# Patient Record
Sex: FEMALE | Race: WHITE | NOT HISPANIC OR LATINO | Employment: FULL TIME | URBAN - METROPOLITAN AREA
[De-identification: names, ages, dates, MRNs, and addresses within clinical notes are randomized per-mention and may not be internally consistent; named-entity substitution may affect disease eponyms.]

---

## 2017-01-16 ENCOUNTER — TRANSFERRED RECORDS (OUTPATIENT)
Dept: HEALTH INFORMATION MANAGEMENT | Facility: CLINIC | Age: 47
End: 2017-01-16

## 2017-02-08 DIAGNOSIS — I10 ESSENTIAL HYPERTENSION WITH GOAL BLOOD PRESSURE LESS THAN 140/90: Primary | ICD-10-CM

## 2017-02-08 RX ORDER — TRIAMTERENE AND HYDROCHLOROTHIAZIDE 37.5; 25 MG/1; MG/1
1 CAPSULE ORAL DAILY
Qty: 90 CAPSULE | Refills: 0 | Status: SHIPPED | OUTPATIENT
Start: 2017-02-08 | End: 2017-05-07

## 2017-02-08 NOTE — TELEPHONE ENCOUNTER
triamterene-hydrochlorothiazide (DYAZIDE) 37.5-25 MG per capsule      Last Written Prescription Date: 12/14/16  Last Fill Quantity: 90, # refills: 0  Last Office Visit with G, P or [facility] prescribing provider: 11/14/16       POTASSIUM   Date Value Ref Range Status   06/24/2015 3.7 3.4 - 5.3 mmol/L Final     CREATININE   Date Value Ref Range Status   06/24/2015 0.71 0.52 - 1.04 mg/dL Final     BP Readings from Last 3 Encounters:   11/14/16 124/86   05/20/16 118/94   12/15/15 124/72

## 2017-05-07 DIAGNOSIS — I10 ESSENTIAL HYPERTENSION WITH GOAL BLOOD PRESSURE LESS THAN 140/90: ICD-10-CM

## 2017-05-08 NOTE — TELEPHONE ENCOUNTER
TRIAMTERENE 37.5MG/ HCTZ 25MG CAPS        Last Written Prescription Date: 2/8/17  Last Fill Quantity: 90, # refills: 0  Last Office Visit with [facility], P or [facility] prescribing provider: 11/14/16       Potassium   Date Value Ref Range Status   06/24/2015 3.7 3.4 - 5.3 mmol/L Final     Creatinine   Date Value Ref Range Status   06/24/2015 0.71 0.52 - 1.04 mg/dL Final     BP Readings from Last 3 Encounters:   11/14/16 124/86   05/20/16 (!) 118/94   12/15/15 124/72

## 2017-05-09 RX ORDER — TRIAMTERENE AND HYDROCHLOROTHIAZIDE 37.5; 25 MG/1; MG/1
CAPSULE ORAL
Qty: 90 CAPSULE | Refills: 0 | Status: SHIPPED | OUTPATIENT
Start: 2017-05-09 | End: 2020-07-06

## 2017-05-09 NOTE — TELEPHONE ENCOUNTER
"Routing refill request to provider for review/approval because:  The following documentation was noted on 11/14/16 and there has been no follow up:  \"(I10) Essential hypertension with goal blood pressure less than 140/90  Comment: well controlled haven't taken triamterene-hydrochlorothiazide (DYAZIDE) for 2 days,   Plan: discontinue medication for 3 months, recheck blood pressure at that time     (E78.5) Hyperlipidemia with target LDL less than 100  Comment: Reducing well with diet and exercise, not on any medication at this time  Plan: recheck lipid panel in 3 months\"  Eron De Leon RN          "

## 2017-08-10 ENCOUNTER — TRANSFERRED RECORDS (OUTPATIENT)
Dept: HEALTH INFORMATION MANAGEMENT | Facility: CLINIC | Age: 47
End: 2017-08-10

## 2017-12-26 ENCOUNTER — TELEPHONE (OUTPATIENT)
Dept: LAB | Facility: CLINIC | Age: 47
End: 2017-12-26

## 2017-12-26 NOTE — TELEPHONE ENCOUNTER
Spoke with pt and she is unable to remain at the [facility] due to insurance.  Will cancel these labs.  Ivy TENORIO RN

## 2018-03-05 RX ORDER — FLUCONAZOLE 150 MG/1
TABLET ORAL
Qty: 1 TABLET | Refills: 0 | OUTPATIENT
Start: 2018-03-05

## 2019-05-08 ENCOUNTER — RECORDS - HEALTHEAST (OUTPATIENT)
Dept: ADMINISTRATIVE | Facility: OTHER | Age: 49
End: 2019-05-08

## 2019-06-07 ENCOUNTER — RECORDS - HEALTHEAST (OUTPATIENT)
Dept: ADMINISTRATIVE | Facility: OTHER | Age: 49
End: 2019-06-07

## 2019-09-29 ENCOUNTER — HEALTH MAINTENANCE LETTER (OUTPATIENT)
Age: 49
End: 2019-09-29

## 2020-02-06 ENCOUNTER — VIRTUAL VISIT (OUTPATIENT)
Dept: FAMILY MEDICINE | Facility: OTHER | Age: 50
End: 2020-02-06

## 2020-02-07 NOTE — PROGRESS NOTES
"Date: 2020 18:28:36  Clinician: Heena De Leon  Clinician NPI: [id number]  Patient: Jaz Archibald  Patient : 1970  Patient Address: [facility] [place] [address]  Patient Phone: [phone]  Visit Protocol: URI  Patient Summary:  Jaz is a 50 year old ( : 1970 ) female who initiated a Visit for cold, sinus infection, or influenza. When asked the question \"Please sign me up to receive news, health information and promotions from [employer].\", Jaz responded \"Yes\".    Jaz states her symptoms started today.   Her symptoms consist of a cough, myalgia, chills, and malaise. Jaz also feels feverish.   Symptom details     Cough: Jaz coughs every 5-10 minutes and her cough is more bothersome at night. Phlegm does not come into her throat when she coughs.     Temperature: Her current temperature is 100.03 degrees Fahrenheit. Jaz has had a temperature over 100 degrees Fahrenheit for 1-2 days.      Jaz denies having facial pain or pressure, headache, sore throat, ear pain, enlarged lymph nodes, nasal congestion, rhinitis, wheezing, and teeth pain. She also denies having recent facial or sinus surgery in the past 60 days and taking antibiotic medication for the symptoms. She is not experiencing dyspnea.   Precipitating events  She has recently been exposed to someone with influenza. Jaz has not been in close contact with any high risk individuals.   Jaz has not traveled internationally in the last 14 days before the start of her symptoms.   Pertinent medical history  Jaz does not get yeast infections when she takes antibiotics.   Weight: 150 lbs   Jaz does not smoke or use smokeless tobacco.   Weight: 150 lbs    MEDICATIONS: Lexapro oral, ALLERGIES: trazodone, Lexapro, hydrochlorothiazide, omeprazole  Clinician Response:  Dear Jaz,  Based on the information provided, you have influenza (the flu). The flu is a very contagious infection that " can lead to a serious illness in anyone, but some are at risk for becoming more sick than others. For this reason, it is important to know you have the flu so you can take steps to prevent spreading it to others.  Medication information  I am prescribing:     Oseltamivir (Tamiflu) 75 mg oral capsule. Take 1 capsule by mouth 2 times per day for 5 days. There are no refills with this prescription.   Self care  The following tips will keep you as comfortable as possible while you recover:     Rest    Drink plenty of water and other liquids    Take a hot shower to loosen congestion    Take a spoonful of honey to reduce your cough     If you have a fever, stay home until your temperature has returned to normal for 24 hours and you feel well enough for daily activities. And of course, wash your hands often to prevent spreading the flu and other illnesses. However, the best way to prevent the flu is to get a flu shot before each flu season.  When to seek care  Please be seen in a clinic or urgent care if new symptoms develop, or symptoms become worse.   Diagnosis: Influenza  Diagnosis ICD: J11.1  Prescription: oseltamivir (Tamiflu) 75 mg oral capsule 10 capsule, 5 days supply. Take 1 capsule by mouth 2 times per day for 5 days. Refills: 0, Refill as needed: no, Allow substitutions: yes  Pharmacy: [facility] #[id number] - [phone] - [address] [place] [address]

## 2020-03-08 ENCOUNTER — VIRTUAL VISIT (OUTPATIENT)
Dept: FAMILY MEDICINE | Facility: OTHER | Age: 50
End: 2020-03-08

## 2020-03-08 NOTE — PROGRESS NOTES
"Date: 2020 12:10:19  Clinician: Girish Mock  Clinician NPI: [id number]  Patient: Jaz Archibald  Patient : 1970  Patient Address: [address] [place] [address] [place] [id number]  Patient Phone: [phone]  Visit Protocol: UTI  Patient Summary:  Jaz is a 50 year old ( : 1970 ) female who initiated a Visit for a presumed bladder infection. When asked the question \"Please sign me up to receive news, health information and promotions from [employer].\", Jaz responded \"Yes\".   Her symptoms started 1-3 days ago and consist of dysuria, feeling as if the bladder is never empty, abdominal pain, urinary frequency, urgency, and urinary incontinence.   Symptom details     Urine color: The color of her urine is yellow.     Abdominal pain: The pain is mild (1-3 on a 10 point pain scale).      Denied symptoms include foul-smelling urine, nausea, flank pain, chills, vaginal discharge, vomiting, and vaginal itching. She does not feel feverish.   Jaz has not used any over-the-counter medications or home remedies to relieve her current symptoms.  Precipitating events  Jaz denies having a sexually transmitted disease.  Pertinent medical history  Jaz has had a bladder infection before and has had 1 in the past 12 months. Her most recent bladder infection was not within the last 4 weeks. Her current symptoms are similar to her previous bladder infection symptoms.   She is not sure what antibiotics have been effective in treating her past bladder infections.   Jaz typically gets a yeast infection when she takes antibiotics. She has used fluconazole (Diflucan) to treat previous yeast infections. 2 doses of fluconazole (Diflucan) has typically been needed for symptoms to resolve in the past.  Jaz has not been prescribed antibiotics to prevent frequent or repeated bladder infections in the past. She has not experienced problems or side effects with any of the common antibiotics used to treat " bladder infections.   Jaz does not have a history of kidney stones. She has not used a catheter or been a patient in a hospital or nursing home in the past 2 weeks.   Jaz does not smoke or use smokeless tobacco.   Additional information as reported by the patient (free text): There is one antibiotic that makes me nauseated, but, unfortunately I do not remember the name. I also have q t prolongation (one test) and a DrLaura told me that it could change prescription antibiotics. Thanks!     MEDICATIONS: triamterene-hydrochlorothiazide oral, omeprazole oral, Lexapro oral, ALLERGIES: NKDA  Clinician Response:  Dear Jaz,  Based on the information you have provided, you likely have an acute urinary tract infection, also called a bladder infection. Bladder infections occur when bacteria from the outside of the body enters the urinary tract. Any part of the urinary system can be infected, but the bladder is the most common.  Medication information  I am prescribing:     Nitrofurantoin monohyd/m-cryst (Macrobid) 100 mg oral capsule. Take 1 capsule by mouth every 12 hours for 5 days. Take this medication with food. There are no refills with this prescription.   The medication I prescribed for your bladder infection is an antibiotic. Continue taking the medication until it is gone even if you feel better.   Yeast infections can be a common side effect of antibiotics. The most common symptom of a yeast infection is itchiness in and around the vagina. Other signs and symptoms include burning, redness, or a thick, white vaginal discharge that looks like cottage cheese and does not have a bad smell.  Self care  Urination helps to flush bacteria from the urinary tract. For this reason, drinking water and urinating often helps relieve some urinary symptoms and can decrease your risk of getting bladder infections in the future.  Other steps you can take to prevent future bladder infections include:     Wipe front to back  after using the bathroom    Urinate after sexual intercourse    Avoid using deodorant sprays, douches, or powders in the vaginal area     When to seek care  Please make an appointment to be seen in a clinic or urgent care if any of the following occur:     You develop new symptoms or your symptoms become worse    You have medication side effects that make it difficult to take them as prescribed    Your symptoms do not improve within 1-2 days of starting treatment    You have symptoms of a bladder infection that return shortly after completing treatment     It is possible to have an allergic reaction to an antibiotic even if you have not had one in the past. If you notice a new rash, significant swelling, or difficulty breathing, stop taking this medication immediately and go to a clinic or urgent care.   Diagnosis: Acute uncomplicated bladder infection  Diagnosis ICD: N39.0  Prescription: nitrofurantoin monohyd/m-cryst (Macrobid) 100 mg oral capsule 10 capsule, 5 days supply. Take 1 capsule by mouth every 12 hours for 5 days. Refills: 0, Refill as needed: no, Allow substitutions: yes  Pharmacy: [facility] #[id number] - [phone] - [address] [place] [address]  Addendum created: March 08 12:42:47, 2020 created by: Girish smith: 150 Mg tablet PO once  Addendum created: March 08 12:42:19, 2020 created by: Girish smith: Pyridium is available OTC.  Addendum created: March 08 12:14:18, 2020 created by: Girish smith: sorry, I forgot to add Diflucan.  If patient requests this please phone in for her with 1 refill.

## 2020-03-12 ENCOUNTER — OFFICE VISIT - HEALTHEAST (OUTPATIENT)
Dept: INTERNAL MEDICINE | Facility: CLINIC | Age: 50
End: 2020-03-12

## 2020-03-12 DIAGNOSIS — F41.1 ANXIETY STATE: ICD-10-CM

## 2020-03-12 DIAGNOSIS — Z00.00 ENCOUNTER FOR PREVENTIVE HEALTH EXAMINATION: ICD-10-CM

## 2020-03-12 DIAGNOSIS — Z85.828 HISTORY OF BASAL CELL CARCINOMA: ICD-10-CM

## 2020-03-12 DIAGNOSIS — F90.0 ATTENTION DEFICIT HYPERACTIVITY DISORDER (ADHD), PREDOMINANTLY INATTENTIVE TYPE: ICD-10-CM

## 2020-03-12 DIAGNOSIS — E78.00 ELEVATED LDL CHOLESTEROL LEVEL: ICD-10-CM

## 2020-03-12 DIAGNOSIS — Z12.31 VISIT FOR SCREENING MAMMOGRAM: ICD-10-CM

## 2020-03-12 DIAGNOSIS — E55.9 VITAMIN D DEFICIENCY: ICD-10-CM

## 2020-03-12 DIAGNOSIS — Z82.62 FAMILY HISTORY OF OSTEOPOROSIS: ICD-10-CM

## 2020-03-12 ASSESSMENT — MIFFLIN-ST. JEOR: SCORE: 1329.26

## 2020-03-18 ENCOUNTER — COMMUNICATION - HEALTHEAST (OUTPATIENT)
Dept: INTERNAL MEDICINE | Facility: CLINIC | Age: 50
End: 2020-03-18

## 2020-03-19 ENCOUNTER — COMMUNICATION - HEALTHEAST (OUTPATIENT)
Dept: INTERNAL MEDICINE | Facility: CLINIC | Age: 50
End: 2020-03-19

## 2020-05-14 ENCOUNTER — RECORDS - HEALTHEAST (OUTPATIENT)
Dept: ADMINISTRATIVE | Facility: OTHER | Age: 50
End: 2020-05-14

## 2020-05-25 ENCOUNTER — COMMUNICATION - HEALTHEAST (OUTPATIENT)
Dept: INTERNAL MEDICINE | Facility: CLINIC | Age: 50
End: 2020-05-25

## 2020-05-25 ENCOUNTER — COMMUNICATION - HEALTHEAST (OUTPATIENT)
Dept: SCHEDULING | Facility: CLINIC | Age: 50
End: 2020-05-25

## 2020-05-28 ENCOUNTER — OFFICE VISIT - HEALTHEAST (OUTPATIENT)
Dept: INTERNAL MEDICINE | Facility: CLINIC | Age: 50
End: 2020-05-28

## 2020-05-28 DIAGNOSIS — I10 BENIGN ESSENTIAL HYPERTENSION: ICD-10-CM

## 2020-05-28 DIAGNOSIS — K44.9 HIATAL HERNIA: ICD-10-CM

## 2020-05-28 DIAGNOSIS — K21.00 GASTROESOPHAGEAL REFLUX DISEASE WITH ESOPHAGITIS: ICD-10-CM

## 2020-05-28 ASSESSMENT — MIFFLIN-ST. JEOR: SCORE: 1333.8

## 2020-06-05 ENCOUNTER — RECORDS - HEALTHEAST (OUTPATIENT)
Dept: ADMINISTRATIVE | Facility: OTHER | Age: 50
End: 2020-06-05

## 2020-06-10 ENCOUNTER — COMMUNICATION - HEALTHEAST (OUTPATIENT)
Dept: INTERNAL MEDICINE | Facility: CLINIC | Age: 50
End: 2020-06-10

## 2020-06-11 ENCOUNTER — RECORDS - HEALTHEAST (OUTPATIENT)
Dept: ADMINISTRATIVE | Facility: OTHER | Age: 50
End: 2020-06-11

## 2020-06-16 ENCOUNTER — HOSPITAL ENCOUNTER (OUTPATIENT)
Dept: NUCLEAR MEDICINE | Facility: CLINIC | Age: 50
Setting detail: NUCLEAR MEDICINE
Discharge: HOME | End: 2020-06-16
Attending: INTERNAL MEDICINE | Admitting: INTERNAL MEDICINE
Payer: COMMERCIAL

## 2020-06-16 ENCOUNTER — RECORDS - HEALTHEAST (OUTPATIENT)
Dept: ADMINISTRATIVE | Facility: OTHER | Age: 50
End: 2020-06-16

## 2020-06-16 DIAGNOSIS — K21.9 CHRONIC GERD: ICD-10-CM

## 2020-06-16 DIAGNOSIS — K20.90 ESOPHAGITIS: ICD-10-CM

## 2020-06-16 PROCEDURE — A9541 TC99M SULFUR COLLOID: HCPCS | Performed by: RADIOLOGY

## 2020-06-16 PROCEDURE — 34300033 ZZH RX 343: Performed by: RADIOLOGY

## 2020-06-16 PROCEDURE — 78264 GASTRIC EMPTYING IMG STUDY: CPT

## 2020-06-16 RX ADMIN — Medication 1.1 MILLICURIE: at 08:05

## 2020-07-01 ENCOUNTER — COMMUNICATION - HEALTHEAST (OUTPATIENT)
Dept: INTERNAL MEDICINE | Facility: CLINIC | Age: 50
End: 2020-07-01

## 2020-07-01 DIAGNOSIS — Z00.00 ROUTINE GENERAL MEDICAL EXAMINATION AT A HEALTH CARE FACILITY: ICD-10-CM

## 2020-07-01 DIAGNOSIS — E55.9 VITAMIN D DEFICIENCY: Primary | ICD-10-CM

## 2020-07-03 DIAGNOSIS — Z00.00 ROUTINE GENERAL MEDICAL EXAMINATION AT A HEALTH CARE FACILITY: ICD-10-CM

## 2020-07-03 DIAGNOSIS — E55.9 VITAMIN D DEFICIENCY: ICD-10-CM

## 2020-07-03 LAB
ALBUMIN UR-MCNC: NEGATIVE MG/DL
APPEARANCE UR: CLEAR
BASOPHILS # BLD AUTO: 0.1 10E9/L (ref 0–0.2)
BASOPHILS NFR BLD AUTO: 0.8 %
BILIRUB UR QL STRIP: NEGATIVE
COLOR UR AUTO: YELLOW
DEPRECATED CALCIDIOL+CALCIFEROL SERPL-MC: 37 UG/L (ref 20–75)
DIFFERENTIAL METHOD BLD: NORMAL
EOSINOPHIL # BLD AUTO: 0.1 10E9/L (ref 0–0.7)
EOSINOPHIL NFR BLD AUTO: 1.5 %
ERYTHROCYTE [DISTWIDTH] IN BLOOD BY AUTOMATED COUNT: 13.6 % (ref 10–15)
FSH SERPL-ACNC: 1.5 IU/L
GLUCOSE UR STRIP-MCNC: NEGATIVE MG/DL
HCT VFR BLD AUTO: 44.3 % (ref 35–47)
HGB BLD-MCNC: 15.1 G/DL (ref 11.7–15.7)
HGB UR QL STRIP: NEGATIVE
KETONES UR STRIP-MCNC: NEGATIVE MG/DL
LEUKOCYTE ESTERASE UR QL STRIP: NEGATIVE
LH SERPL-ACNC: 3.7 IU/L
LYMPHOCYTES # BLD AUTO: 2.9 10E9/L (ref 0.8–5.3)
LYMPHOCYTES NFR BLD AUTO: 38.6 %
MCH RBC QN AUTO: 29.7 PG (ref 26.5–33)
MCHC RBC AUTO-ENTMCNC: 34.1 G/DL (ref 31.5–36.5)
MCV RBC AUTO: 87 FL (ref 78–100)
MONOCYTES # BLD AUTO: 0.6 10E9/L (ref 0–1.3)
MONOCYTES NFR BLD AUTO: 8.4 %
NEUTROPHILS # BLD AUTO: 3.8 10E9/L (ref 1.6–8.3)
NEUTROPHILS NFR BLD AUTO: 50.7 %
NITRATE UR QL: NEGATIVE
PH UR STRIP: 5.5 PH (ref 5–7)
PLATELET # BLD AUTO: 367 10E9/L (ref 150–450)
RBC # BLD AUTO: 5.09 10E12/L (ref 3.8–5.2)
SOURCE: NORMAL
SP GR UR STRIP: 1.02 (ref 1–1.03)
UROBILINOGEN UR STRIP-ACNC: 0.2 EU/DL (ref 0.2–1)
WBC # BLD AUTO: 7.5 10E9/L (ref 4–11)

## 2020-07-03 PROCEDURE — 82306 VITAMIN D 25 HYDROXY: CPT | Performed by: NURSE PRACTITIONER

## 2020-07-03 PROCEDURE — 83001 ASSAY OF GONADOTROPIN (FSH): CPT | Performed by: NURSE PRACTITIONER

## 2020-07-03 PROCEDURE — 36415 COLL VENOUS BLD VENIPUNCTURE: CPT | Performed by: NURSE PRACTITIONER

## 2020-07-03 PROCEDURE — 80050 GENERAL HEALTH PANEL: CPT | Performed by: NURSE PRACTITIONER

## 2020-07-03 PROCEDURE — 80061 LIPID PANEL: CPT | Performed by: NURSE PRACTITIONER

## 2020-07-03 PROCEDURE — 83002 ASSAY OF GONADOTROPIN (LH): CPT | Performed by: NURSE PRACTITIONER

## 2020-07-03 PROCEDURE — 81003 URINALYSIS AUTO W/O SCOPE: CPT | Performed by: NURSE PRACTITIONER

## 2020-07-04 LAB
ALBUMIN SERPL-MCNC: 3.9 G/DL (ref 3.4–5)
ALP SERPL-CCNC: 57 U/L (ref 40–150)
ALT SERPL W P-5'-P-CCNC: 21 U/L (ref 0–50)
ANION GAP SERPL CALCULATED.3IONS-SCNC: 10 MMOL/L (ref 3–14)
AST SERPL W P-5'-P-CCNC: 9 U/L (ref 0–45)
BILIRUB SERPL-MCNC: 0.4 MG/DL (ref 0.2–1.3)
BUN SERPL-MCNC: 16 MG/DL (ref 7–30)
CALCIUM SERPL-MCNC: 9 MG/DL (ref 8.5–10.1)
CHLORIDE SERPL-SCNC: 105 MMOL/L (ref 94–109)
CHOLEST SERPL-MCNC: 264 MG/DL
CO2 SERPL-SCNC: 20 MMOL/L (ref 20–32)
CREAT SERPL-MCNC: 0.76 MG/DL (ref 0.52–1.04)
GFR SERPL CREATININE-BSD FRML MDRD: >90 ML/MIN/{1.73_M2}
GLUCOSE SERPL-MCNC: 78 MG/DL (ref 70–99)
HDLC SERPL-MCNC: 57 MG/DL
LDLC SERPL CALC-MCNC: 160 MG/DL
NONHDLC SERPL-MCNC: 207 MG/DL
POTASSIUM SERPL-SCNC: 3.9 MMOL/L (ref 3.4–5.3)
PROT SERPL-MCNC: 7.9 G/DL (ref 6.8–8.8)
SODIUM SERPL-SCNC: 135 MMOL/L (ref 133–144)
TRIGL SERPL-MCNC: 235 MG/DL
TSH SERPL DL<=0.005 MIU/L-ACNC: 2.22 MU/L (ref 0.4–4)

## 2020-07-06 ENCOUNTER — OFFICE VISIT (OUTPATIENT)
Dept: FAMILY MEDICINE | Facility: CLINIC | Age: 50
End: 2020-07-06
Payer: COMMERCIAL

## 2020-07-06 ENCOUNTER — RECORDS - HEALTHEAST (OUTPATIENT)
Dept: ADMINISTRATIVE | Facility: OTHER | Age: 50
End: 2020-07-06

## 2020-07-06 VITALS
OXYGEN SATURATION: 95 % | SYSTOLIC BLOOD PRESSURE: 136 MMHG | TEMPERATURE: 98 F | BODY MASS INDEX: 25.28 KG/M2 | WEIGHT: 157.31 LBS | HEART RATE: 84 BPM | HEIGHT: 66 IN | DIASTOLIC BLOOD PRESSURE: 92 MMHG

## 2020-07-06 DIAGNOSIS — I10 ESSENTIAL HYPERTENSION WITH GOAL BLOOD PRESSURE LESS THAN 140/90: ICD-10-CM

## 2020-07-06 DIAGNOSIS — E78.00 ELEVATED LDL CHOLESTEROL LEVEL: ICD-10-CM

## 2020-07-06 PROBLEM — Z00.6 ENCOUNTER FOR EXAMINATION FOR NORMAL COMPARISON AND CONTROL IN CLINICAL RESEARCH PROGRAM: Status: ACTIVE | Noted: 2019-08-19

## 2020-07-06 PROBLEM — Z85.828 HISTORY OF BASAL CELL CARCINOMA: Status: ACTIVE | Noted: 2018-05-09

## 2020-07-06 RX ORDER — PANTOPRAZOLE SODIUM 40 MG/1
40 TABLET, DELAYED RELEASE ORAL 2 TIMES DAILY
Status: ON HOLD | COMMUNITY
Start: 2020-06-11 | End: 2021-07-29

## 2020-07-06 RX ORDER — ESCITALOPRAM OXALATE 20 MG/1
20 TABLET ORAL AT BEDTIME
COMMUNITY
Start: 2019-12-15 | End: 2021-07-30

## 2020-07-06 RX ORDER — AMLODIPINE BESYLATE 2.5 MG/1
2.5 TABLET ORAL
COMMUNITY
Start: 2019-09-10 | End: 2021-05-20

## 2020-07-06 RX ORDER — CLONAZEPAM 1 MG/1
TABLET ORAL
COMMUNITY
Start: 2020-06-30 | End: 2021-05-20

## 2020-07-06 RX ORDER — VALACYCLOVIR HYDROCHLORIDE 1 G/1
1000 TABLET, FILM COATED ORAL PRN
COMMUNITY
Start: 2018-12-17 | End: 2021-08-06

## 2020-07-06 RX ORDER — IMIQUIMOD 12.5 MG/.25G
CREAM TOPICAL
COMMUNITY
Start: 2019-03-29 | End: 2021-05-21

## 2020-07-06 RX ORDER — TRIAMTERENE AND HYDROCHLOROTHIAZIDE 37.5; 25 MG/1; MG/1
1 CAPSULE ORAL EVERY MORNING
Qty: 90 CAPSULE | Refills: 0 | COMMUNITY
Start: 2020-07-06 | End: 2021-07-30

## 2020-07-06 RX ORDER — SUCRALFATE 1 G/1
1 TABLET ORAL 4 TIMES DAILY PRN
COMMUNITY
Start: 2020-06-11 | End: 2021-12-09

## 2020-07-06 ASSESSMENT — MIFFLIN-ST. JEOR: SCORE: 1345.31

## 2020-07-06 NOTE — PATIENT INSTRUCTIONS
Initial goal is to achieve a small amount of weight loss to help BP and cholesterol. A 5% weight loss would about 8 lbs.     We hope that Jaz Archibald can do this through sustainable lifestyle changes.    DIET ASSESSMENT/PLAN:    Her diet does not seem high in calories, but not formally assessed.     Has protein in the morning.     Does not eat before bed.     Minimal snacking    Referred to Nutrition counseling.     PHYSICAL ACTIVITY ASSESSMENT/PLAN:    Continue on twice weekly strength training.    Add activities on other days, including brisk walking, consider Nordic Walking poles. Technique taught. Increases energy expenditure.     Seek a minimum of about 8-10K steps per day    Also, use standing desk with proper technique. Technique taught      Follow-up:    With Nutrition - Set up visit    With Dr. Atkinson in 6 weeks.

## 2020-07-06 NOTE — NURSING NOTE
"50 year old  Chief Complaint   Patient presents with     Weight Problem     new lifestyle weight management        Blood pressure (!) 136/92, pulse 84, temperature 98  F (36.7  C), temperature source Temporal, height 1.676 m (5' 6\"), weight 71.4 kg (157 lb 5 oz), last menstrual period 07/26/2013, SpO2 95 %, not currently breastfeeding. Body mass index is 25.39 kg/m .  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, insomnia type     Hypothyroidism     Health Care Home     Dyspareunia     Hyperlipidemia LDL goal <130     Essential hypertension     QT prolongation     Persons encountering health services in other specified circumstances     History of basal cell carcinoma     Environmental allergies     Encounter for examination for normal comparison and control in clinical research program     Elevated LDL cholesterol level     Vitamin D deficiency       Wt Readings from Last 2 Encounters:   07/06/20 71.4 kg (157 lb 5 oz)   11/14/16 65.6 kg (144 lb 11.2 oz)     BP Readings from Last 3 Encounters:   07/06/20 (!) 136/92   11/14/16 124/86   05/20/16 (!) 118/94         Current Outpatient Medications   Medication     clonazePAM (KLONOPIN) 1 MG tablet     EPINEPHrine (EPIPEN) 0.3 MG/0.3ML injection     escitalopram (LEXAPRO) 20 MG tablet     hyoscyamine (LEVSIN/SL) 0.125 MG sublingual tablet     imiquimod (ALDARA) 5 % external cream     olopatadine (PATANOL) 0.1 % ophthalmic solution     pantoprazole (PROTONIX) 40 MG EC tablet     sucralfate (CARAFATE) 1 GM tablet     traZODone (DESYREL) 50 MG tablet     triamterene-hydrochlorothiazide (DYAZIDE) 37.5-25 MG per capsule     valACYclovir (VALTREX) 1000 mg tablet     amLODIPine (NORVASC) 2.5 MG tablet     No current facility-administered medications for this visit.        Social History     Tobacco Use     Smoking status: Never Smoker     Smokeless tobacco: Never Used   Substance " Use Topics     Alcohol use: Yes     Alcohol/week: 0.0 standard drinks     Comment: 2-3 drinks per week     Drug use: No       Health Maintenance Due   Topic Date Due     COLORECTAL CANCER SCREENING  02/01/1979     HIV SCREENING  02/01/1984     MAMMO SCREENING  05/28/2017     PREVENTIVE CARE VISIT  11/14/2017     ZOSTER IMMUNIZATION (1 of 2) 02/01/2019     PHQ-2  01/01/2020       Lab Results   Component Value Date    PAP NIL 06/26/2013 July 6, 2020 10:17 AM

## 2020-07-06 NOTE — PROGRESS NOTES
"INTRODUCTION: Ms. Jaz Archibald is a 50 year old y.o. female who has hypertension, GERD and hypercholestemia who presents for her initial visit to the to discuss strategies for healthy weight loss.       Had a hysterectomy. Doesn't think that she's post-menopausal. Feels that her weight is slowly increasing. A brief review of her chart shows fluctuations from the mid-130s up to the upper 150's.   Has hypertension, high cholesterol and GERD. Has been vomiting at night. Also, coughing at night.   Seeing GI specialist in next few days.     Her goal is \"to lose 25-30 lbs.\" States that everyone at her job gains weight. Wants to weigh about 135lbs. She determined this through the BMI charts.   Feeling on the edge \"both physically and mentally.\" There's lots of obesity in her family and she's scared that she  Is headed that way.   Her  is losing weight through medication prescribed by the [facility] [place] Med management program.   He is supportive.     Wants to control her BP, chol and reflux through lifestyle change    Patient Supplied Answers To [facility] Lifestyle Weight Management Intake Questionnaire:     Sports Medicine Lifestyle Management Questionnaire Responses  Reasons for coming:  Lifestyle Mgmt Reason for Coming 7/5/2020   Were you referred here? No   If you were referred here who referred you? DANILO Lawson in [facility]   Please describe your reasons for coming to this weight management program: Health problems related to weight increase       History of obesity and weight loss attempts:  Lifestyle Mgmt Obesity History and Wgt Loss Attempts 7/5/2020   Have you tried other weight loss programs?  Please check all that apply: Weight Watchers   Please list any other weight loss programs that you have tried: Gym membership programs   Have you done any of the following things in order to lose weight or keep from gaining weight? Fasted/skipped meals, Used food substitutes (powder, special " drink)   Has tried 'intermittent fasting', only eating between 10-6pm.       Dietary history:  Lifestyle Mgmt Dietary History 7/5/2020   Stress makes me eat Mostly true   Feeling sad makes me eat Mostly false   Feeling lonely makes me eat Mostly false   Do you go on eating binges even though you are not hungry? No     Alcohol intake = 1-2 nights/week. About 1-2 drinks/night.   Occasional diet soda.   No juices as hurts her GERD.   Minimal snacks. Generally apples or bananas.   Has protein shake in the morning.     Sleep Habits:  Lifestyle Mgmt Sleep Habits 7/5/2020   Do you have a scale at home? Yes   Have you ever been told you have sleep apnea? No   Do you generally get up in the morning feeling rested and ready for the day? Yes   Do you snore at night? Yes   Does sleepiness during the day affect your ability to function at work? No   Have you ever fallen asleep while driving? No   Have you ever fallen asleep during work? No   I find it difficult to resist eating tasty food even if I am not hungry 4 = Moderately agree   If I want something, I may buy it even if it is beyond my financial budget 5 = Strongly agree   I go to bed early so that I can feel better the next day 5 = Strongly agree   I try to find time for exercises that make me feel good 4 = Moderately agree   I would rather have $10.00 today than $15.00 in a week  1 = Strongly disagree       Physical Activity History:  Lifestyle Mgmt Physical Activity History 7/5/2020   STRENUOUS EXERCISE (HEART BEATS RAPIDLY) Number of times per week 2   MILD EXERCISE (MINIMAL EFFECT) Number of times per week 1   Do you have access to a gymnasium? Yes     Does high intensity weight lifting. This has had a large effect on cholesterol. Yet, not helping her lose weight.     Has a standing desk at office. Has low back problems with low back arthritis.   Has watch step counter. On focused days, gets 10K. On other days, around 5K       Review of systems: Otherwise in her usual  "state of health.      Past Medical History:  has a past medical history of Hypertension and Urethral hypermobility (6/26/2013).    Speciality comments:  June 5, 2013, 24 hour [facility] contact form given to patient today.     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   Lives in [facility].     . Two children, 24 and 20 yo    Lab Results  TSH   Date Value Ref Range Status   07/03/2020 2.22 0.40 - 4.00 mU/L Final     Recent Labs   Lab Test 07/03/20  0859 05/20/16  0754 05/28/15  0840 06/05/13  1024   CHOL 264* 248* 312* 264*   HDL 57 60 76 68   * 148* 202* 165*   TRIG 235* 199* 171* 153*   CHOLHDLRATIO  --   --  4.1 4.0         PHYSICAL EXAM:  Blood pressure (!) 136/92, pulse 84, temperature 98  F (36.7  C), temperature source Temporal, height 1.676 m (5' 6\"), weight 71.4 kg (157 lb 5 oz), last menstrual period 07/26/2013, SpO2 95 %, not currently breastfeeding.    No flowsheet data found.    Ms. Archibald is well and in no apparent distress. Able to rise from a seated position without difficulty and ambulate with a normal gait.     HEENT: Normal  CV: Regular rate and rhythm without murmer  Lungs: Clear to Ausculation  Musculoskeletal: No limitations noted in hips, knees or lower legs and feet.   No edema    IMPRESSION: 50 year old y.o. with obesity who is motivated to lose weight in order to improve hypertension, hypercholesterolemia, GERD and general health.      Discussed controversies re: ideal body weight with aging.   Also, discussed the lack of clear standards regarding body fat percentages.     Suggested initial goal is to achieve a small amount of weight loss to help BP and cholesterol. A 5% weight loss would about 8 lbs.     We hope that Jaz Archibald can do this through sustainable lifestyle changes.    DIET ASSESSMENT/PLAN:    Her diet does not seem high in calories, but not formally assessed.     Has protein in the " morning.     Does not eat before bed.     Minimal snacking    Referred to Nutrition counseling.     PHYSICAL ACTIVITY ASSESSMENT/PLAN:    Continue on twice weekly strength training.    Add activities on other days, including brisk walking, consider Nordic Walking poles. Technique taught. Increases energy expenditure.     Seek a minimum of about 8-10K steps per day    Also, use standing desk with proper technique. Technique taught      Follow-up:    With Nutrition - Set up visit    With Dr. Atkinson in 6 weeks.       Over 50% of the 45 minute face-to-face clinic visit time was spent in counseling regarding strategies to achieve lifestyle changes in diet and physical activity as described above.   --Montrell Atkinson MD

## 2020-07-15 ENCOUNTER — TELEPHONE (OUTPATIENT)
Dept: FAMILY MEDICINE | Facility: CLINIC | Age: 50
End: 2020-07-15

## 2020-07-15 NOTE — TELEPHONE ENCOUNTER
Health Call Center    Phone Message    May a detailed message be left on voicemail: yes     Reason for Call: Other: Hank calling to let Claire know that she is not available until after 2:00 p.m. today (7/15/20) due to other appointments she has scheduled. Hank would like to hear back from Claire arnold if possible. Please give Hank a call back at your earliest convenience to discuss.     Action Taken: Message routed to:  [facility]: Primary Care    Travel Screening: Not Applicable

## 2020-07-30 ENCOUNTER — TRANSFERRED RECORDS (OUTPATIENT)
Dept: HEALTH INFORMATION MANAGEMENT | Facility: CLINIC | Age: 50
End: 2020-07-30

## 2020-07-30 ENCOUNTER — RECORDS - HEALTHEAST (OUTPATIENT)
Dept: ADMINISTRATIVE | Facility: OTHER | Age: 50
End: 2020-07-30

## 2020-08-06 ENCOUNTER — RECORDS - HEALTHEAST (OUTPATIENT)
Dept: ADMINISTRATIVE | Facility: OTHER | Age: 50
End: 2020-08-06

## 2020-08-06 ENCOUNTER — TRANSFERRED RECORDS (OUTPATIENT)
Dept: HEALTH INFORMATION MANAGEMENT | Facility: CLINIC | Age: 50
End: 2020-08-06

## 2020-08-10 ENCOUNTER — OFFICE VISIT (OUTPATIENT)
Dept: SURGERY | Facility: CLINIC | Age: 50
End: 2020-08-10
Payer: COMMERCIAL

## 2020-08-10 VITALS
WEIGHT: 157 LBS | SYSTOLIC BLOOD PRESSURE: 116 MMHG | BODY MASS INDEX: 25.23 KG/M2 | DIASTOLIC BLOOD PRESSURE: 80 MMHG | HEART RATE: 87 BPM | HEIGHT: 66 IN

## 2020-08-10 DIAGNOSIS — K44.9 HIATAL HERNIA: Primary | ICD-10-CM

## 2020-08-10 DIAGNOSIS — K21.9 GASTROESOPHAGEAL REFLUX DISEASE WITHOUT ESOPHAGITIS: ICD-10-CM

## 2020-08-10 PROCEDURE — 99215 OFFICE O/P EST HI 40 MIN: CPT | Performed by: SURGERY

## 2020-08-10 ASSESSMENT — MIFFLIN-ST. JEOR: SCORE: 1343.9

## 2020-08-14 ENCOUNTER — TRANSFERRED RECORDS (OUTPATIENT)
Dept: HEALTH INFORMATION MANAGEMENT | Facility: CLINIC | Age: 50
End: 2020-08-14
Payer: COMMERCIAL

## 2020-08-14 NOTE — PROGRESS NOTES
[facility] Consultation    CHIEF COMPLAINT:  Chief Complaint   Patient presents with     Consult     Hiatal hernia        HISTORY OF PRESENT ILLNESS:  Jaz Archibald is a 50 year old female who is seen in consultation at the request of Dr. Choi for evaluation of recalcitrant gastroesophageal reflux disease and known hiatal hernia.  She has been on multiple medications for years with only partial results at best, now the symptoms have progress to level not compatible with normal daily activities, she has to at times step out of meetings/work due to reflux driven vomiting.  She has been altering her oral intake to try and minimize the food related events but has been unsuccessful.  The reflux is driving ear, nasal and throat constant irritation.  She is here to learn about surgical options.    REVIEW OF SYSTEMS:  Constitutional:  Negative for chills, fatigue, fever and some weight up since the pandemic closings but working of weight control/loss.  Neuro: No extremity, nor facial weakness  Psych:  No unexpected changes in mood  Eyes:  Negative for new vision problems.  ENT:  See HPI.  Cardiovascular:  Negative for chest pain, palpitations.  Respiratory:  Negative for dyspnea but significant cough related to reflux  Gastrointestinal:  Negative for abdominal pain.     Musculoskeletal:  Negative for new arthralgias or myalgias.  Integumentary:  No new lesions nor rashes    Past Medical History:   Diagnosis Date     Hypertension      Urethral hypermobility 6/26/2013       Past Surgical History:   Procedure Laterality Date     GYN SURGERY  7/12/12    Novasure     HYSTERECTOMY, PAP NO LONGER INDICATED       LAPAROSCOPIC HYSTERECTOMY TOTAL  8/12/2013    Procedure: LAPAROSCOPIC HYSTERECTOMY TOTAL;  Laparoscopic Total Hysterectomy,Bilateral Salpingectomy with Sparing of the Ovaries,Uterosacral Suspension,Transvaginal Taping,Perineoplasty;  Surgeon: Sy Castro MD;  Location: [facility]     SLING  TRANSVAGINAL  8/12/2013    Procedure: SLING TRANSVAGINAL;;  Surgeon: Sy Castro MD;  Location: [facility] OR       Family History has been reviewed.    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, insomnia type     Hypothyroidism     Health Care Home     Dyspareunia     Hyperlipidemia LDL goal <130     Essential hypertension     QT prolongation     Persons encountering health services in other specified circumstances     History of basal cell carcinoma     Environmental allergies     Encounter for examination for normal comparison and control in clinical research program     Elevated LDL cholesterol level     Vitamin D deficiency       Allergies   Allergen Reactions     Bees Swelling     Disfiguring      Suture Swelling     local swelling and sutures didn't dissolve vyrcil   Suture        Current Outpatient Medications   Medication Sig Dispense Refill     amLODIPine (NORVASC) 2.5 MG tablet Take 2.5 mg by mouth       clonazePAM (KLONOPIN) 1 MG tablet        EPINEPHrine (EPIPEN) 0.3 MG/0.3ML injection Inject 0.3 mLs into the muscle once as needed for anaphylaxis. 2 each 3     escitalopram (LEXAPRO) 20 MG tablet        hyoscyamine (LEVSIN/SL) 0.125 MG sublingual tablet Take 0.125 mg by mouth       imiquimod (ALDARA) 5 % external cream        olopatadine (PATANOL) 0.1 % ophthalmic solution INT 1 OR 2 GTS AEY BID  6     pantoprazole (PROTONIX) 40 MG EC tablet        sucralfate (CARAFATE) 1 GM tablet        traZODone (DESYREL) 50 MG tablet TK SS TO ONE T PO QHS PRF INSOMNIA  2     triamterene-HCTZ (DYAZIDE) 37.5-25 MG capsule Take 1 capsule by mouth daily 90 capsule 0     valACYclovir (VALTREX) 1000 mg  "tablet Take 1,000 mg by mouth         Vitals: /80   Pulse 87   Ht 1.676 m (5' 6\")   Wt 71.2 kg (157 lb)   LMP 07/26/2013   BMI 25.34 kg/m    BMI= Body mass index is 25.34 kg/m .    EXAM:  GENERAL: healthy, alert and no distress     HEENT: moist mucus membranes, no scleral icterus,   CARDIOVASCULAR:  RRR, No JVD  NEURO:  Alert;  well oriented to time, place and person.  RESPIRATORY: non labored breathing  NECK: Neck supple. No noticeable masses.  No lymphadenopathy noted.  ABDOMEN/GI: benign  EXTREMITIES: warm and well perfused, no edema  SKIN: No suspicious lesions or rashes      LABS/Imaging: reviewed endoscopy and path, gastric emptying results, also the pH study results    ASSESSMENT:  Jaz Archibald suffers from 1. Hiatal hernia  - XR Esophagram w Upper GI    2. Gastroesophageal reflux disease without esophagitis    - objectively proven to have significant reflux to explain her symptoms.      PLAN:  Laparoscopic hiatal hernia repair with fundoplication      Jaz Archibald understands the risk, benefits, hopeful outcomes, and possible complications, both in the short and in the long term.  All her questions answered, she will like to proceed with the propose procedure in the near future.    It is my pleasure to participate in the care of Jaz Archibald. Thank you for this consultation.     If you have any questions please give me a call.    Best regards,  David Mar MD    Please route or send letter to:  Primary Care Provider (PCP), Referring Provider and Include Progress Note    Total time with patient visit: 60 minutes more than half spent in counseling, explanation of procedures and coordination of care.    "

## 2020-08-18 ENCOUNTER — TELEPHONE (OUTPATIENT)
Dept: SURGERY | Facility: CLINIC | Age: 50
End: 2020-08-18

## 2020-08-18 NOTE — TELEPHONE ENCOUNTER
Name of caller: Patient    Reason for Call:  Order, patient needs an order for the weight loss clinic-program with medical provider.    Patient met with Dr. Mar last week for a  Hernia, and they discussed the WL program, she thought he was going to put in an order/referral for the WL program, but they do not have one for her.    Surgeon:  Dr. Mar    Recent Surgery:  No    If yes, when & what type:  N/A      Best phone number to reach pt at is: [phone]    Ok to leave a message with medical info? Yes

## 2020-08-19 NOTE — TELEPHONE ENCOUNTER
Attempted to call patient to provider her with scheduling number for weight loss.  No answer and voicemail box full.  Unable to leave message.    Will try again tomorrow.    Natalie Roque RN-BSN

## 2020-08-24 NOTE — TELEPHONE ENCOUNTER
Patient called back, she is not able to schedule with the WL clinic until there is a referral placed.    Please call : [phone]

## 2020-08-28 DIAGNOSIS — E66.9 OBESITY: Primary | ICD-10-CM

## 2020-08-28 NOTE — TELEPHONE ENCOUNTER
Patient calling back regarding referral for Weight Loss.    Please call when referral is placed so she can schedule.    [phone]  OK to leave VM

## 2020-09-01 ENCOUNTER — HOSPITAL ENCOUNTER (OUTPATIENT)
Dept: GENERAL RADIOLOGY | Facility: CLINIC | Age: 50
Discharge: HOME | End: 2020-09-01
Attending: SURGERY | Admitting: SURGERY
Payer: COMMERCIAL

## 2020-09-01 DIAGNOSIS — K44.9 HIATAL HERNIA: ICD-10-CM

## 2020-09-01 PROCEDURE — 74240 X-RAY XM UPR GI TRC 1CNTRST: CPT

## 2020-09-01 PROCEDURE — 25500045 ZZH RX 255: Performed by: SURGERY

## 2020-09-01 RX ADMIN — ANTACID/ANTIFLATULENT 4 G: 380; 550; 10; 10 GRANULE, EFFERVESCENT ORAL at 15:04

## 2020-09-02 ENCOUNTER — TELEPHONE (OUTPATIENT)
Dept: SURGERY | Facility: CLINIC | Age: 50
End: 2020-09-02

## 2020-09-02 NOTE — TELEPHONE ENCOUNTER
vm box was full and I could not leave a message    Called pt to let her know she needs to sign into her mychart prior to tomorrow visit to fill out hte required new pt questionnaire. If the questionnaire is not completed, we will need to reschedule her.    If shehas any questions my number is [phone]    Misty Mortensen MA

## 2020-09-03 ENCOUNTER — VIRTUAL VISIT (OUTPATIENT)
Dept: SURGERY | Facility: CLINIC | Age: 50
End: 2020-09-03
Payer: COMMERCIAL

## 2020-09-03 VITALS
SYSTOLIC BLOOD PRESSURE: 136 MMHG | WEIGHT: 160 LBS | BODY MASS INDEX: 25.71 KG/M2 | DIASTOLIC BLOOD PRESSURE: 86 MMHG | HEIGHT: 66 IN

## 2020-09-03 DIAGNOSIS — I10 ESSENTIAL HYPERTENSION: ICD-10-CM

## 2020-09-03 DIAGNOSIS — E66.3 OVERWEIGHT WITH BODY MASS INDEX (BMI) OF 25 TO 25.9 IN ADULT: Primary | ICD-10-CM

## 2020-09-03 DIAGNOSIS — E78.5 HYPERLIPIDEMIA LDL GOAL <130: ICD-10-CM

## 2020-09-03 DIAGNOSIS — K21.9 GASTROESOPHAGEAL REFLUX DISEASE, ESOPHAGITIS PRESENCE NOT SPECIFIED: ICD-10-CM

## 2020-09-03 PROCEDURE — 99214 OFFICE O/P EST MOD 30 MIN: CPT | Mod: 95 | Performed by: PHYSICIAN ASSISTANT

## 2020-09-03 ASSESSMENT — MIFFLIN-ST. JEOR: SCORE: 1357.51

## 2020-09-03 NOTE — PROGRESS NOTES
"Jaz Archibald is a 50 year old female who is being evaluated via a billable video visit.          The patient has been notified of following:     \"This video visit will be conducted via a call between you and your physician/provider. We have found that certain health care needs can be provided without the need for an in-person physical exam.  This service lets us provide the care you need with a video conversation.  If a prescription is necessary we can send it directly to your pharmacy.  If lab work is needed we can place an order for that and you can then stop by our lab to have the test done at a later time.    Video visits are billed at different rates depending on your insurance coverage.  Please reach out to your insurance provider with any questions.    If during the course of the call the physician/provider feels a video visit is not appropriate, you will not be charged for this service.\"    Patient has given verbal consent for Video visit? Yes  How would you like to obtain your AVS? MyChart  If you are dropped from the video visit, the video invite should be resent to: Text to cell phone: [phone]  Will anyone else be joining your video visit? No        Video-Visit Details    Type of service:  Video Visit    Video Start Time:  2:12  Video End Time:  2:34    Originating Location (pt. Location): Home    Distant Location (provider location):  [place] [facility]     Platform used for Video Visit: Vivian Ferrara [facility] Medical Weight Management Consult      PATIENT:  Jaz Archibald  MRN:         [id number]  :         1970  FLASH:         9/3/2020        Dear Maria D Holden DO,    I had the pleasure of seeing your patient virtually, Jaz Archibald. Full intake/assessment was done to determine barriers to weight loss success and develop a treatment plan. Jaz Archibald is a 50 year old female interested in treatment of medical problems associated with excess weight. She has " "a height of 5' 6\"[pt reported[, a weight of 160 lbs 0 oz, and the calculated Body mass index is 25.82 kg/m .        ASSESSMENT/PLAN:  Overweight BMI 25    She has a hiatal hernia that needs repair along with hypertension and hyperlipidemia.  Her surgeon Dr Mar referred her to the program as weight loss might help with her hiatal hernia symptoms.  Unfortunately her BMI is below the threshold.  Discussed the components of the 24 week plan and how the health coaching, dietitian visits, and meal replacement products can all be done outside of the program.  Patient would like to do these.      Sonia MARI will contact patient today and discuss the meal replacements and health coaching.  A referral will be put in today for a dietitian.          She has the following co-morbidities:       9/3/2020   I have the following health issues associated with obesity: High Blood Pressure, High Cholesterol, GERD (Reflux), Stress Incontinence, Osteoarthritis (joint disease)   I have the following symptoms associated with obesity: Knee Pain, Depression, Lower Extremity Swelling, Back Pain, Fatigue, Hip Pain       Patient Goals 9/3/2020   I am interested in having a healthier weight to diminish current health problems: Yes   I am interested in having a healthier weight in order to prevent future health problems: Yes   I am interested in having a healthier weight in order to have a future surgery: Yes   If yes, please indicate which surgery? hiatal hernia repair for severe reflux as per Dr. Mar       Referring Provider 9/3/2020   Please name the provider who referred you to Medical Weight Management.  If you do not know, please answer: \"I Don't Know\". David Mar       Weight History 9/3/2020   How concerned are you about your weight? Somewhat Concerned   Would you describe your weight gain as gradual? Yes   I became overweight: As an Adult   The following factors have contributed to my weight gain:  Eating Wrong Types of Food, Genetic " (Runs in the Family), Stress   I have tried the following methods to lose weight: Watching Portions or Calories, Exercise, Weight Watchers, Atkins-type Diet (Low Carb/High Protein), Meal Replacements, Fasting   My lowest weight since age 17 was: 120   My highest weight since age 17 was: 163   The most weight I have ever lost was: (lbs) 10   I have the following family history of obesity/being overweight:  My father is overweight, Many of my relatives are overweight   Has anyone in your family had weight loss surgery? No   How has your weight changed over the last year?  Gained   How many pounds? 10       Diet Recall Review with Patient 9/3/2020   Do you typically eat breakfast? No   If you do eat breakfast, what types of food do you eat? bagel, cream cheese, protein shake, anything that is available   Do you typically eat lunch? Yes   If you do eat lunch, what types of food do you typically eat?  large salad, soup, chicken, sometimes fast food   Do you typically eat supper? Yes   If you do eat supper, what types of food do you typically eat? chicken, steak, salad, greens, cheesy potatoes,   Do you typically eat snacks? Yes   If you do snack, what types of food do you typically eat? anything that is available from an apple to m&m's   Do you like vegetables?  Yes   Do you drink water? Yes   How many glasses of juice do you drink in a typical day? 0   How many of glasses of milk do you drink in a typical day? 1   If you do drink milk, what type? 2%   How many 8oz glasses of sugar containing drinks such as Fer-Aid/sweet tea do you drink in a day? 0   How many cans/bottles of sugar pop/soda/tea/sports drinks do you drink in a day? 1   How many cans/bottles of diet pop/soda/tea or sports drink do you drink in a day? 1   How often do you have a drink of alcohol? 2-3 TImes a Week   If you do drink, how many drinks might you have in a day? 1 or 2       Eating Habits 9/3/2020   Generally, my meals include foods like these:  bread, pasta, rice, potatoes, corn, crackers, sweet dessert, pop, or juice. Less Than Weekly   Generally, my meals include foods like these: fried meats, brats, burgers, french fries, pizza, cheese, chips, or ice cream. Less Than Weekly   Eat fast food (like McDonalds, [employer], Taco Bell). Less Than Weekly   Eat at a buffet or sit-down restaurant. Once a Week   Eat most of my meals in front of the TV or computer. Almost Everyday   Often skip meals, eat at random times, have no regular eating times. Everyday   Rarely sit down for a meal but snack or graze throughout.  Everyday   Eat extra snacks between meals. A Few Times a Week   Eat most of my food at the end of the day. A Few Times a Week   Eat in the middle of the night or wake up at night to eat. Less Than Weekly   Eat extra snacks to prevent or correct low blood sugar. Less Than Weekly   Eat to prevent acid reflux or stomach pain. Everyday   Worry about not having enough food to eat. Never   Have you been to the food shelf at least a few times this year? No   I eat when I am depressed. Once a Week   I eat when I am stressed. Once a Week   I eat when I am bored. Once a Week   I eat when I am anxious. A Few Times a Week   I eat when I am happy or as a reward. Once a Week   I feel hungry all the time even if I just have eaten. A Few Times a Week   Feeling full is important to me. Never   I finish all the food on my plate even if I am already full. Never   I can't resist eating delicious food or walk past the good food/smell. Once a Week   I eat/snack without noticing that I am eating. Less Than Weekly   I eat when I am preparing the meal. Almost Everyday   I eat more than usual when I see others eating. A Few Times a Week   I have trouble not eating sweets, ice cream, cookies, or chips if they are around the house. Once a Week   I think about food all day. Once a Week   What foods, if any, do you crave? Cheese   Please list any other foods you crave? cheese,  crackers, olives, potatoe chips, creamy soups, avocados, salads, occasionally chocolate and mexican coca-cola       Amount of Food 9/3/2020   I make myself vomit what I have eaten or use laxatives to get rid of food. Never   I eat a large amount of food, like a loaf of bread, a box of cookies, a pint/quart of ice cream, all at once. Never   I eat a large amount of food even when I am not hungry. Monthly   I eat rapidly. Never   I eat alone because I feel embarrassed and do not want others to see how much I have eaten. Never   I eat until I am uncomfortably full. Monthly   I feel bad, disgusted, or guilty after I overeat. Monthly   I make myself vomit what I have eaten or use laxatives to get rid of food. Never       Activity/Exercise History 9/3/2020   How much of a typical 12 hour day do you spend sitting? Most of the Day   How much of a typical 12 hour day do you spend lying down? Less Than Half the Day   How much of a typical day do you spend walking/standing? Less Than Half the Day   How many hours (not including work) do you spend on the TV/Video Games/Computer/Tablet/Phone? 6 Hours or More   How many times a week are you active for the purpose of exercise? 2-3 Times a Week   What keeps you from being more active? Too tired   How many total minutes do you spend doing some activity for the purpose of exercising when you exercise? 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 100%   How many hours do you spend commuting to work daily?  1   What is your marital status? /In a Relationship   If in a relationship, is your significant other overweight? Yes   Do you have children? Yes   If you have children, are they  overweight? Yes   Who do you live with?     Are they supportive of your health goals? Yes   Who does the food shopping?         Mental Health History Reviewed With Patient 9/3/2020   Have you ever been physically or sexually abused? No   If yes, do you feel that the abuse is affecting your weight? N/A   If yes, would you like to talk to a counselor about the abuse? N/A   How often in the past 2 weeks have you felt little interest or pleasure in doing things? For Several Days   Over the past 2 weeks how often have you felt down, depressed, or hopeless? For Several Days       Sleep History Reviewed With Patient 9/3/2020   How many hours do you sleep at night? 7   Do you think that you snore loudly or has anybody ever heard you snore loudly (louder than talking or so loud it can be heard behind a shut door)? No   Has anyone seen or heard you stop breathing during your sleep? No   Do you often feel tired, fatigued, or sleepy during the day? Yes   Do you have a TV/Computer in your bedroom? Yes       MEDICATIONS:   Current Outpatient Medications   Medication Sig Dispense Refill     amLODIPine (NORVASC) 2.5 MG tablet Take 2.5 mg by mouth       clonazePAM (KLONOPIN) 1 MG tablet        EPINEPHrine (EPIPEN) 0.3 MG/0.3ML injection Inject 0.3 mLs into the muscle once as needed for anaphylaxis. 2 each 3     escitalopram (LEXAPRO) 20 MG tablet        hyoscyamine (LEVSIN/SL) 0.125 MG sublingual tablet Take 0.125 mg by mouth       imiquimod (ALDARA) 5 % external cream        olopatadine (PATANOL) 0.1 % ophthalmic solution INT 1 OR 2 GTS AEY BID  6     pantoprazole (PROTONIX) 40 MG EC tablet        sucralfate (CARAFATE) 1 GM tablet        traZODone (DESYREL) 50 MG tablet TK SS TO ONE T PO QHS PRF INSOMNIA  2     triamterene-HCTZ (DYAZIDE) 37.5-25 MG capsule Take 1 capsule by mouth daily 90 capsule 0     valACYclovir (VALTREX) 1000 mg tablet Take 1,000 mg by mouth         ALLERGIES:   Allergies   Allergen Reactions      "Bees Swelling     Disfiguring      Suture Swelling     local swelling and sutures didn't dissolve vyrcil   Suture        PHYSICAL EXAM:  /86   Ht 5' 6\" (1.676 m)   Wt 160 lb (72.6 kg)   LMP 07/26/2013   BMI 25.82 kg/m    General: NAD  Pulmonary: No labored breathing    FOLLOW-UP:   Prn        Sincerely,    Gardenia Diego PA-C      "

## 2020-09-03 NOTE — PATIENT INSTRUCTIONS

## 2020-09-04 NOTE — PROGRESS NOTES
"Jaz Archibald is a 50 year old female who is being evaluated via a billable video visit.      The patient has been notified of following:     \"This video visit will be conducted via a call between you and your physician/provider. We have found that certain health care needs can be provided without the need for an in-person physical exam.  This service lets us provide the care you need with a video conversation.  If a prescription is necessary we can send it directly to your pharmacy.  If lab work is needed we can place an order for that and you can then stop by our lab to have the test done at a later time.    Video visits are billed at different rates depending on your insurance coverage.  Please reach out to your insurance provider with any questions.    If during the course of the call the physician/provider feels a video visit is not appropriate, you will not be charged for this service.\"    Patient has given verbal consent for Video visit? Yes  How would you like to obtain your AVS? MyChart    Will anyone else be joining your video visit? No        Video-Visit Details    Type of service:  Video Visit    Video Start Time: 9:02  Video End Time: 9:37    Originating Location (pt. Location): Home    Distant Location (provider location):  [place] [facility]     Platform used for Video Visit: Vivian Ruiz RD, LD      MEDICAL WEIGHT LOSS INITIAL EVALUATION  DIAGNOSIS:  Overweight    NUTRITION HISTORY: per visit with PA-C    Diet Recall Review with Patient 9/3/2020   Do you typically eat breakfast? No   If you do eat breakfast, what types of food do you eat? bagel, cream cheese, protein shake, anything that is available   Do you typically eat lunch? Yes   If you do eat lunch, what types of food do you typically eat?  large salad, soup, chicken, sometimes fast food-take out   Do you typically eat supper? Yes   If you do eat supper, what types of food do you typically eat? chicken, " "steak, salad, greens, cheesy potatoes,   Do you typically eat snacks? Yes   If you do snack, what types of food do you typically eat? anything that is available from an apple to m&m's-1X per day       Beverage choices: water, occasional Mexican Coke, infused water, occasional protein drink,ETOH-1 glass of wine 2X per week  Dining out: take out at lunch (Naf Naf grill)  Binge eating: denies  Emotional eating: yes-stress,bad day/reward  Night time eating: very rare 1X every 3 months-fruit or chips  Exercise: strength training (HIIT)  1-2X per week  Other: Primary reason for wanting to lose weight is to help with hiatal hernia and possibly delay surgery. She indicated that she carries most of her weight in her abdomen, UBW most of her adult life wast 130 lb. Patient and her spouse share the cooking at home, but he does most of the cooking. She is not sure if she is post-menopausal (hx. of hysterectomy). She struggles with meal planning. Works in [employer] and struggles with social eating at work.Works 8 days in a row and starts at 4:00 a.m. some days. Has to eat meals very slow due to reflux and occasional vomiting.    Medication for weight loss:  none    ANTHROPOMETRICS:  Height: 66\"  Weight: 160 lb (pt reported)  BMI:  25.82 kg/m2  NUTRITION DIAGNOSIS:   Overweight related to excess energy intake as evidence by BMI of  25.82  NUTRITION INTERVENTIONS  Nutrition Prescription:  Recommend modified energy- nutrient intake  Implementation:  Nutrition Education (Content):    Discussed portion sizes    Determined alternative to emotional/reward eating    Reviewed the importance of not skipping meals and regular exercise    Provided   Tips for Weight Loss and Weight Management\"    Nutrition Education (Application):     Patient to practice goals as stated below    Patient verbalizes understanding of diet by wanting to focus on preplanning meals    Anticipate fair-good compliance    Goals:  Eat breakfast " daily or have a protein drink (< 250 calories, 15-30 g. protein, < 10 g. total fat, < 10g. sugar per serving) within 1 hour of waking  Preplan and prepare 3-4 meals for the week on days off from work  Practice alternatives to emotional/ reward eating (knitting, etc.)  Add cardio. workouts 3X per week for 30 minutes    FOLLOW UP AND MONITORING:    Other  - follow up in 8 weeks     Harpal Ruiz RD, MARLA  [facility], [place]  [phone]

## 2020-09-09 ENCOUNTER — VIRTUAL VISIT (OUTPATIENT)
Dept: SURGERY | Facility: CLINIC | Age: 50
End: 2020-09-09
Payer: COMMERCIAL

## 2020-09-09 ENCOUNTER — COMMUNICATION - HEALTHEAST (OUTPATIENT)
Dept: INTERNAL MEDICINE | Facility: CLINIC | Age: 50
End: 2020-09-09

## 2020-09-09 VITALS — WEIGHT: 160 LBS | BODY MASS INDEX: 25.82 KG/M2

## 2020-09-09 DIAGNOSIS — F41.1 ANXIETY STATE: ICD-10-CM

## 2020-09-09 DIAGNOSIS — I10 BENIGN ESSENTIAL HYPERTENSION: ICD-10-CM

## 2020-09-09 DIAGNOSIS — K21.00 GASTROESOPHAGEAL REFLUX DISEASE WITH ESOPHAGITIS: ICD-10-CM

## 2020-09-09 DIAGNOSIS — K44.9 HIATAL HERNIA: ICD-10-CM

## 2020-09-09 PROCEDURE — 97802 MEDICAL NUTRITION INDIV IN: CPT | Mod: GT | Performed by: DIETITIAN, REGISTERED

## 2020-09-21 ENCOUNTER — TELEPHONE (OUTPATIENT)
Dept: SURGERY | Facility: CLINIC | Age: 50
End: 2020-09-21

## 2020-09-21 NOTE — TELEPHONE ENCOUNTER
Pt Missed Scheduled HC Visit.    Left vm for pt regarding missed Health  visit today. Provided pt reschedule info and to expect a MyChart. See MyChart for addt'l info provided to pt. -GN         They found a dermoid on my right ovary. "They found a dermoid on my left ovary."

## 2020-09-29 ENCOUNTER — COMMUNICATION - HEALTHEAST (OUTPATIENT)
Dept: INTERNAL MEDICINE | Facility: CLINIC | Age: 50
End: 2020-09-29

## 2020-09-29 ENCOUNTER — OFFICE VISIT - HEALTHEAST (OUTPATIENT)
Dept: INTERNAL MEDICINE | Facility: CLINIC | Age: 50
End: 2020-09-29

## 2020-09-29 DIAGNOSIS — Z85.828 HISTORY OF BASAL CELL CARCINOMA: ICD-10-CM

## 2020-09-29 DIAGNOSIS — K21.00 GASTROESOPHAGEAL REFLUX DISEASE WITH ESOPHAGITIS WITHOUT HEMORRHAGE: ICD-10-CM

## 2020-09-29 DIAGNOSIS — I10 BENIGN ESSENTIAL HYPERTENSION: ICD-10-CM

## 2020-09-29 DIAGNOSIS — R20.0 NUMBNESS AND TINGLING: ICD-10-CM

## 2020-09-29 DIAGNOSIS — K44.9 HIATAL HERNIA: ICD-10-CM

## 2020-09-29 DIAGNOSIS — R20.2 NUMBNESS AND TINGLING: ICD-10-CM

## 2020-09-29 DIAGNOSIS — F41.1 ANXIETY STATE: ICD-10-CM

## 2020-09-29 DIAGNOSIS — R63.5 WEIGHT GAIN: ICD-10-CM

## 2020-10-01 ENCOUNTER — COMMUNICATION - HEALTHEAST (OUTPATIENT)
Dept: PHYSICAL MEDICINE AND REHAB | Facility: CLINIC | Age: 50
End: 2020-10-01

## 2020-10-22 ENCOUNTER — HOSPITAL ENCOUNTER (OUTPATIENT)
Dept: PHYSICAL MEDICINE AND REHAB | Facility: CLINIC | Age: 50
Discharge: HOME | End: 2020-10-22
Attending: PHYSICAL MEDICINE & REHABILITATION

## 2020-10-22 DIAGNOSIS — R20.0 NUMBNESS AND TINGLING: ICD-10-CM

## 2020-10-22 DIAGNOSIS — R20.2 NUMBNESS AND TINGLING: ICD-10-CM

## 2020-11-18 ENCOUNTER — HOSPITAL ENCOUNTER (OUTPATIENT)
Dept: PHYSICAL MEDICINE AND REHAB | Facility: CLINIC | Age: 50
Discharge: HOME | End: 2020-11-18
Attending: PHYSICAL MEDICINE & REHABILITATION

## 2020-11-18 DIAGNOSIS — R20.2 NUMBNESS AND TINGLING: ICD-10-CM

## 2020-11-18 DIAGNOSIS — R20.0 NUMBNESS AND TINGLING: ICD-10-CM

## 2020-11-19 ENCOUNTER — AMBULATORY - HEALTHEAST (OUTPATIENT)
Dept: INTERNAL MEDICINE | Facility: CLINIC | Age: 50
End: 2020-11-19

## 2020-11-19 DIAGNOSIS — R20.0 NUMBNESS AND TINGLING: ICD-10-CM

## 2020-11-19 DIAGNOSIS — R20.2 NUMBNESS AND TINGLING: ICD-10-CM

## 2021-01-05 ENCOUNTER — TRANSCRIBE ORDERS (OUTPATIENT)
Dept: OTHER | Age: 51
End: 2021-01-05

## 2021-01-05 DIAGNOSIS — K21.9 GERD (GASTROESOPHAGEAL REFLUX DISEASE): Primary | ICD-10-CM

## 2021-01-05 DIAGNOSIS — K44.9 HIATAL HERNIA: ICD-10-CM

## 2021-01-08 ENCOUNTER — PRE VISIT (OUTPATIENT)
Dept: SURGERY | Facility: CLINIC | Age: 51
End: 2021-01-08

## 2021-01-08 NOTE — TELEPHONE ENCOUNTER
ONCOLOGY INTAKE: Records Information      APPT INFORMATION:  Referring provider:  Morgan  Referring provider s clinic:  [facility]  Reason for visit/diagnosis:  hiatal hernia, linx procerdure  Has patient been notified of appointment date and time?: yes    RECORDS INFORMATION:  Were the records received with the referral (via [employer])? no    Has patient been seen for any external appt for this diagnosis? no    If yes, where? n/a    Has patient had any imaging or procedures outside of [facility] for this condition? no      If Yes, where? n/a    ADDITIONAL INFORMATION:  none

## 2021-01-14 ENCOUNTER — HEALTH MAINTENANCE LETTER (OUTPATIENT)
Age: 51
End: 2021-01-14

## 2021-01-19 NOTE — PROGRESS NOTES
Hank is a 50 year old who is being evaluated via a billable video visit.      How would you like to obtain your AVS? MyChart  If the video visit is dropped, the invitation should be resent by: Send to text: [phone]  Will anyone else be joining your video visit? No       I have reviewed and updated the patient's allergies and medication list.    Concerns: none  Refills: none     Kathy Alatorre CMA  \      Despite several attempts I was unsuccessful at connecting with the patient. We will reschedule her visit.   Mady Archer MD

## 2021-01-20 ENCOUNTER — VIRTUAL VISIT (OUTPATIENT)
Dept: SURGERY | Facility: CLINIC | Age: 51
End: 2021-01-20
Attending: THORACIC SURGERY (CARDIOTHORACIC VASCULAR SURGERY)
Payer: COMMERCIAL

## 2021-01-20 DIAGNOSIS — K21.9 GERD WITHOUT ESOPHAGITIS: Primary | ICD-10-CM

## 2021-01-20 PROCEDURE — 99207 PR NO BILLABLE SERVICE THIS VISIT: CPT | Mod: GT | Performed by: THORACIC SURGERY (CARDIOTHORACIC VASCULAR SURGERY)

## 2021-01-20 PROCEDURE — 999N001193 HC VIDEO/TELEPHONE VISIT; NO CHARGE

## 2021-01-20 NOTE — LETTER
1/20/2021         RE: Jaz Archibald  [address]  [facility] [address]        Dear Colleague,    Thank you for referring your patient, Jaz Archibald, to the [facility]. Please see a copy of my visit note below.        Again, thank you for allowing me to participate in the care of your patient.        Sincerely,        Morris Boland MD

## 2021-01-21 ENCOUNTER — TELEPHONE (OUTPATIENT)
Dept: SURGERY | Facility: CLINIC | Age: 51
End: 2021-01-21

## 2021-01-21 NOTE — TELEPHONE ENCOUNTER
"      ----- Message from Mady Caceres MD sent at 1/20/2021  9:06 PM CST -----  Regarding: RE: Patient called  Wagner Fowler,     I couldn't connect with this patient today, I tried calling her twice after she was \"kicked out of virtual waiting room\", but she didn't answer and her voice mail was full.     Can we call her, apologize and reschedule?     Thank you!     Mady     ----- Message -----  From: Maria D Escalona RN  Sent: 1/20/2021   5:01 PM CST  To: Mady Caceres MD, #  Subject: Patient called                                   Patient called to see why she was kicked out of video visit and hadn't heard anything. I explained to her the fun that happens if you have been in the \"waiting room\" for 30 minutes but didn't see that there were multiple attempts to call her. Can someone touch base with her on getting rescheduled?    Thanks-    Luzmaria        "

## 2021-02-09 ENCOUNTER — COMMUNICATION - HEALTHEAST (OUTPATIENT)
Dept: INTERNAL MEDICINE | Facility: CLINIC | Age: 51
End: 2021-02-09

## 2021-02-09 DIAGNOSIS — F41.1 ANXIETY STATE: ICD-10-CM

## 2021-02-10 ENCOUNTER — RECORDS - HEALTHEAST (OUTPATIENT)
Dept: ADMINISTRATIVE | Facility: OTHER | Age: 51
End: 2021-02-10

## 2021-02-10 ENCOUNTER — VIRTUAL VISIT (OUTPATIENT)
Dept: SURGERY | Facility: CLINIC | Age: 51
End: 2021-02-10
Attending: THORACIC SURGERY (CARDIOTHORACIC VASCULAR SURGERY)
Payer: COMMERCIAL

## 2021-02-10 DIAGNOSIS — K21.9 GERD WITHOUT ESOPHAGITIS: Primary | ICD-10-CM

## 2021-02-10 PROCEDURE — 999N001193 HC VIDEO/TELEPHONE VISIT; NO CHARGE

## 2021-02-10 PROCEDURE — 99203 OFFICE O/P NEW LOW 30 MIN: CPT | Mod: 95 | Performed by: THORACIC SURGERY (CARDIOTHORACIC VASCULAR SURGERY)

## 2021-02-10 NOTE — PROGRESS NOTES
"Hank is a 51 year old who is being evaluated via a billable video visit.      How would you like to obtain your AVS? Mail a copy  If the video visit is dropped, the invitation should be resent by: Send to e-mail at: [email or web address]  Will anyone else be joining your video visit? No  {If patient encounters technical issues they should call [phone] :[id number]}    Vitals - Patient Reported  Weight (Patient Reported): 68 kg (150 lb)  Height (Patient Reported): 167.6 cm (5' 6\")  BMI (Based on Pt Reported Ht/Wt): 24.21  Pain Score: Moderate Pain (4)  Pain Loc: Abdomen    Dionna Mina RMA    Video Start Time: 4:30  Video-Visit Details    Type of service:  Video Visit    Video End Time: 4:59    Originating Location (pt. Location): Home    Distant Location (provider location):  [facility]     Platform used for Video Visit: [facility]     THORACIC SURGERY - NEW PATIENT OFFICE VISIT      Dear Dr. Mar,    I saw Dragan in consultation for the evaluation and treatment of GERD and hiatal hernia.     HPI  Ms. Jaz Archibald is a 51 year old woman who presented with longstanding history of heartburn and regurgitation that occasionally leads to vomiting. No dysphagia.                                     Previsit Tests   Esophagram 9/1/20: Small hiatal hernia.   EGD 7/30/20: Hiatal hernia.   Ambulatory pH study 7/30/20: DeMeester 43-50.    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, insomnia type     Hypothyroidism     Health Care Home     Dyspareunia     Hyperlipidemia LDL goal <130     Essential hypertension     QT prolongation     Persons encountering health services in other specified circumstances     History of basal cell carcinoma     Environmental allergies     Encounter for examination for normal comparison and control in clinical research program     Elevated LDL cholesterol level     Vitamin D deficiency         PSH    Past Surgical " History:   Procedure Laterality Date     GYN SURGERY  7/12/12    Novasure     HYSTERECTOMY, PAP NO LONGER INDICATED       LAPAROSCOPIC HYSTERECTOMY TOTAL  8/12/2013    Procedure: LAPAROSCOPIC HYSTERECTOMY TOTAL;  Laparoscopic Total Hysterectomy,Bilateral Salpingectomy with Sparing of the Ovaries,Uterosacral Suspension,Transvaginal Taping,Perineoplasty;  Surgeon: Sy Castro MD;  Location: [facility]     SLING TRANSVAGINAL  8/12/2013    Procedure: SLING TRANSVAGINAL;;  Surgeon: Sy Castro MD;  Location: [facility]        ETOH Occasional  TOB None     Physical examination  BMI 25  Deferred    From a personal perspective, she is at home with her family. She works at [employer], she is a scheduling supervisor.     IMPRESSION No diagnosis found.   51 year old year-old female with ***    PLAN  I spent a total of *** minutes with Jaz Archibald. I reviewed the plan as follows:  Will think about it and let us know.   If LINX then she needs a manometry.   Then RTC and H&P by PCP.     All questions were answered and Jaz Archibald and present family were in agreement with the plan.  I appreciate the opportunity to participate in the care of your patient and will keep you updated.  Sincerely,    Morris Boland MD     {[facility] 2021 Documentation (Optional):084960}  {2021 E&M time (Optional):655382}  {Provider  Link to [facility] Help Grid :810441}

## 2021-02-10 NOTE — LETTER
"    2/10/2021         RE: Jaz Archibald  [address]  [facility] [address]        Dear Colleague,    Thank you for referring your patient, Jaz Archibald, to the [facility]. Please see a copy of my visit note below.    Hank is a 51 year old who is being evaluated via a billable video visit.      How would you like to obtain your AVS? Mail a copy  If the video visit is dropped, the invitation should be resent by: Send to e-mail at: [email or web address]  Will anyone else be joining your video visit? No      Vitals - Patient Reported  Weight (Patient Reported): 68 kg (150 lb)  Height (Patient Reported): 167.6 cm (5' 6\")  BMI (Based on Pt Reported Ht/Wt): 24.21  Pain Score: Moderate Pain (4)  Pain Loc: Abdomen    Dionna Mina MCCLAINA    Video Start Time: 4:30  Video-Visit Details    Type of service:  Video Visit    Video End Time: 4:59    Originating Location (pt. Location): Home    Distant Location (provider location):  [facility]     Platform used for Video Visit: [employer]     THORACIC SURGERY - NEW PATIENT OFFICE VISIT      Dear Dr. Mar,    I saw Dragan in consultation for the evaluation and treatment of GERD and hiatal hernia.     HPI  Ms. Jaz Archibald is a 51 year old woman who presented with longstanding history of heartburn and regurgitation that occasionally leads to vomiting. No dysphagia. She comes to clinic for a second opinion about surgery.                                    Previsit Tests   Esophagram 9/1/20: Small hiatal hernia.       EGD 7/30/20: Hiatal hernia.   Ambulatory pH study 7/30/20: DeMeester 43-50.    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, insomnia type     Hypothyroidism     Health Care Home     Dyspareunia     Hyperlipidemia LDL goal <130     Essential hypertension     QT prolongation     Persons encountering health services in other specified circumstances     " History of basal cell carcinoma     Environmental allergies     Encounter for examination for normal comparison and control in clinical research program     Elevated LDL cholesterol level     Vitamin D deficiency       PSH    Past Surgical History:   Procedure Laterality Date     GYN SURGERY  7/12/12    Novasure     HYSTERECTOMY, PAP NO LONGER INDICATED       LAPAROSCOPIC HYSTERECTOMY TOTAL  8/12/2013    Procedure: LAPAROSCOPIC HYSTERECTOMY TOTAL;  Laparoscopic Total Hysterectomy,Bilateral Salpingectomy with Sparing of the Ovaries,Uterosacral Suspension,Transvaginal Taping,Perineoplasty;  Surgeon: Sy Castro MD;  Location: [facility]     SLING TRANSVAGINAL  8/12/2013    Procedure: SLING TRANSVAGINAL;;  Surgeon: Sy Castro MD;  Location: [facility]        ETOH Occasional  TOB None     Physical examination  BMI 25  Deferred    From a personal perspective, she is at home with her family. She works at [employer], she is a scheduling supervisor.     IMPRESSION    51 year old female patient with GERD and hiatal hernia.  I had an extensive discussion with the patient regarding the rationale for surgery, the alternatives risks and benefits of the procedure. We talked about the pros and cons of fundoplication vs LINX placement. I explained the expected hospital stay, postoperative course, recovery time, diet and activity restrictions.       PLAN  I spent a total of 30 minutes with Jaz Archibald. I reviewed the plan as follows:  Hank will think about what option is best for her and will contact our office.     Necessary tests and appointments: PAC.  If she wants to proceed with LINX, we will need a HRM. H&P by PCP.     All questions were answered and Jaz Archibald and present family were in agreement with the plan.  I appreciate the opportunity to participate in the care of your patient and will keep you updated.  Sincerely,  Mady Archer MD

## 2021-02-11 NOTE — PROGRESS NOTES
"Hank is a 51 year old who is being evaluated via a billable video visit.      How would you like to obtain your AVS? Mail a copy  If the video visit is dropped, the invitation should be resent by: Send to e-mail at: [email or web address]  Will anyone else be joining your video visit? No      Vitals - Patient Reported  Weight (Patient Reported): 68 kg (150 lb)  Height (Patient Reported): 167.6 cm (5' 6\")  BMI (Based on Pt Reported Ht/Wt): 24.21  Pain Score: Moderate Pain (4)  Pain Loc: Abdomen    Dionna Mina RMA    Video Start Time: 4:30  Video-Visit Details    Type of service:  Video Visit    Video End Time: 4:59    Originating Location (pt. Location): Home    Distant Location (provider location):  [facility]     Platform used for Video Visit: [facility]     THORACIC SURGERY - NEW PATIENT OFFICE VISIT      Dear Dr. Mar,    I saw Dragan in consultation for the evaluation and treatment of GERD and hiatal hernia.     HPI  Ms. Jaz Archibald is a 51 year old woman who presented with longstanding history of heartburn and regurgitation that occasionally leads to vomiting. No dysphagia. She comes to clinic for a second opinion about surgery.                                    Previsit Tests   Esophagram 9/1/20: Small hiatal hernia.       EGD 7/30/20: Hiatal hernia.   Ambulatory pH study 7/30/20: DeMeester 43-50.    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, insomnia type     Hypothyroidism     Health Care Home     Dyspareunia     Hyperlipidemia LDL goal <130     Essential hypertension     QT prolongation     Persons encountering health services in other specified circumstances     History of basal cell carcinoma     Environmental allergies     Encounter for examination for normal comparison and control in clinical research program     Elevated LDL cholesterol level     Vitamin D deficiency       PSH    Past Surgical History:   Procedure " Laterality Date     GYN SURGERY  7/12/12    Novasure     HYSTERECTOMY, PAP NO LONGER INDICATED       LAPAROSCOPIC HYSTERECTOMY TOTAL  8/12/2013    Procedure: LAPAROSCOPIC HYSTERECTOMY TOTAL;  Laparoscopic Total Hysterectomy,Bilateral Salpingectomy with Sparing of the Ovaries,Uterosacral Suspension,Transvaginal Taping,Perineoplasty;  Surgeon: Sy Castro MD;  Location: [facility]     SLING TRANSVAGINAL  8/12/2013    Procedure: SLING TRANSVAGINAL;;  Surgeon: Sy Castro MD;  Location: [facility]        ETOH Occasional  TOB None     Physical examination  BMI 25  Deferred    From a personal perspective, she is at home with her family. She works at [employer], she is a scheduling supervisor.     IMPRESSION    51 year old female patient with GERD and hiatal hernia.  I had an extensive discussion with the patient regarding the rationale for surgery, the alternatives risks and benefits of the procedure. We talked about the pros and cons of fundoplication vs LINX placement. I explained the expected hospital stay, postoperative course, recovery time, diet and activity restrictions.       PLAN  I spent a total of 30 minutes with Jaz Archibald. I reviewed the plan as follows:  Hank will think about what option is best for her and will contact our office.     Necessary tests and appointments: PAC.  If she wants to proceed with LINX, we will need a HRM. H&P by PCP.     All questions were answered and Jaz Archibald and present family were in agreement with the plan.  I appreciate the opportunity to participate in the care of your patient and will keep you updated.  Sincerely,  Mady Archer MD

## 2021-02-12 ENCOUNTER — COMMUNICATION - HEALTHEAST (OUTPATIENT)
Dept: ADMINISTRATIVE | Facility: CLINIC | Age: 51
End: 2021-02-12

## 2021-02-12 DIAGNOSIS — F41.1 ANXIETY STATE: ICD-10-CM

## 2021-03-14 ENCOUNTER — HEALTH MAINTENANCE LETTER (OUTPATIENT)
Age: 51
End: 2021-03-14

## 2021-04-08 ENCOUNTER — AMBULATORY - HEALTHEAST (OUTPATIENT)
Dept: INTERNAL MEDICINE | Facility: CLINIC | Age: 51
End: 2021-04-08

## 2021-04-08 DIAGNOSIS — I10 BENIGN ESSENTIAL HYPERTENSION: ICD-10-CM

## 2021-04-18 ENCOUNTER — E-VISIT (OUTPATIENT)
Dept: URGENT CARE | Facility: URGENT CARE | Age: 51
End: 2021-04-18
Payer: COMMERCIAL

## 2021-04-18 DIAGNOSIS — B37.9 ANTIBIOTIC-INDUCED YEAST INFECTION: Primary | ICD-10-CM

## 2021-04-18 DIAGNOSIS — T36.95XA ANTIBIOTIC-INDUCED YEAST INFECTION: Primary | ICD-10-CM

## 2021-04-18 DIAGNOSIS — N39.0 ACUTE UTI (URINARY TRACT INFECTION): ICD-10-CM

## 2021-04-18 PROCEDURE — 99421 OL DIG E/M SVC 5-10 MIN: CPT | Performed by: PHYSICIAN ASSISTANT

## 2021-04-18 RX ORDER — PHENAZOPYRIDINE HYDROCHLORIDE 100 MG/1
100 TABLET, FILM COATED ORAL 3 TIMES DAILY PRN
Qty: 6 TABLET | Refills: 0 | Status: SHIPPED | OUTPATIENT
Start: 2021-04-18 | End: 2021-05-20

## 2021-04-18 RX ORDER — FLUCONAZOLE 150 MG/1
TABLET ORAL
Qty: 2 TABLET | Refills: 0 | Status: SHIPPED | OUTPATIENT
Start: 2021-04-18 | End: 2021-05-20

## 2021-04-18 RX ORDER — SULFAMETHOXAZOLE/TRIMETHOPRIM 800-160 MG
1 TABLET ORAL 2 TIMES DAILY
Qty: 6 TABLET | Refills: 0 | Status: SHIPPED | OUTPATIENT
Start: 2021-04-18 | End: 2021-04-21

## 2021-04-18 NOTE — PATIENT INSTRUCTIONS
Dear Jaz Archibald    After reviewing your responses, I've been able to diagnose you with a urinary tract infection, which is a common infection of the bladder with bacteria.  This is not a sexually transmitted infection, though urinating immediately after intercourse can help prevent infections.  Drinking lots of fluids is also helpful to clear your current infection and prevent the next one.      I have sent a prescription for antibiotics to your pharmacy to treat this infection.    It is important that you take all of your prescribed medication even if your symptoms are improving after a few doses.  Taking all of your medicine helps prevent the symptoms from returning.     If your symptoms worsen, you develop pain in your back or stomach, develop fevers, or are not improving in 5 days, please contact your primary care provider for an appointment or visit any of our convenient Walk-in or Urgent Care Centers to be seen, which can be found on our website here.    Thanks again for choosing us as your health care partner,    Mar Smith PA-C

## 2021-04-30 ENCOUNTER — PREP FOR PROCEDURE (OUTPATIENT)
Dept: SURGERY | Facility: CLINIC | Age: 51
End: 2021-04-30

## 2021-04-30 ENCOUNTER — TELEPHONE (OUTPATIENT)
Dept: SURGERY | Facility: CLINIC | Age: 51
End: 2021-04-30

## 2021-04-30 DIAGNOSIS — K44.9 HIATAL HERNIA: ICD-10-CM

## 2021-04-30 DIAGNOSIS — K21.9 GASTROESOPHAGEAL REFLUX DISEASE WITHOUT ESOPHAGITIS: Primary | ICD-10-CM

## 2021-04-30 RX ORDER — CEFAZOLIN SODIUM 2 G/50ML
2 SOLUTION INTRAVENOUS SEE ADMIN INSTRUCTIONS
Status: CANCELLED | OUTPATIENT
Start: 2021-04-30

## 2021-04-30 RX ORDER — CEFAZOLIN SODIUM 2 G/50ML
2 SOLUTION INTRAVENOUS
Status: CANCELLED | OUTPATIENT
Start: 2021-04-30

## 2021-05-03 NOTE — TELEPHONE ENCOUNTER
FUTURE VISIT INFORMATION      SURGERY INFORMATION:    Date: TBD- Dr. Boland    Consult: virtual visit 2/10/21    RECORDS REQUESTED FROM:       Primary Care Provider: Jacinda Banks NP- [facility]    Pertinent Medical History: hypertension    Most recent EKG+ Tracin/1/15    Most recent ECHO: 6/15/15    Most recent Coronary Angiogram: 6/15/15

## 2021-05-19 ENCOUNTER — OFFICE VISIT (OUTPATIENT)
Dept: SURGERY | Facility: CLINIC | Age: 51
End: 2021-05-19
Attending: THORACIC SURGERY (CARDIOTHORACIC VASCULAR SURGERY)
Payer: COMMERCIAL

## 2021-05-19 VITALS
SYSTOLIC BLOOD PRESSURE: 128 MMHG | BODY MASS INDEX: 24.91 KG/M2 | WEIGHT: 155 LBS | TEMPERATURE: 98 F | HEART RATE: 65 BPM | DIASTOLIC BLOOD PRESSURE: 94 MMHG | HEIGHT: 66 IN | OXYGEN SATURATION: 98 % | RESPIRATION RATE: 16 BRPM

## 2021-05-19 DIAGNOSIS — K44.9 HIATAL HERNIA: Primary | ICD-10-CM

## 2021-05-19 DIAGNOSIS — K21.9 GASTROESOPHAGEAL REFLUX DISEASE WITHOUT ESOPHAGITIS: ICD-10-CM

## 2021-05-19 PROCEDURE — G0463 HOSPITAL OUTPT CLINIC VISIT: HCPCS

## 2021-05-19 PROCEDURE — 99214 OFFICE O/P EST MOD 30 MIN: CPT | Performed by: THORACIC SURGERY (CARDIOTHORACIC VASCULAR SURGERY)

## 2021-05-19 RX ORDER — DEXTROAMPHETAMINE SACCHARATE, AMPHETAMINE ASPARTATE, DEXTROAMPHETAMINE SULFATE AND AMPHETAMINE SULFATE 5; 5; 5; 5 MG/1; MG/1; MG/1; MG/1
20 TABLET ORAL EVERY MORNING
COMMUNITY
Start: 2021-04-01 | End: 2021-08-06

## 2021-05-19 ASSESSMENT — MIFFLIN-ST. JEOR: SCORE: 1329.58

## 2021-05-19 ASSESSMENT — PAIN SCALES - GENERAL: PAINLEVEL: NO PAIN (0)

## 2021-05-19 NOTE — LETTER
5/19/2021         RE: Jaz Archibald  [address]  [facility] [address]        Dear Colleague,    Thank you for referring your patient, Jaz Archibald, to the [facility]. Please see a copy of my visit note below.    THORACIC SURGERY FOLLOW UP VISIT    I saw Ms. Jaz Archibald in follow-up today. The clinical summary follows:     DIAGNOSIS   GERD and hiatal hernia.     INTERVAL STUDIES  None.    Previsit Tests   Esophagram 9/1/20: Small hiatal hernia.       EGD 7/30/20: Hiatal hernia.   Ambulatory pH study 7/30/20: DeMeester 43-50.    Past Medical History:   Diagnosis Date     ADD (attention deficit disorder)      Anxiety      Gastroesophageal reflux disease without esophagitis      Hypertension      Urethral hypermobility 06/26/2013     Past Surgical History:   Procedure Laterality Date     EGD      7/2020     GYN SURGERY  07/12/2012    Novasure     HYSTERECTOMY, PAP NO LONGER INDICATED       LAPAROSCOPIC HYSTERECTOMY TOTAL  08/12/2013    Procedure: LAPAROSCOPIC HYSTERECTOMY TOTAL;  Laparoscopic Total Hysterectomy,Bilateral Salpingectomy with Sparing of the Ovaries,Uterosacral Suspension,Transvaginal Taping,Perineoplasty;  Surgeon: Sy Castro MD;  Location: [facility]     SLING TRANSVAGINAL  08/12/2013    Procedure: SLING TRANSVAGINAL;;  Surgeon: Sy Castro MD;  Location: [facility]        Allergies   Allergen Reactions     Bees Swelling     Disfiguring      Suture Swelling     local swelling and sutures didn't dissolve vyrcil   Suture      Current Outpatient Medications   Medication     amphetamine-dextroamphetamine (ADDERALL) 20 MG tablet     EPINEPHrine (EPIPEN) 0.3 MG/0.3ML injection     escitalopram (LEXAPRO) 20 MG tablet     hyoscyamine (LEVSIN/SL) 0.125 MG sublingual tablet     olopatadine (PATANOL) 0.1 % ophthalmic solution     pantoprazole (PROTONIX) 40 MG EC tablet     sucralfate (CARAFATE) 1 GM tablet     traZODone (DESYREL) 50 MG tablet      "triamterene-HCTZ (DYAZIDE) 37.5-25 MG capsule     valACYclovir (VALTREX) 1000 mg tablet     amphetamine-dextroamphetamine (ADDERALL) 10 MG tablet     lactobacillus rhamnosus, GG, (CULTURELL) capsule     traZODone (DESYREL) 100 MG tablet     Vitamin D3 (CHOLECALCIFEROL) 25 mcg (1000 units) tablet     No current facility-administered medications for this visit.        ETOH Occasional   TOB None   BMI 25    Exam  BP (!) 128/94 (BP Location: Right arm, Patient Position: Chair, Cuff Size: Adult Regular)   Pulse 65   Temp 98  F (36.7  C)   Resp 16   Ht 1.676 m (5' 5.98\")   Wt 70.3 kg (155 lb)   LMP 07/26/2013   SpO2 98%   BMI 25.03 kg/m    Alert and oriented  Bilateral breath sounds.   RRR  Abd soft benign ND NT. Small scars from lap hysterectomy.     HUSAM Mckinney comes to clinic after a long hiatus. I saw her las year however, she put off surgery due to Covid and other pressing circumstances. She continues to be symptomatic, mainly heartburn and regurgitation but no dysphagia.    From a personal perspective, she comes to clinic by herself.     IMPRESSION   51 year old female patient with GERD and hiatal hernia.  I had an extensive discussion with the patient regarding the rationale for surgery, the alternatives risks and benefits of the procedure. I explained the expected hospital stay, postoperative course, recovery time, diet and activity restrictions. Informed consent was obtained and they agreed to proceed.    PLAN  I spent 30 min on the date of the encounter in chart review, patient visit, review of tests, documentation and/or discussion with other providers about the issues documented above. I reviewed the plan as follows:  Procedure planned: Laparoscopic hiatal hernia repair and Nissen fundoplication.   Necessary Tests & Appointments: PAC. Virtual visit within 30 days of surgery (she wants to have surgery in July).   Anticoagulation Plan: Enoxaparin postop.   All questions were answered and the patient " and present family were in agreement with the plan.  I appreciate the opportunity to participate in the care of your patient and will keep you updated.  Sincerely,    Mady Archer MD        Again, thank you for allowing me to participate in the care of your patient.        Sincerely,        Morris Boland MD

## 2021-05-19 NOTE — NURSING NOTE
"Oncology Rooming Note    May 19, 2021 3:29 PM   Jaz Archibald is a 51 year old female who presents for:    Chief Complaint   Patient presents with     Oncology Clinic Visit     [facility] RETURN - HIATAL HERNIA     Initial Vitals: BP (!) 128/94 (BP Location: Right arm, Patient Position: Chair, Cuff Size: Adult Regular)   Pulse 65   Temp 98  F (36.7  C)   Resp 16   Ht 1.676 m (5' 5.98\")   Wt 70.3 kg (155 lb)   LMP 07/26/2013   SpO2 98%   BMI 25.03 kg/m   Estimated body mass index is 25.03 kg/m  as calculated from the following:    Height as of this encounter: 1.676 m (5' 5.98\").    Weight as of this encounter: 70.3 kg (155 lb). Body surface area is 1.81 meters squared.  No Pain (0) Comment: Data Unavailable   Patient's last menstrual period was 07/26/2013.  Allergies reviewed: Yes  Medications reviewed: Yes    Medications: Medication refills not needed today.  Pharmacy name entered into [employer]:    [id number] - [place], [place] - [address] [place] [address] [id number]    Clinical concerns: No new concerns. Krystian was notified.      Job Bunn LPN            "

## 2021-05-20 ENCOUNTER — RECORDS - HEALTHEAST (OUTPATIENT)
Dept: ADMINISTRATIVE | Facility: OTHER | Age: 51
End: 2021-05-20

## 2021-05-20 ENCOUNTER — PRE VISIT (OUTPATIENT)
Dept: SURGERY | Facility: CLINIC | Age: 51
End: 2021-05-20

## 2021-05-20 ENCOUNTER — ANESTHESIA EVENT (OUTPATIENT)
Dept: SURGERY | Facility: CLINIC | Age: 51
End: 2021-05-20

## 2021-05-20 ASSESSMENT — LIFESTYLE VARIABLES: TOBACCO_USE: 0

## 2021-05-20 NOTE — ANESTHESIA PREPROCEDURE EVALUATION
Anesthesia Pre-Procedure Evaluation    Patient: Jaz Archibald   MRN: [id number] : 1970        Preoperative Diagnosis: * No surgery found *   Procedure :      Past Medical History:   Diagnosis Date     ADD (attention deficit disorder)      Anxiety      Gastroesophageal reflux disease without esophagitis      Hypertension      Urethral hypermobility 2013      Past Surgical History:   Procedure Laterality Date     EGD      2020     GYN SURGERY  2012    Novasure     HYSTERECTOMY, PAP NO LONGER INDICATED       LAPAROSCOPIC HYSTERECTOMY TOTAL  2013    Procedure: LAPAROSCOPIC HYSTERECTOMY TOTAL;  Laparoscopic Total Hysterectomy,Bilateral Salpingectomy with Sparing of the Ovaries,Uterosacral Suspension,Transvaginal Taping,Perineoplasty;  Surgeon: Sy Castro MD;  Location: [facility]     SLING TRANSVAGINAL  2013    Procedure: SLING TRANSVAGINAL;;  Surgeon: Sy Castro MD;  Location: [facility]      Allergies   Allergen Reactions     Bees Swelling     Disfiguring      Suture Swelling     local swelling and sutures didn't dissolve vyrcil   Suture       Social History     Tobacco Use     Smoking status: Never Smoker     Smokeless tobacco: Never Used   Substance Use Topics     Alcohol use: Yes     Alcohol/week: 0.0 standard drinks     Comment: 2-3 drinks per week      Wt Readings from Last 1 Encounters:   21 70.3 kg (155 lb)        Anesthesia Evaluation   Pt has had prior anesthetic. Type: General and MAC.    No history of anesthetic complications       ROS/MED HX  ENT/Pulmonary:     (+) ZHANG risk factors, snores loudly, hypertension,  (-) tobacco use   Neurologic: Comment: ADD - neg neurologic ROS     Cardiovascular:     (+) hypertension-range: BP range 130s/80-90s/ ----Previous cardiac testing   Echo: Date:  Results:    Stress Test: Date: Results:    ECG Reviewed: Date:  Results:  SR, SARA, right atrial abnormality, poss pulmonary disease    EKG   Normal sinus  rhythm, possible LAE, ST and T wave abnormality, Prolonged QTc 492 ms  Cath: Date: Results:   (-) CAD and taking anticoagulants/antiplatelets   METS/Exercise Tolerance: >4 METS    Hematologic:  - neg hematologic  ROS     Musculoskeletal:  - neg musculoskeletal ROS     GI/Hepatic:     (+) GERD, Symptomatic,     Renal/Genitourinary:  - neg Renal ROS     Endo:     (+) thyroid problem,  Thyroid disease - Other,     Psychiatric/Substance Use:     (+) psychiatric history anxiety     Infectious Disease:  - neg infectious disease ROS     Malignancy:   (+) Malignancy, History of Skin.Skin CA Remission status post Surgery.        Other:  - neg other ROS          Physical Exam    Airway   unable to assess          Respiratory Devices and Support         Dental  no notable dental history         Cardiovascular    unable to assess         Pulmonary    Unable to assess           Other findings: Virtual visit    OUTSIDE LABS:  CBC:   Lab Results   Component Value Date    WBC 7.5 07/03/2020    WBC 7.5 08/15/2013    HGB 15.1 07/03/2020    HGB 11.8 08/15/2013    HCT 44.3 07/03/2020    HCT 34.5 (L) 08/15/2013     07/03/2020     08/15/2013     BMP:   Lab Results   Component Value Date     07/03/2020     06/24/2015    POTASSIUM 3.9 07/03/2020    POTASSIUM 3.7 06/24/2015    CHLORIDE 105 07/03/2020    CHLORIDE 104 06/24/2015    CO2 20 07/03/2020    CO2 28 06/24/2015    BUN 16 07/03/2020    BUN 14 06/24/2015    CR 0.76 07/03/2020    CR 0.71 06/24/2015    GLC 78 07/03/2020    GLC 98 06/24/2015     COAGS: No results found for: PTT, INR, FIBR  POC:   Lab Results   Component Value Date     (H) 08/14/2013    HCG Negative 08/12/2013    HCGS Negative 04/11/2006     HEPATIC:   Lab Results   Component Value Date    ALBUMIN 3.9 07/03/2020    PROTTOTAL 7.9 07/03/2020    ALT 21 07/03/2020    AST 9 07/03/2020    ALKPHOS 57 07/03/2020    BILITOTAL 0.4 07/03/2020     OTHER:   Lab Results   Component Value Date    JUANY 9.0  07/03/2020    TSH 2.22 07/03/2020    T4 1.03 06/24/2015    CRP 6.0 04/21/2014    SED 7 04/21/2014             PAC Discussion and Assessment    ASA Classification: 3  Case is suitable for: [place]  Anesthetic techniques and relevant risks discussed: GA  Invasive monitoring and risk discussed: No    Possibility and Risk of blood transfusion discussed: Yes            PAC Resident/NP Anesthesia Assessment: Jaz Archibald (Lee) is a 51 year old female scheduled to undergo laparoscopic Nissen fundoplication with Dr. Krystian Caceres on date TBD. She has the following specific operative considerations:   - RCRI : 0.9% risk of major adverse cardiac event.   - VTE risk: 0.26%  - ZHANG # of risks 3/8 = Intermediate risk  - Risk of PONV score = 3.  If > 2, anti-emetic intervention recommended. If 3 or > anti emetic intervention recommended with two or more meds    --Longstanding GERD, refractory to medical management. Above procedure being planned. Will take Protonix on DOS.  --No history of problems with anesthesia.  --HYPERTENSION. Will hold Dyazide on DOS. Cardiac work up in 2015 for PVCs neg. CTA showed no evidence of coronary calcification. Echocardiogram with EF 55% without regional wall motion abnormality. Denies cardiac symptoms. Good activity tolerance.   --Nonsmoker. Some coughing attributed to GERD. Denies shortness of breath. Intermediate risk for ZHANG.  --Anxiety. Lexapro at HS.   --ADD Adderall in am but will hold on DOS.   --Denies history of blood transfusion.       Patient is optimized and is acceptable candidate for the proposed procedure.  No further diagnostic evaluation is needed.     Reviewed and Signed by PAC Mid-Level Provider/Resident  Mid-Level Provider/Resident: JAMIE Heredia, CNS  Date: 5/20/21  Time: 5:30pm                               JAMIE Pink CNS

## 2021-05-21 ENCOUNTER — PREP FOR PROCEDURE (OUTPATIENT)
Dept: SURGERY | Facility: CLINIC | Age: 51
End: 2021-05-21

## 2021-05-21 DIAGNOSIS — K21.9 GERD WITHOUT ESOPHAGITIS: Primary | ICD-10-CM

## 2021-05-21 RX ORDER — CEFAZOLIN SODIUM 2 G/50ML
2 SOLUTION INTRAVENOUS
Status: CANCELLED | OUTPATIENT
Start: 2021-05-21

## 2021-05-21 RX ORDER — CEFAZOLIN SODIUM 2 G/50ML
2 SOLUTION INTRAVENOUS SEE ADMIN INSTRUCTIONS
Status: CANCELLED | OUTPATIENT
Start: 2021-05-21

## 2021-05-21 RX ORDER — ACETAMINOPHEN 325 MG/1
975 TABLET ORAL ONCE
Status: CANCELLED | OUTPATIENT
Start: 2021-05-21 | End: 2021-05-21

## 2021-05-22 NOTE — PROGRESS NOTES
THORACIC SURGERY FOLLOW UP VISIT    I saw Ms. Jaz Archibald in follow-up today. The clinical summary follows:     DIAGNOSIS   GERD and hiatal hernia.     INTERVAL STUDIES  None.    Previsit Tests   Esophagram 9/1/20: Small hiatal hernia.       EGD 7/30/20: Hiatal hernia.   Ambulatory pH study 7/30/20: [place] 43-50.    Past Medical History:   Diagnosis Date     ADD (attention deficit disorder)      Anxiety      Gastroesophageal reflux disease without esophagitis      Hypertension      Urethral hypermobility 06/26/2013     Past Surgical History:   Procedure Laterality Date     EGD      7/2020     GYN SURGERY  07/12/2012    Novasure     HYSTERECTOMY, PAP NO LONGER INDICATED       LAPAROSCOPIC HYSTERECTOMY TOTAL  08/12/2013    Procedure: LAPAROSCOPIC HYSTERECTOMY TOTAL;  Laparoscopic Total Hysterectomy,Bilateral Salpingectomy with Sparing of the Ovaries,Uterosacral Suspension,Transvaginal Taping,Perineoplasty;  Surgeon: Sy Castro MD;  Location: [facility]     SLING TRANSVAGINAL  08/12/2013    Procedure: SLING TRANSVAGINAL;;  Surgeon: Sy Castro MD;  Location: [facility]        Allergies   Allergen Reactions     Bees Swelling     Disfiguring      Suture Swelling     local swelling and sutures didn't dissolve vyrcil   Suture      Current Outpatient Medications   Medication     amphetamine-dextroamphetamine (ADDERALL) 20 MG tablet     EPINEPHrine (EPIPEN) 0.3 MG/0.3ML injection     escitalopram (LEXAPRO) 20 MG tablet     hyoscyamine (LEVSIN/SL) 0.125 MG sublingual tablet     olopatadine (PATANOL) 0.1 % ophthalmic solution     pantoprazole (PROTONIX) 40 MG EC tablet     sucralfate (CARAFATE) 1 GM tablet     traZODone (DESYREL) 50 MG tablet     triamterene-HCTZ (DYAZIDE) 37.5-25 MG capsule     valACYclovir (VALTREX) 1000 mg tablet     amphetamine-dextroamphetamine (ADDERALL) 10 MG tablet     lactobacillus rhamnosus, GG, (CULTURELL) capsule     traZODone (DESYREL) 100 MG tablet     Vitamin D3  "(CHOLECALCIFEROL) 25 mcg (1000 units) tablet     No current facility-administered medications for this visit.        ETOH Occasional   TOB None   BMI 25    Exam  BP (!) 128/94 (BP Location: Right arm, Patient Position: Chair, Cuff Size: Adult Regular)   Pulse 65   Temp 98  F (36.7  C)   Resp 16   Ht 1.676 m (5' 5.98\")   Wt 70.3 kg (155 lb)   LMP 07/26/2013   SpO2 98%   BMI 25.03 kg/m    Alert and oriented  Bilateral breath sounds.   RRR  Abd soft benign ND NT. Small scars from lap hysterectomy.     HUSAM Mckinney comes to clinic after a long hiatus. I saw her las year however, she put off surgery due to Covid and other pressing circumstances. She continues to be symptomatic, mainly heartburn and regurgitation but no dysphagia.    From a personal perspective, she comes to clinic by herself.     IMPRESSION   51 year old female patient with GERD and hiatal hernia.  I had an extensive discussion with the patient regarding the rationale for surgery, the alternatives risks and benefits of the procedure. I explained the expected hospital stay, postoperative course, recovery time, diet and activity restrictions. Informed consent was obtained and they agreed to proceed.    PLAN  I spent 30 min on the date of the encounter in chart review, patient visit, review of tests, documentation and/or discussion with other providers about the issues documented above. I reviewed the plan as follows:  Procedure planned: Laparoscopic hiatal hernia repair and Nissen fundoplication.   Necessary Tests & Appointments: PAC. Virtual visit within 30 days of surgery (she wants to have surgery in July).   Anticoagulation Plan: Enoxaparin postop.   All questions were answered and the patient and present family were in agreement with the plan.  I appreciate the opportunity to participate in the care of your patient and will keep you updated.  Sincerely,    Mady Archer MD    "

## 2021-05-28 PROBLEM — K21.9 GERD WITHOUT ESOPHAGITIS: Status: ACTIVE | Noted: 2021-05-28

## 2021-06-01 ENCOUNTER — RECORDS - HEALTHEAST (OUTPATIENT)
Dept: ADMINISTRATIVE | Facility: CLINIC | Age: 51
End: 2021-06-01

## 2021-06-01 NOTE — TELEPHONE ENCOUNTER
FUTURE VISIT INFORMATION      SURGERY INFORMATION:    Date: 21    Location: [facility] OR    Surgeon:  Mady Potts MD    Anesthesia Type:  General    Procedure: HERNIORRHAPHY, HIATAL, LAPAROSCOPIC FUNDOPLICATION, NISSEN, LAPAROSCOPIC    Consult: virtual visit 2/10/21     RECORDS REQUESTED FROM:         Primary Care Provider: Jacinda Banks NP- [facility]     Pertinent Medical History: hypertension     Most recent EKG+ Tracin/1/15     Most recent ECHO: 6/15/15     Most recent Coronary Angiogram: 6/15/15

## 2021-06-02 ENCOUNTER — TELEPHONE (OUTPATIENT)
Dept: SURGERY | Facility: CLINIC | Age: 51
End: 2021-06-02

## 2021-06-02 NOTE — TELEPHONE ENCOUNTER
Spoke with patient to schedule procedure with Dr. Mady Caceres   Procedure was scheduled on 07/26 at [facility] OR  Patient will have H&P with PAC 07/21    Patient is aware a COVID-19 test is needed before their procedure. The test should be with-in 4 days of their procedure.   Test Details: Date 7/23 Location  [place]    Patient is aware a / is needed day of surgery.   Surgery letter was sent via [employer], patient has my direct contact information for any further questions.

## 2021-06-04 VITALS
HEART RATE: 91 BPM | SYSTOLIC BLOOD PRESSURE: 132 MMHG | BODY MASS INDEX: 24.75 KG/M2 | HEIGHT: 66 IN | WEIGHT: 154 LBS | OXYGEN SATURATION: 95 % | RESPIRATION RATE: 20 BRPM | DIASTOLIC BLOOD PRESSURE: 88 MMHG | TEMPERATURE: 98.2 F

## 2021-06-04 VITALS
SYSTOLIC BLOOD PRESSURE: 124 MMHG | BODY MASS INDEX: 24.91 KG/M2 | OXYGEN SATURATION: 98 % | RESPIRATION RATE: 22 BRPM | HEART RATE: 87 BPM | DIASTOLIC BLOOD PRESSURE: 80 MMHG | HEIGHT: 66 IN | TEMPERATURE: 98.2 F | WEIGHT: 155 LBS

## 2021-06-06 NOTE — PROGRESS NOTES
Assessment/Plan:     1. Encounter for preventive health examination  - Lipid [place] FASTING; Future  - Comprehensive Metabolic Panel; Future  - HM1(CBC and Differential); Future  - Thyroid Stimulating Hormone (TSH); Future  - Urinalysis-UC if Indicated; Future  - Follicle Stimulating Hormone (FSH); Future  - Luteinizing Hormone (LH); Future    2. Attention deficit hyperactivity disorder (ADHD), predominantly inattentive type  Remains stable recommend no changes at this time    3. Vitamin D deficiency  - Vitamin D, Total (25-Hydroxy); Future    4. Elevated LDL cholesterol level  We will obtain a lipid panel    5. History of basal cell carcinoma  - Ambulatory referral to Dermatology    6. Anxiety state  Continue Lexapro 20 mg daily    7. Visit for screening mammogram  - Mammo Screening Bilateral; Future    8. Family history of osteoporosis  - DXA Bone Density Scan; Future  - DXA Bone Density Scan    9. BMI 24.0-24.9, adult  - Ambulatory referral to Nutrition Services      Subjective:     Jaz Archibald is a 50 y.o. female who presents for an annual exam. Hypertension, history of ADD, history of actinic keratosis, GERD, hot flashes post hysterectomy.    Patient is new to me.  She is concerned about the swelling in her ankles secondary to the Norvasc.    Patient has a history of GERD she reports continued epigastric discomfort she is taking ibuprofen on a regular basis still continues on omeprazole 40 mg daily continued symptoms without blood in the stool or black tarry stools.    She has a history of basal cell carcinoma she is needing a referral for ongoing monitoring through dermatology she is requesting this today.    Patient reports difficulty with weight loss she reports that she is exercising on a regular basis she is interested in being seen by the dietitian.    She reports she is utilizing the ibuprofen due to low back discomfort she is doing stretches utilizes her foam roller.    Patient is status post  hysterectomy she reports concern for osteopenia and osteoporosis with family history of bone loss.    Patient has a history of anxiety continues on Lexapro 20 mg and feels this medication is working well for her.  She does report having a low sex drive is not clear as to if this is related to medication side effect or hormonal changes.  We will obtain an FSH and LH and discussed with patient possible estrogen.      The patient reports that there is not domestic violence in her life.     Healthy Habits:   Regular Exercise: Yes  Sunscreen Use: Yes  Healthy Diet: Yes  Dental Visits Regularly: Yes  Sexually active: Yes  Mammogram:   Colonoscopy: Yes  Prevention of Osteoporosis: Yes  Last Dexa: Yes    Immunization History   Administered Date(s) Administered     Influenza,seasonal quad, PF, =/> 6months 01/24/2011, 10/18/2012, 01/13/2014, 01/08/2016, 10/01/2016, 11/08/2017, 02/13/2019, 10/01/2019     Influenza,seasonal, Inj IIV3 12/01/1998, 11/08/1999, 01/24/2011, 10/18/2012, 01/13/2014, 10/01/2016     Pneumo Polysac 23-V 11/14/2016     Td, adult adsorbed, PF 05/23/2005     Tdap 06/05/2013     ZOSTER, RECOMBINANT, IM 02/13/2019     Immunization status: up to date and documented.    Gynecologic History  No LMP recorded.  Contraception: status post hysterectomy  Last Pap: 2013. Results were: normal HPV testing:  History of HPV and will require Pap off the cuff  Last mammogram: 2017. Results were: normal    OB History   No obstetric history on file.       Current Outpatient Medications   Medication Sig Dispense Refill     escitalopram oxalate (LEXAPRO) 20 MG tablet        hyoscyamine (LEVSIN/SL) 0.125 mg SL tablet Take 0.125 mg by mouth every 4 (four) hours as needed for cramping.       nitrofurantoin, macrocrystal-monohydrate, (MACROBID) 100 MG capsule TK 1 C PO Q 12 H FOR 5 DAYS       omeprazole (PRILOSEC) 40 MG capsule TAKE 1 CAPSULE BY MOUTH EVERY DAY       traZODone (DESYREL) 50 MG tablet Take 50 mg by mouth.        triamterene-hydrochlorothiazide (DYAZIDE) 37.5-25 mg per capsule Take 1 capsule by mouth.       No current facility-administered medications for this visit.      No past medical history on file.  No past surgical history on file.  Beesix; Venom-honey bee; and Other allergy (see comments)  No family history on file.  Social History     Socioeconomic History     Marital status:      Spouse name: Not on file     Number of children: Not on file     Years of education: Not on file     Highest education level: Not on file   Occupational History     Not on file   Social Needs     Financial resource strain: Not on file     Food insecurity     Worry: Not on file     Inability: Not on file     Transportation needs     Medical: Not on file     Non-medical: Not on file   Tobacco Use     Smoking status: Never Smoker     Smokeless tobacco: Never Used   Substance and Sexual Activity     Alcohol use: Yes     Drug use: Never     Sexual activity: Yes     Partners: Male     Birth control/protection: None   Lifestyle     Physical activity     Days per week: Not on file     Minutes per session: Not on file     Stress: Not on file   Relationships     Social connections     Talks on phone: Not on file     Gets together: Not on file     Attends Zoroastrianism service: Not on file     Active member of club or organization: Not on file     Attends meetings of clubs or organizations: Not on file     Relationship status: Not on file     Intimate partner violence     Fear of current or ex partner: Not on file     Emotionally abused: Not on file     Physically abused: Not on file     Forced sexual activity: Not on file   Other Topics Concern     Not on file   Social History Narrative    Remains physically active.  Works for [employer].     Social History     Social History Narrative    Remains physically active.  Works for [employer].       Review of Systems  General:  Negative except as noted above  Eyes: Negative except as  "noted above  Ears/Nose/Throat: Negative except as noted above  Cardiovascular: Negative except as noted above  Respiratory:  Negative except as noted above  Gastrointestinal:  Negative except as noted above  Musculoskeletal:  Negative except as noted above  Skin: Negative except as noted above  Neurologic: Negative except as noted above  Psychiatric: Negative except as noted above  Endocrine: Negative except as noted above  Heme/Lymphatic: Negative except as noted above   Allergic/Immunologic: Negative except as noted above      Objective:      Vitals:    03/12/20 0923   BP: 132/88   Pulse: 91   Resp: 20   Temp: 98.2  F (36.8  C)   SpO2: 95%   Weight: 154 lb (69.9 kg)   Height: 5' 6.25\" (1.683 m)     Wt Readings from Last 3 Encounters:   03/12/20 154 lb (69.9 kg)     Body mass index is 24.67 kg/m . (>25?)    Physical Exam:  General Appearance: Alert, cooperative, no distress.  Head: Normocephalic, without obvious abnormality, atraumatic  Eyes: PERRL, conjunctiva/corneas clear, EOM's intact  Ears: Normal TM's and external ear canals, both ears  Nose: Nares normal, septum midline,mucosa normal, no drainage  Throat: Lips, mucosa, and tongue normal  Neck: Supple, symmetrical, trachea midline, no adenopathy;  thyroid: not enlarged, symmetric, no tenderness/mass/nodules  Back: Symmetric, no curvature, ROM normal, no CVA tenderness  Lungs: Clear to auscultation bilaterally, respirations unlabored  Heart: Regular rate and rhythm, S1 and S2 normal, no murmur, rub, or gallop  Abdomen: Soft, non-tender, bowel sounds active all four quadrants,  no masses, no organomegaly  Extremities: Extremities normal, atraumatic, no cyanosis or edema  Skin: Skin color, texture, turgor normal, no rashes or lesions  Lymph nodes: Cervical, supraclavicular nodes normal  Neurologic: Normal  Psych: Normal affect.  Does not appear anxious or depressed.       "

## 2021-06-07 NOTE — TELEPHONE ENCOUNTER
Question following Office Visit  When did you see your provider: 3/12/20  What is your question: Patient stated she wanted to know if Jacinda Banks CNP can be her primary provider. Please let the patient know and update her chart to reflect this.  Okay to leave a detailed message: Yes

## 2021-06-08 NOTE — TELEPHONE ENCOUNTER
Type of referral:  Gastroenterology   What provider or facility are you being referred to?  None   Do you have an appointment scheduled?  No  What is your question?  Patient states her symptoms are getting worst she is not working tomorrow she would like to scheduled appointment .  Okay to leave a detailed message: No

## 2021-06-08 NOTE — PROGRESS NOTES
Internal Medicine Office Visit  [facility]  Patient Name: Jaz Archibald  Patient Age: 50 y.o.  YOB: 1970  MRN: [id number]    Date of Visit: 2020  Reason for Office Visit:   Chief Complaint   Patient presents with     Follow-up     Discuss abd issues. Endoscopy a year ago. Having burning in the abd, cramping, vomiting, and the reflux is causing issues with throat.            Assessment / Plan / Medical Decision Makin. Gastroesophageal reflux disease with esophagitis  2. Hiatal hernia  - omeprazole (PRILOSEC) 20 MG capsule; Take 1 capsule (20 mg total) by mouth 2 (two) times a day before meals.  Dispense: 60 capsule; Refill: 2  - Ambulatory referral to Gastroenterology    3. Benign essential hypertension  - triamterene-hydrochlorothiazide (DYAZIDE) 37.5-25 mg per capsule; Take 1 capsule by mouth daily.  Dispense: 90 capsule; Refill: 0      Orders Placed This Encounter   Procedures     Ambulatory referral to Gastroenterology   Followup: Return in about 3 months (around 2020). earlier if needed.    Health Maintenance Review  Health Maintenance   Topic Date Due     ADVANCE CARE PLANNING  1987     PAP SMEAR  2016     ZOSTER VACCINES (2 of 2) 04/10/2019     PREVENTIVE CARE VISIT  2021     MAMMOGRAM  2021     TD 18+ HE  2023     LIPID  2024     COLORECTAL CANCER SCREENING  2029     HIV SCREENING  Completed     INFLUENZA VACCINE RULE BASED  Completed     TDAP ADULT ONE TIME DOSE  Completed         I have changed Jaz Archibald's triamterene-hydrochlorothiazide. I am also having her start on omeprazole. Additionally, I am having her maintain her nitrofurantoin (macrocrystal-monohydrate), omeprazole, escitalopram oxalate, traZODone, and hyoscyamine.      HPI:  Jaz Archibald is a 50 y.o. year old who presents to the office today with chronic conditions including Hypertension, history of ADD, history of actinic  keratosis, GERD, hot flashes post hysterectomy, history of swelling of ankles secondary to Norvasc, history of basal cell carcinoma, reported difficulty with weight loss, low back pain, anxiety, elevated LDL, vitamin D deficiency.    Patient was last seen in March 2020 at which time she is advised to continue on her Lexapro 20 mg daily, obtain a bone density scan, referral to nutritional services, and completion of lab work.    Patient underwent endoscopy June 2019 which indicated esophagitis with no bleeding was found in the lower third of the esophagus, small hiatal hernia was present, no signs of Pandey's with recommendation to continue on Prilosec 20 mg daily indefinitely and if her symptoms were to persist to increase to 20 mg twice daily.      She reports sore throat with epigastric burning and some regurgitation.  She is reducing her consumption of food given the burning in the epigastric region.  She continues on Prilosec daily as prescribed.    Review of Systems- pertinent positive in bold:  Constitutional: Fever, chills, night sweats, fainting, weight change, fatigue, dizziness, sleeping difficulties, loud snoring/pauses in breathing  Eyes: change in vision, blurred or double vision, redness/eye pain  Ears, nose, mouth, throat: change in hearing, ear pain, hoarseness, difficulty swallowing, sores in the mouth or throat  Respiratory: shortness of breath, cough, bloody sputum, wheezing  Cardiovascular: chest pain, palpitations   Gastrointestinal: abdominal pain, heartburn/indigestion, nausea/vomiting, change in appetite, change in bowel habits, constipation or diarrhea, rectal bleeding/dark stools, difficulty swallowing  Urinary: painful urination, frequent urination, urinary urgency/incontinence, blood in urine/dark urine, nocturia (new or increasing), muscle cramps, swelling of hands, feet, ankles, leg pain/redness  Skin: change in moles/freckles, rash, nodules  Hematologic/lymphatic: swollen lymph  "glands, abnormal bruising/bleeding  Endocrine: excessive thirst/urination, cold or heat intolerance  Neurologic/emotional: worrisome memory change, numbness/tingling, anxiety, mood swings      Current Scheduled Meds:  Outpatient Encounter Medications as of 5/28/2020   Medication Sig Dispense Refill     escitalopram oxalate (LEXAPRO) 20 MG tablet        hyoscyamine (LEVSIN/SL) 0.125 mg SL tablet Take 0.125 mg by mouth every 4 (four) hours as needed for cramping.       omeprazole (PRILOSEC) 40 MG capsule TAKE 1 CAPSULE BY MOUTH EVERY DAY       traZODone (DESYREL) 50 MG tablet Take 50 mg by mouth.       triamterene-hydrochlorothiazide (DYAZIDE) 37.5-25 mg per capsule Take 1 capsule by mouth daily. 90 capsule 0     [DISCONTINUED] triamterene-hydrochlorothiazide (DYAZIDE) 37.5-25 mg per capsule Take 1 capsule by mouth.       nitrofurantoin, macrocrystal-monohydrate, (MACROBID) 100 MG capsule TK 1 C PO Q 12 H FOR 5 DAYS       omeprazole (PRILOSEC) 20 MG capsule Take 1 capsule (20 mg total) by mouth 2 (two) times a day before meals. 60 capsule 2     No facility-administered encounter medications on file as of 5/28/2020.      No past medical history on file.  No past surgical history on file.  Social History     Tobacco Use     Smoking status: Never Smoker     Smokeless tobacco: Never Used   Substance Use Topics     Alcohol use: Yes     Drug use: Never     No family history on file.  Social History     Social History Narrative    Remains physically active.  Works for [employer].       Objective / Physical Examination:  Vitals:    05/28/20 1026   BP: 124/80   Pulse: 87   Resp: 22   Temp: 98.2  F (36.8  C)   SpO2: 98%   Weight: 155 lb (70.3 kg)   Height: 5' 6.25\" (1.683 m)     Wt Readings from Last 3 Encounters:   05/28/20 155 lb (70.3 kg)   03/12/20 154 lb (69.9 kg)     Body mass index is 24.83 kg/m .     General Appearance: Alert and oriented, cooperative, affect appropriate, speech clear, in no apparent distress, " patient is wearing a mask  Head: Normocephalic, atraumatic  Eyes:   Conjunctivae clear and sclerae non-icteric  Lungs: Clear to auscultation bilaterally. No adventitious lung sounds noted. Normal inspiratory and expiratory effort  Cardiovascular: Regular rate, normal S1, S2. No murmurs, rubs, or gallops  Abdomen: Bowel sounds active all four quadrants. Soft, epigastric tenderness upon palpation.  No hepatomegaly or splenomegaly.   Integumentary: Warm and dry.   Neuro: Alert and oriented, follows commands appropriately.         Jacinda Banks, CNP

## 2021-06-08 NOTE — TELEPHONE ENCOUNTER
I called and left a VM for the patient to call back and schedule a in person visit with Jacinda Banks this week.

## 2021-06-08 NOTE — TELEPHONE ENCOUNTER
New Appointment Needed  What is the reason for the visit:  Discuss endoscopy results - per pt the clinic where she got them done should have sent results over to the clinic and is also wondering whether or not Jacinda has received them.     Provider Preference: Jacinda Banks only    How soon do you need to be seen?: Hoping for this week as the next available virtual visit with PCP is in June     Waitlist offered?: No    Okay to leave a detailed message:  Yes

## 2021-06-09 NOTE — TELEPHONE ENCOUNTER
Who is calling:  The patient   Reason for Call:  The patient states she scheduled a lab appointment at the [facility] at 9AM on 7/3/20 and would like the future lab orders from 3/12/20 faxed to them at  # [phone].  Date of last appointment with primary care:   Okay to leave a detailed message: Yes  The patient states to send her a My chart message when the labs have been faxed.

## 2021-06-10 ENCOUNTER — COMMUNICATION - HEALTHEAST (OUTPATIENT)
Dept: INTERNAL MEDICINE | Facility: CLINIC | Age: 51
End: 2021-06-10

## 2021-06-10 DIAGNOSIS — Z11.59 ENCOUNTER FOR SCREENING FOR OTHER VIRAL DISEASES: ICD-10-CM

## 2021-06-10 DIAGNOSIS — F41.1 ANXIETY STATE: ICD-10-CM

## 2021-06-11 NOTE — PROGRESS NOTES
"Jaz Archibald is a 50 y.o. female who is being evaluated via a billable video visit.      The patient has been notified of following:     \"This video visit will be conducted via a call between you and your physician/provider. We have found that certain health care needs can be provided without the need for an in-person physical exam.  This service lets us provide the care you need with a video conversation.  If a prescription is necessary we can send it directly to your pharmacy.  If lab work is needed we can place an order for that and you can then stop by our lab to have the test done at a later time.    Video visits are billed at different rates depending on your insurance coverage. Please reach out to your insurance provider with any questions.    If during the course of the call the physician/provider feels a video visit is not appropriate, you will not be charged for this service.\"    Patient has given verbal consent to a Video visit? Yes  How would you like to obtain your AVS? AVS Preference: MyChart.  If dropped by the video visit, the video invitation should be sent to:   Will anyone else be joining your video visit? No        Video Start Time: 2:20pm    Additional provider notes:     Internal Medicine Office Visit  [facility]  Patient Name: Jaz Archiabld  Patient Age: 50 y.o.  YOB: 1970  MRN: [id number]    Date of Visit: 2020  Reason for Office Visit:   Chief Complaint   Patient presents with     Follow-up     [facility] said that she may need surgery. Has been getting treatment for skin cancers. Discuss blood work. Talk about thyroid.            Assessment / Plan / Medical Decision Makin. Numbness and tingling  - EMG- Both Arms; Future  - EMG- Both Legs; Future    2. Anxiety state  - escitalopram oxalate (LEXAPRO) 20 MG tablet; Take 1 tablet (20 mg total) by mouth daily.  Dispense: 30 tablet; Refill: 5  - traZODone (DESYREL) 50 MG tablet; Take 1 " tablet (50 mg total) by mouth at bedtime.  Dispense: 30 tablet; Refill: 5    3. Benign essential hypertension  - triamterene-hydrochlorothiazide (DYAZIDE) 37.5-25 mg per capsule; Take 1 capsule by mouth daily.  Dispense: 30 capsule; Refill: 5    4. Gastroesophageal reflux disease with esophagitis without hemorrhage  5. Hiatal hernia  Recommend continue on current medications, schedule follow-up with specialist to discuss surgical options and determine a plan of care    6. History of basal cell carcinoma  Recommend keeping follow-up appointment with dermatology as scheduled    7. Weight gain  Recommend increasing physical activity to 30 minutes most days of the week outside of regular schedule in addition to utilizing an mando such as my fitness pal as an awareness opportunity as to caloric intake    Orders Placed This Encounter   Procedures     EMG- Both Arms     EMG- Both Legs   Followup: Return in about 6 months (around 3/29/2021). earlier if needed.    Health Maintenance Review  Health Maintenance   Topic Date Due     ADVANCE CARE PLANNING  02/01/1987     PAP SMEAR  06/26/2016     Pneumococcal Vaccine: Pediatrics (0 to 5 Years) and At-Risk Patients (6 to 64 Years) (2 of 3 - PCV13) 11/14/2017     ZOSTER VACCINES (2 of 2) 04/10/2019     INFLUENZA VACCINE RULE BASED (1) 08/01/2020     PREVENTIVE CARE VISIT  03/12/2021     MAMMOGRAM  06/05/2021     TD 18+ HE  06/05/2023     LIPID  02/13/2024     COLORECTAL CANCER SCREENING  05/08/2029     HIV SCREENING  Completed     TDAP ADULT ONE TIME DOSE  Completed     HEPATITIS B VACCINES  Aged Out         I have discontinued Jaz Archibald's nitrofurantoin (macrocrystal-monohydrate), omeprazole, and omeprazole. I am also having her maintain her hyoscyamine, pantoprazole, escitalopram oxalate, triamterene-hydrochlorothiazide, and traZODone.      HPI:  Jaz Archibald is a 50 y.o. year old who presents to the office today with chronic conditions including     Recommendation by GI  to complete an brii or link procedure.     She is concerns about 15 pound weight gain over the last year.    She reports some neuropathy in her hands and in her legs sometimes at bedtime. She is working at a desk    She is doing skin cancer treatments and is following up with dermatology.       Review of Systems- pertinent positive in bold:  Constitutional: Fever, chills, night sweats, fainting, weight change, fatigue, dizziness, sleeping difficulties, loud snoring/pauses in breathing  Eyes: change in vision, blurred or double vision, redness/eye pain  Ears, nose, mouth, throat: change in hearing, ear pain, hoarseness, difficulty swallowing, sores in the mouth or throat  Respiratory: shortness of breath, cough, bloody sputum, wheezing  Cardiovascular: chest pain, palpitations   Gastrointestinal: abdominal pain, heartburn/indigestion, nausea/vomiting, change in appetite, change in bowel habits, constipation or diarrhea, rectal bleeding/dark stools, difficulty swallowing  Urinary: painful urination, frequent urination, urinary urgency/incontinence, blood in urine/dark urine, nocturia (new or increasing), muscle cramps, swelling of hands, feet, ankles, leg pain/redness  Skin: change in moles/freckles, rash, nodules  Hematologic/lymphatic: swollen lymph glands, abnormal bruising/bleeding  Endocrine: excessive thirst/urination, cold or heat intolerance  Neurologic/emotional: worrisome memory change, numbness/tingling, anxiety, mood swings      Current Scheduled Meds:  Outpatient Encounter Medications as of 9/29/2020   Medication Sig Dispense Refill     escitalopram oxalate (LEXAPRO) 20 MG tablet Take 1 tablet (20 mg total) by mouth daily. 30 tablet 5     hyoscyamine (LEVSIN/SL) 0.125 mg SL tablet Take 0.125 mg by mouth every 4 (four) hours as needed for cramping.       pantoprazole (PROTONIX) 40 MG tablet        traZODone (DESYREL) 50 MG tablet Take 1 tablet (50 mg total) by mouth at bedtime. 30 tablet 5      triamterene-hydrochlorothiazide (DYAZIDE) 37.5-25 mg per capsule Take 1 capsule by mouth daily. 30 capsule 5     [DISCONTINUED] escitalopram oxalate (LEXAPRO) 20 MG tablet Take 1 tablet (20 mg total) by mouth daily. 30 tablet 0     [DISCONTINUED] nitrofurantoin, macrocrystal-monohydrate, (MACROBID) 100 MG capsule TK 1 C PO Q 12 H FOR 5 DAYS       [DISCONTINUED] traZODone (DESYREL) 50 MG tablet Take 1 tablet (50 mg total) by mouth at bedtime. 30 tablet 0     [DISCONTINUED] triamterene-hydrochlorothiazide (DYAZIDE) 37.5-25 mg per capsule Take 1 capsule by mouth daily. 30 capsule 0     [DISCONTINUED] omeprazole (PRILOSEC) 20 MG capsule Take 1 capsule (20 mg total) by mouth 2 (two) times a day before meals. 60 capsule 0     [DISCONTINUED] omeprazole (PRILOSEC) 40 MG capsule TAKE 1 CAPSULE BY MOUTH EVERY DAY       No facility-administered encounter medications on file as of 9/29/2020.      No past medical history on file.  No past surgical history on file.  Social History     Tobacco Use     Smoking status: Never Smoker     Smokeless tobacco: Never Used   Substance Use Topics     Alcohol use: Yes     Drug use: Never     No family history on file.  Social History     Social History Narrative    Remains physically active.  Works for [employer].       Objective / Physical Examination:  There were no vitals filed for this visit.  Wt Readings from Last 3 Encounters:   05/28/20 155 lb (70.3 kg)   03/12/20 154 lb (69.9 kg)     There is no height or weight on file to calculate BMI.     GENERAL: Healthy, alert and no distress  EYES: Eyes grossly normal to inspection. No discharge or erythema, or obvious scleral/conjunctival abnormalities.  RESP: No audible wheeze, cough, or visible cyanosis.  No visible retractions or increased work of breathing.    NEURO: Cranial nerves grossly intact. Mentation and speech appropriate for age.  PSYCH: Mentation appears normal, affect normal/bright, judgement and insight intact, normal  speech and appearance well-groomed          Video-Visit Details    Type of service:  Video Visit    Video End Time (time video stopped): 2:42 PM  Originating Location (pt. Location): Home    Distant Location (provider location):  [facility]     Platform used for Video Visit: Vivian Banks, CNP

## 2021-06-12 NOTE — PROGRESS NOTES
"Patient presents at the request of Jacinda Banks CNP for an upper extremity EMG.  She has neck pain with right greater than left arm numbness and tingling throughout the forearms with a shocklike sensation.  She also has right wrist pain and first CMC joint pain some numbness in digits one of the right hand.  Also has low back pain with left leg \"sciatica \".  On exam, normal sensation of the upper extremities with normal reflexes and strength.    EMG/NCS  results: Please see scanned full report    Comment NCS: Normal study  1.  Normal nerve conduction studies bilateral upper extremities.    Comment EMG: Normal study  1.  Normal needle EMG bilateral upper extremities.    Interpretation: Normal study    1. There is no electrodiagnostic evidence of cervical radiculopathy, brachial plexopathy, or focal neuropathy in the bilateral upper extremities.  Specifically, there is no electrodiagnostic evidence of median neuropathy at the wrist in the bilateral upper extremities.    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  May benefit from hand therapy.  She will return for EMG of the left lower extremity to evaluate for polyneuropathy and  lumbar radiculopathy.    The testing was completed in its entirety by the physician.       It was our pleasure caring for your patient today, if there any questions or concerns please do not hesitate to contact us.    "

## 2021-06-12 NOTE — PATIENT INSTRUCTIONS - HE
Thank you for choosing the [facility] for your EMG testing.    The ordering provider will receive your final EMG results within the next few days.  Please follow up with your provider for the results and further treatment recommendations.

## 2021-06-13 NOTE — PROGRESS NOTES
Patient presents at the request of Jacinda Banks CNP for bilateral lower extremity EMG.  She has a 7 to 8-year history of left-sided low back pain with left posterior lower extremity pain to the calf.  She also has bilateral foot numbness and tingling over the toes and sometimes radiating up to the ankles.  On exam she has no muscle atrophy of the lower extremities, normal strength, and has an absent left Achilles reflex normal patellar reflexes.    EMG/NCS  results: Please see scanned full report.    Comment NCS: Abnormal study  1.  Prolonged left tibial H reflex latency with low amplitude compared to right.  2.  Normal bilateral sural SNAPs and peroneal and tibial CMAP's.    Comment EMG: Normal study  1.  Normal needle EMG bilateral lower extremities.    Interpretation: Abnormal study: There is electrodiagnostic evidence of:     1.  Prolonged latency and very low amplitude left tibial H reflex compared to right.  This is nonspecific and nondiagnostic.  Under the correct clinical conditions, this can be observed in an age-indeterminate left S1 radiculopathy.     2.  There is no electrodiagnostic evidence of lumbosacral radiculopathy in the right lower extremity or lumbosacral plexopathy, focal neuropathy, or peripheral polyneuropathy in the bilateral lower extremities.    Note: Patient will follow up with her primary care provider for further recommendations.  She does have some supplement such as B12 and vitamin D that she is going to try.    The testing was completed in its entirety by the physician.      It was our pleasure caring for your patient today, if there any questions or concerns please do not hesitate to contact us.

## 2021-06-15 NOTE — TELEPHONE ENCOUNTER
Refill Approved    Rx renewed per Medication Renewal Policy. Medication was last renewed on 09/29/2020.  Last office visit was 09/29/2020 with PCP.    Heena Aguayo, [facility] Triage/Med Refill 2/9/2021     Requested Prescriptions   Pending Prescriptions Disp Refills     escitalopram oxalate (LEXAPRO) 20 MG tablet 30 tablet 1     Sig: Take 1 tablet (20 mg total) by mouth daily.       SSRI Refill Protocol  Passed - 2/9/2021  1:39 PM        Passed - PCP or prescribing provider visit in last year     Last office visit with prescriber/PCP: 5/28/2020 Jacinda Banks CNP OR same dept: 5/28/2020 Jacinda Banks CNP OR same specialty: 5/28/2020 Jacinda Banks CNP  Last physical: 3/12/2020 Last MTM visit: Visit date not found   Next visit within 3 mo: Visit date not found  Next physical within 3 mo: Visit date not found  Prescriber OR PCP: Jacinda Banks CNP  Last diagnosis associated with med order: 1. Anxiety state  - escitalopram oxalate (LEXAPRO) 20 MG tablet; Take 1 tablet (20 mg total) by mouth daily.  Dispense: 30 tablet; Refill: 1    If protocol passes may refill for 12 months if within 3 months of last provider visit (or a total of 15 months).

## 2021-06-15 NOTE — TELEPHONE ENCOUNTER
[facility]     RX Request has been sent 3 times to new fax number  - no response rcvd for    escitalopram oxalate     Fax number is [phone] or submit electronically.    If any questions please call [facility] at.

## 2021-06-25 NOTE — TELEPHONE ENCOUNTER
escitalopram oxalate (LEXAPRO) 20 MG tablet 30 tablet 0 6/10/2021  No   Sig - Route: Take 1 tablet (20 mg total) by mouth daily. - Oral   Sent to pharmacy as: escitalopram 20 mg tablet (LEXAPRO)   E-Prescribing Status: Receipt confirmed by pharmacy (6/10/2021 11:32 AM CDT)     Sent to wrong pharmacy. Teed up with correct one

## 2021-06-25 NOTE — TELEPHONE ENCOUNTER
Last seen 9/29/21  No pending follow up   [employer] message sent to schedule     Pended 30 day supply of Escitalopram 20MG

## 2021-06-25 NOTE — TELEPHONE ENCOUNTER
[facility] calling to report that the below Rx was supposed to be sent to them, not [facility].  Please correct and resubmit.    Any questions, please call [phone].

## 2021-07-01 ENCOUNTER — COMMUNICATION - HEALTHEAST (OUTPATIENT)
Dept: INTERNAL MEDICINE | Facility: CLINIC | Age: 51
End: 2021-07-01

## 2021-07-01 DIAGNOSIS — I10 BENIGN ESSENTIAL HYPERTENSION: ICD-10-CM

## 2021-07-04 NOTE — TELEPHONE ENCOUNTER
Telephone Encounter by Aurea Drake RN at 7/1/2021 10:38 AM     Author: Aurea Drake RN Service: -- Author Type: Registered Nurse    Filed: 7/1/2021 10:38 AM Encounter Date: 7/1/2021 Status: Signed    : Aurea Drake RN (Registered Nurse)       RN cannot approve Refill Request    RN can NOT refill this medication PCP messaged that patient is overdue for Labs. Last office visit: 5/28/2020 Jacinda Banks CNP Last Physical: 3/12/2020 Last MTM visit: Visit date not found Last visit same specialty: 5/28/2020 Jacinda Banks CNP.  Next visit within 3 mo: Visit date not found  Next physical within 3 mo: Visit date not found      [facility] Triage/Med Refill 7/1/2021    Requested Prescriptions   Pending Prescriptions Disp Refills   ? triamterene-hydrochlorothiazide (DYAZIDE) 37.5-25 mg per capsule 60 capsule 0     Sig: Take 1 capsule by mouth daily. Patient due for in clinic appt for future refills       Diuretics/Combination Diuretics Refill Protocol  Failed - 7/1/2021  9:58 AM        Failed - Serum Potassium in past 12 months      No results found for: LN-POTASSIUM          Failed - Serum Sodium in past 12 months      No results found for: LN-SODIUM          Failed - Blood pressure on file in past 12 months     BP Readings from Last 1 Encounters:   05/28/20 124/80             Failed - Serum Creatinine in past 12 months      No results found for: CREATININE          Passed - Visit with PCP or prescribing provider visit in past 12 months     Last office visit with prescriber/PCP: 5/28/2020 Jacinda Banks CNP OR same dept: Visit date not found OR same specialty: 5/28/2020 Jacinda Banks CNP  Last physical: 3/12/2020 Last MTM visit: Visit date not found   Next visit within 3 mo: Visit date not found  Next physical within 3 mo: Visit date not found  Prescriber OR PCP: Jacinda Banks CNP  Last diagnosis associated with med  order: 1. Benign essential hypertension  - triamterene-hydrochlorothiazide (DYAZIDE) 37.5-25 mg per capsule; Take 1 capsule by mouth daily. Patient due for in clinic appt for future refills  Dispense: 60 capsule; Refill: 0    If protocol passes may refill for 12 months if within 3 months of last provider visit (or a total of 15 months).

## 2021-07-04 NOTE — ADDENDUM NOTE
Addendum Note by Kristel Quiles LPN at 6/11/2021 10:58 AM     Author: Kristel Quiles LPN Service: -- Author Type: Licensed Nurse    Filed: 6/11/2021 10:58 AM Encounter Date: 6/10/2021 Status: Signed    : Kristel Quiles LPN (Licensed Nurse)    Addended by: KRISTEL QUILES on: 6/11/2021 10:58 AM        Modules accepted: Orders

## 2021-07-07 ENCOUNTER — COMMUNICATION - HEALTHEAST (OUTPATIENT)
Dept: FAMILY MEDICINE | Facility: CLINIC | Age: 51
End: 2021-07-07

## 2021-07-07 DIAGNOSIS — I10 BENIGN ESSENTIAL HYPERTENSION: ICD-10-CM

## 2021-07-07 NOTE — TELEPHONE ENCOUNTER
Telephone Encounter by Aurea Drake RN at 7/7/2021  8:29 AM     Author: Aurea Drake RN Service: -- Author Type: Registered Nurse    Filed: 7/7/2021  8:29 AM Encounter Date: 7/7/2021 Status: Signed    : Aurea Drake RN (Registered Nurse)       RN cannot approve Refill Request    RN can NOT refill this medication PCP messaged that patient is overdue for Labs. Last office visit: 5/28/2020 Jacinda Banks CNP Last Physical: 3/12/2020 Last MTM visit: Visit date not found Last visit same specialty: Visit date not found.  Next visit within 3 mo: Visit date not found  Next physical within 3 mo: Visit date not found      [facility] Connection Triage/Med Refill 7/7/2021    Requested Prescriptions   Pending Prescriptions Disp Refills   ? triamterene-hydrochlorothiazide (DYAZIDE) 37.5-25 mg per capsule 60 capsule 0     Sig: Take 1 capsule by mouth daily. Patient due for in clinic appt for future refills       Diuretics/Combination Diuretics Refill Protocol  Failed - 7/7/2021  7:44 AM        Failed - Serum Potassium in past 12 months      No results found for: LN-POTASSIUM          Failed - Serum Sodium in past 12 months      No results found for: LN-SODIUM          Failed - Blood pressure on file in past 12 months     BP Readings from Last 1 Encounters:   05/28/20 124/80             Failed - Serum Creatinine in past 12 months      No results found for: CREATININE          Passed - Visit with PCP or prescribing provider visit in past 12 months     Last office visit with prescriber/PCP: 5/28/2020 Jacinda Banks CNP OR same dept: Visit date not found OR same specialty: Visit date not found  Last physical: 3/12/2020 Last MTM visit: Visit date not found   Next visit within 3 mo: Visit date not found  Next physical within 3 mo: Visit date not found  Prescriber OR PCP: Jacinda Banks CNP  Last diagnosis associated with med order: 1. Benign essential  hypertension  - triamterene-hydrochlorothiazide (DYAZIDE) 37.5-25 mg per capsule; Take 1 capsule by mouth daily. Patient due for in clinic appt for future refills  Dispense: 60 capsule; Refill: 0    If protocol passes may refill for 12 months if within 3 months of last provider visit (or a total of 15 months).

## 2021-07-07 NOTE — TELEPHONE ENCOUNTER
Telephone Encounter by Rolanda Mir CMA at 7/7/2021  7:43 AM     Author: Rolanda Mir CMA Service: -- Author Type: Certified Medical Assistant    Filed: 7/7/2021  7:44 AM Encounter Date: 7/7/2021 Status: Signed    : Rolanda Mir CMA (Certified Medical Assistant)       Patient would like RX sent to this pharmacy for triamterene-hydrochlorothiazide instead of [facility]

## 2021-07-12 ENCOUNTER — PRE VISIT (OUTPATIENT)
Dept: SURGERY | Facility: CLINIC | Age: 51
End: 2021-07-12

## 2021-07-20 ENCOUNTER — APPOINTMENT (OUTPATIENT)
Dept: URGENT CARE | Facility: CLINIC | Age: 51
End: 2021-07-20
Payer: COMMERCIAL

## 2021-07-21 ENCOUNTER — OFFICE VISIT (OUTPATIENT)
Dept: SURGERY | Facility: CLINIC | Age: 51
End: 2021-07-21
Attending: THORACIC SURGERY (CARDIOTHORACIC VASCULAR SURGERY)
Payer: COMMERCIAL

## 2021-07-21 ENCOUNTER — ANESTHESIA EVENT (OUTPATIENT)
Dept: SURGERY | Facility: CLINIC | Age: 51
DRG: 328 | End: 2021-07-21
Payer: COMMERCIAL

## 2021-07-21 ENCOUNTER — OFFICE VISIT (OUTPATIENT)
Dept: SURGERY | Facility: CLINIC | Age: 51
End: 2021-07-21
Payer: COMMERCIAL

## 2021-07-21 ENCOUNTER — LAB (OUTPATIENT)
Dept: LAB | Facility: CLINIC | Age: 51
End: 2021-07-21
Payer: COMMERCIAL

## 2021-07-21 VITALS
HEART RATE: 101 BPM | BODY MASS INDEX: 24.96 KG/M2 | OXYGEN SATURATION: 97 % | TEMPERATURE: 98.1 F | HEIGHT: 67 IN | RESPIRATION RATE: 12 BRPM | DIASTOLIC BLOOD PRESSURE: 89 MMHG | WEIGHT: 159 LBS | SYSTOLIC BLOOD PRESSURE: 117 MMHG

## 2021-07-21 VITALS
SYSTOLIC BLOOD PRESSURE: 117 MMHG | OXYGEN SATURATION: 97 % | DIASTOLIC BLOOD PRESSURE: 89 MMHG | TEMPERATURE: 98.1 F | HEART RATE: 101 BPM | RESPIRATION RATE: 12 BRPM | WEIGHT: 158.95 LBS | BODY MASS INDEX: 25.27 KG/M2

## 2021-07-21 DIAGNOSIS — I10 ESSENTIAL HYPERTENSION: ICD-10-CM

## 2021-07-21 DIAGNOSIS — K21.9 GERD WITHOUT ESOPHAGITIS: Primary | ICD-10-CM

## 2021-07-21 DIAGNOSIS — Z01.818 PREOP EXAMINATION: ICD-10-CM

## 2021-07-21 DIAGNOSIS — K44.9 HIATAL HERNIA: Primary | ICD-10-CM

## 2021-07-21 DIAGNOSIS — K44.9 HIATAL HERNIA: ICD-10-CM

## 2021-07-21 LAB
ABO/RH(D): NORMAL
ANION GAP SERPL CALCULATED.3IONS-SCNC: 7 MMOL/L (ref 3–14)
ANTIBODY SCREEN: NEGATIVE
BUN SERPL-MCNC: 12 MG/DL (ref 7–30)
CALCIUM SERPL-MCNC: 9.1 MG/DL (ref 8.5–10.1)
CHLORIDE BLD-SCNC: 100 MMOL/L (ref 94–109)
CO2 SERPL-SCNC: 29 MMOL/L (ref 20–32)
CREAT SERPL-MCNC: 0.79 MG/DL (ref 0.52–1.04)
ERYTHROCYTE [DISTWIDTH] IN BLOOD BY AUTOMATED COUNT: 12.9 % (ref 10–15)
GFR SERPL CREATININE-BSD FRML MDRD: 86 ML/MIN/1.73M2
GLUCOSE BLD-MCNC: 92 MG/DL (ref 70–99)
HCT VFR BLD AUTO: 44.9 % (ref 35–47)
HGB BLD-MCNC: 14.9 G/DL (ref 11.7–15.7)
MCH RBC QN AUTO: 29 PG (ref 26.5–33)
MCHC RBC AUTO-ENTMCNC: 33.2 G/DL (ref 31.5–36.5)
MCV RBC AUTO: 88 FL (ref 78–100)
PLATELET # BLD AUTO: 388 10E3/UL (ref 150–450)
POTASSIUM BLD-SCNC: 3.6 MMOL/L (ref 3.4–5.3)
RBC # BLD AUTO: 5.13 10E6/UL (ref 3.8–5.2)
SODIUM SERPL-SCNC: 136 MMOL/L (ref 133–144)
SPECIMEN EXPIRATION DATE: NORMAL
WBC # BLD AUTO: 10.6 10E3/UL (ref 4–11)

## 2021-07-21 PROCEDURE — 86850 RBC ANTIBODY SCREEN: CPT | Mod: 90 | Performed by: PATHOLOGY

## 2021-07-21 PROCEDURE — 85027 COMPLETE CBC AUTOMATED: CPT | Performed by: PATHOLOGY

## 2021-07-21 PROCEDURE — 86900 BLOOD TYPING SEROLOGIC ABO: CPT | Mod: 90 | Performed by: PATHOLOGY

## 2021-07-21 PROCEDURE — 36415 COLL VENOUS BLD VENIPUNCTURE: CPT | Performed by: PATHOLOGY

## 2021-07-21 PROCEDURE — 99215 OFFICE O/P EST HI 40 MIN: CPT | Performed by: CLINICAL NURSE SPECIALIST

## 2021-07-21 PROCEDURE — 99215 OFFICE O/P EST HI 40 MIN: CPT | Performed by: THORACIC SURGERY (CARDIOTHORACIC VASCULAR SURGERY)

## 2021-07-21 PROCEDURE — G0463 HOSPITAL OUTPT CLINIC VISIT: HCPCS

## 2021-07-21 PROCEDURE — 86901 BLOOD TYPING SEROLOGIC RH(D): CPT | Mod: 90 | Performed by: PATHOLOGY

## 2021-07-21 PROCEDURE — 80048 BASIC METABOLIC PNL TOTAL CA: CPT | Performed by: PATHOLOGY

## 2021-07-21 RX ORDER — OMEGA-3 FATTY ACIDS/FISH OIL 300-1000MG
200 CAPSULE ORAL EVERY 4 HOURS PRN
Status: ON HOLD | COMMUNITY
End: 2021-07-29

## 2021-07-21 ASSESSMENT — LIFESTYLE VARIABLES: TOBACCO_USE: 0

## 2021-07-21 ASSESSMENT — PAIN SCALES - GENERAL
PAINLEVEL: MODERATE PAIN (4)
PAINLEVEL: MODERATE PAIN (4)

## 2021-07-21 ASSESSMENT — MIFFLIN-ST. JEOR: SCORE: 1355.91

## 2021-07-21 NOTE — NURSING NOTE
"Oncology Rooming Note    July 21, 2021 3:04 PM   Jaz Archibald is a 51 year old female who presents for:    Chief Complaint   Patient presents with     Oncology Clinic Visit     Hiatal hernia (Primary Dx)     Initial Vitals: /89   Pulse 101   Temp 98.1  F (36.7  C) (Oral)   Resp 12   Wt 72.1 kg (158 lb 15.2 oz)   LMP 07/26/2013   SpO2 97%   BMI 25.27 kg/m   Estimated body mass index is 25.27 kg/m  as calculated from the following:    Height as of an earlier encounter on 7/21/21: 1.689 m (5' 6.5\").    Weight as of this encounter: 72.1 kg (158 lb 15.2 oz). Body surface area is 1.84 meters squared.  Moderate Pain (4) Comment: Data Unavailable   Patient's last menstrual period was 07/26/2013.  Allergies reviewed: Yes  Medications reviewed: Yes    Medications: Medication refills not needed today.  Pharmacy name entered into [employer]:    [id number] - [place], [place] - [address] [place] [address]  [facility] [place] [place] [facility] [address]  [facility]  [facility] - [place], [place] - [address] [place] [address]    Clinical concerns: Patient has Pre-op appointment prior. Medication ,allergies and vitals done at Pre-op.       Alissa Hutchinson MA            "

## 2021-07-21 NOTE — H&P
Pre-Operative H & P     CC:  Preoperative exam to assess for increased cardiopulmonary risk while undergoing surgery and anesthesia.    Date of Encounter: 7/21/2021  Primary Care Physician:  Jacinda Banks  Reason for visit: GERD without esophagitis [K21.9]    HPI  Jaz Archibald is a 51 year old female who presents for pre-operative H & P in preparation for HERNIORRHAPHY, HIATAL, LAPAROSCOPIC, FUNDOPLICATION, NISSEN, LAPAROSCOPIC with Dr. Krystian Caceres on 7/26/21 at [facility]. History is obtained from the patient and medical records.     Patient who has recently been evaluated by Dr. Krystian Caceres with longstanding history of heartburn and regurgitation that occasionally leads to vomiting. No dysphagia. She has attempted control with multiple medications. Work up was reviewed and she was counseled for above procedure.     Her history is otherwise significant for HYPERTENSION, and anxiety. In 2015 she was having some palpitations but had a negative work up. She now attributes this to caffeine at the time. Today patient denies fever, shortness of breath, chest pain, irregular HR, or ankle edema. She does cough but attributes this to her GERD.     The patient was initially evaluated by me via virtual visit on 5/20/21. At that time she did not have a surgery date, and now it has been scheduled outside the 30 days required for H and P. She reports no changes since that time.     Past Medical History  Past Medical History:   Diagnosis Date     ADD (attention deficit disorder)      Anxiety      Gastroesophageal reflux disease without esophagitis      Hypertension      Urethral hypermobility 06/26/2013       Past Surgical History  Past Surgical History:   Procedure Laterality Date     EGD      7/2020     GYN SURGERY  07/12/2012    Novasure     HYSTERECTOMY, PAP NO LONGER INDICATED       LAPAROSCOPIC HYSTERECTOMY TOTAL  08/12/2013    Procedure: LAPAROSCOPIC HYSTERECTOMY  TOTAL;  Laparoscopic Total Hysterectomy,Bilateral Salpingectomy with Sparing of the Ovaries,Uterosacral Suspension,Transvaginal Taping,Perineoplasty;  Surgeon: Sy Castro MD;  Location: [facility] OR     SLING TRANSVAGINAL  08/12/2013    Procedure: SLING TRANSVAGINAL;;  Surgeon: Sy Castro MD;  Location: [facility] OR       Hx of Blood transfusions/reactions: Denies.    Hx of abnormal bleeding or anti-platelet use: Denies.     Menstrual history: Patient's last menstrual period was 07/26/2013.    Steroid use in the last year: Denies.     Personal or FH with difficulty with Anesthesia:  Denies.     Prior to Admission Medications  Current Outpatient Medications   Medication Sig Dispense Refill     amphetamine-dextroamphetamine (ADDERALL) 10 MG tablet Take 10 mg by mouth daily as needed (in afternoon when necessary)       amphetamine-dextroamphetamine (ADDERALL) 20 MG tablet Take 20 mg by mouth every morning        EPINEPHrine (EPIPEN) 0.3 MG/0.3ML injection Inject 0.3 mLs into the muscle once as needed for anaphylaxis. 2 each 3     escitalopram (LEXAPRO) 20 MG tablet Take 20 mg by mouth At Bedtime        hyoscyamine (LEVSIN/SL) 0.125 MG sublingual tablet Take 0.125 mg by mouth every 6 hours as needed        ibuprofen (ADVIL/MOTRIN) 200 MG capsule Take 200 mg by mouth every 4 hours as needed for fever       lactobacillus rhamnosus, GG, (CULTURELL) capsule Take 1 capsule by mouth 2 times daily       olopatadine (PATANOL) 0.1 % ophthalmic solution Place 1-2 drops into both eyes 2 times daily   6     pantoprazole (PROTONIX) 40 MG EC tablet Take 40 mg by mouth 2 times daily        sucralfate (CARAFATE) 1 GM tablet Take 1 g by mouth 4 times daily as needed        traZODone (DESYREL) 100 MG tablet Take 100 mg by mouth At Bedtime       triamterene-HCTZ (DYAZIDE) 37.5-25 MG capsule Take 1 capsule by mouth every morning  90 capsule 0     valACYclovir (VALTREX) 1000 mg tablet Take 1,000 mg by mouth as needed (cold sores)         Vitamin D3 (CHOLECALCIFEROL) 25 mcg (1000 units) tablet Take 1 tablet by mouth every morning          Allergies  Allergies   Allergen Reactions     Bees Swelling     Disfiguring      Suture Swelling     local swelling and sutures didn't dissolve vyrcil   Suture        Social History  Social History     Socioeconomic History     Marital status:      Spouse name: Not on file     Number of children: 2     Years of education: Not on file     Highest education level: Not on file   Occupational History     Employer: [employer]   Tobacco Use     Smoking status: Never Smoker     Smokeless tobacco: Never Used   Substance and Sexual Activity     Alcohol use: Yes     Alcohol/week: 0.0 standard drinks     Comment: 2-3 drinks per week     Drug use: No     Sexual activity: Yes     Partners: Male     Birth control/protection: Surgical     Comment: vasectomy significant other   Other Topics Concern     Parent/sibling w/ CABG, MI or angioplasty before 65F 55M? No      Service Not Asked     Blood Transfusions Not Asked     Caffeine Concern Not Asked     Occupational Exposure Not Asked     Hobby Hazards Not Asked     Sleep Concern Not Asked     Stress Concern Not Asked     Weight Concern Not Asked     Special Diet No     Back Care Not Asked     Exercise No     Bike Helmet Not Asked     Seat Belt Yes     Self-Exams Not Asked   Social History Narrative     Not on file     Social Determinants of Health     Financial Resource Strain:      Difficulty of Paying Living Expenses:    Food Insecurity:      Worried About Running Out of Food in the Last Year:      Ran Out of Food in the Last Year:    Transportation Needs:      Lack of Transportation (Medical):      Lack of Transportation (Non-Medical):    Physical Activity:      Days of Exercise per Week:      Minutes of Exercise per Session:    Stress:      Feeling of Stress :    Social Connections:      Frequency of Communication with Friends and Family:      Frequency  "of Social Gatherings with Friends and Family:      Attends [employer] Services:      Active Member of Clubs or Organizations:      Attends Club or Organization Meetings:      Marital Status:    Intimate Partner Violence:      Fear of Current or Ex-Partner:      Emotionally Abused:      Physically Abused:      Sexually Abused:        Family History  Family History   Problem Relation Age of Onset     Lipids Mother         chol     Hypertension Mother      Lipids Father         chol     Hypertension Father      C.A.D. Father      Heart Disease Father        ROS/MED HISTORY  The complete review of systems is negative other than noted in the HPI or here.    ENT/Pulmonary:     (+) ZHANG risk factors, snores loudly, hypertension,  (-) tobacco use   Neurologic: Comment: ADD - neg neurologic ROS     Cardiovascular:     (+) hypertension-range: BP range 130s/80-90s/ ----Previous cardiac testing   Echo: Date: 2015 Results:    Stress Test: Date: Results:    ECG Reviewed: Date: 2015 Results:   (-) CAD and taking anticoagulants/antiplatelets   METS/Exercise Tolerance: >4 METS    Hematologic:  - neg hematologic  ROS     Musculoskeletal:  - neg musculoskeletal ROS     GI/Hepatic:     (+) GERD, Symptomatic,     Renal/Genitourinary:  - neg Renal ROS     Endo:     (+) thyroid problem,  Thyroid disease - Other,     Psychiatric/Substance Use:     (+) psychiatric history anxiety     Infectious Disease:  - neg infectious disease ROS     Malignancy:   (+) Malignancy, History of Skin.Skin CA Remission status post Surgery.        Other:  - neg other ROS          Temp: 98.1  F (36.7  C) Temp src: Oral BP: 117/89 Pulse: 101   Resp: 12 SpO2: 97 %         159 lbs 0 oz  5' 6.5\"[PT REPORTED[   Body mass index is 25.28 kg/m .       Physical Exam  Constitutional: Awake, alert, cooperative, no apparent distress, and appears stated age.  Eyes: Pupils equal, round and reactive to light, extra ocular muscles intact, sclera clear, conjunctiva normal.  HENT: " Normocephalic, oral pharynx with moist mucus membranes, good dentition. No goiter appreciated.   Respiratory: Clear to auscultation bilaterally, no crackles or wheezing. No cough or obvious dyspnea.  Cardiovascular: Regular rate and rhythm, normal S1 and S2, and no murmur noted.  Carotids +2, no bruits. No edema. Palpable pulses to radial  DP and PT arteries.   GI: Normal bowel sounds, soft, non-distended, non-tender, no masses palpated, no hepatosplenomegaly.    Lymph/Hematologic: No cervical lymphadenopathy and no supraclavicular lymphadenopathy.  Genitourinary: Deferred.   Skin: Warm and dry.  No rashes at anticipated surgical site.   Musculoskeletal: Full ROM of neck. There is no redness, warmth, or swelling of the joints. Gross motor strength is normal.    Neurologic: Awake, alert, oriented to name, place and time. Cranial nerves II-XII are grossly intact. Gait is normal.   Neuropsychiatric: Calm, cooperative. Normal affect.     Labs: (personally reviewed)  Lab Results   Component Value Date    WBC 10.6 07/21/2021    WBC 7.5 07/03/2020     Lab Results   Component Value Date    RBC 5.13 07/21/2021    RBC 5.09 07/03/2020     Lab Results   Component Value Date    HGB 14.9 07/21/2021    HGB 15.1 07/03/2020     Lab Results   Component Value Date    HCT 44.9 07/21/2021    HCT 44.3 07/03/2020     Lab Results   Component Value Date    MCV 88 07/21/2021    MCV 87 07/03/2020     Lab Results   Component Value Date    MCH 29.0 07/21/2021    MCH 29.7 07/03/2020     Lab Results   Component Value Date    MCHC 33.2 07/21/2021    MCHC 34.1 07/03/2020     Lab Results   Component Value Date    RDW 12.9 07/21/2021    RDW 13.6 07/03/2020     Lab Results   Component Value Date     07/21/2021     07/03/2020     Last Comprehensive Metabolic Panel:  Sodium   Date Value Ref Range Status   07/21/2021 136 133 - 144 mmol/L Final   07/03/2020 135 133 - 144 mmol/L Final     Potassium   Date Value Ref Range Status   07/21/2021  3.6 3.4 - 5.3 mmol/L Final   07/03/2020 3.9 3.4 - 5.3 mmol/L Final     Chloride   Date Value Ref Range Status   07/21/2021 100 94 - 109 mmol/L Final   07/03/2020 105 94 - 109 mmol/L Final     Carbon Dioxide   Date Value Ref Range Status   07/03/2020 20 20 - 32 mmol/L Final     Carbon Dioxide (CO2)   Date Value Ref Range Status   07/21/2021 29 20 - 32 mmol/L Final     Anion Gap   Date Value Ref Range Status   07/21/2021 7 3 - 14 mmol/L Final   07/03/2020 10 3 - 14 mmol/L Final     Glucose   Date Value Ref Range Status   07/21/2021 92 70 - 99 mg/dL Final   07/03/2020 78 70 - 99 mg/dL Final     Comment:     Fasting specimen     Urea Nitrogen   Date Value Ref Range Status   07/21/2021 12 7 - 30 mg/dL Final   07/03/2020 16 7 - 30 mg/dL Final     Creatinine   Date Value Ref Range Status   07/21/2021 0.79 0.52 - 1.04 mg/dL Final   07/03/2020 0.76 0.52 - 1.04 mg/dL Final     GFR Estimate   Date Value Ref Range Status   07/21/2021 86 >60 mL/min/1.73m2 Final     Comment:     As of July 11, 2021, eGFR is calculated by the CKD-EPI creatinine equation, without race adjustment. eGFR can be influenced by muscle mass, exercise, and diet. The reported eGFR is an estimation only and is only applicable if the renal function is stable.   07/03/2020 >90 >60 mL/min/[1.73_m2] Final     Comment:     Non  GFR Calc  Starting 12/18/2018, serum creatinine based estimated GFR (eGFR) will be   calculated using the Chronic Kidney Disease Epidemiology Collaboration   (CKD-EPI) equation.       Calcium   Date Value Ref Range Status   07/21/2021 9.1 8.5 - 10.1 mg/dL Final   07/03/2020 9.0 8.5 - 10.1 mg/dL Final     EKG: Personally reviewed 2015 Sinus rhythm, right atrial enlargement, read as right atrial abnormality, possible pulmonary disease.    EKG 2013  Normal sinus rhythm, possible LAE, ST and T wave abnormality, Prolonged QTc 492 ms     Cardiac echo: 2015   Interpretation Summary  Normal to low normal EF around 55% without  regional wall motion abnormality.     No significant valvular stenosis/insufficiency by doppler.     No old study available for comparison.     CTA Coronary 2015     IMPRESSION:     1. No evidence of coronary artery calcification.  2. Widely patent coronary arteries without detectable stenosis.        Xray esophagram 9/1/20  IMPRESSION: No hiatal hernia. No reflux demonstrated during the exam.     6/16/20 NM Gastric emptying  IMPRESSION: Normal  gastric emptying .     Imaging and cardiac testing reviewed by this provider     Outside records reviewed from: Care Everywhere       ASSESSMENT and PLAN  Jaz Archibald is a 51 year old female scheduled to undergo HERNIORRHAPHY, HIATAL, LAPAROSCOPIC, FUNDOPLICATION, NISSEN, LAPAROSCOPIC with Dr. Krystian Caceres on 7/26/21. She has the following specific operative considerations:   - RCRI : 0.9% risk of major adverse cardiac event.   - Anesthesia considerations:  Refer to PAC assessment in anesthesia records  - VTE risk: 0.26%  - ZHANG # of risks 3/8 = Intermediate risk  - Risk of PONV score = 3.  If > 2, anti-emetic intervention recommended. If 3 or > anti emetic intervention recommended with two or more meds    --Longstanding GERD, refractory to medical management. Above procedure being planned. Will take Protonix on DOS.  --No history of problems with anesthesia.  --HYPERTENSION. Will hold Dyazide on DOS. Cardiac work up in 2015 for PVCs neg. CTA showed no evidence of coronary calcification. Echocardiogram with EF 55% without regional wall motion abnormality. Denies cardiac symptoms. Good activity tolerance.   --Nonsmoker. Some coughing attributed to GERD. Denies shortness of breath. Lungs CTA. Intermediate risk for ZHANG.  --Anxiety. Lexapro at HS.   --ADD Adderall in am but will hold on DOS.   --Denies history of blood transfusion. Type and screen drawn today.     Arrival time, NPO, shower and medication instructions provided by nursing staff today.     Patient is optimized  and is acceptable candidate for the proposed procedure.  No further diagnostic evaluation is needed.       JAMIE Pink CNS  Preoperative Assessment Center  [facility]  Phone: [phone]  Fax: [phone]

## 2021-07-21 NOTE — PATIENT INSTRUCTIONS
Preparing for Your Surgery      Name:  Jaz Archibald   MRN:  [id number]   :  1970   Today's Date:  2021       Arriving for surgery:  Surgery date:  2021  Arrival time:  8:30 am    Restrictions due to COVID 19:       One visitor is allowed in the Pre Op area. When you go into surgery, one visitor is allowed to wait in the Surgery Waiting Room       (provided there is enough space to social distance).   After surgery- Two visitors are allowed at a time if you have a private room and one visitor is allowed for those in a semi-private room.   Every 4 days the visitor(s) can rotate. During the 4 day period, the visitor(s) must be consistent. No visitors under the age of 18 years old.   Visiting Hours: 8 am - 8:30 pm   No ill visitors.   All visitors must wear face mask.    [employer] parking is available for anyone with mobility limitations or disabilities.  ([facility]  24 hours/ 7 days a week; [facility]  7 am- 3:30 pm, Mon- Fri)    Please come to:     [facility] [address]  [address] [place] [address]       -    Please proceed to [address] on the 3rd floor. [phone]?     - ?If you are in need of directions, wheelchair or escort please stop at the Information Desk in the lobby. ?     What can I eat or drink?  -  You may eat and drink normally for up to 8 hours before your surgery. (Until 2 am)  -  You may have clear liquids until 2 hours before surgery. (Until )    Examples of clear liquids:  Water  Clear broth  Juices (apple, white grape, white cranberry  and cider) without pulp  Noncarbonated, powder based beverages  (lemonade and Fer-Aid)  Sodas (Sprite, 7-Up, ginger ale and seltzer)  Coffee or tea (without milk or cream)  Gatorade    -  No Alcohol for at least 24 hours before surgery     Which medicines can I take?    Hold Aspirin for 7 days before surgery.   Hold Multivitamins for 7 days before surgery.  Hold Supplements for 7  days before surgery.  Hold Ibuprofen (Advil, Motrin) for 1 day before surgery--unless otherwise directed by surgeon.  Hold Naproxen (Aleve) for 4 days before surgery.    -  DO NOT take these medications the day of surgery:  Adderall  Sucralfate (carafate)  Triamterene hydrochlorothiazide   Vitamin D      -  PLEASE TAKE these medications the day of surgery:  Hyoscyamine if needed   Eye drops as usual and bring to hospital  Pantoprazole  Valacyclovir       How do I prepare myself?  - Please take 2 showers before surgery using Scrubcare or Hibiclens soap.    Use this soap only from the neck to your toes.     Leave the soap on your skin for one minute--then rinse thoroughly.      You may use your own shampoo and conditioner; no other hair products.   - Please remove all jewelry and body piercings.  - No lotions, deodorants or fragrance.  - No makeup or fingernail polish.   - Bring your ID and insurance card.    - All patients are required to have a Covid-19 test within 4 days of surgery/procedure.      -Patients will be contacted by the [facility] scheduling team within 1 week of surgery to make an appointment.      - Patients may call the Scheduling team at [phone] if they have not been scheduled within 4 days of  surgery.          Questions or Concerns:    - For any questions regarding the day of surgery or your hospital stay, please contact the Pre Admission Nursing Office at [phone].       - If you have health changes between today and your surgery please call your surgeon.       For questions after surgery please call your surgeons office.

## 2021-07-21 NOTE — ANESTHESIA PREPROCEDURE EVALUATION
Anesthesia Pre-Procedure Evaluation    Patient: Jaz Archibald   MRN: [id number] : 1970        Preoperative Diagnosis: GERD without esophagitis [K21.9]   Procedure : Procedure(s):  HERNIORRHAPHY, HIATAL, LAPAROSCOPIC  FUNDOPLICATION, NISSEN, LAPAROSCOPIC     Past Medical History:   Diagnosis Date     ADD (attention deficit disorder)      Anxiety      Gastroesophageal reflux disease without esophagitis      Hypertension      Urethral hypermobility 2013      Past Surgical History:   Procedure Laterality Date     EGD      2020     GYN SURGERY  2012    Novasure     HYSTERECTOMY, PAP NO LONGER INDICATED       LAPAROSCOPIC HYSTERECTOMY TOTAL  2013    Procedure: LAPAROSCOPIC HYSTERECTOMY TOTAL;  Laparoscopic Total Hysterectomy,Bilateral Salpingectomy with Sparing of the Ovaries,Uterosacral Suspension,Transvaginal Taping,Perineoplasty;  Surgeon: Sy Castro MD;  Location: [facility]     SLING TRANSVAGINAL  2013    Procedure: SLING TRANSVAGINAL;;  Surgeon: Sy Castro MD;  Location: [facility]      Allergies   Allergen Reactions     Bees Swelling     Disfiguring      Suture Swelling     local swelling and sutures didn't dissolve vyrcil   Suture       Social History     Tobacco Use     Smoking status: Never Smoker     Smokeless tobacco: Never Used   Substance Use Topics     Alcohol use: Yes     Alcohol/week: 0.0 standard drinks     Comment: 2-3 drinks per week      Wt Readings from Last 1 Encounters:   21 70.3 kg (155 lb)        Anesthesia Evaluation   Pt has had prior anesthetic. Type: General and MAC.    No history of anesthetic complications       ROS/MED HX  ENT/Pulmonary:     (+) ZHANG risk factors, snores loudly, hypertension,  (-) tobacco use   Neurologic: Comment: ADD - neg neurologic ROS     Cardiovascular:     (+) hypertension-range: 117/89/ ----Previous cardiac testing   Echo: Date:  Results:    Stress Test: Date: Results:    ECG Reviewed: Date:  Results:  SR,  SARA, right atrial abnormality, poss pulmonary disease    EKG 2013  Normal sinus rhythm, possible LAE, ST and T wave abnormality, Prolonged QTc 492 ms  Cath: Date: Results:   (-) CAD and taking anticoagulants/antiplatelets   METS/Exercise Tolerance: >4 METS    Hematologic:  - neg hematologic  ROS     Musculoskeletal:  - neg musculoskeletal ROS     GI/Hepatic:     (+) GERD, Symptomatic,     Renal/Genitourinary:  - neg Renal ROS     Endo:     (+) thyroid problem,  Thyroid disease - Other,     Psychiatric/Substance Use:     (+) psychiatric history anxiety     Infectious Disease:  - neg infectious disease ROS     Malignancy:   (+) Malignancy, History of Skin.Skin CA Remission status post Surgery.        Other:  - neg other ROS          Physical Exam    Airway        Mallampati: I   TM distance: > 3 FB   Neck ROM: full   Mouth opening: > 3 cm    Respiratory Devices and Support         Dental     Comment: Upper front teeth veneers      B=Bridge, C=Chipped, L=Loose, M=Missing    Cardiovascular          Rhythm and rate: regular and normal     Pulmonary           breath sounds clear to auscultation           OUTSIDE LABS:  CBC:   Lab Results   Component Value Date    WBC 7.5 07/03/2020    WBC 7.5 08/15/2013    HGB 15.1 07/03/2020    HGB 11.8 08/15/2013    HCT 44.3 07/03/2020    HCT 34.5 (L) 08/15/2013     07/03/2020     08/15/2013     BMP:   Lab Results   Component Value Date     07/03/2020     06/24/2015    POTASSIUM 3.9 07/03/2020    POTASSIUM 3.7 06/24/2015    CHLORIDE 105 07/03/2020    CHLORIDE 104 06/24/2015    CO2 20 07/03/2020    CO2 28 06/24/2015    BUN 16 07/03/2020    BUN 14 06/24/2015    CR 0.76 07/03/2020    CR 0.71 06/24/2015    GLC 78 07/03/2020    GLC 98 06/24/2015     COAGS: No results found for: PTT, INR, FIBR  POC:   Lab Results   Component Value Date     (H) 08/14/2013    HCG Negative 08/12/2013    HCGS Negative 04/11/2006     HEPATIC:   Lab Results   Component Value Date     ALBUMIN 3.9 07/03/2020    PROTTOTAL 7.9 07/03/2020    ALT 21 07/03/2020    AST 9 07/03/2020    ALKPHOS 57 07/03/2020    BILITOTAL 0.4 07/03/2020     OTHER:   Lab Results   Component Value Date    JUANY 9.0 07/03/2020    TSH 2.22 07/03/2020    T4 1.03 06/24/2015    CRP 6.0 04/21/2014    SED 7 04/21/2014       Anesthesia Plan    ASA Status:  2   NPO Status:  NPO Appropriate    Anesthesia Type: General.     - Airway: ETT   Induction: Intravenous.   Maintenance: Balanced.   Techniques and Equipment:     - Lines/Monitors: 2nd IV     Consents    Anesthesia Plan(s) and associated risks, benefits, and realistic alternatives discussed. Questions answered and patient/representative(s) expressed understanding.     - Discussed with:  Patient      - Extended Intubation/Ventilatory Support Discussed: No.      - Patient is DNR/DNI Status: No    Use of blood products discussed: No .     Postoperative Care    Pain management: IV analgesics, Multi-modal analgesia.   PONV prophylaxis: Ondansetron (or other 5HT-3), Dexamethasone or Solumedrol     Comments:              PAC Discussion and Assessment    ASA Classification: 2  Case is suitable for: [place]  Anesthetic techniques and relevant risks discussed: GA  Invasive monitoring and risk discussed: No    Possibility and Risk of blood transfusion discussed: Yes            PAC Resident/NP Anesthesia Assessment: Jaz Archibald is a 51 year old female scheduled to undergo HERNIORRHAPHY, HIATAL, LAPAROSCOPIC, FUNDOPLICATION, NISSEN, LAPAROSCOPIC with Dr. Krystian Caceres on 7/26/21. She has the following specific operative considerations:   - RCRI : 0.9% risk of major adverse cardiac event.   - VTE risk: 0.26%  - ZHANG # of risks 3/8 = Intermediate risk  - Risk of PONV score = 3.  If > 2, anti-emetic intervention recommended. If 3 or > anti emetic intervention recommended with two or more meds    --Longstanding GERD, refractory to medical management. Above procedure being planned. Will take  Protonix on DOS.  --No history of problems with anesthesia.  --HYPERTENSION. Will hold Dyazide on DOS. Cardiac work up in 2015 for PVCs neg. CTA showed no evidence of coronary calcification. Echocardiogram with EF 55% without regional wall motion abnormality. Denies cardiac symptoms. Good activity tolerance.   --Nonsmoker. Some coughing attributed to GERD. Denies shortness of breath. Lungs CTA. Intermediate risk for ZHANG.  --Anxiety. Lexapro at HS.   --ADD Adderall in am but will hold on DOS.   --Denies history of blood transfusion. Type and screen drawn today.     Patient is optimized and is acceptable candidate for the proposed procedure.  No further diagnostic evaluation is needed.     Reviewed and Signed by PAC Mid-Level Provider/Resident  Mid-Level Provider/Resident: JAMIE Heredia, BLANCA  Date: 7/21/21  Time: 1:53pm      Reviewed and Signed by PAC Anesthesiologist  Anesthesiologist: susanna  Date: 7/21/21                     JAMIE Pink MD  Staff Anesthesiologist  [phone]

## 2021-07-21 NOTE — PROGRESS NOTES
THORACIC SURGERY FOLLOW UP VISIT    I saw Ms. Jaz Archibald in follow-up today. The clinical summary follows:     DIAGNOSIS   GERD and hiatal hernia.      INTERVAL STUDIES  None.     Previsit Tests   Esophagram 9/1/20: Small hiatal hernia.       EGD 7/30/20: Hiatal hernia.   Ambulatory pH study 7/30/20: [place] 43-50.     Past Medical History:   Diagnosis Date     ADD (attention deficit disorder)      Anxiety      Gastroesophageal reflux disease without esophagitis      Hypertension      Urethral hypermobility 06/26/2013     Past Surgical History:   Procedure Laterality Date     EGD      7/2020     GYN SURGERY  07/12/2012    Novasure     HYSTERECTOMY, PAP NO LONGER INDICATED       LAPAROSCOPIC HYSTERECTOMY TOTAL  08/12/2013    Procedure: LAPAROSCOPIC HYSTERECTOMY TOTAL;  Laparoscopic Total Hysterectomy,Bilateral Salpingectomy with Sparing of the Ovaries,Uterosacral Suspension,Transvaginal Taping,Perineoplasty;  Surgeon: Sy Castro MD;  Location: [facility]     SLING TRANSVAGINAL  08/12/2013    Procedure: SLING TRANSVAGINAL;;  Surgeon: Sy Castro MD;  Location: [facility]        Allergies   Allergen Reactions     Bees Swelling     Disfiguring      Suture Swelling     local swelling and sutures didn't dissolve vyrcil   Suture      Current Outpatient Medications   Medication     amphetamine-dextroamphetamine (ADDERALL) 10 MG tablet     amphetamine-dextroamphetamine (ADDERALL) 20 MG tablet     EPINEPHrine (EPIPEN) 0.3 MG/0.3ML injection     escitalopram (LEXAPRO) 20 MG tablet     hyoscyamine (LEVSIN/SL) 0.125 MG sublingual tablet     lactobacillus rhamnosus, GG, (CULTURELL) capsule     olopatadine (PATANOL) 0.1 % ophthalmic solution     pantoprazole (PROTONIX) 40 MG EC tablet     sucralfate (CARAFATE) 1 GM tablet     traZODone (DESYREL) 100 MG tablet     traZODone (DESYREL) 50 MG tablet     triamterene-HCTZ (DYAZIDE) 37.5-25 MG capsule     valACYclovir (VALTREX) 1000 mg tablet     Vitamin D3  (CHOLECALCIFEROL) 25 mcg (1000 units) tablet     No current facility-administered medications for this visit.     ETOH None  TOB None       Exam  /89   Pulse 101   Temp 98.1  F (36.7  C) (Oral)   Resp 12   Wt 72.1 kg (158 lb 15.2 oz)   LMP 07/26/2013   SpO2 97%   BMI 25.27 kg/m    Alert and oriented. NAD  Bilateral breath sounds.       SUBJECTIVE  Linsey comes to clinic today for a preop visit. She continues to be symptomatic, mainly heartburn and regurgitation but no dysphagia.    From a personal perspective, she comes to clinic alone. 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     IMPRESSION   51 year old female patient with GERD and hiatal hernia.  I had an extensive discussion with the patient regarding the rationale for surgery, the alternatives risks and benefits of the procedure. I explained the expected hospital stay, postoperative course, recovery time, diet and activity restrictions. Informed consent was obtained and they agreed to proceed.    PLAN  I spent 45 min on the date of the encounter in chart review, patient visit, review of tests, documentation and/or discussion with other providers about the issues documented above. I reviewed the plan as follows:  Procedure planned: Laparoscopic hiatal hernia repair and Nissen fundoplication. No G-tube.   Necessary Tests & Appointments: PAC.   Anticoagulation Plan: Enoxaparin postop.     All questions were answered and the patient and present family were in agreement with the plan.  I appreciate the opportunity to participate in the care of your patient and will keep you updated.    Sincerely,     Mady Archer MD

## 2021-07-21 NOTE — H&P (VIEW-ONLY)
Pre-Operative H & P     CC:  Preoperative exam to assess for increased cardiopulmonary risk while undergoing surgery and anesthesia.    Date of Encounter: 7/21/2021  Primary Care Physician:  Jacinda Banks  Reason for visit: GERD without esophagitis [K21.9]    HPI  Jaz Archibald is a 51 year old female who presents for pre-operative H & P in preparation for HERNIORRHAPHY, HIATAL, LAPAROSCOPIC, FUNDOPLICATION, NISSEN, LAPAROSCOPIC with Dr. Krystian Caceres on 7/26/21 at [facility]. History is obtained from the patient and medical records.     Patient who has recently been evaluated by Dr. Krystian Caceres with longstanding history of heartburn and regurgitation that occasionally leads to vomiting. No dysphagia. She has attempted control with multiple medications. Work up was reviewed and she was counseled for above procedure.     Her history is otherwise significant for HYPERTENSION, and anxiety. In 2015 she was having some palpitations but had a negative work up. She now attributes this to caffeine at the time. Today patient denies fever, shortness of breath, chest pain, irregular HR, or ankle edema. She does cough but attributes this to her GERD.     The patient was initially evaluated by me via virtual visit on 5/20/21. At that time she did not have a surgery date, and now it has been scheduled outside the 30 days required for H and P. She reports no changes since that time.     Past Medical History  Past Medical History:   Diagnosis Date     ADD (attention deficit disorder)      Anxiety      Gastroesophageal reflux disease without esophagitis      Hypertension      Urethral hypermobility 06/26/2013       Past Surgical History  Past Surgical History:   Procedure Laterality Date     EGD      7/2020     GYN SURGERY  07/12/2012    Novasure     HYSTERECTOMY, PAP NO LONGER INDICATED       LAPAROSCOPIC HYSTERECTOMY TOTAL  08/12/2013    Procedure: LAPAROSCOPIC HYSTERECTOMY  TOTAL;  Laparoscopic Total Hysterectomy,Bilateral Salpingectomy with Sparing of the Ovaries,Uterosacral Suspension,Transvaginal Taping,Perineoplasty;  Surgeon: Sy Castro MD;  Location: [facility]     SLING TRANSVAGINAL  08/12/2013    Procedure: SLING TRANSVAGINAL;;  Surgeon: Sy Castro MD;  Location: [facility] OR       Hx of Blood transfusions/reactions: Denies.    Hx of abnormal bleeding or anti-platelet use: Denies.     Menstrual history: Patient's last menstrual period was 07/26/2013.    Steroid use in the last year: Denies.     Personal or FH with difficulty with Anesthesia:  Denies.     Prior to Admission Medications  Current Outpatient Medications   Medication Sig Dispense Refill     amphetamine-dextroamphetamine (ADDERALL) 10 MG tablet Take 10 mg by mouth daily as needed (in afternoon when necessary)       amphetamine-dextroamphetamine (ADDERALL) 20 MG tablet Take 20 mg by mouth every morning        EPINEPHrine (EPIPEN) 0.3 MG/0.3ML injection Inject 0.3 mLs into the muscle once as needed for anaphylaxis. 2 each 3     escitalopram (LEXAPRO) 20 MG tablet Take 20 mg by mouth At Bedtime        hyoscyamine (LEVSIN/SL) 0.125 MG sublingual tablet Take 0.125 mg by mouth every 6 hours as needed        ibuprofen (ADVIL/MOTRIN) 200 MG capsule Take 200 mg by mouth every 4 hours as needed for fever       lactobacillus rhamnosus, GG, (CULTURELL) capsule Take 1 capsule by mouth 2 times daily       olopatadine (PATANOL) 0.1 % ophthalmic solution Place 1-2 drops into both eyes 2 times daily   6     pantoprazole (PROTONIX) 40 MG EC tablet Take 40 mg by mouth 2 times daily        sucralfate (CARAFATE) 1 GM tablet Take 1 g by mouth 4 times daily as needed        traZODone (DESYREL) 100 MG tablet Take 100 mg by mouth At Bedtime       triamterene-HCTZ (DYAZIDE) 37.5-25 MG capsule Take 1 capsule by mouth every morning  90 capsule 0     valACYclovir (VALTREX) 1000 mg tablet Take 1,000 mg by mouth as needed (cold sores)         Vitamin D3 (CHOLECALCIFEROL) 25 mcg (1000 units) tablet Take 1 tablet by mouth every morning          Allergies  Allergies   Allergen Reactions     Bees Swelling     Disfiguring      Suture Swelling     local swelling and sutures didn't dissolve vyrcil   Suture        Social History  Social History     Socioeconomic History     Marital status:      Spouse name: Not on file     Number of children: 2     Years of education: Not on file     Highest education level: Not on file   Occupational History     Employer: [employer]   Tobacco Use     Smoking status: Never Smoker     Smokeless tobacco: Never Used   Substance and Sexual Activity     Alcohol use: Yes     Alcohol/week: 0.0 standard drinks     Comment: 2-3 drinks per week     Drug use: No     Sexual activity: Yes     Partners: Male     Birth control/protection: Surgical     Comment: vasectomy significant other   Other Topics Concern     Parent/sibling w/ CABG, MI or angioplasty before 65F 55M? No      Service Not Asked     Blood Transfusions Not Asked     Caffeine Concern Not Asked     Occupational Exposure Not Asked     Hobby Hazards Not Asked     Sleep Concern Not Asked     Stress Concern Not Asked     Weight Concern Not Asked     Special Diet No     Back Care Not Asked     Exercise No     Bike Helmet Not Asked     Seat Belt Yes     Self-Exams Not Asked   Social History Narrative     Not on file     Social Determinants of Health     Financial Resource Strain:      Difficulty of Paying Living Expenses:    Food Insecurity:      Worried About Running Out of Food in the Last Year:      Ran Out of Food in the Last Year:    Transportation Needs:      Lack of Transportation (Medical):      Lack of Transportation (Non-Medical):    Physical Activity:      Days of Exercise per Week:      Minutes of Exercise per Session:    Stress:      Feeling of Stress :    Social Connections:      Frequency of Communication with Friends and Family:      Frequency  "of Social Gatherings with Friends and Family:      Attends [employer] Services:      Active Member of Clubs or Organizations:      Attends Club or Organization Meetings:      Marital Status:    Intimate Partner Violence:      Fear of Current or Ex-Partner:      Emotionally Abused:      Physically Abused:      Sexually Abused:        Family History  Family History   Problem Relation Age of Onset     Lipids Mother         chol     Hypertension Mother      Lipids Father         chol     Hypertension Father      C.A.D. Father      Heart Disease Father        ROS/MED HISTORY  The complete review of systems is negative other than noted in the HPI or here.    ENT/Pulmonary:     (+) ZHANG risk factors, snores loudly, hypertension,  (-) tobacco use   Neurologic: Comment: ADD - neg neurologic ROS     Cardiovascular:     (+) hypertension-range: BP range 130s/80-90s/ ----Previous cardiac testing   Echo: Date: 2015 Results:    Stress Test: Date: Results:    ECG Reviewed: Date: 2015 Results:   (-) CAD and taking anticoagulants/antiplatelets   METS/Exercise Tolerance: >4 METS    Hematologic:  - neg hematologic  ROS     Musculoskeletal:  - neg musculoskeletal ROS     GI/Hepatic:     (+) GERD, Symptomatic,     Renal/Genitourinary:  - neg Renal ROS     Endo:     (+) thyroid problem,  Thyroid disease - Other,     Psychiatric/Substance Use:     (+) psychiatric history anxiety     Infectious Disease:  - neg infectious disease ROS     Malignancy:   (+) Malignancy, History of Skin.Skin CA Remission status post Surgery.        Other:  - neg other ROS          Temp: 98.1  F (36.7  C) Temp src: Oral BP: 117/89 Pulse: 101   Resp: 12 SpO2: 97 %         159 lbs 0 oz  5' 6.5\"[PT REPORTED[   Body mass index is 25.28 kg/m .       Physical Exam  Constitutional: Awake, alert, cooperative, no apparent distress, and appears stated age.  Eyes: Pupils equal, round and reactive to light, extra ocular muscles intact, sclera clear, conjunctiva normal.  HENT: " Normocephalic, oral pharynx with moist mucus membranes, good dentition. No goiter appreciated.   Respiratory: Clear to auscultation bilaterally, no crackles or wheezing. No cough or obvious dyspnea.  Cardiovascular: Regular rate and rhythm, normal S1 and S2, and no murmur noted.  Carotids +2, no bruits. No edema. Palpable pulses to radial  DP and PT arteries.   GI: Normal bowel sounds, soft, non-distended, non-tender, no masses palpated, no hepatosplenomegaly.    Lymph/Hematologic: No cervical lymphadenopathy and no supraclavicular lymphadenopathy.  Genitourinary: Deferred.   Skin: Warm and dry.  No rashes at anticipated surgical site.   Musculoskeletal: Full ROM of neck. There is no redness, warmth, or swelling of the joints. Gross motor strength is normal.    Neurologic: Awake, alert, oriented to name, place and time. Cranial nerves II-XII are grossly intact. Gait is normal.   Neuropsychiatric: Calm, cooperative. Normal affect.     Labs: (personally reviewed)  Lab Results   Component Value Date    WBC 10.6 07/21/2021    WBC 7.5 07/03/2020     Lab Results   Component Value Date    RBC 5.13 07/21/2021    RBC 5.09 07/03/2020     Lab Results   Component Value Date    HGB 14.9 07/21/2021    HGB 15.1 07/03/2020     Lab Results   Component Value Date    HCT 44.9 07/21/2021    HCT 44.3 07/03/2020     Lab Results   Component Value Date    MCV 88 07/21/2021    MCV 87 07/03/2020     Lab Results   Component Value Date    MCH 29.0 07/21/2021    MCH 29.7 07/03/2020     Lab Results   Component Value Date    MCHC 33.2 07/21/2021    MCHC 34.1 07/03/2020     Lab Results   Component Value Date    RDW 12.9 07/21/2021    RDW 13.6 07/03/2020     Lab Results   Component Value Date     07/21/2021     07/03/2020     Last Comprehensive Metabolic Panel:  Sodium   Date Value Ref Range Status   07/21/2021 136 133 - 144 mmol/L Final   07/03/2020 135 133 - 144 mmol/L Final     Potassium   Date Value Ref Range Status   07/21/2021  3.6 3.4 - 5.3 mmol/L Final   07/03/2020 3.9 3.4 - 5.3 mmol/L Final     Chloride   Date Value Ref Range Status   07/21/2021 100 94 - 109 mmol/L Final   07/03/2020 105 94 - 109 mmol/L Final     Carbon Dioxide   Date Value Ref Range Status   07/03/2020 20 20 - 32 mmol/L Final     Carbon Dioxide (CO2)   Date Value Ref Range Status   07/21/2021 29 20 - 32 mmol/L Final     Anion Gap   Date Value Ref Range Status   07/21/2021 7 3 - 14 mmol/L Final   07/03/2020 10 3 - 14 mmol/L Final     Glucose   Date Value Ref Range Status   07/21/2021 92 70 - 99 mg/dL Final   07/03/2020 78 70 - 99 mg/dL Final     Comment:     Fasting specimen     Urea Nitrogen   Date Value Ref Range Status   07/21/2021 12 7 - 30 mg/dL Final   07/03/2020 16 7 - 30 mg/dL Final     Creatinine   Date Value Ref Range Status   07/21/2021 0.79 0.52 - 1.04 mg/dL Final   07/03/2020 0.76 0.52 - 1.04 mg/dL Final     GFR Estimate   Date Value Ref Range Status   07/21/2021 86 >60 mL/min/1.73m2 Final     Comment:     As of July 11, 2021, eGFR is calculated by the CKD-EPI creatinine equation, without race adjustment. eGFR can be influenced by muscle mass, exercise, and diet. The reported eGFR is an estimation only and is only applicable if the renal function is stable.   07/03/2020 >90 >60 mL/min/[1.73_m2] Final     Comment:     Non  GFR Calc  Starting 12/18/2018, serum creatinine based estimated GFR (eGFR) will be   calculated using the Chronic Kidney Disease Epidemiology Collaboration   (CKD-EPI) equation.       Calcium   Date Value Ref Range Status   07/21/2021 9.1 8.5 - 10.1 mg/dL Final   07/03/2020 9.0 8.5 - 10.1 mg/dL Final     EKG: Personally reviewed 2015 Sinus rhythm, right atrial enlargement, read as right atrial abnormality, possible pulmonary disease.    EKG 2013  Normal sinus rhythm, possible LAE, ST and T wave abnormality, Prolonged QTc 492 ms     Cardiac echo: 2015   Interpretation Summary  Normal to low normal EF around 55% without  regional wall motion abnormality.     No significant valvular stenosis/insufficiency by doppler.     No old study available for comparison.     CTA Coronary 2015     IMPRESSION:     1. No evidence of coronary artery calcification.  2. Widely patent coronary arteries without detectable stenosis.        Xray esophagram 9/1/20  IMPRESSION: No hiatal hernia. No reflux demonstrated during the exam.     6/16/20 NM Gastric emptying  IMPRESSION: Normal  gastric emptying .     Imaging and cardiac testing reviewed by this provider     Outside records reviewed from: Care Everywhere       ASSESSMENT and PLAN  Jaz Archibald is a 51 year old female scheduled to undergo HERNIORRHAPHY, HIATAL, LAPAROSCOPIC, FUNDOPLICATION, NISSEN, LAPAROSCOPIC with Dr. Krystian Caceres on 7/26/21. She has the following specific operative considerations:   - RCRI : 0.9% risk of major adverse cardiac event.   - Anesthesia considerations:  Refer to PAC assessment in anesthesia records  - VTE risk: 0.26%  - ZHANG # of risks 3/8 = Intermediate risk  - Risk of PONV score = 3.  If > 2, anti-emetic intervention recommended. If 3 or > anti emetic intervention recommended with two or more meds    --Longstanding GERD, refractory to medical management. Above procedure being planned. Will take Protonix on DOS.  --No history of problems with anesthesia.  --HYPERTENSION. Will hold Dyazide on DOS. Cardiac work up in 2015 for PVCs neg. CTA showed no evidence of coronary calcification. Echocardiogram with EF 55% without regional wall motion abnormality. Denies cardiac symptoms. Good activity tolerance.   --Nonsmoker. Some coughing attributed to GERD. Denies shortness of breath. Lungs CTA. Intermediate risk for ZHANG.  --Anxiety. Lexapro at HS.   --ADD Adderall in am but will hold on DOS.   --Denies history of blood transfusion. Type and screen drawn today.     Arrival time, NPO, shower and medication instructions provided by nursing staff today.     Patient is optimized  and is acceptable candidate for the proposed procedure.  No further diagnostic evaluation is needed.       JAMIE Pink CNS  Preoperative Assessment Center  [facility]  Phone: [phone]  Fax: [phone]

## 2021-07-21 NOTE — LETTER
7/21/2021         RE: Jaz Archibald  [address]  [place] [address]        Dear Colleague,    Thank you for referring your patient, Jaz Archibald, to the [facility]. Please see a copy of my visit note below.    THORACIC SURGERY FOLLOW UP VISIT    I saw Ms. Jaz Archibald in follow-up today. The clinical summary follows:     DIAGNOSIS   GERD and hiatal hernia.      INTERVAL STUDIES  None.     Previsit Tests   Esophagram 9/1/20: Small hiatal hernia.       EGD 7/30/20: Hiatal hernia.   Ambulatory pH study 7/30/20: [place] 43-50.     Past Medical History:   Diagnosis Date     ADD (attention deficit disorder)      Anxiety      Gastroesophageal reflux disease without esophagitis      Hypertension      Urethral hypermobility 06/26/2013     Past Surgical History:   Procedure Laterality Date     EGD      7/2020     GYN SURGERY  07/12/2012    Novasure     HYSTERECTOMY, PAP NO LONGER INDICATED       LAPAROSCOPIC HYSTERECTOMY TOTAL  08/12/2013    Procedure: LAPAROSCOPIC HYSTERECTOMY TOTAL;  Laparoscopic Total Hysterectomy,Bilateral Salpingectomy with Sparing of the Ovaries,Uterosacral Suspension,Transvaginal Taping,Perineoplasty;  Surgeon: Sy Castro MD;  Location: [facility] OR     SLING TRANSVAGINAL  08/12/2013    Procedure: SLING TRANSVAGINAL;;  Surgeon: Sy Castro MD;  Location: [facility] OR        Allergies   Allergen Reactions     Bees Swelling     Disfiguring      Suture Swelling     local swelling and sutures didn't dissolve vyrcil   Suture      Current Outpatient Medications   Medication     amphetamine-dextroamphetamine (ADDERALL) 10 MG tablet     amphetamine-dextroamphetamine (ADDERALL) 20 MG tablet     EPINEPHrine (EPIPEN) 0.3 MG/0.3ML injection     escitalopram (LEXAPRO) 20 MG tablet     hyoscyamine (LEVSIN/SL) 0.125 MG sublingual tablet     lactobacillus rhamnosus, GG, (CULTURELL) capsule     olopatadine (PATANOL) 0.1 % ophthalmic solution     pantoprazole  (PROTONIX) 40 MG EC tablet     sucralfate (CARAFATE) 1 GM tablet     traZODone (DESYREL) 100 MG tablet     traZODone (DESYREL) 50 MG tablet     triamterene-HCTZ (DYAZIDE) 37.5-25 MG capsule     valACYclovir (VALTREX) 1000 mg tablet     Vitamin D3 (CHOLECALCIFEROL) 25 mcg (1000 units) tablet     No current facility-administered medications for this visit.     ETOH None  TOB None       Exam  /89   Pulse 101   Temp 98.1  F (36.7  C) (Oral)   Resp 12   Wt 72.1 kg (158 lb 15.2 oz)   LMP 07/26/2013   SpO2 97%   BMI 25.27 kg/m    Alert and oriented. NAD  Bilateral breath sounds.       HUSAM Stiles comes to clinic today for a preop visit. She continues to be symptomatic, mainly heartburn and regurgitation but no dysphagia.    From a personal perspective, she comes to clinic alone. 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     IMPRESSION   51 year old female patient with GERD and hiatal hernia.  I had an extensive discussion with the patient regarding the rationale for surgery, the alternatives risks and benefits of the procedure. I explained the expected hospital stay, postoperative course, recovery time, diet and activity restrictions. Informed consent was obtained and they agreed to proceed.    PLAN  I spent 45 min on the date of the encounter in chart review, patient visit, review of tests, documentation and/or discussion with other providers about the issues documented above. I reviewed the plan as follows:  Procedure planned: Laparoscopic hiatal hernia repair and Nissen fundoplication. No G-tube.   Necessary Tests & Appointments: PAC.   Anticoagulation Plan: Enoxaparin postop.     All questions were answered and the patient and present family were in agreement with the plan.  I appreciate the opportunity to participate in the care of your patient and will keep you updated.    Sincerely,     Mady Archer MD      Again, thank you for allowing me to participate in the care of  your patient.        Sincerely,        Morris Boland MD

## 2021-07-23 ENCOUNTER — LAB (OUTPATIENT)
Dept: LAB | Facility: CLINIC | Age: 51
End: 2021-07-23
Payer: COMMERCIAL

## 2021-07-23 DIAGNOSIS — Z11.59 ENCOUNTER FOR SCREENING FOR OTHER VIRAL DISEASES: ICD-10-CM

## 2021-07-23 LAB — SARS-COV-2 RNA RESP QL NAA+PROBE: NEGATIVE

## 2021-07-23 PROCEDURE — U0005 INFEC AGEN DETEC AMPLI PROBE: HCPCS

## 2021-07-23 PROCEDURE — U0003 INFECTIOUS AGENT DETECTION BY NUCLEIC ACID (DNA OR RNA); SEVERE ACUTE RESPIRATORY SYNDROME CORONAVIRUS 2 (SARS-COV-2) (CORONAVIRUS DISEASE [COVID-19]), AMPLIFIED PROBE TECHNIQUE, MAKING USE OF HIGH THROUGHPUT TECHNOLOGIES AS DESCRIBED BY CMS-2020-01-R: HCPCS

## 2021-07-26 ENCOUNTER — APPOINTMENT (OUTPATIENT)
Dept: GENERAL RADIOLOGY | Facility: CLINIC | Age: 51
DRG: 328 | End: 2021-07-26
Attending: THORACIC SURGERY (CARDIOTHORACIC VASCULAR SURGERY)
Payer: COMMERCIAL

## 2021-07-26 ENCOUNTER — ANESTHESIA (OUTPATIENT)
Dept: SURGERY | Facility: CLINIC | Age: 51
DRG: 328 | End: 2021-07-26
Payer: COMMERCIAL

## 2021-07-26 ENCOUNTER — HOSPITAL ENCOUNTER (INPATIENT)
Facility: CLINIC | Age: 51
LOS: 3 days | Discharge: HOME | DRG: 328 | End: 2021-07-29
Attending: THORACIC SURGERY (CARDIOTHORACIC VASCULAR SURGERY) | Admitting: THORACIC SURGERY (CARDIOTHORACIC VASCULAR SURGERY)
Payer: COMMERCIAL

## 2021-07-26 DIAGNOSIS — K21.9 GERD WITHOUT ESOPHAGITIS: ICD-10-CM

## 2021-07-26 LAB
CREAT SERPL-MCNC: 0.66 MG/DL (ref 0.52–1.04)
GFR SERPL CREATININE-BSD FRML MDRD: >90 ML/MIN/1.73M2
GLUCOSE BLDC GLUCOMTR-MCNC: 92 MG/DL (ref 70–99)

## 2021-07-26 PROCEDURE — 360N000077 HC SURGERY LEVEL 4, PER MIN: Performed by: THORACIC SURGERY (CARDIOTHORACIC VASCULAR SURGERY)

## 2021-07-26 PROCEDURE — 250N000011 HC RX IP 250 OP 636: Performed by: THORACIC SURGERY (CARDIOTHORACIC VASCULAR SURGERY)

## 2021-07-26 PROCEDURE — 250N000011 HC RX IP 250 OP 636: Performed by: STUDENT IN AN ORGANIZED HEALTH CARE EDUCATION/TRAINING PROGRAM

## 2021-07-26 PROCEDURE — 88302 TISSUE EXAM BY PATHOLOGIST: CPT | Mod: 26 | Performed by: PATHOLOGY

## 2021-07-26 PROCEDURE — 258N000003 HC RX IP 258 OP 636: Performed by: STUDENT IN AN ORGANIZED HEALTH CARE EDUCATION/TRAINING PROGRAM

## 2021-07-26 PROCEDURE — 43281 LAP PARAESOPHAG HERN REPAIR: CPT | Mod: GC | Performed by: THORACIC SURGERY (CARDIOTHORACIC VASCULAR SURGERY)

## 2021-07-26 PROCEDURE — 272N000001 HC OR GENERAL SUPPLY STERILE: Performed by: THORACIC SURGERY (CARDIOTHORACIC VASCULAR SURGERY)

## 2021-07-26 PROCEDURE — 710N000010 HC RECOVERY PHASE 1, LEVEL 2, PER MIN: Performed by: THORACIC SURGERY (CARDIOTHORACIC VASCULAR SURGERY)

## 2021-07-26 PROCEDURE — 0DJ08ZZ INSPECTION OF UPPER INTESTINAL TRACT, VIA NATURAL OR ARTIFICIAL OPENING ENDOSCOPIC: ICD-10-PCS | Performed by: THORACIC SURGERY (CARDIOTHORACIC VASCULAR SURGERY)

## 2021-07-26 PROCEDURE — 250N000009 HC RX 250: Performed by: STUDENT IN AN ORGANIZED HEALTH CARE EDUCATION/TRAINING PROGRAM

## 2021-07-26 PROCEDURE — 999N000065 XR CHEST PORT 1 VIEW

## 2021-07-26 PROCEDURE — 999N000141 HC STATISTIC PRE-PROCEDURE NURSING ASSESSMENT: Performed by: THORACIC SURGERY (CARDIOTHORACIC VASCULAR SURGERY)

## 2021-07-26 PROCEDURE — 82565 ASSAY OF CREATININE: CPT | Performed by: STUDENT IN AN ORGANIZED HEALTH CARE EDUCATION/TRAINING PROGRAM

## 2021-07-26 PROCEDURE — 88302 TISSUE EXAM BY PATHOLOGIST: CPT | Mod: TC | Performed by: THORACIC SURGERY (CARDIOTHORACIC VASCULAR SURGERY)

## 2021-07-26 PROCEDURE — 0BQT4ZZ REPAIR DIAPHRAGM, PERCUTANEOUS ENDOSCOPIC APPROACH: ICD-10-PCS | Performed by: THORACIC SURGERY (CARDIOTHORACIC VASCULAR SURGERY)

## 2021-07-26 PROCEDURE — 250N000013 HC RX MED GY IP 250 OP 250 PS 637: Performed by: THORACIC SURGERY (CARDIOTHORACIC VASCULAR SURGERY)

## 2021-07-26 PROCEDURE — 71045 X-RAY EXAM CHEST 1 VIEW: CPT | Mod: 26 | Performed by: RADIOLOGY

## 2021-07-26 PROCEDURE — 93010 ELECTROCARDIOGRAM REPORT: CPT | Performed by: INTERNAL MEDICINE

## 2021-07-26 PROCEDURE — 93005 ELECTROCARDIOGRAM TRACING: CPT

## 2021-07-26 PROCEDURE — C1769 GUIDE WIRE: HCPCS | Performed by: THORACIC SURGERY (CARDIOTHORACIC VASCULAR SURGERY)

## 2021-07-26 PROCEDURE — 120N000002 HC R&B MED SURG/OB UMMC

## 2021-07-26 PROCEDURE — 370N000017 HC ANESTHESIA TECHNICAL FEE, PER MIN: Performed by: THORACIC SURGERY (CARDIOTHORACIC VASCULAR SURGERY)

## 2021-07-26 PROCEDURE — 36415 COLL VENOUS BLD VENIPUNCTURE: CPT | Performed by: STUDENT IN AN ORGANIZED HEALTH CARE EDUCATION/TRAINING PROGRAM

## 2021-07-26 PROCEDURE — 250N000025 HC SEVOFLURANE, PER MIN: Performed by: THORACIC SURGERY (CARDIOTHORACIC VASCULAR SURGERY)

## 2021-07-26 PROCEDURE — 0DV44ZZ RESTRICTION OF ESOPHAGOGASTRIC JUNCTION, PERCUTANEOUS ENDOSCOPIC APPROACH: ICD-10-PCS | Performed by: THORACIC SURGERY (CARDIOTHORACIC VASCULAR SURGERY)

## 2021-07-26 RX ORDER — SODIUM CHLORIDE, SODIUM LACTATE, POTASSIUM CHLORIDE, CALCIUM CHLORIDE 600; 310; 30; 20 MG/100ML; MG/100ML; MG/100ML; MG/100ML
INJECTION, SOLUTION INTRAVENOUS CONTINUOUS PRN
Status: DISCONTINUED | OUTPATIENT
Start: 2021-07-26 | End: 2021-07-26

## 2021-07-26 RX ORDER — ONDANSETRON 2 MG/ML
4 INJECTION INTRAMUSCULAR; INTRAVENOUS EVERY 6 HOURS
Status: DISCONTINUED | OUTPATIENT
Start: 2021-07-26 | End: 2021-07-29 | Stop reason: HOSPADM

## 2021-07-26 RX ORDER — FENTANYL CITRATE-0.9 % NACL/PF 10 MCG/ML
PLASTIC BAG, INJECTION (ML) INTRAVENOUS PRN
Status: DISCONTINUED | OUTPATIENT
Start: 2021-07-26 | End: 2021-07-26

## 2021-07-26 RX ORDER — BUPIVACAINE HYDROCHLORIDE 2.5 MG/ML
INJECTION, SOLUTION INFILTRATION; PERINEURAL PRN
Status: DISCONTINUED | OUTPATIENT
Start: 2021-07-26 | End: 2021-07-26 | Stop reason: HOSPADM

## 2021-07-26 RX ORDER — NALOXONE HYDROCHLORIDE 0.4 MG/ML
0.2 INJECTION, SOLUTION INTRAMUSCULAR; INTRAVENOUS; SUBCUTANEOUS
Status: DISCONTINUED | OUTPATIENT
Start: 2021-07-26 | End: 2021-07-29 | Stop reason: HOSPADM

## 2021-07-26 RX ORDER — ONDANSETRON 4 MG/1
4 TABLET, ORALLY DISINTEGRATING ORAL EVERY 6 HOURS PRN
Status: CANCELLED | OUTPATIENT
Start: 2021-07-26

## 2021-07-26 RX ORDER — FENTANYL CITRATE 50 UG/ML
INJECTION, SOLUTION INTRAMUSCULAR; INTRAVENOUS PRN
Status: DISCONTINUED | OUTPATIENT
Start: 2021-07-26 | End: 2021-07-26

## 2021-07-26 RX ORDER — ONDANSETRON 2 MG/ML
INJECTION INTRAMUSCULAR; INTRAVENOUS PRN
Status: DISCONTINUED | OUTPATIENT
Start: 2021-07-26 | End: 2021-07-26

## 2021-07-26 RX ORDER — CEFAZOLIN SODIUM 2 G/100ML
2 INJECTION, SOLUTION INTRAVENOUS
Status: COMPLETED | OUTPATIENT
Start: 2021-07-26 | End: 2021-07-26

## 2021-07-26 RX ORDER — PROCHLORPERAZINE MALEATE 5 MG
10 TABLET ORAL EVERY 6 HOURS PRN
Status: DISCONTINUED | OUTPATIENT
Start: 2021-07-26 | End: 2021-07-29 | Stop reason: HOSPADM

## 2021-07-26 RX ORDER — DEXAMETHASONE SODIUM PHOSPHATE 4 MG/ML
INJECTION, SOLUTION INTRA-ARTICULAR; INTRALESIONAL; INTRAMUSCULAR; INTRAVENOUS; SOFT TISSUE PRN
Status: DISCONTINUED | OUTPATIENT
Start: 2021-07-26 | End: 2021-07-26

## 2021-07-26 RX ORDER — EPHEDRINE SULFATE 50 MG/ML
INJECTION, SOLUTION INTRAMUSCULAR; INTRAVENOUS; SUBCUTANEOUS PRN
Status: DISCONTINUED | OUTPATIENT
Start: 2021-07-26 | End: 2021-07-26

## 2021-07-26 RX ORDER — NALOXONE HYDROCHLORIDE 0.4 MG/ML
0.4 INJECTION, SOLUTION INTRAMUSCULAR; INTRAVENOUS; SUBCUTANEOUS
Status: DISCONTINUED | OUTPATIENT
Start: 2021-07-26 | End: 2021-07-29 | Stop reason: HOSPADM

## 2021-07-26 RX ORDER — ONDANSETRON 2 MG/ML
4 INJECTION INTRAMUSCULAR; INTRAVENOUS EVERY 6 HOURS PRN
Status: CANCELLED | OUTPATIENT
Start: 2021-07-26

## 2021-07-26 RX ORDER — PANTOPRAZOLE SODIUM 40 MG/1
40 TABLET, DELAYED RELEASE ORAL DAILY
Status: DISCONTINUED | OUTPATIENT
Start: 2021-07-27 | End: 2021-07-29 | Stop reason: HOSPADM

## 2021-07-26 RX ORDER — KETOROLAC TROMETHAMINE 30 MG/ML
15 INJECTION, SOLUTION INTRAMUSCULAR; INTRAVENOUS EVERY 6 HOURS
Status: DISCONTINUED | OUTPATIENT
Start: 2021-07-26 | End: 2021-07-29 | Stop reason: HOSPADM

## 2021-07-26 RX ORDER — LABETALOL HYDROCHLORIDE 5 MG/ML
10 INJECTION, SOLUTION INTRAVENOUS
Status: DISCONTINUED | OUTPATIENT
Start: 2021-07-26 | End: 2021-07-26 | Stop reason: HOSPADM

## 2021-07-26 RX ORDER — ONDANSETRON 2 MG/ML
4 INJECTION INTRAMUSCULAR; INTRAVENOUS EVERY 30 MIN PRN
Status: DISCONTINUED | OUTPATIENT
Start: 2021-07-26 | End: 2021-07-26 | Stop reason: HOSPADM

## 2021-07-26 RX ORDER — ACETAMINOPHEN 325 MG/1
975 TABLET ORAL ONCE
Status: COMPLETED | OUTPATIENT
Start: 2021-07-26 | End: 2021-07-26

## 2021-07-26 RX ORDER — HYDROMORPHONE HCL IN WATER/PF 6 MG/30 ML
0.2 PATIENT CONTROLLED ANALGESIA SYRINGE INTRAVENOUS EVERY 5 MIN PRN
Status: DISCONTINUED | OUTPATIENT
Start: 2021-07-26 | End: 2021-07-26

## 2021-07-26 RX ORDER — ONDANSETRON 4 MG/1
4 TABLET, ORALLY DISINTEGRATING ORAL EVERY 30 MIN PRN
Status: DISCONTINUED | OUTPATIENT
Start: 2021-07-26 | End: 2021-07-26 | Stop reason: HOSPADM

## 2021-07-26 RX ORDER — CEFAZOLIN SODIUM 2 G/100ML
2 INJECTION, SOLUTION INTRAVENOUS SEE ADMIN INSTRUCTIONS
Status: DISCONTINUED | OUTPATIENT
Start: 2021-07-26 | End: 2021-07-26 | Stop reason: HOSPADM

## 2021-07-26 RX ORDER — LIDOCAINE HYDROCHLORIDE 20 MG/ML
INJECTION, SOLUTION INFILTRATION; PERINEURAL PRN
Status: DISCONTINUED | OUTPATIENT
Start: 2021-07-26 | End: 2021-07-26

## 2021-07-26 RX ORDER — SODIUM CHLORIDE, SODIUM LACTATE, POTASSIUM CHLORIDE, CALCIUM CHLORIDE 600; 310; 30; 20 MG/100ML; MG/100ML; MG/100ML; MG/100ML
INJECTION, SOLUTION INTRAVENOUS CONTINUOUS
Status: DISCONTINUED | OUTPATIENT
Start: 2021-07-26 | End: 2021-07-29 | Stop reason: HOSPADM

## 2021-07-26 RX ORDER — LIDOCAINE 40 MG/G
CREAM TOPICAL
Status: DISCONTINUED | OUTPATIENT
Start: 2021-07-26 | End: 2021-07-29 | Stop reason: HOSPADM

## 2021-07-26 RX ORDER — HYDRALAZINE HYDROCHLORIDE 20 MG/ML
10 INJECTION INTRAMUSCULAR; INTRAVENOUS EVERY 30 MIN PRN
Status: DISCONTINUED | OUTPATIENT
Start: 2021-07-26 | End: 2021-07-29 | Stop reason: HOSPADM

## 2021-07-26 RX ORDER — PROPOFOL 10 MG/ML
INJECTION, EMULSION INTRAVENOUS PRN
Status: DISCONTINUED | OUTPATIENT
Start: 2021-07-26 | End: 2021-07-26

## 2021-07-26 RX ORDER — LABETALOL HYDROCHLORIDE 5 MG/ML
10-40 INJECTION, SOLUTION INTRAVENOUS EVERY 10 MIN PRN
Status: DISCONTINUED | OUTPATIENT
Start: 2021-07-26 | End: 2021-07-29 | Stop reason: HOSPADM

## 2021-07-26 RX ORDER — SODIUM CHLORIDE, SODIUM LACTATE, POTASSIUM CHLORIDE, CALCIUM CHLORIDE 600; 310; 30; 20 MG/100ML; MG/100ML; MG/100ML; MG/100ML
INJECTION, SOLUTION INTRAVENOUS CONTINUOUS
Status: DISCONTINUED | OUTPATIENT
Start: 2021-07-26 | End: 2021-07-26 | Stop reason: HOSPADM

## 2021-07-26 RX ORDER — FENTANYL CITRATE 50 UG/ML
50 INJECTION, SOLUTION INTRAMUSCULAR; INTRAVENOUS EVERY 5 MIN PRN
Status: DISCONTINUED | OUTPATIENT
Start: 2021-07-26 | End: 2021-07-26

## 2021-07-26 RX ADMIN — FENTANYL CITRATE 50 MCG: 50 INJECTION, SOLUTION INTRAMUSCULAR; INTRAVENOUS at 15:26

## 2021-07-26 RX ADMIN — HYDROMORPHONE HYDROCHLORIDE 0.2 MG: 1 INJECTION, SOLUTION INTRAMUSCULAR; INTRAVENOUS; SUBCUTANEOUS at 13:40

## 2021-07-26 RX ADMIN — ROCURONIUM BROMIDE 30 MG: 10 INJECTION INTRAVENOUS at 10:48

## 2021-07-26 RX ADMIN — FENTANYL CITRATE 50 MCG: 50 INJECTION, SOLUTION INTRAMUSCULAR; INTRAVENOUS at 15:19

## 2021-07-26 RX ADMIN — HYDROMORPHONE HYDROCHLORIDE 0.2 MG: 0.2 INJECTION, SOLUTION INTRAMUSCULAR; INTRAVENOUS; SUBCUTANEOUS at 16:49

## 2021-07-26 RX ADMIN — FENTANYL CITRATE 50 MCG: 50 INJECTION, SOLUTION INTRAMUSCULAR; INTRAVENOUS at 12:53

## 2021-07-26 RX ADMIN — HYDROMORPHONE HYDROCHLORIDE 0.2 MG: 0.2 INJECTION, SOLUTION INTRAMUSCULAR; INTRAVENOUS; SUBCUTANEOUS at 16:20

## 2021-07-26 RX ADMIN — MIDAZOLAM 2 MG: 1 INJECTION INTRAMUSCULAR; INTRAVENOUS at 10:30

## 2021-07-26 RX ADMIN — Medication 200 MCG: at 12:37

## 2021-07-26 RX ADMIN — SUGAMMADEX 150 MG: 100 INJECTION, SOLUTION INTRAVENOUS at 14:53

## 2021-07-26 RX ADMIN — ROCURONIUM BROMIDE 10 MG: 10 INJECTION INTRAVENOUS at 11:40

## 2021-07-26 RX ADMIN — FENTANYL CITRATE 50 MCG: 50 INJECTION, SOLUTION INTRAMUSCULAR; INTRAVENOUS at 15:30

## 2021-07-26 RX ADMIN — Medication 100 MCG: at 13:12

## 2021-07-26 RX ADMIN — Medication: at 17:18

## 2021-07-26 RX ADMIN — HYDROMORPHONE HYDROCHLORIDE 0.3 MG: 1 INJECTION, SOLUTION INTRAMUSCULAR; INTRAVENOUS; SUBCUTANEOUS at 11:51

## 2021-07-26 RX ADMIN — HYDROMORPHONE HYDROCHLORIDE 0.2 MG: 0.2 INJECTION, SOLUTION INTRAMUSCULAR; INTRAVENOUS; SUBCUTANEOUS at 15:49

## 2021-07-26 RX ADMIN — Medication 2 G: at 14:39

## 2021-07-26 RX ADMIN — ONDANSETRON 4 MG: 2 INJECTION INTRAMUSCULAR; INTRAVENOUS at 14:32

## 2021-07-26 RX ADMIN — HYDROMORPHONE HYDROCHLORIDE 0.2 MG: 0.2 INJECTION, SOLUTION INTRAMUSCULAR; INTRAVENOUS; SUBCUTANEOUS at 15:59

## 2021-07-26 RX ADMIN — ROCURONIUM BROMIDE 20 MG: 10 INJECTION INTRAVENOUS at 12:16

## 2021-07-26 RX ADMIN — FENTANYL CITRATE 50 MCG: 50 INJECTION, SOLUTION INTRAMUSCULAR; INTRAVENOUS at 15:41

## 2021-07-26 RX ADMIN — ACETAMINOPHEN 975 MG: 325 TABLET, FILM COATED ORAL at 09:50

## 2021-07-26 RX ADMIN — Medication 200 MCG: at 12:20

## 2021-07-26 RX ADMIN — HYDROMORPHONE HYDROCHLORIDE 0.2 MG: 0.2 INJECTION, SOLUTION INTRAMUSCULAR; INTRAVENOUS; SUBCUTANEOUS at 15:45

## 2021-07-26 RX ADMIN — FENTANYL CITRATE 50 MCG: 50 INJECTION, SOLUTION INTRAMUSCULAR; INTRAVENOUS at 11:41

## 2021-07-26 RX ADMIN — Medication 2 G: at 10:45

## 2021-07-26 RX ADMIN — SODIUM CHLORIDE, POTASSIUM CHLORIDE, SODIUM LACTATE AND CALCIUM CHLORIDE: 600; 310; 30; 20 INJECTION, SOLUTION INTRAVENOUS at 10:30

## 2021-07-26 RX ADMIN — FENTANYL CITRATE 50 MCG: 50 INJECTION, SOLUTION INTRAMUSCULAR; INTRAVENOUS at 10:42

## 2021-07-26 RX ADMIN — PROPOFOL 150 MG: 10 INJECTION, EMULSION INTRAVENOUS at 10:42

## 2021-07-26 RX ADMIN — Medication 300 MCG: at 12:45

## 2021-07-26 RX ADMIN — ROCURONIUM BROMIDE 20 MG: 10 INJECTION INTRAVENOUS at 11:05

## 2021-07-26 RX ADMIN — LIDOCAINE HYDROCHLORIDE 70 MG: 20 INJECTION, SOLUTION INFILTRATION; PERINEURAL at 10:42

## 2021-07-26 RX ADMIN — Medication 10 MG: at 11:31

## 2021-07-26 RX ADMIN — SODIUM CHLORIDE, POTASSIUM CHLORIDE, SODIUM LACTATE AND CALCIUM CHLORIDE: 600; 310; 30; 20 INJECTION, SOLUTION INTRAVENOUS at 14:09

## 2021-07-26 RX ADMIN — Medication 100 MCG: at 11:27

## 2021-07-26 RX ADMIN — ONDANSETRON 4 MG: 2 INJECTION INTRAMUSCULAR; INTRAVENOUS at 21:31

## 2021-07-26 RX ADMIN — ROCURONIUM BROMIDE 20 MG: 10 INJECTION INTRAVENOUS at 13:22

## 2021-07-26 RX ADMIN — HYDROMORPHONE HYDROCHLORIDE 0.2 MG: 0.2 INJECTION, SOLUTION INTRAMUSCULAR; INTRAVENOUS; SUBCUTANEOUS at 16:13

## 2021-07-26 RX ADMIN — Medication 100 MCG: at 12:35

## 2021-07-26 RX ADMIN — KETOROLAC TROMETHAMINE 15 MG: 30 INJECTION, SOLUTION INTRAMUSCULAR at 18:51

## 2021-07-26 RX ADMIN — PROPOFOL 50 MG: 10 INJECTION, EMULSION INTRAVENOUS at 10:48

## 2021-07-26 RX ADMIN — DEXAMETHASONE SODIUM PHOSPHATE 8 MG: 4 INJECTION, SOLUTION INTRA-ARTICULAR; INTRALESIONAL; INTRAMUSCULAR; INTRAVENOUS; SOFT TISSUE at 11:03

## 2021-07-26 RX ADMIN — FENTANYL CITRATE 50 MCG: 50 INJECTION, SOLUTION INTRAMUSCULAR; INTRAVENOUS at 13:38

## 2021-07-26 RX ADMIN — SODIUM CHLORIDE, POTASSIUM CHLORIDE, SODIUM LACTATE AND CALCIUM CHLORIDE: 600; 310; 30; 20 INJECTION, SOLUTION INTRAVENOUS at 12:30

## 2021-07-26 RX ADMIN — HYDROMORPHONE HYDROCHLORIDE 0.2 MG: 0.2 INJECTION, SOLUTION INTRAMUSCULAR; INTRAVENOUS; SUBCUTANEOUS at 16:05

## 2021-07-26 RX ADMIN — SODIUM CHLORIDE, POTASSIUM CHLORIDE, SODIUM LACTATE AND CALCIUM CHLORIDE: 600; 310; 30; 20 INJECTION, SOLUTION INTRAVENOUS at 15:45

## 2021-07-26 RX ADMIN — FENTANYL CITRATE 50 MCG: 50 INJECTION, SOLUTION INTRAMUSCULAR; INTRAVENOUS at 11:37

## 2021-07-26 ASSESSMENT — MIFFLIN-ST. JEOR: SCORE: 1336.75

## 2021-07-26 NOTE — ANESTHESIA PROCEDURE NOTES
Airway       Patient location during procedure: OR  Staff -        Anesthesiologist:  Jesica Yen MD       Resident/Fellow: Zeeshan Julian MD       Performed By: resident  Consent for Airway        Urgency: elective  Indications and Patient Condition       Indications for airway management: paramjit-procedural       Induction type:RSI       Mask difficulty assessment: 0 - not attempted    Final Airway Details       Final airway type: endotracheal airway       Successful airway: ETT - single  Endotracheal Airway Details        ETT size (mm): 7.0       Cuffed: yes       Successful intubation technique: direct laryngoscopy       DL Blade Type: MAC 3       Grade View of Cords: 1       Adjucts: stylet       Position: Right       Measured from: gums/teeth       Secured at (cm): 22       Bite block used: None    Post intubation assessment        Placement verified by: capnometry, equal breath sounds and chest rise        Number of attempts at approach: 1       Secured with: pink tape       Ease of procedure: easy       Dentition: Unchanged

## 2021-07-26 NOTE — OP NOTE
Procedure Date: 07/26/2021    PREOPERATIVE DIAGNOSES:  1.  Hiatal hernia.  2.  Gastroesophageal reflux disease.    POSTOPERATIVE DIAGNOSES:  1.  Hiatal hernia.  2.  Gastroesophageal reflux disease.  3.  Gastric polyps.    PROCEDURES PERFORMED:    1.  Laparoscopic hiatal hernia repair with Nissen fundoplication.  2.  EGD.    ANESTHESIA:  General endotracheal anesthesia.    SURGEON:  Mady Caceres MD    ASSISTANT:  Jhonny Mcleod MD, General Surgery resident.    COMPLICATIONS:  None.    ESTIMATED BLOOD LOSS:  15 mL.    SPECIMENS:  Hernia sac for permanent.      DRAINS/TUBES:  A 19-German Tristen drain to left pleural space directed posterior apical.    OPERATIVE FINDINGS:    1.  EGD:  The patient had normal esophagus.  The stomach showed evidence of gastric polyps.  These were all subcentimeter.  After the fundoplication was performed and the procedure was completed, an endoscopy revealed an adequate fundoplication.  We then imaged a stack of coins.  A picture was taken for documentation.  2.  Laparoscopy:  Initial inspection revealed no peritoneal implants or ascites.  The patient had a small sliding hiatal hernia.  She did have significant amount of periesophageal inflammation secondary to chronic reflux disease.  This significantly increased the complexity of the case.  She also had an accessory left hepatic artery going through the gastrohepatic ligament, which was preserved throughout the case.  We performed a 360-degree fundoplication over the 52-German bougie.  The wrap was placed above the replaced left hepatic artery to prevent a sliding Nissen.    DESCRIPTION OF PROCEDURE IN DETAIL:  The patient was taken to the operating room, laid supine.  General anesthesia was induced.  A formal timeout was carried out, confirming the name of the patient and correct procedure.  She had SCDs in place and functioning prior to induction of anesthesia.  She received antibiotics within 30 minutes of incision.    After  the timeout was completed, we started by placing our ports.  We placed two 12 mm ports 3 fingerbreadths above the umbilicus on either side of the midline and 5 mm ports in the upper quadrants of the abdomen.  We placed a Fabi liver retractor for exposure.  The patient was then positioned in reverse Trendelenburg.  We entered the abdomen with an open Stephanie technique, insufflated to 15 mmHg.  The patient tolerated insufflation well.  Initial inspection revealed no evidence of ascites or peritoneal implants.  We started by dividing the gastrohepatic ligament.  Of note, the patient had an accessory left hepatic artery.  This was preserved throughout the case.  We then mobilized the greater curvature of the stomach by dividing the gastrocolic ligament and the short gastric vessels.  There were some posterior gastric wall attachments that were divided as well.  A Penrose drain was then placed around the GE junction for retraction.  We then proceeded with a mediastinal dissection.    We entered the posterior mediastinum by dividing the phrenoesophageal membrane.  We circumferentially dissected out the esophagus to the level of the inferior pulmonary veins.  We did enter the pleura on the left side, and a left pleural tube was placed.  The patient had significant chronic inflammation of the periesophageal region at the GE junction, consistent with chronic reflux disease.  Once the esophagus was completely dissected up to the inferior pulmonary veins, we confirmed esophageal length by performing an endoscopy.  The patient had more than 3 cm of intra-abdominal esophagus.  We then excised the hernia sac and sent that to Pathology for permanent.    We performed a leak test.  We irrigated the abdomen with a half liter of normal saline.  The patient was positioned in Trendelenburg.  An endoscopy was performed, and the leak test was negative, insufflating both the stomach and the esophagus.  After this, then the stomach was  suctioned out and then we proceeded to perform the cruroplasty.    Next, we proceeded to perform the cruroplasty.  This was performed with 3 posterior stitches and 1 anterior stitch.  We used 0 silk pledgeted stitches for the posterior stitches and a #2 Ti-Cron with a full pledget for the anterior stitch.  The cruroplasty was performed over a 52-Sudanese bougie.    We then performed the Nissen fundoplication.  For this, we grasped the fundus of the stomach and brought that behind the esophagus.  The wrap was placed above the accessory left hepatic artery in order to prevent a slipped Nissen in the future.  We then placed 3 interrupted 2-0 silk stitches.  The stitches were placed on both sides of the wrap on the greater curvature of the stomach, and a small bite of the esophagus on the anterior aspect was taken.  Once the 3 stitches were placed, completion endoscopy was performed, which confirmed adequate wrap.  The stomach was then desufflated.  We advanced an Amplatz Super Stiff wire, and an NG tube was advanced over the wire.  The procedure was then terminated.  We released the pneumoperitoneum.  We removed the ports under direct vision, confirmed hemostasis.  The 12 mm port sites were closed with a 2-0 silk, and this was because the patient was ALLERGIC TO VICRYL.  The skin was then closed with Monocryl.  The tube was cut to appropriate length and connected to water seal.  All incisions were irrigated prior to closure of the abdomen.  All instrument, sponge counts were correct at the end of the case.  The patient was then extubated and transferred to recovery.      Mady Caceres MD        D: 2021   T: 2021   MT: [facility]    Name:     TERRANCE FLYNN  MRN:      [id number]        Account:        [id number]   :      1970           Procedure Date: 2021     Document: [id number]

## 2021-07-26 NOTE — ANESTHESIA CARE TRANSFER NOTE
Patient: Jaz Archibald    Procedure(s):  HERNIORRHAPHY, HIATAL, LAPAROSCOPIC  FUNDOPLICATION, NISSEN, LAPAROSCOPIC    Diagnosis: GERD without esophagitis [K21.9]  Diagnosis Additional Information: No value filed.    Anesthesia Type:   General     Note:    Oropharynx: oropharynx clear of all foreign objects  Level of Consciousness: awake  Oxygen Supplementation: face mask  Level of Supplemental Oxygen (L/min / FiO2): 6  Independent Airway: airway patency satisfactory and stable  Dentition: dentition unchanged  Vital Signs Stable: post-procedure vital signs reviewed and stable  Report to RN Given: handoff report given  Patient transferred to: PACU    Handoff Report: Identifed the Patient, Identified the Reponsible Provider, Reviewed the pertinent medical history, Discussed the surgical course, Reviewed Intra-OP anesthesia mangement and issues during anesthesia, Set expectations for post-procedure period and Allowed opportunity for questions and acknowledgement of understanding      Vitals:  Vitals Value Taken Time   /80 07/26/21 1511   Temp     Pulse 103 07/26/21 1519   Resp     SpO2 71 % 07/26/21 1519   Vitals shown include unvalidated device data.    Electronically Signed By: Zeeshan Julian MD  July 26, 2021  3:21 PM

## 2021-07-26 NOTE — BRIEF OP NOTE
[facility]    Brief Operative Note    Pre-operative diagnosis: GERD without esophagitis [K21.9]  Post-operative diagnosis Same as pre-operative diagnosis    Procedure: Procedure(s):  HERNIORRHAPHY, HIATAL, LAPAROSCOPIC  FUNDOPLICATION, NISSEN, LAPAROSCOPIC  Surgeon: Surgeon(s) and Role:     * Mady Potts MD - Primary     * Jhonny Mcleod MD - Resident - Assisting  Anesthesia: General   Estimated blood loss: 20 mL  Drains: Left chest tube, resendez catheter  Specimens:   ID Type Source Tests Collected by Time Destination   1 : hernia sac Tissue Hernia Sac SURGICAL PATHOLOGY EXAM Mady Potts MD 7/26/2021 12:50 PM      Findings:   See operative note.  Complications: None.  Implants: * No implants in log *

## 2021-07-27 ENCOUNTER — APPOINTMENT (OUTPATIENT)
Dept: GENERAL RADIOLOGY | Facility: CLINIC | Age: 51
DRG: 328 | End: 2021-07-27
Attending: THORACIC SURGERY (CARDIOTHORACIC VASCULAR SURGERY)
Payer: COMMERCIAL

## 2021-07-27 ENCOUNTER — APPOINTMENT (OUTPATIENT)
Dept: OCCUPATIONAL THERAPY | Facility: CLINIC | Age: 51
DRG: 328 | End: 2021-07-27
Attending: THORACIC SURGERY (CARDIOTHORACIC VASCULAR SURGERY)
Payer: COMMERCIAL

## 2021-07-27 LAB
ANION GAP SERPL CALCULATED.3IONS-SCNC: 5 MMOL/L (ref 3–14)
ATRIAL RATE - MUSE: 72 BPM
ATRIAL RATE - MUSE: 89 BPM
BUN SERPL-MCNC: 12 MG/DL (ref 7–30)
CALCIUM SERPL-MCNC: 7.8 MG/DL (ref 8.5–10.1)
CHLORIDE BLD-SCNC: 107 MMOL/L (ref 94–109)
CO2 SERPL-SCNC: 28 MMOL/L (ref 20–32)
CREAT SERPL-MCNC: 0.67 MG/DL (ref 0.52–1.04)
DIASTOLIC BLOOD PRESSURE - MUSE: NORMAL MMHG
DIASTOLIC BLOOD PRESSURE - MUSE: NORMAL MMHG
ERYTHROCYTE [DISTWIDTH] IN BLOOD BY AUTOMATED COUNT: 13.2 % (ref 10–15)
GFR SERPL CREATININE-BSD FRML MDRD: >90 ML/MIN/1.73M2
GLUCOSE BLD-MCNC: 83 MG/DL (ref 70–99)
GLUCOSE BLDC GLUCOMTR-MCNC: 80 MG/DL (ref 70–99)
HCT VFR BLD AUTO: 36.5 % (ref 35–47)
HGB BLD-MCNC: 11.9 G/DL (ref 11.7–15.7)
INTERPRETATION ECG - MUSE: NORMAL
INTERPRETATION ECG - MUSE: NORMAL
MAGNESIUM SERPL-MCNC: 2.2 MG/DL (ref 1.6–2.3)
MCH RBC QN AUTO: 29.5 PG (ref 26.5–33)
MCHC RBC AUTO-ENTMCNC: 32.6 G/DL (ref 31.5–36.5)
MCV RBC AUTO: 90 FL (ref 78–100)
P AXIS - MUSE: 21 DEGREES
P AXIS - MUSE: 47 DEGREES
PLATELET # BLD AUTO: 265 10E3/UL (ref 150–450)
POTASSIUM BLD-SCNC: 3.5 MMOL/L (ref 3.4–5.3)
PR INTERVAL - MUSE: 156 MS
PR INTERVAL - MUSE: 158 MS
QRS DURATION - MUSE: 66 MS
QRS DURATION - MUSE: 68 MS
QT - MUSE: 430 MS
QT - MUSE: 438 MS
QTC - MUSE: 479 MS
QTC - MUSE: 523 MS
R AXIS - MUSE: -4 DEGREES
R AXIS - MUSE: 4 DEGREES
RBC # BLD AUTO: 4.04 10E6/UL (ref 3.8–5.2)
SODIUM SERPL-SCNC: 140 MMOL/L (ref 133–144)
SYSTOLIC BLOOD PRESSURE - MUSE: NORMAL MMHG
SYSTOLIC BLOOD PRESSURE - MUSE: NORMAL MMHG
T AXIS - MUSE: 46 DEGREES
T AXIS - MUSE: 58 DEGREES
TROPONIN I SERPL-MCNC: <0.015 UG/L (ref 0–0.04)
VENTRICULAR RATE- MUSE: 72 BPM
VENTRICULAR RATE- MUSE: 89 BPM
WBC # BLD AUTO: 11.8 10E3/UL (ref 4–11)

## 2021-07-27 PROCEDURE — 71045 X-RAY EXAM CHEST 1 VIEW: CPT | Mod: 26 | Performed by: RADIOLOGY

## 2021-07-27 PROCEDURE — 97530 THERAPEUTIC ACTIVITIES: CPT | Mod: GO

## 2021-07-27 PROCEDURE — 93005 ELECTROCARDIOGRAM TRACING: CPT

## 2021-07-27 PROCEDURE — 71045 X-RAY EXAM CHEST 1 VIEW: CPT

## 2021-07-27 PROCEDURE — 258N000003 HC RX IP 258 OP 636: Performed by: STUDENT IN AN ORGANIZED HEALTH CARE EDUCATION/TRAINING PROGRAM

## 2021-07-27 PROCEDURE — 71045 X-RAY EXAM CHEST 1 VIEW: CPT | Mod: 77

## 2021-07-27 PROCEDURE — 97165 OT EVAL LOW COMPLEX 30 MIN: CPT | Mod: GO

## 2021-07-27 PROCEDURE — 93010 ELECTROCARDIOGRAM REPORT: CPT | Performed by: INTERNAL MEDICINE

## 2021-07-27 PROCEDURE — 80048 BASIC METABOLIC PNL TOTAL CA: CPT | Performed by: STUDENT IN AN ORGANIZED HEALTH CARE EDUCATION/TRAINING PROGRAM

## 2021-07-27 PROCEDURE — 120N000002 HC R&B MED SURG/OB UMMC

## 2021-07-27 PROCEDURE — 36415 COLL VENOUS BLD VENIPUNCTURE: CPT | Performed by: STUDENT IN AN ORGANIZED HEALTH CARE EDUCATION/TRAINING PROGRAM

## 2021-07-27 PROCEDURE — 85027 COMPLETE CBC AUTOMATED: CPT | Performed by: STUDENT IN AN ORGANIZED HEALTH CARE EDUCATION/TRAINING PROGRAM

## 2021-07-27 PROCEDURE — 250N000013 HC RX MED GY IP 250 OP 250 PS 637: Performed by: STUDENT IN AN ORGANIZED HEALTH CARE EDUCATION/TRAINING PROGRAM

## 2021-07-27 PROCEDURE — 97535 SELF CARE MNGMENT TRAINING: CPT | Mod: GO

## 2021-07-27 PROCEDURE — 83735 ASSAY OF MAGNESIUM: CPT | Performed by: STUDENT IN AN ORGANIZED HEALTH CARE EDUCATION/TRAINING PROGRAM

## 2021-07-27 PROCEDURE — 250N000013 HC RX MED GY IP 250 OP 250 PS 637: Performed by: THORACIC SURGERY (CARDIOTHORACIC VASCULAR SURGERY)

## 2021-07-27 PROCEDURE — C9113 INJ PANTOPRAZOLE SODIUM, VIA: HCPCS | Performed by: STUDENT IN AN ORGANIZED HEALTH CARE EDUCATION/TRAINING PROGRAM

## 2021-07-27 PROCEDURE — 250N000011 HC RX IP 250 OP 636: Performed by: STUDENT IN AN ORGANIZED HEALTH CARE EDUCATION/TRAINING PROGRAM

## 2021-07-27 PROCEDURE — 84484 ASSAY OF TROPONIN QUANT: CPT

## 2021-07-27 RX ORDER — HYDROMORPHONE HCL IN WATER/PF 6 MG/30 ML
0.2 PATIENT CONTROLLED ANALGESIA SYRINGE INTRAVENOUS
Status: DISCONTINUED | OUTPATIENT
Start: 2021-07-27 | End: 2021-07-29 | Stop reason: HOSPADM

## 2021-07-27 RX ORDER — OXYCODONE HCL 5 MG/5 ML
5-10 SOLUTION, ORAL ORAL EVERY 4 HOURS PRN
Status: DISCONTINUED | OUTPATIENT
Start: 2021-07-27 | End: 2021-07-29 | Stop reason: HOSPADM

## 2021-07-27 RX ORDER — ESCITALOPRAM OXALATE 20 MG/1
20 TABLET ORAL AT BEDTIME
Status: DISCONTINUED | OUTPATIENT
Start: 2021-07-27 | End: 2021-07-29 | Stop reason: HOSPADM

## 2021-07-27 RX ORDER — ACETAMINOPHEN 325 MG/10.15ML
650 LIQUID ORAL EVERY 6 HOURS
Status: DISCONTINUED | OUTPATIENT
Start: 2021-07-27 | End: 2021-07-29 | Stop reason: HOSPADM

## 2021-07-27 RX ORDER — ESCITALOPRAM OXALATE 20 MG/1
20 TABLET ORAL AT BEDTIME
Status: DISCONTINUED | OUTPATIENT
Start: 2021-07-27 | End: 2021-07-27

## 2021-07-27 RX ADMIN — OXYCODONE HYDROCHLORIDE 5 MG: 5 SOLUTION ORAL at 13:35

## 2021-07-27 RX ADMIN — PANTOPRAZOLE SODIUM 40 MG: 40 INJECTION, POWDER, FOR SOLUTION INTRAVENOUS at 08:06

## 2021-07-27 RX ADMIN — PROCHLORPERAZINE EDISYLATE 10 MG: 5 INJECTION INTRAMUSCULAR; INTRAVENOUS at 22:44

## 2021-07-27 RX ADMIN — KETOROLAC TROMETHAMINE 15 MG: 30 INJECTION, SOLUTION INTRAMUSCULAR at 18:44

## 2021-07-27 RX ADMIN — ONDANSETRON 4 MG: 2 INJECTION INTRAMUSCULAR; INTRAVENOUS at 13:35

## 2021-07-27 RX ADMIN — ACETAMINOPHEN 650 MG: 325 SOLUTION ORAL at 13:35

## 2021-07-27 RX ADMIN — HYDROMORPHONE HYDROCHLORIDE 0.2 MG: 0.2 INJECTION, SOLUTION INTRAMUSCULAR; INTRAVENOUS; SUBCUTANEOUS at 20:16

## 2021-07-27 RX ADMIN — KETOROLAC TROMETHAMINE 15 MG: 30 INJECTION, SOLUTION INTRAMUSCULAR at 08:04

## 2021-07-27 RX ADMIN — ACETAMINOPHEN 650 MG: 325 SOLUTION ORAL at 18:44

## 2021-07-27 RX ADMIN — SODIUM CHLORIDE, POTASSIUM CHLORIDE, SODIUM LACTATE AND CALCIUM CHLORIDE: 600; 310; 30; 20 INJECTION, SOLUTION INTRAVENOUS at 11:22

## 2021-07-27 RX ADMIN — OXYCODONE HYDROCHLORIDE 10 MG: 5 SOLUTION ORAL at 22:37

## 2021-07-27 RX ADMIN — ONDANSETRON 4 MG: 2 INJECTION INTRAMUSCULAR; INTRAVENOUS at 01:53

## 2021-07-27 RX ADMIN — KETOROLAC TROMETHAMINE 15 MG: 30 INJECTION, SOLUTION INTRAMUSCULAR at 01:08

## 2021-07-27 RX ADMIN — OXYCODONE HYDROCHLORIDE 10 MG: 5 SOLUTION ORAL at 16:56

## 2021-07-27 RX ADMIN — ESCITALOPRAM OXALATE 20 MG: 20 TABLET ORAL at 19:02

## 2021-07-27 RX ADMIN — SODIUM CHLORIDE, POTASSIUM CHLORIDE, SODIUM LACTATE AND CALCIUM CHLORIDE: 600; 310; 30; 20 INJECTION, SOLUTION INTRAVENOUS at 01:09

## 2021-07-27 RX ADMIN — ONDANSETRON 4 MG: 2 INJECTION INTRAMUSCULAR; INTRAVENOUS at 08:16

## 2021-07-27 RX ADMIN — KETOROLAC TROMETHAMINE 15 MG: 30 INJECTION, SOLUTION INTRAMUSCULAR at 13:35

## 2021-07-27 RX ADMIN — HYDROMORPHONE HYDROCHLORIDE 0.2 MG: 0.2 INJECTION, SOLUTION INTRAMUSCULAR; INTRAVENOUS; SUBCUTANEOUS at 16:09

## 2021-07-27 RX ADMIN — ENOXAPARIN SODIUM 40 MG: 40 INJECTION SUBCUTANEOUS at 08:05

## 2021-07-27 RX ADMIN — ONDANSETRON 4 MG: 2 INJECTION INTRAMUSCULAR; INTRAVENOUS at 20:09

## 2021-07-27 ASSESSMENT — ACTIVITIES OF DAILY LIVING (ADL)
PREVIOUS_RESPONSIBILITIES: MEAL PREP;HOUSEKEEPING;LAUNDRY;SHOPPING;MEDICATION MANAGEMENT;FINANCES;DRIVING;WORK
ADLS_ACUITY_SCORE: 16

## 2021-07-27 NOTE — PROGRESS NOTES
Pt resting in bed quietly. Remains on 3l NC. SR on the monitor. Denies chest pain. Abdominal tender to touch. Hypoactive BS..still remains except ice chips. NGT to connected to LIS. Draining brownish drainage. Austin patent draining clear yellow urine. PCA in progress. Pt encouraged to use for pain. Bed in low position, side rails up x2. Call light within reach. Pt instructed to use call light for assistance and needs. Pt instructed to use call light for assistance and needs. Pt verbalized understanding. Will continue to monitor

## 2021-07-27 NOTE — ANESTHESIA POSTPROCEDURE EVALUATION
Patient: Jaz Archibald    Procedure(s):  HERNIORRHAPHY, HIATAL, LAPAROSCOPIC  FUNDOPLICATION, NISSEN, LAPAROSCOPIC    Diagnosis:GERD without esophagitis [K21.9]  Diagnosis Additional Information: No value filed.    Anesthesia Type:  General    Note:  Disposition: Admission   Postop Pain Control: Uneventful            Sign Out: Well controlled pain   PONV: No   Neuro/Psych: Uneventful            Sign Out: Acceptable/Baseline neuro status   Airway/Respiratory: Uneventful            Sign Out: Acceptable/Baseline resp. status   CV/Hemodynamics: Uneventful            Sign Out: Acceptable CV status; No obvious hypovolemia; No obvious fluid overload   Other NRE: NONE   DID A NON-ROUTINE EVENT OCCUR? No           Last vitals:  Vitals Value Taken Time   /74 07/26/21 1900   Temp 36.7  C (98  F) 07/26/21 1615   Pulse 86 07/26/21 1900   Resp 12 07/26/21 1745   SpO2 96 % 07/26/21 1901   Vitals shown include unvalidated device data.    Electronically Signed By: Juan Manuel Marlow MD  July 26, 2021  7:02 PM

## 2021-07-27 NOTE — PROGRESS NOTES
Pt c/o chest pain 5 on a scale 0-10. Radiate to left shoulder. MD made aware. Md visited pt. EKG done as per order. Pt encouraged to use pca pump for pain control.VSS within normal limits. Will continue to monitor    "Jayla Galvan MD

## 2021-07-27 NOTE — PROGRESS NOTES
"   07/27/21 0900   Quick Adds   Type of Visit Initial Occupational Therapy Evaluation       Present no   Language English    Living Environment   People in home spouse   Current Living Arrangements house  ([facility] )   Home Accessibility stairs within home   Number of Stairs, Within Home, Primary greater than 10 stairs  (3 story townhouse, 2 full flights of stairs to bedroom )   Stair Railings, Within Home, Primary railings safe and in good condition   Transportation Anticipated family or friend will provide   Living Environment Comments Pt lives in [facility] w/ and 2 dogs, uses walk-in shower and raised toilet. 2 sets of stairs for bedroom, laundry on 2nd floor.    Self-Care   Usual Activity Tolerance good  (RA at baseline )   Current Activity Tolerance moderate  (2L of O2 via NC during OT eval )   Regular Exercise No   Equipment Currently Used at Home raised toilet seat   Activity/Exercise/Self-Care Comment Pt reports IND at baseline    Instrumental Activities of Daily Living (IADL)   Previous Responsibilities meal prep;housekeeping;laundry;shopping;medication management;finances;driving;work   Disability/Function   Hearing Difficulty or Deaf no   Wear Glasses or Blind no   Concentrating, Remembering or Making Decisions Difficulty no   Difficulty Communicating no   Difficulty Eating/Swallowing no   Walking or Climbing Stairs Difficulty no   Dressing/Bathing Difficulty no   Toileting issues no   Doing Errands Independently Difficulty (such as shopping) no   Fall history within last six months no   Change in Functional Status Since Onset of Current Illness/Injury no   General Information   Onset of Illness/Injury or Date of Surgery 07/26/21   Referring Physician Jhonny Mcleod MD   Patient/Family Therapy Goal Statement (OT) \"To go home and be in better shape\"   Additional Occupational Profile Info/Pertinent History of Current Problem Per chart: Jaz Archibald is a 51 year old " female who presents for pre-operative H & P in preparation for HERNIORRHAPHY, HIATAL, LAPAROSCOPIC, FUNDOPLICATION, NISSEN, LAPAROSCOPIC with Dr. Krystian Caceres on 7/26/21 at [facility]   Performance Patterns (Routines, Roles, Habits) Works as staffing  for [employer]    Existing Precautions/Restrictions thoracotomy   Left Upper Extremity (Weight-bearing Status) full weight-bearing (FWB)   Right Upper Extremity (Weight-bearing Status) full weight-bearing (FWB)   Left Lower Extremity (Weight-bearing Status) full weight-bearing (FWB)   Right Lower Extremity (Weight-bearing Status) full weight-bearing (FWB)   Cognitive Status Examination   Orientation Status orientation to person, place and time   Affect/Mental Status (Cognitive) WNL   Visual Perception   Visual Impairment/Limitations WNL   Sensory   Sensory Quick Adds No deficits were identified   Pain Assessment   Patient Currently in Pain No   Integumentary/Edema   Integumentary/Edema no deficits were identifed   Posture   Posture not impaired   Range of Motion Comprehensive   Comment, General Range of Motion BUE grossly WFL   Strength Comprehensive (MMT)   Comment, General Manual Muscle Testing (MMT) Assessment brenda hand  4/5   Bed Mobility   Comment (Bed Mobility) IND   Balance   Balance Comments mild unsteadiness during ambulation    Clinical Impression   Criteria for Skilled Therapeutic Interventions Met (OT) yes   OT Diagnosis decreased functional endurance, which may impact ADL/IADL performance    OT Problem List-Impairments impacting ADL problems related to;activity tolerance impaired;strength;post-surgical precautions;pain   Assessment of Occupational Performance 1-3 Performance Deficits   Identified Performance Deficits dressing, home mgmt, leisure    Planned Therapy Interventions (OT) ADL retraining;strengthening;progressive activity/exercise;transfer training   Clinical Decision Making  Complexity (OT) low complexity   Therapy Frequency (OT) 5x/week   Predicted Duration of Therapy 1 week    Anticipated Equipment Needs Upon Discharge (OT)   (TBD)   Risk & Benefits of therapy have been explained evaluation/treatment results reviewed;care plan/treatment goals reviewed;risks/benefits reviewed;patient;spouse/significant other   Comment-Clinical Impression Pt to benefit from skilled OT during IP stay to progress IND in ADLs/IADLs    OT Discharge Planning    OT Discharge Recommendation (DC Rec) Home with assist;home with home care occupational therapy   OT Rationale for DC Rec Anticipate safe discharge home w/ to assist w/heavier IADLs. Recommend  OT to progress functional endurance/strength for optimal ADL/IADL performance    OT Brief overview of current status  close SBA + IV pole ambulation    Total Evaluation Time (Minutes)   Total Evaluation Time (Minutes) 5

## 2021-07-27 NOTE — PROGRESS NOTES
Thoracic Surgery Progress Note  07/27/2021       Subjective:  POD 1 from laparoscopic hiatal hernia repair and nissen. Overall doing well. Pain is better controlled. In good spirits. Was able to take in ice chips.     Objective:  Temp:  [96.9  F (36.1  C)-98.9  F (37.2  C)] 96.9  F (36.1  C)  Pulse:  [] 80  Resp:  [12-18] 17  BP: (108-149)/() 108/74  SpO2:  [92 %-100 %] 95 %    I/O last 3 completed shifts:  In: 2808 [I.V.:2808]  Out: 1590 [Urine:1150; Emesis/NG output:400; Blood:20; Chest Tube:20]      Gen: Awake, alert, NAD  Resp: NLB on NC, chest tube without air leak  Abd: soft, nondistended, appropriately tender  Incision: c/d/I  Ext: WWP, no edema     Labs:  Recent Labs   Lab 07/27/21  0533 07/21/21  1612   WBC 11.8* 10.6   HGB 11.9 14.9    388       Recent Labs   Lab 07/27/21  0612 07/27/21  0533 07/26/21  2217 07/26/21  0932 07/21/21  1612   NA  --  140  --   --  136   POTASSIUM  --  3.5  --   --  3.6   CHLORIDE  --  107  --   --  100   CO2  --  28  --   --  29   BUN  --  12  --   --  12   CR  --  0.67 0.66  --  0.79   GLC 80 83  --  92 92   JUANY  --  7.8*  --   --  9.1   MAG  --  2.2  --   --   --        Imaging:  CXR - no ptx, no effusion     Assessment/Plan:   51 year old female who is s/p laparoscopic hiatal hernia repair with nissen fundoplication on 7/26 with Dr. Boland. Overall doing well    - Restart home lexapro  - Discontinue PCA, start liquid tylenol, oxycodone, continue toradol  - Remove NGT, start sips of clear liquids  - Will decrease mIVF 50 mL/hr  - Remove resendez catheter  - Remove left chest tube  - Follow-up post-pull CXR  - Encourage ambulation, OOB  - Possibly home tomorrow pending progression     Seen, examined, and discussed with staff  - - - - - - - - - - - - - - - - - -  Jhonny Mcleod MD  PGY-3, General Surgery

## 2021-07-27 NOTE — PROGRESS NOTES
"Overnight Progress note:    Paged by nursing with concern for acute onset chest pain in a POD#1 Nissen fundoplication patient.    On evaluation patient with moderate chest and subxiphoid pain. Complains of moderate SOB 2/2 to this pain. Otherwise asymptomatic. Exam notable for some mild chest wall tenderness to palpation, but otherwise benign.     /70 (BP Location: Left arm)   Pulse 80   Temp 97.4  F (36.3  C) (Oral)   Resp 17   Ht 1.676 m (5' 6\")   Wt 71 kg (156 lb 8.4 oz)   LMP 07/26/2013   SpO2 94%   BMI 25.26 kg/m    Body mass index is 25.26 kg/m .     A/P:  Low suspicion for ACS or over tightening of the nissen, though will obtain chest pain work-up including EKG, CXR, BMP, CBC, and trend trops. Will discuss with day team and have them follow up with results.       Please page if questions,  Harvey Lombardo MD  Surgical Resident  #[phone]    "

## 2021-07-27 NOTE — PROGRESS NOTES
"  Time: 1985-7725     Admission/Diagnosis:  GERD without esophagitis [K21.9]    /74 (BP Location: Left arm)   Pulse 80   Temp 96.9  F (36.1  C) (Axillary)   Resp 17   Ht 1.676 m (5' 6\")   Wt 71 kg (156 lb 8.4 oz)   LMP 07/26/2013   SpO2 95%   BMI 25.26 kg/m      Shift Updates: Pt A&Ox4, VSS on 2L O2 nasal cannula. Weaned O2 from 4L to 0L and is tolerating well. Denies SOB and nausea. Complains of left abdominal pain radiating to left shoulder, PCA pump and PRN toradol in use. Chest tube was removed, plan to x-ray at 1500. Austin catheter was removed. R PIV SL. L PIV infusing LR at 100mL/hr w/ PCA pump. NG tube removed. Pt ambulated w/ OT. Pt has not had a BM, denies passing flatus; MD aware.     Plan: Monitor for urine output and BM/ flatus passing. Advance w/ clear liquid diet as tolerated. Discharge when medially stable.     Continue to monitor and with POC.   "

## 2021-07-27 NOTE — PROVIDER NOTIFICATION
NEHA.  Pt c/o feeling more pain on her left neck, shoulder and arm. C/o feeling heavy on both LLE and LUE. Thoracic team updated on pt's condition and here to see pt.

## 2021-07-27 NOTE — PROVIDER NOTIFICATION
"Writer notified provider \"7B 33-2 PATRICIA Mirza. Pt is experiencing chest pain and crepitus and left hand is swelling now please come see pt. thx JACEY Gan [id number]\"    Esther Boateng RN on 7/27/2021 at 4:36 PM    "

## 2021-07-28 ENCOUNTER — APPOINTMENT (OUTPATIENT)
Dept: OCCUPATIONAL THERAPY | Facility: CLINIC | Age: 51
DRG: 328 | End: 2021-07-28
Attending: THORACIC SURGERY (CARDIOTHORACIC VASCULAR SURGERY)
Payer: COMMERCIAL

## 2021-07-28 LAB
ANION GAP SERPL CALCULATED.3IONS-SCNC: 6 MMOL/L (ref 3–14)
BUN SERPL-MCNC: 9 MG/DL (ref 7–30)
CALCIUM SERPL-MCNC: 7.8 MG/DL (ref 8.5–10.1)
CHLORIDE BLD-SCNC: 107 MMOL/L (ref 94–109)
CO2 SERPL-SCNC: 26 MMOL/L (ref 20–32)
CREAT SERPL-MCNC: 0.61 MG/DL (ref 0.52–1.04)
ERYTHROCYTE [DISTWIDTH] IN BLOOD BY AUTOMATED COUNT: 13.2 % (ref 10–15)
GFR SERPL CREATININE-BSD FRML MDRD: >90 ML/MIN/1.73M2
GLUCOSE BLD-MCNC: 86 MG/DL (ref 70–99)
HCT VFR BLD AUTO: 37.1 % (ref 35–47)
HGB BLD-MCNC: 12.3 G/DL (ref 11.7–15.7)
MAGNESIUM SERPL-MCNC: 2.3 MG/DL (ref 1.6–2.3)
MCH RBC QN AUTO: 29.8 PG (ref 26.5–33)
MCHC RBC AUTO-ENTMCNC: 33.2 G/DL (ref 31.5–36.5)
MCV RBC AUTO: 90 FL (ref 78–100)
PLATELET # BLD AUTO: 199 10E3/UL (ref 150–450)
POTASSIUM BLD-SCNC: 3.3 MMOL/L (ref 3.4–5.3)
RBC # BLD AUTO: 4.13 10E6/UL (ref 3.8–5.2)
SODIUM SERPL-SCNC: 139 MMOL/L (ref 133–144)
WBC # BLD AUTO: 9.2 10E3/UL (ref 4–11)

## 2021-07-28 PROCEDURE — 85027 COMPLETE CBC AUTOMATED: CPT | Performed by: STUDENT IN AN ORGANIZED HEALTH CARE EDUCATION/TRAINING PROGRAM

## 2021-07-28 PROCEDURE — 97535 SELF CARE MNGMENT TRAINING: CPT | Mod: GO

## 2021-07-28 PROCEDURE — 83735 ASSAY OF MAGNESIUM: CPT | Performed by: STUDENT IN AN ORGANIZED HEALTH CARE EDUCATION/TRAINING PROGRAM

## 2021-07-28 PROCEDURE — 80048 BASIC METABOLIC PNL TOTAL CA: CPT | Performed by: STUDENT IN AN ORGANIZED HEALTH CARE EDUCATION/TRAINING PROGRAM

## 2021-07-28 PROCEDURE — 250N000013 HC RX MED GY IP 250 OP 250 PS 637: Performed by: THORACIC SURGERY (CARDIOTHORACIC VASCULAR SURGERY)

## 2021-07-28 PROCEDURE — 999N000147 HC STATISTIC PT IP EVAL DEFER

## 2021-07-28 PROCEDURE — 120N000002 HC R&B MED SURG/OB UMMC

## 2021-07-28 PROCEDURE — 36415 COLL VENOUS BLD VENIPUNCTURE: CPT | Performed by: STUDENT IN AN ORGANIZED HEALTH CARE EDUCATION/TRAINING PROGRAM

## 2021-07-28 PROCEDURE — 250N000013 HC RX MED GY IP 250 OP 250 PS 637: Performed by: STUDENT IN AN ORGANIZED HEALTH CARE EDUCATION/TRAINING PROGRAM

## 2021-07-28 PROCEDURE — 250N000011 HC RX IP 250 OP 636: Performed by: STUDENT IN AN ORGANIZED HEALTH CARE EDUCATION/TRAINING PROGRAM

## 2021-07-28 PROCEDURE — 250N000013 HC RX MED GY IP 250 OP 250 PS 637: Performed by: PHYSICIAN ASSISTANT

## 2021-07-28 RX ORDER — POLYETHYLENE GLYCOL 3350 17 G/17G
17 POWDER, FOR SOLUTION ORAL 2 TIMES DAILY
Status: DISCONTINUED | OUTPATIENT
Start: 2021-07-28 | End: 2021-07-29 | Stop reason: HOSPADM

## 2021-07-28 RX ORDER — AMOXICILLIN 250 MG
2 CAPSULE ORAL 2 TIMES DAILY
Status: DISCONTINUED | OUTPATIENT
Start: 2021-07-28 | End: 2021-07-29 | Stop reason: HOSPADM

## 2021-07-28 RX ORDER — POLYETHYLENE GLYCOL 3350 17 G/17G
17 POWDER, FOR SOLUTION ORAL DAILY
Status: DISCONTINUED | OUTPATIENT
Start: 2021-07-28 | End: 2021-07-28

## 2021-07-28 RX ORDER — BISACODYL 10 MG
10 SUPPOSITORY, RECTAL RECTAL DAILY PRN
Status: DISCONTINUED | OUTPATIENT
Start: 2021-07-28 | End: 2021-07-29 | Stop reason: HOSPADM

## 2021-07-28 RX ORDER — SIMETHICONE 40MG/0.6ML
40 SUSPENSION, DROPS(FINAL DOSAGE FORM)(ML) ORAL 4 TIMES DAILY
Status: DISCONTINUED | OUTPATIENT
Start: 2021-07-28 | End: 2021-07-29 | Stop reason: HOSPADM

## 2021-07-28 RX ADMIN — PANTOPRAZOLE SODIUM 40 MG: 40 TABLET, DELAYED RELEASE ORAL at 08:44

## 2021-07-28 RX ADMIN — ENOXAPARIN SODIUM 40 MG: 40 INJECTION SUBCUTANEOUS at 08:45

## 2021-07-28 RX ADMIN — ONDANSETRON 4 MG: 2 INJECTION INTRAMUSCULAR; INTRAVENOUS at 02:12

## 2021-07-28 RX ADMIN — OXYCODONE HYDROCHLORIDE 10 MG: 5 SOLUTION ORAL at 13:58

## 2021-07-28 RX ADMIN — KETOROLAC TROMETHAMINE 15 MG: 30 INJECTION, SOLUTION INTRAMUSCULAR at 00:49

## 2021-07-28 RX ADMIN — OXYCODONE HYDROCHLORIDE 10 MG: 5 SOLUTION ORAL at 04:28

## 2021-07-28 RX ADMIN — DOCUSATE SODIUM 50 MG AND SENNOSIDES 8.6 MG 2 TABLET: 8.6; 5 TABLET, FILM COATED ORAL at 08:44

## 2021-07-28 RX ADMIN — ACETAMINOPHEN 650 MG: 325 SOLUTION ORAL at 18:38

## 2021-07-28 RX ADMIN — KETOROLAC TROMETHAMINE 15 MG: 30 INJECTION, SOLUTION INTRAMUSCULAR at 06:40

## 2021-07-28 RX ADMIN — DOCUSATE SODIUM 50 MG AND SENNOSIDES 8.6 MG 2 TABLET: 8.6; 5 TABLET, FILM COATED ORAL at 20:43

## 2021-07-28 RX ADMIN — OXYCODONE HYDROCHLORIDE 10 MG: 5 SOLUTION ORAL at 18:38

## 2021-07-28 RX ADMIN — OXYCODONE HYDROCHLORIDE 10 MG: 5 SOLUTION ORAL at 08:59

## 2021-07-28 RX ADMIN — SIMETHICONE 40 MG: 20 EMULSION ORAL at 18:38

## 2021-07-28 RX ADMIN — KETOROLAC TROMETHAMINE 15 MG: 30 INJECTION, SOLUTION INTRAMUSCULAR at 18:38

## 2021-07-28 RX ADMIN — POLYETHYLENE GLYCOL 3350 17 G: 17 POWDER, FOR SOLUTION ORAL at 08:44

## 2021-07-28 RX ADMIN — POLYETHYLENE GLYCOL 3350 17 G: 17 POWDER, FOR SOLUTION ORAL at 20:44

## 2021-07-28 RX ADMIN — ONDANSETRON 4 MG: 2 INJECTION INTRAMUSCULAR; INTRAVENOUS at 08:44

## 2021-07-28 RX ADMIN — SIMETHICONE 40 MG: 20 EMULSION ORAL at 20:45

## 2021-07-28 RX ADMIN — ACETAMINOPHEN 650 MG: 325 SOLUTION ORAL at 06:40

## 2021-07-28 RX ADMIN — ACETAMINOPHEN 650 MG: 325 SOLUTION ORAL at 12:41

## 2021-07-28 RX ADMIN — ONDANSETRON 4 MG: 2 INJECTION INTRAMUSCULAR; INTRAVENOUS at 14:42

## 2021-07-28 RX ADMIN — ESCITALOPRAM OXALATE 20 MG: 20 TABLET ORAL at 21:55

## 2021-07-28 RX ADMIN — ACETAMINOPHEN 650 MG: 325 SOLUTION ORAL at 00:49

## 2021-07-28 ASSESSMENT — ACTIVITIES OF DAILY LIVING (ADL)
ADLS_ACUITY_SCORE: 16
ADLS_ACUITY_SCORE: 14
ADLS_ACUITY_SCORE: 16
ADLS_ACUITY_SCORE: 16

## 2021-07-28 NOTE — PROGRESS NOTES
Thoracic Surgery Progress Note        Subjective:  No acute events overnight, pain controlled, tolerated sips of clears yesterday, no BM yet.      Objective:  Temp:  [97.2  F (36.2  C)-97.9  F (36.6  C)] 97.4  F (36.3  C)  Pulse:  [74-85] 85  Resp:  [18] 18  BP: (123-125)/(72-87) 124/77  SpO2:  [86 %-98 %] 92 %    I/O last 3 completed shifts:  In: 1030 [P.O.:180; I.V.:850]  Out: 850 [Urine:850]      Gen: Awake, alert, NAD  Resp: RA  Abd: soft, nondistended, appropriately tender  Incision: c/d/I  Ext: WWP, no edema     Labs:  Recent Labs   Lab 07/28/21  0648 07/27/21  0533   WBC 9.2 11.8*   HGB 12.3 11.9    265       Recent Labs   Lab 07/28/21  0648 07/27/21  0612 07/27/21  0533 07/26/21  2217     --  140  --    POTASSIUM 3.3*  --  3.5  --    CHLORIDE 107  --  107  --    CO2 26  --  28  --    BUN 9  --  12  --    CR 0.61  --  0.67 0.66   GLC 86 80 83  --    JUANY 7.8*  --  7.8*  --    MAG 2.3  --  2.2  --        Imaging:  CXR - no ptx, no effusion     Assessment/Plan:   51 year old female who is s/p laparoscopic hiatal hernia repair with nissen fundoplication on 7/26 with Dr. Boland. Overall doing well    - Pain: tylenol, oxycodone, continue toradol  - Advance to CLD  - Add simethicone   - Bowel regimen: senna-docusate, miralax, PRN suppository and enema  - Encourage ambulation, OOB, IS  - Dispo home today vs tomorrow pending CLD tolerace and bowel movement    Tyrese Kwon PA-C  Thoracic and Foregut Surgery

## 2021-07-28 NOTE — PLAN OF CARE
"Vital signs:  Temp: 97.9  F (36.6  C) Temp src: Oral BP: 123/87 Pulse: 77   Resp: 18 SpO2: 95 % O2 Device: Nasal cannula Oxygen Delivery: 2 LPM Height: 167.6 cm (5' 6\") Weight: 71 kg (156 lb 8.4 oz)  AVSS.    Neuro: Alert and oriented x4.     GI/: Abdomen soft. Passing gas. No BM. Voiding in good amounts.     Diet: Clears and tolerating intake well. Pt is on scheduled iv Zofran.     Incisions/Drains: No drains. Old left CT site with dressing on.     IV Access: PIV - LUE - sl.     Activity: Up walking on unit.     Pain: Pain well controled with oxycodone.     New changes this shift: Stable. No changes. Pt asking if she can go home. Team updated.     Plan: Continue with current cares.        "

## 2021-07-28 NOTE — PLAN OF CARE
Vital signs:  Temp: 97.2  F (36.2  C) Temp src: Oral BP: 125/72 Pulse: 74   Resp: 18 SpO2: 98 % O2 Device: Nasal cannula Oxygen Delivery: 2 LPM     Activity: Up with SBA.   Neuros: A&O x4. Anxious at times.    Cardiac: WDL.   Respiratory: On 2L O2 via NC. Pulse ox on with O2 sats mid 90s. Dessated to 86% on RA.  GI/: Voiding spontaneously. +BS, -BM.  Diet: Clear.   Lines: Right PIV SL. Left PIV infusing IVMF.   Incisions/Drains: Lap sites with liquid bandage, KEYUR. Old CT site dressing CDI. Continues to have crepitus to left chest/neck.  Pain/nausea: PRN dilaudid and oxy given for pain. On scheduled zofran. Compazine given x1.

## 2021-07-28 NOTE — PROGRESS NOTES
CLINICAL NUTRITION SERVICES     Reason for Assessment:  Nutrition education regarding diet s/p nissen or similar surgery    Diet History:  Pt admits to not receiving nutrition education on diet advancement and stages of diet advancement s/p nissen or similar surgery.    Nutrition Diagnosis:  Food- and nutrition-related knowledge deficit r/t no previous knowledge of anticipated diet s/p nissen or similar procedure AEB pt report of not receiving nutrition education on s/p nissen or similar surgery in the past.      Interventions:  Nutrition Education  1.  Provided verbal instruction on diet s/p nissen fundoplication or related surgery.  Discussed foods/beverages at each stage of the diet (clear liquids, full liquids, semi-solid, and regular foods) and anticipated diet.  Also, reviewed helpful hints when starting solid food and ways of dealing with potential side effects post-op.    2.  Provided handout:  Diet Guidelines for a Nissen Fundoplication     Goals:   1.  Patient will verbalize at least two foods or beverages at each stage of the diet.    2.  Pt to verbalize anticipated duration of each diet stage.      Follow-up:    Patient to ask any further nutrition-related questions before discharge.  In addition, pt may request outpatient RD appointment.    Harriet Kim RD, LD   7B (M-F) Pager: [phone]  RD Weekend Pager: [phone]

## 2021-07-29 ENCOUNTER — APPOINTMENT (OUTPATIENT)
Dept: OCCUPATIONAL THERAPY | Facility: CLINIC | Age: 51
DRG: 328 | End: 2021-07-29
Attending: THORACIC SURGERY (CARDIOTHORACIC VASCULAR SURGERY)
Payer: COMMERCIAL

## 2021-07-29 ENCOUNTER — APPOINTMENT (OUTPATIENT)
Dept: GENERAL RADIOLOGY | Facility: CLINIC | Age: 51
DRG: 328 | End: 2021-07-29
Attending: PHYSICIAN ASSISTANT
Payer: COMMERCIAL

## 2021-07-29 VITALS
BODY MASS INDEX: 25.16 KG/M2 | SYSTOLIC BLOOD PRESSURE: 129 MMHG | HEART RATE: 69 BPM | RESPIRATION RATE: 16 BRPM | TEMPERATURE: 96.7 F | HEIGHT: 66 IN | DIASTOLIC BLOOD PRESSURE: 90 MMHG | WEIGHT: 156.53 LBS | OXYGEN SATURATION: 95 %

## 2021-07-29 LAB
ANION GAP SERPL CALCULATED.3IONS-SCNC: 6 MMOL/L (ref 3–14)
BUN SERPL-MCNC: 5 MG/DL (ref 7–30)
CALCIUM SERPL-MCNC: 8 MG/DL (ref 8.5–10.1)
CHLORIDE BLD-SCNC: 109 MMOL/L (ref 94–109)
CO2 SERPL-SCNC: 25 MMOL/L (ref 20–32)
CREAT SERPL-MCNC: 0.61 MG/DL (ref 0.52–1.04)
ERYTHROCYTE [DISTWIDTH] IN BLOOD BY AUTOMATED COUNT: 13 % (ref 10–15)
GFR SERPL CREATININE-BSD FRML MDRD: >90 ML/MIN/1.73M2
GLUCOSE BLD-MCNC: 88 MG/DL (ref 70–99)
HCT VFR BLD AUTO: 39.4 % (ref 35–47)
HGB BLD-MCNC: 12.8 G/DL (ref 11.7–15.7)
MAGNESIUM SERPL-MCNC: 2.3 MG/DL (ref 1.6–2.3)
MCH RBC QN AUTO: 29.2 PG (ref 26.5–33)
MCHC RBC AUTO-ENTMCNC: 32.5 G/DL (ref 31.5–36.5)
MCV RBC AUTO: 90 FL (ref 78–100)
PATH REPORT.COMMENTS IMP SPEC: NORMAL
PATH REPORT.COMMENTS IMP SPEC: NORMAL
PATH REPORT.FINAL DX SPEC: NORMAL
PATH REPORT.GROSS SPEC: NORMAL
PATH REPORT.MICROSCOPIC SPEC OTHER STN: NORMAL
PATH REPORT.RELEVANT HX SPEC: NORMAL
PHOTO IMAGE: NORMAL
PLATELET # BLD AUTO: 227 10E3/UL (ref 150–450)
POTASSIUM BLD-SCNC: 3.2 MMOL/L (ref 3.4–5.3)
RBC # BLD AUTO: 4.38 10E6/UL (ref 3.8–5.2)
SODIUM SERPL-SCNC: 140 MMOL/L (ref 133–144)
WBC # BLD AUTO: 9.3 10E3/UL (ref 4–11)

## 2021-07-29 PROCEDURE — 250N000011 HC RX IP 250 OP 636: Performed by: STUDENT IN AN ORGANIZED HEALTH CARE EDUCATION/TRAINING PROGRAM

## 2021-07-29 PROCEDURE — 82374 ASSAY BLOOD CARBON DIOXIDE: CPT | Performed by: STUDENT IN AN ORGANIZED HEALTH CARE EDUCATION/TRAINING PROGRAM

## 2021-07-29 PROCEDURE — 97535 SELF CARE MNGMENT TRAINING: CPT | Mod: GO

## 2021-07-29 PROCEDURE — 36415 COLL VENOUS BLD VENIPUNCTURE: CPT | Performed by: STUDENT IN AN ORGANIZED HEALTH CARE EDUCATION/TRAINING PROGRAM

## 2021-07-29 PROCEDURE — 85027 COMPLETE CBC AUTOMATED: CPT | Performed by: STUDENT IN AN ORGANIZED HEALTH CARE EDUCATION/TRAINING PROGRAM

## 2021-07-29 PROCEDURE — 71045 X-RAY EXAM CHEST 1 VIEW: CPT | Mod: 26 | Performed by: RADIOLOGY

## 2021-07-29 PROCEDURE — 250N000013 HC RX MED GY IP 250 OP 250 PS 637: Performed by: PHYSICIAN ASSISTANT

## 2021-07-29 PROCEDURE — 250N000013 HC RX MED GY IP 250 OP 250 PS 637: Performed by: STUDENT IN AN ORGANIZED HEALTH CARE EDUCATION/TRAINING PROGRAM

## 2021-07-29 PROCEDURE — 71045 X-RAY EXAM CHEST 1 VIEW: CPT

## 2021-07-29 PROCEDURE — 83735 ASSAY OF MAGNESIUM: CPT | Performed by: STUDENT IN AN ORGANIZED HEALTH CARE EDUCATION/TRAINING PROGRAM

## 2021-07-29 RX ORDER — ACETAMINOPHEN 325 MG/10.15ML
1000 LIQUID ORAL EVERY 6 HOURS PRN
Qty: 1000 ML | Refills: 0 | Status: SHIPPED | OUTPATIENT
Start: 2021-07-29 | End: 2021-08-06

## 2021-07-29 RX ORDER — OXYCODONE HCL 5 MG/5 ML
5 SOLUTION, ORAL ORAL EVERY 4 HOURS PRN
Qty: 200 ML | Refills: 0 | Status: SHIPPED | OUTPATIENT
Start: 2021-07-29 | End: 2021-08-06

## 2021-07-29 RX ORDER — OXYCODONE HCL 5 MG/5 ML
5 SOLUTION, ORAL ORAL EVERY 4 HOURS PRN
Qty: 200 ML | Refills: 0 | Status: SHIPPED | OUTPATIENT
Start: 2021-07-29 | End: 2021-07-29

## 2021-07-29 RX ORDER — PANTOPRAZOLE SODIUM 40 MG/1
40 FOR SUSPENSION ORAL DAILY
Qty: 14 PACKET | Refills: 0 | Status: SHIPPED | OUTPATIENT
Start: 2021-07-29 | End: 2021-08-06

## 2021-07-29 RX ORDER — POLYETHYLENE GLYCOL 3350 17 G/17G
17 POWDER, FOR SOLUTION ORAL 2 TIMES DAILY
Qty: 7 PACKET | Refills: 0 | Status: SHIPPED | OUTPATIENT
Start: 2021-07-29 | End: 2022-01-27

## 2021-07-29 RX ORDER — SIMETHICONE 40MG/0.6ML
40 SUSPENSION, DROPS(FINAL DOSAGE FORM)(ML) ORAL 4 TIMES DAILY
Qty: 33.6 ML | Refills: 0 | Status: SHIPPED | OUTPATIENT
Start: 2021-07-29 | End: 2021-08-13

## 2021-07-29 RX ORDER — AMOXICILLIN 250 MG
1 CAPSULE ORAL 2 TIMES DAILY
Qty: 14 TABLET | Refills: 0 | Status: SHIPPED | OUTPATIENT
Start: 2021-07-29 | End: 2021-08-06

## 2021-07-29 RX ADMIN — ENOXAPARIN SODIUM 40 MG: 40 INJECTION SUBCUTANEOUS at 08:07

## 2021-07-29 RX ADMIN — KETOROLAC TROMETHAMINE 15 MG: 30 INJECTION, SOLUTION INTRAMUSCULAR at 00:30

## 2021-07-29 RX ADMIN — OXYCODONE HYDROCHLORIDE 10 MG: 5 SOLUTION ORAL at 00:30

## 2021-07-29 RX ADMIN — KETOROLAC TROMETHAMINE 15 MG: 30 INJECTION, SOLUTION INTRAMUSCULAR at 06:27

## 2021-07-29 RX ADMIN — ACETAMINOPHEN 650 MG: 325 SOLUTION ORAL at 00:30

## 2021-07-29 RX ADMIN — DOCUSATE SODIUM 286 ML: 50 LIQUID ORAL at 11:20

## 2021-07-29 RX ADMIN — ONDANSETRON 4 MG: 2 INJECTION INTRAMUSCULAR; INTRAVENOUS at 04:23

## 2021-07-29 RX ADMIN — ACETAMINOPHEN 650 MG: 325 SOLUTION ORAL at 06:27

## 2021-07-29 RX ADMIN — PANTOPRAZOLE SODIUM 40 MG: 40 TABLET, DELAYED RELEASE ORAL at 08:07

## 2021-07-29 RX ADMIN — OXYCODONE HYDROCHLORIDE 10 MG: 5 SOLUTION ORAL at 08:22

## 2021-07-29 RX ADMIN — ACETAMINOPHEN 650 MG: 325 SOLUTION ORAL at 12:40

## 2021-07-29 RX ADMIN — SIMETHICONE 40 MG: 20 EMULSION ORAL at 08:07

## 2021-07-29 RX ADMIN — OXYCODONE HYDROCHLORIDE 10 MG: 5 SOLUTION ORAL at 04:23

## 2021-07-29 RX ADMIN — DOCUSATE SODIUM 50 MG AND SENNOSIDES 8.6 MG 2 TABLET: 8.6; 5 TABLET, FILM COATED ORAL at 08:06

## 2021-07-29 RX ADMIN — OXYCODONE HYDROCHLORIDE 10 MG: 5 SOLUTION ORAL at 12:40

## 2021-07-29 RX ADMIN — POLYETHYLENE GLYCOL 3350 17 G: 17 POWDER, FOR SOLUTION ORAL at 08:07

## 2021-07-29 RX ADMIN — SIMETHICONE 40 MG: 20 EMULSION ORAL at 12:40

## 2021-07-29 ASSESSMENT — ACTIVITIES OF DAILY LIVING (ADL)
ADLS_ACUITY_SCORE: 14

## 2021-07-29 NOTE — PLAN OF CARE
"BP (!) 129/90 (BP Location: Left arm)   Pulse 69   Temp (!) 96.7  F (35.9  C) (Axillary)   Resp 16   Ht 1.676 m (5' 6\")   Wt 71 kg (156 lb 8.4 oz)   LMP 07/26/2013   SpO2 95%   BMI 25.26 kg/m     AVSS.   Sating mid 90s at Ra.   Using IS well. Lungs clear.   Left neck subcutaneous air improving.     Neuro: Alert and oriented x4.     GI/:Abdomen soft. Passing gas. Pink Lady given and had results of mainly the enema and large gas. No solid stools. Pt has been on clears ands a full liquid diet this am. Voiding in good amounts.     Diet: Clears yesterday and full liquid this am. Tolerating full liquids well. No c/o nausea.     Incisions/Drains: Old CT site on the left with dressing on- intact.     IV Access: No PIVs.     Labs:WDL.      Activity: Up independently.     Pain: Oxycodone for pain.     New changes this shift: Pink Lady for BM. Possible discharge to home today.     Plan: Waiting for discontinue RX to be signed to sent to pharmacy. Discharge home when orders are completed.   "

## 2021-07-29 NOTE — PLAN OF CARE
Vital signs:  Temp: 97.9  F (36.6  C) Temp src: Oral BP: (Abnormal) 141/91 Pulse: 81   Resp: 18 SpO2: 95 % O2 Device: None (Room air)     Activity: Up independently.   Neuros: A&O x4. Anxious at times.    Cardiac: WDL.   Respiratory: WDL on RA.   GI/: Voiding spontaneously. +BS, +flatus, -BM.   Diet: Clear.   Lines: No IV access, ok to be without IV per team.  Incisions/Drains: Lap sites with liquid bandage, KEYUR. Old CT site dressing CDI. Continues to have crepitus to left chest/neck, improving.  Pain/nausea: Oxy given for pain. On scheduled tylenol, toradol, and zofran.

## 2021-07-29 NOTE — PLAN OF CARE
Vitals:    07/28/21 0428 07/28/21 0430 07/28/21 0751 07/28/21 1529   BP:   123/87 124/77   BP Location:   Left arm Left arm   Pulse:   77 85   Resp:   18 18   Temp:   97.9  F (36.6  C) 97.4  F (36.3  C)   TempSrc:   Oral Oral   SpO2: (!) 86% 95% 95% 92%   Weight:       Height:         Pt up ad tabatha in room. IV s/l'd. Pain adequate coverage with oxycodone, tylenol, toradol. Denies nausea, declined zofran at this time. Simethicone given for gas discomfort with relief. Old CT site with no drainage, covered. 4 lap sites dermabond KEYUR, no drainage. On room air. K+= 3.3 and MD notified, no new orders, pt on CLD tolerating. Probable discharge home tomorrow pending AM CXR. Continue with the POC.

## 2021-07-29 NOTE — DISCHARGE SUMMARY
NAME: Jaz Archibald   MRN: [id number]   : 1970     DATE OF ADMISSION: 2021     PRE/POSTOPERATIVE DIAGNOSES:   Hiatal hernia, gastroesophageal reflux disease, gastric polyps    PROCEDURES PERFORMED:   Laparoscopic hiatal hernia repair with Nissen fundoplication - 2021    PATHOLOGY RESULTS: Pending at time of discharge    CULTURE RESULTS: None    INTRAOPERATIVE COMPLICATIONS: None     POSTOPERATIVE COMPLICATIONS: None     DRAINS/TUBES PRESENT AT DISCHARGE: dressing changes    DATE OF DISCHARGE:  2021    HOSPITAL COURSE: Jaz Archibald is a 51 year old female who on 2021 underwent the above-named procedures.  She tolerated the operation well and postoperatively was transferred to the general post-surgical unit.  The remainder of her course was essentially uncomplicated.  Prior to discharge, her pain was controlled well, she was able to perform ADLs and ambulate independently without difficulty. She was advanced to and tolerating a full liquid diet at time of discharge and was passing gas. On , she was discharged home in stable condition with a strict bowel regimen in place.    DISCHARGE EXAM:   A&O, NAD  Resp non-labored  Distal extremities warm    Incisions CDI     DISCHARGE INSTRUCTIONS:  Discharge Procedure Orders   XR Upper GI without KUB   Standing Status: Future Standing Exp. Date: 10/27/21     Order Specific Question Answer Comments   Is patient pregnant? Pregnancy Unknown at time of ordering    Priority Routine      Reason for your hospital stay   Order Comments: Foregut surgery     Adult [facility] Follow-up and recommended labs and tests   Order Comments: 1.) Follow up with primary care physician, Jacinda Banks, in 1-2 weeks.  2.) Follow up with a thoracic surgery Clinical Nurse Specialist in Thoracic Surgery clinic in 1 month, prior to which an UGI xray should be performed.      Appointments on [facility] and/or [facility] (with [facility] or [facility] provider or service). Call  [phone] if you haven't heard regarding these appointments within 7 days of discharge.     Discharge Instructions   Order Comments: THORACIC SURGERY DISCHARGE INSTRUCTIONS    DIET: Nissen diet as instructed by the nutritionist - Full liquid diet at the time of discharge    Minimize pills taken by mouth as mush as possible until seen at follow up. Pills that can be crushed should be and then mixed with pudding/applesauce. If unsure if a necessary pill can be crushed, please check with your pharmacist.     If your plans upon discharge include prolonged periods of sitting (i.e a lengthy car or plane ride), it is highly beneficial to get up and walk at least once per hour to help prevent swelling and blood clots.     You may remove chest tube dressing 48 hours after tube removal and bandage the site at your own discretion thereafter.  Small amounts of leakage are normal for 2-3 days after removal.  Feel free to call with questions.    You may get incision wet 2 days after operation. Do not submerge, soak, or scrub incision or swim until seen in follow-up.    Take incentive spirometer home for continued frequent use    Activity as tolerated, no strenous activity until seen in follow-up, no lifting greater than 20 pounds for the next 8 weeks.    Stay hydrated. Take over the counter fiber (metamucil or benefiber) and stool softeners (Miralax, docusate or senna) if becoming constipated.     Call for fever greater than 101.5, chills, increased size of incision, red skin around incision, vision changes, muscle strength changes, sensation changes, shortness of breath, or other concerns.    No driving while taking narcotic pain medication.    Transition to ibuprofen or tylenol/acetaminophen for pain control. Do not take tylenol/acetaminophen and acetaminophen containing narcotic (e.g., percocet or vicodin) at the same time. If you have known ulcer problems, or kidney trouble (elevated creatinine) do not take the  "ibuprofen.    In emergencies, call 911    For other Questions or Concerns;   A.) During weekday working hours (Monday through Friday 8am to 4:30pm)   call [phone] and ask to speak to a clinical nurse specialist.     B.) At nights (after 4:30pm), on weekends, or if urgent call [phone] and   tell the  \"I would like to page job code [phone], the thoracic surgery   fellow on call, please.\"       DISCHARGE MEDICATIONS:   Current Discharge Medication List      START taking these medications    Details   acetaminophen (TYLENOL) 325 MG/10.15ML solution Take 31.25 mLs (1,000 mg) by mouth every 6 hours as needed for mild pain or fever  Qty: 1000 mL, Refills: 0    Associated Diagnoses: GERD without esophagitis      oxyCODONE (ROXICODONE) 5 MG/5ML solution Take 5 mLs (5 mg) by mouth every 4 hours as needed for moderate to severe pain  Qty: 200 mL, Refills: 0    Associated Diagnoses: GERD without esophagitis      pantoprazole sodium (PROTONIX) 40 MG packet Take 1 packet (40 mg) by mouth daily for 14 days  Qty: 14 packet, Refills: 0    Associated Diagnoses: GERD without esophagitis      polyethylene glycol (MIRALAX) 17 g packet Take 17 g by mouth 2 times daily  Qty: 7 packet, Refills: 0    Associated Diagnoses: GERD without esophagitis      senna-docusate (SENOKOT-S/PERICOLACE) 8.6-50 MG tablet Take 1 tablet by mouth 2 times daily  Qty: 14 tablet, Refills: 0    Associated Diagnoses: GERD without esophagitis      simethicone (MYLICON) 40 MG/0.6ML suspension Take 0.6 mLs (40 mg) by mouth 4 times daily for 14 days  Qty: 33.6 mL, Refills: 0    Associated Diagnoses: GERD without esophagitis         CONTINUE these medications which have NOT CHANGED    Details   escitalopram (LEXAPRO) 20 MG tablet Take 20 mg by mouth At Bedtime       sucralfate (CARAFATE) 1 GM tablet Take 1 g by mouth 4 times daily as needed       triamterene-HCTZ (DYAZIDE) 37.5-25 MG capsule Take 1 capsule by mouth every morning   Qty: 90 capsule, " Refills: 0    Associated Diagnoses: Essential hypertension with goal blood pressure less than 140/90      valACYclovir (VALTREX) 1000 mg tablet Take 1,000 mg by mouth as needed (cold sores)       amphetamine-dextroamphetamine (ADDERALL) 10 MG tablet Take 10 mg by mouth daily as needed (in afternoon when necessary)      amphetamine-dextroamphetamine (ADDERALL) 20 MG tablet Take 20 mg by mouth every morning       EPINEPHrine (EPIPEN) 0.3 MG/0.3ML injection Inject 0.3 mLs into the muscle once as needed for anaphylaxis.  Qty: 2 each, Refills: 3    Associated Diagnoses: Bee sting reaction      hyoscyamine (LEVSIN/SL) 0.125 MG sublingual tablet Take 0.125 mg by mouth every 6 hours as needed       olopatadine (PATANOL) 0.1 % ophthalmic solution Place 1-2 drops into both eyes 2 times daily   Refills: 6      traZODone (DESYREL) 100 MG tablet Take 100 mg by mouth At Bedtime         STOP taking these medications       ibuprofen (ADVIL/MOTRIN) 200 MG capsule Comments:   Reason for Stopping:         pantoprazole (PROTONIX) 40 MG EC tablet Comments:   Reason for Stopping:         Vitamin D3 (CHOLECALCIFEROL) 25 mcg (1000 units) tablet Comments:   Reason for Stopping:

## 2021-07-29 NOTE — PLAN OF CARE
Occupational Therapy Discharge Summary    Reason for therapy discharge:    All goals and outcomes met, no further needs identified.    Progress towards therapy goal(s). See goals on Care Plan in [facility] electronic health record for goal details.  Goals met    Therapy recommendation(s):    Continue home exercise program. Pt issued UE exercises for thoracic surgery and provided education on purpose and body mechanics. Pt would benefit from assistance for heavy ADLs/IADLs as she recovers.

## 2021-07-29 NOTE — PROGRESS NOTES
Got a call from thoracic. OK for pt to discharge this afternoon if she feels comfortable with her GI status. Pt had an enema earlier. Passing a lot of gas. Passed most of the enema with a brownish color but no solid stools. Pt feels comfortable going home. Has tolerated full liquid diet well. Pt updated. Will continue to monitor her GI status at home and update team as needed per discharge instruction. Ready to discharge home. Rx ready in pharmacy. Waiting for her ride.

## 2021-07-30 ENCOUNTER — NURSE TRIAGE (OUTPATIENT)
Dept: NURSING | Facility: CLINIC | Age: 51
End: 2021-07-30

## 2021-07-30 ENCOUNTER — TELEPHONE (OUTPATIENT)
Dept: SURGERY | Facility: CLINIC | Age: 51
End: 2021-07-30

## 2021-07-30 ENCOUNTER — TELEPHONE (OUTPATIENT)
Dept: INTERNAL MEDICINE | Facility: CLINIC | Age: 51
End: 2021-07-30

## 2021-07-30 DIAGNOSIS — F41.1 ANXIETY STATE: ICD-10-CM

## 2021-07-30 DIAGNOSIS — K21.9 GERD WITHOUT ESOPHAGITIS: Primary | ICD-10-CM

## 2021-07-30 DIAGNOSIS — I10 ESSENTIAL HYPERTENSION WITH GOAL BLOOD PRESSURE LESS THAN 140/90: Primary | ICD-10-CM

## 2021-07-30 DIAGNOSIS — G47.01 SLEEP DISORDER DUE TO A GENERAL MEDICAL CONDITION, INSOMNIA TYPE: ICD-10-CM

## 2021-07-30 RX ORDER — ONDANSETRON 4 MG/1
4 TABLET, ORALLY DISINTEGRATING ORAL EVERY 8 HOURS PRN
Qty: 20 TABLET | Refills: 1 | Status: SHIPPED | OUTPATIENT
Start: 2021-07-30 | End: 2021-12-09

## 2021-07-30 RX ORDER — TRIAMTERENE AND HYDROCHLOROTHIAZIDE 37.5; 25 MG/1; MG/1
1 CAPSULE ORAL EVERY MORNING
Qty: 10 CAPSULE | Refills: 0 | Status: SHIPPED | OUTPATIENT
Start: 2021-07-30 | End: 2021-08-06

## 2021-07-30 RX ORDER — TRAZODONE HYDROCHLORIDE 100 MG/1
100 TABLET ORAL AT BEDTIME
Qty: 10 TABLET | Refills: 0 | Status: SHIPPED | OUTPATIENT
Start: 2021-07-30 | End: 2021-08-06

## 2021-07-30 RX ORDER — ESCITALOPRAM OXALATE 20 MG/1
20 TABLET ORAL AT BEDTIME
Qty: 10 TABLET | Refills: 0 | Status: SHIPPED | OUTPATIENT
Start: 2021-07-30 | End: 2021-08-06

## 2021-07-30 NOTE — TELEPHONE ENCOUNTER
Reason for Call:  Other appointment    Detailed comments: INF  07-25-21----07-29-21  Acid Reflux  [facility]  Needs to be seen within 7 days.    Phone Number Patient can be reached at: Home number on file [phone] (home)    Best Time: Anytime    Can we leave a detailed message on this number? YES    Call taken on 7/30/2021 at 7:32 AM by Bhavna Mcqueen

## 2021-07-30 NOTE — TELEPHONE ENCOUNTER
Linsey is calling and states that she needs to renew Trazodone 100mg and Lexapro 20 mg and Triamterene-hydrochlorothiazide.  Pharmacy of choice [facility] mail order.    COVID 19 Nurse Triage Plan/Patient Instructions    Please be aware that novel coronavirus (COVID-19) may be circulating in the community. If you develop symptoms such as fever, cough, or SOB or if you have concerns about the presence of another infection including coronavirus (COVID-19), please contact your health care provider or visit [email or web address].     Disposition/Instructions    Home care recommended. Follow home care protocol based instructions.    Thank you for taking steps to prevent the spread of this virus.  o Limit your contact with others.  o Wear a simple mask to cover your cough.  o Wash your hands well and often.    Resources    [facility]: About COVID-19: [email or web address]    CDC: What to Do If You're Sick: www.cdc.gov/coronavirus/2019-ncov/about/steps-when-sick.html    CDC: Ending Home Isolation: www.cdc.gov/coronavirus/2019-ncov/hcp/disposition-in-home-patients.html     CDC: Caring for Someone: www.cdc.gov/coronavirus/2019-ncov/if-you-are-sick/care-for-someone.html     [facility]: Interim Guidance for Hospital Discharge to Home: www.health.[facility].[place]/diseases/coronavirus/hcp/hospdischarge.pdf    [facility] clinical trials (COVID-19 research studies): [email or web address].[email or web address]-clinical-trials     Below are the COVID-19 hotlines at the [place] Department of Health ([facility]). Interpreters are available.   o For health questions: Call [phone] or 1-707.457.5663 (7 a.m. to 7 p.m.)  o For questions about schools and childcare: Call [phone] or 1-619.131.9166 (7 a.m. to 7 p.m.)                     Reason for Disposition    Caller requesting a NON-URGENT new prescription or refill and triager unable to refill per department policy    Additional Information     Negative: MORE THAN A DOUBLE DOSE of a prescription or over-the-counter (OTC) drug    Negative: DOUBLE DOSE (an extra dose or lesser amount) of over-the-counter (OTC) drug and any symptoms (e.g., dizziness, nausea, pain, sleepiness)    Negative: DOUBLE DOSE (an extra dose or lesser amount) of prescription drug and any symptoms (e.g., dizziness, nausea, pain, sleepiness)    Negative: Took another person's prescription drug    Negative: DOUBLE DOSE (an extra dose or lesser amount) of prescription drug and NO symptoms (Exception: a double dose of antibiotics)    Negative: Diabetes drug error or overdose (e.g., took wrong type of insulin or took extra dose)    Negative: Caller has medication question about med not prescribed by PCP and triager unable to answer question (e.g., compatibility with other med, storage)    Negative: Request for URGENT new prescription or refill of 'essential' medication (i.e., likelihood of harm to patient if not taken) and triager unable to fill per department policy    Negative: Prescription not at pharmacy and was prescribed today by PCP    Negative: Pharmacy calling with prescription questions and triager unable to answer question    Negative: Caller has urgent medication question about med that PCP prescribed and triager unable to answer question    Negative: Caller has NON-URGENT medication question about med that PCP prescribed and triager unable to answer question    Protocols used: MEDICATION QUESTION CALL-A-[place]

## 2021-07-30 NOTE — TELEPHONE ENCOUNTER
Contacted patient and scheduled her for 8/6/21.  Provider ok'd use of virtual slot for in-person visit.

## 2021-07-30 NOTE — TELEPHONE ENCOUNTER
[facility] Telephone Triage Note    Assessment: Khushbu called in to triage reporting the following symptoms: Tightness across shoulders and diaphragm.  Pain under collar bones bilaterally with burping and hiccoughing. Pt states it hurts really bad under the collar bone when this happens. Sometimes it feels like she can't get her breath, but she is actually able to take a breath. No SOB or chest pain, fever or illness.    Pt was discharge yesterday. Had diarrhea yesterday. Everything else is okay. Pt is eating a soft diet and is full after a few bites. Some bloating and diarrhea.  Had some crepitus after CT removed.    Recommendations:   Advised pt to continue with activity to help with absorption of air.    Follow-Up: Routed to Janeth Goldberg.

## 2021-08-06 ENCOUNTER — E-CONSULT (OUTPATIENT)
Dept: SLEEP MEDICINE | Facility: CLINIC | Age: 51
End: 2021-08-06
Payer: COMMERCIAL

## 2021-08-06 ENCOUNTER — TELEPHONE (OUTPATIENT)
Dept: INTERNAL MEDICINE | Facility: CLINIC | Age: 51
End: 2021-08-06

## 2021-08-06 ENCOUNTER — OFFICE VISIT (OUTPATIENT)
Dept: INTERNAL MEDICINE | Facility: CLINIC | Age: 51
End: 2021-08-06
Payer: COMMERCIAL

## 2021-08-06 VITALS
OXYGEN SATURATION: 97 % | WEIGHT: 153.1 LBS | HEIGHT: 66 IN | BODY MASS INDEX: 24.6 KG/M2 | TEMPERATURE: 97.6 F | DIASTOLIC BLOOD PRESSURE: 88 MMHG | SYSTOLIC BLOOD PRESSURE: 118 MMHG | HEART RATE: 73 BPM

## 2021-08-06 DIAGNOSIS — I10 BENIGN ESSENTIAL HYPERTENSION: Primary | ICD-10-CM

## 2021-08-06 DIAGNOSIS — E55.9 VITAMIN D DEFICIENCY: ICD-10-CM

## 2021-08-06 DIAGNOSIS — G47.01 SLEEP DISORDER DUE TO A GENERAL MEDICAL CONDITION, INSOMNIA TYPE: ICD-10-CM

## 2021-08-06 DIAGNOSIS — K92.1 BLOOD IN STOOL: ICD-10-CM

## 2021-08-06 DIAGNOSIS — R19.7 DIARRHEA, UNSPECIFIED TYPE: ICD-10-CM

## 2021-08-06 DIAGNOSIS — F41.1 ANXIETY STATE: ICD-10-CM

## 2021-08-06 DIAGNOSIS — K21.9 GERD WITHOUT ESOPHAGITIS: ICD-10-CM

## 2021-08-06 DIAGNOSIS — R32 URINARY INCONTINENCE, UNSPECIFIED TYPE: ICD-10-CM

## 2021-08-06 LAB
ALBUMIN SERPL-MCNC: 4 G/DL (ref 3.5–5)
ALBUMIN UR-MCNC: NEGATIVE MG/DL
ALP SERPL-CCNC: 70 U/L (ref 45–120)
ALT SERPL W P-5'-P-CCNC: 66 U/L (ref 0–45)
ANION GAP SERPL CALCULATED.3IONS-SCNC: 8 MMOL/L (ref 5–18)
APPEARANCE UR: CLEAR
AST SERPL W P-5'-P-CCNC: 17 U/L (ref 0–40)
BASOPHILS # BLD AUTO: 0.1 10E3/UL (ref 0–0.2)
BASOPHILS NFR BLD AUTO: 1 %
BILIRUB SERPL-MCNC: 0.5 MG/DL (ref 0–1)
BILIRUB UR QL STRIP: NEGATIVE
BUN SERPL-MCNC: 9 MG/DL (ref 8–22)
CALCIUM SERPL-MCNC: 9.1 MG/DL (ref 8.5–10.5)
CHLORIDE BLD-SCNC: 106 MMOL/L (ref 98–107)
CO2 SERPL-SCNC: 24 MMOL/L (ref 22–31)
COLOR UR AUTO: YELLOW
CREAT SERPL-MCNC: 0.72 MG/DL (ref 0.6–1.1)
EOSINOPHIL # BLD AUTO: 0.2 10E3/UL (ref 0–0.7)
EOSINOPHIL NFR BLD AUTO: 3 %
ERYTHROCYTE [DISTWIDTH] IN BLOOD BY AUTOMATED COUNT: 13.2 % (ref 10–15)
GFR SERPL CREATININE-BSD FRML MDRD: >90 ML/MIN/1.73M2
GLUCOSE BLD-MCNC: 91 MG/DL (ref 70–125)
GLUCOSE UR STRIP-MCNC: NEGATIVE MG/DL
HCT VFR BLD AUTO: 43.5 % (ref 35–47)
HGB BLD-MCNC: 14.6 G/DL (ref 11.7–15.7)
HGB UR QL STRIP: NEGATIVE
IMM GRANULOCYTES # BLD: 0 10E3/UL
IMM GRANULOCYTES NFR BLD: 0 %
KETONES UR STRIP-MCNC: NEGATIVE MG/DL
LEUKOCYTE ESTERASE UR QL STRIP: NEGATIVE
LYMPHOCYTES # BLD AUTO: 2.4 10E3/UL (ref 0.8–5.3)
LYMPHOCYTES NFR BLD AUTO: 36 %
MCH RBC QN AUTO: 29.4 PG (ref 26.5–33)
MCHC RBC AUTO-ENTMCNC: 33.6 G/DL (ref 31.5–36.5)
MCV RBC AUTO: 88 FL (ref 78–100)
MONOCYTES # BLD AUTO: 0.6 10E3/UL (ref 0–1.3)
MONOCYTES NFR BLD AUTO: 9 %
NEUTROPHILS # BLD AUTO: 3.6 10E3/UL (ref 1.6–8.3)
NEUTROPHILS NFR BLD AUTO: 52 %
NITRATE UR QL: NEGATIVE
PH UR STRIP: 7 [PH] (ref 5–8)
PLATELET # BLD AUTO: 500 10E3/UL (ref 150–450)
POTASSIUM BLD-SCNC: 4.7 MMOL/L (ref 3.5–5)
PROT SERPL-MCNC: 7.7 G/DL (ref 6–8)
RBC # BLD AUTO: 4.97 10E6/UL (ref 3.8–5.2)
SODIUM SERPL-SCNC: 138 MMOL/L (ref 136–145)
SP GR UR STRIP: 1.02 (ref 1–1.03)
UROBILINOGEN UR STRIP-ACNC: 1 E.U./DL
WBC # BLD AUTO: 6.8 10E3/UL (ref 4–11)

## 2021-08-06 PROCEDURE — 36415 COLL VENOUS BLD VENIPUNCTURE: CPT | Performed by: NURSE PRACTITIONER

## 2021-08-06 PROCEDURE — 85025 COMPLETE CBC W/AUTO DIFF WBC: CPT | Performed by: NURSE PRACTITIONER

## 2021-08-06 PROCEDURE — 81003 URINALYSIS AUTO W/O SCOPE: CPT | Performed by: NURSE PRACTITIONER

## 2021-08-06 PROCEDURE — 99215 OFFICE O/P EST HI 40 MIN: CPT | Performed by: NURSE PRACTITIONER

## 2021-08-06 PROCEDURE — 99207 PR NO BILLABLE SERVICE THIS VISIT: CPT | Performed by: FAMILY MEDICINE

## 2021-08-06 PROCEDURE — 80053 COMPREHEN METABOLIC PANEL: CPT | Performed by: NURSE PRACTITIONER

## 2021-08-06 PROCEDURE — 99207 E-CONSULT TO SLEEP MEDICINE (ADULT OUTPT PROVIDER TO SPECIALIST WRITTEN QUESTION & RESPONSE): CPT | Performed by: NURSE PRACTITIONER

## 2021-08-06 RX ORDER — OXYCODONE HCL 5 MG/5 ML
5 SOLUTION, ORAL ORAL EVERY 6 HOURS PRN
Qty: 100 ML | Refills: 0 | Status: SHIPPED | OUTPATIENT
Start: 2021-08-06 | End: 2021-09-14

## 2021-08-06 RX ORDER — ACETAMINOPHEN 325 MG/10.15ML
1000 LIQUID ORAL EVERY 6 HOURS PRN
Qty: 1000 ML | Refills: 0 | Status: SHIPPED | OUTPATIENT
Start: 2021-08-06 | End: 2021-08-15

## 2021-08-06 RX ORDER — SODIUM BICARBONATE
1 POWDER (GRAM) MISCELLANEOUS DAILY
COMMUNITY
Start: 2021-07-30 | End: 2021-08-13

## 2021-08-06 RX ORDER — ESCITALOPRAM OXALATE 20 MG/1
20 TABLET ORAL AT BEDTIME
Qty: 90 TABLET | Refills: 1 | Status: SHIPPED | OUTPATIENT
Start: 2021-08-06 | End: 2021-12-09

## 2021-08-06 RX ORDER — LISINOPRIL 10 MG/1
10 TABLET ORAL DAILY
Qty: 90 TABLET | Refills: 1 | Status: SHIPPED | OUTPATIENT
Start: 2021-08-06 | End: 2021-08-13

## 2021-08-06 RX ORDER — TRAZODONE HYDROCHLORIDE 100 MG/1
100 TABLET ORAL AT BEDTIME
Qty: 90 TABLET | Refills: 1 | Status: SHIPPED | OUTPATIENT
Start: 2021-08-06 | End: 2022-02-08

## 2021-08-06 ASSESSMENT — ANXIETY QUESTIONNAIRES
3. WORRYING TOO MUCH ABOUT DIFFERENT THINGS: NOT AT ALL
5. BEING SO RESTLESS THAT IT IS HARD TO SIT STILL: NEARLY EVERY DAY
2. NOT BEING ABLE TO STOP OR CONTROL WORRYING: NOT AT ALL
1. FEELING NERVOUS, ANXIOUS, OR ON EDGE: SEVERAL DAYS
GAD7 TOTAL SCORE: 7
6. BECOMING EASILY ANNOYED OR IRRITABLE: NOT AT ALL
7. FEELING AFRAID AS IF SOMETHING AWFUL MIGHT HAPPEN: NOT AT ALL
IF YOU CHECKED OFF ANY PROBLEMS ON THIS QUESTIONNAIRE, HOW DIFFICULT HAVE THESE PROBLEMS MADE IT FOR YOU TO DO YOUR WORK, TAKE CARE OF THINGS AT HOME, OR GET ALONG WITH OTHER PEOPLE: NOT DIFFICULT AT ALL

## 2021-08-06 ASSESSMENT — MIFFLIN-ST. JEOR: SCORE: 1321.21

## 2021-08-06 ASSESSMENT — PATIENT HEALTH QUESTIONNAIRE - PHQ9: 5. POOR APPETITE OR OVEREATING: NEARLY EVERY DAY

## 2021-08-06 NOTE — TELEPHONE ENCOUNTER
"Patient calling in regards to her labs from earlier this morning.  She notes in her labs that her platelet count is \"really high\" and she's very concerned.  Please review and call patient to discuss as soon as possible.    Patient can be reached at [phone].  Okay to leave message if needed.  "

## 2021-08-06 NOTE — TELEPHONE ENCOUNTER
[facility] DAILY PROGRESS NOTE  SUBJECTIVE:   Patient seen and examined.  Doing well.  Denies any complaints of chest pain or shortness of breath.  Denies any abdominal pain.  States he has a cough.    OBJECTIVE:    VITAL SIGNS:     Vital Last Value 24 Hour Range   Temperature 97.7 °F (36.5 °C) (01/14/21 0547) Temp  Min: 97 °F (36.1 °C)  Max: 97.7 °F (36.5 °C)   Pulse (!) 55 (01/14/21 0547) Pulse  Min: 55  Max: 70   Respiratory 18 (01/14/21 0547) Resp  Min: 15  Max: 18   Non-Invasive  Blood Pressure (!) 161/69 (01/14/21 0547) BP  Min: 114/56  Max: 161/69   Pulse Oximetry 98 % (01/14/21 0547) SpO2  Min: 94 %  Max: 98 %     Vital Today Admitted   Weight 85 kg (187 lb 6.3 oz) (01/11/21 2008) Weight: 83 kg (182 lb 15.7 oz) (01/11/21 1553)   Height N/A Height: 5' 10\" (177.8 cm) (01/11/21 1553)   BMI N/A BMI (Calculated): 26.25 (01/11/21 1553)       INTAKE/OUTPUT:      Intake/Output Summary (Last 24 hours) at 1/14/2021 0912  Last data filed at 1/14/2021 0400  Gross per 24 hour   Intake 660 ml   Output 625 ml   Net 35 ml         PHYSICAL EXAM:    Physical Exam   Constitutional: He is oriented to person, place, and time. No distress.   Eyes: Conjunctivae are normal.   Cardiovascular: Normal rate and regular rhythm.   Pulmonary/Chest: Breath sounds normal. No respiratory distress.   Abdominal: Soft. Bowel sounds are normal. He exhibits no distension. There is no abdominal tenderness.   Musculoskeletal:         General: No edema.   Neurological: He is alert and oriented to person, place, and time.   Skin: Skin is warm.   Nursing note and vitals reviewed.     LABORATORY DATA:    Labs:  Recent Labs   Lab 01/14/21 0622 01/13/21  0548 01/12/21  1551 01/12/21  0559   WBC 7.3 8.7  --  5.1   HGB 7.7* 7.9* 7.9* 7.6*   HCT 25.4* 26.4* 26.1* 25.1*    257  --  254     Recent Labs   Lab 01/14/21  0622 01/13/21  0544 01/12/21  0559 01/11/21  1558   SODIUM 140 139 140 137   POTASSIUM 4.2 4.0 3.9 3.6   CHLORIDE 105 104 104 100   CO2 30 29  Medication Request  Medication name:   escitalopram oxalate (LEXAPRO) 20 MG tablet    2/18/2020      Class: Historical Med       traZODone (DESYREL) 50 MG tablet    4/12/2018      Sig - Route: Take 50 mg by mouth. - Oral     Class: Historical Med           [facility]  Reason for request: Is out of med    Okay to leave a detailed message: yes     26 28   BUN 23* 21* 16 13   CREATININE 0.89 0.89 0.84 0.98   ANIONGAP 9* 10 14 13   CALCIUM 8.4 8.7 8.6 8.4   ALBUMIN 2.7* 2.8* 2.7* 2.9*   BILIRUBIN 0.3 0.2 0.2 0.2   ALKPT 69 75 77 83   AST 17 19 18 20   GLUCOSE 92 99 102* 102*   MG  --   --   --  2.0   RAPDTR <0.02  --  <0.02 <0.02   PCT  --   --   --  <0.05   LACTA  --   --   --  1.3   PT  --   --   --  11.7   PTT  --   --   --  27   INR  --   --   --  1.1     Recent Labs   Lab 01/14/21  0622 01/12/21  0559 01/11/21  1558   CRP 0.5 1.4* 1.5*   DDIMER 1.33* 1.01* 1.02*         MICROBIOLOGY:  Specimen Description (no units)   Date Value   02/10/2020 BLOOD LT AC      CULTURE (no units)   Date Value   02/10/2020 NO GROWTH 5 DAYS.          IMAGING STUDIES:    No results found.                           ASSESSMENT/PLAN:       Acute on chronic respiratory failure 2/2 COVID-19 viral pneumonia  Known idiopathic pulmonary fibrosis, COPD and hx/o tobacco dependence   - Oxygenating on 3L O2 via NC today, monitor (baseline 3L O2 via NC)   - CXR (1/11) noted patchy opacity in the periphery of the mid and lower lung and bilateral lung bases suggestive of multifocal pneumonia  - Outpatient COVID-19 Test positive (1/11)  - Repeat COVID-19 Test positive (final 1/11)  - Blood Culture x2 NGTD, pending  - Inflammatory Markers (1/14): D-dimer 1.01 --> 1.33, CRP 1.4 --> WNL, LDH, Ferritin, Procal, Lactic Acid WNL  - IV remdesivir course: day 4/5   - Steroid therapy: IV Decadron   - Supplements: zinc sulfate and vitamin C   - PT/OT working with patient; Patient requires intermittent nonskilled assistance  - Self-proning advised  - Contact and droplet precautions in place  - PT/OT to eval and work with patient  - Pulmonology (Dr. Altman) following; Will reassess Ofev for pulmonary fibrosis as outpatient. Tentative discharge tomorrow if no other issues.     Acute normocytic anemia, with recent admission for melena   -Patient denies any melena since discharge  - EGD (12/21/20) noted  patchy, white plaques in the middle third of the esophagus. A few localized, 3 mm non-bleeding erosions were found in the prepyloric region of the stomach. There were no stigmata of recent bleeding. Biopsies were benign and noted mild, patchy, chronic inactive gastritis and mild acute esophagitis  - Capsule Endoscopy (12/22/20) normal  - Hgb 7.9 --> 7.7 today, no evidence of active bleed  - AC: Resume Eliquis and discontinued Lovenox sc today  - Repeat hemoglobin, if acute drop consider GI eval. If hemoglobin stays stable will resume Eliquis or start patient on Lovenox.     Paroxysmal atrial fibrillation  - HR depressed x1 in the 50s today, monitor via telemetry    - Continue metoprolol   - AC: Resume Eliquis and discontinued Lovenox sc today     Chronic diastolic heart failure  - Recent TTE (7/2020) noted normal systolic function with EF 55-60%, mild dilation of LA  - Continue metoprolol  - Diuresis: po Lasix      Primary hypertension  - BP elevated x2 in the 150/50 to 160/90s today   - Continue po Lasix, lisinopril, metoprolol, and prn IV hydralazine      PVD s/p stenting of left common iliac artery - Continue atorvastatin  CAD s/p stent - Continue atorvastatin and metoprolol  AAA - Continue atorvastatin    Hyperlipidemia - Continue atorvastatin   Anxiety/Depression/PTSD - Continue sertraline and prn Klonopin   Obstructive sleep apnea - Patient O2 dependent, above  History of larynx cancer, skin cancer and prostate cancer    Code Status    Code Status: Selective Treatment/DNR    DVT PPx: SCDs, and TEDs.  Eliquis  GI PPX: Protonix    DISPOSITION: Continue IV steroid and remdesivir day 4/5, and monitoring Hgb closely. Discharge pending improvement above.    We will update daughter later today.     PCP:  Mino Irwin MD     Charting performed by Deven Nava for Dr. Morena Crawford    All medical record entries made by the deven were at my direction. I have reviewed the chart and agree that the record  accurately reflects my personal performance of the history, physical exam, hospital course, and assessment and plan.

## 2021-08-06 NOTE — PROGRESS NOTES
ALL SMARTFIELDS MUST BE COMPLETED FOR PATIENT CARE AND BILLING    8/6/2021     E-Consult has been denied due to: Complexity of question, needs in-person referral.    Interprofessional consultation requested by:  Jacinda Banks NP      Clinical Question/Purpose: MY CLINICAL QUESTION IS: patient was recently hospitalized and had been asked multiple times if she had sleep apnea    Patient assessment and information reviewed: Problem list, hospital discharge summary.    Unknown if current snoring, observed apnea.    History of chronic insomnia.    Recommendations: Recommend clinic consult to get full review of history to estimate pre-test probability for ZHANG, also chronic insomnia concerns.       The recommendations provided in this E-Consult are based on the clinical data available to me at this time, and are furnished without the benefit of a comprehensive in-person or virtual patient evaluation, Any new clinical issues or changes in patient status since the filing of this E-Consult will need to be taken into account when assessing these recommendations. Please contact me if you have further questions.    My total time spent reviewing clinical information and formulating assessment was 10 minutes.    Report sent automatically to requesting provider once signed.     Charge codes - [id number] (5+ minutes) or [id number] (No charge code)    Pepe Mares MD, MD

## 2021-08-07 ASSESSMENT — ANXIETY QUESTIONNAIRES: GAD7 TOTAL SCORE: 7

## 2021-08-07 NOTE — TELEPHONE ENCOUNTER
08/07/2021    Surgery Cross Cover Note    Call from home regarding shoulder pain, lab results.    The patient is still experience intermittent shoulder pain on both left and right sides, not significantly changed from surgery but worse today. She has intermittent pleuritic pain and increased pain with deep inspiration, but denies dyspnea and denies SOB with ADLs. She is tolerating a diet. Currently takin ~2g a day of tylenol for pain. I reassured her that these symptoms may be part of the recovery process. Addressed the mildly elevated ALT, platelets. I explained the elevated platelets may be a part of her post-op recovery. She has a post-op telephone visit schedule for September 1st, but it would be reasonable to follow up sooner. If she continues to have similar pleuritic pain could consider obtaining a chest x-ray.     Juan Manuel Angeles MD  Surgery PGY-5  [phone]

## 2021-08-09 PROCEDURE — 87506 IADNA-DNA/RNA PROBE TQ 6-11: CPT

## 2021-08-10 ENCOUNTER — ANCILLARY PROCEDURE (OUTPATIENT)
Dept: GENERAL RADIOLOGY | Facility: CLINIC | Age: 51
End: 2021-08-10
Attending: CLINICAL NURSE SPECIALIST
Payer: COMMERCIAL

## 2021-08-10 ENCOUNTER — LAB (OUTPATIENT)
Dept: LAB | Facility: CLINIC | Age: 51
End: 2021-08-10
Payer: COMMERCIAL

## 2021-08-10 DIAGNOSIS — R19.7 DIARRHEA, UNSPECIFIED TYPE: ICD-10-CM

## 2021-08-10 DIAGNOSIS — R06.00 DYSPNEA, UNSPECIFIED TYPE: ICD-10-CM

## 2021-08-10 DIAGNOSIS — R06.00 DYSPNEA, UNSPECIFIED TYPE: Primary | ICD-10-CM

## 2021-08-10 PROCEDURE — 71046 X-RAY EXAM CHEST 2 VIEWS: CPT | Mod: FY | Performed by: RADIOLOGY

## 2021-08-11 ENCOUNTER — TELEPHONE (OUTPATIENT)
Dept: SURGERY | Facility: CLINIC | Age: 51
End: 2021-08-11

## 2021-08-11 NOTE — TELEPHONE ENCOUNTER
I called Linsey to let her know the CXR done yesterday looked good with no obvious cause of her shoulder/neck pain.   We again discussed likely referred pain and she was happy to hear this and glad I called.

## 2021-08-13 ENCOUNTER — VIRTUAL VISIT (OUTPATIENT)
Dept: INTERNAL MEDICINE | Facility: CLINIC | Age: 51
End: 2021-08-13
Payer: COMMERCIAL

## 2021-08-13 ENCOUNTER — HOSPITAL ENCOUNTER (OUTPATIENT)
Dept: ULTRASOUND IMAGING | Facility: CLINIC | Age: 51
Discharge: HOME | End: 2021-08-13
Attending: NURSE PRACTITIONER | Admitting: NURSE PRACTITIONER
Payer: COMMERCIAL

## 2021-08-13 ENCOUNTER — OFFICE VISIT (OUTPATIENT)
Dept: PEDIATRICS | Facility: CLINIC | Age: 51
End: 2021-08-13
Payer: COMMERCIAL

## 2021-08-13 ENCOUNTER — TELEPHONE (OUTPATIENT)
Dept: FAMILY MEDICINE | Facility: CLINIC | Age: 51
End: 2021-08-13

## 2021-08-13 ENCOUNTER — OFFICE VISIT (OUTPATIENT)
Dept: URGENT CARE | Facility: URGENT CARE | Age: 51
End: 2021-08-13
Payer: COMMERCIAL

## 2021-08-13 VITALS
WEIGHT: 155 LBS | OXYGEN SATURATION: 94 % | RESPIRATION RATE: 16 BRPM | HEART RATE: 88 BPM | SYSTOLIC BLOOD PRESSURE: 134 MMHG | DIASTOLIC BLOOD PRESSURE: 93 MMHG | BODY MASS INDEX: 25.02 KG/M2 | TEMPERATURE: 98.2 F

## 2021-08-13 VITALS
DIASTOLIC BLOOD PRESSURE: 101 MMHG | OXYGEN SATURATION: 99 % | SYSTOLIC BLOOD PRESSURE: 140 MMHG | HEIGHT: 66 IN | TEMPERATURE: 98.2 F | BODY MASS INDEX: 24.91 KG/M2 | WEIGHT: 155 LBS | HEART RATE: 74 BPM

## 2021-08-13 DIAGNOSIS — M79.89 SWELLING OF LEFT LOWER EXTREMITY: ICD-10-CM

## 2021-08-13 DIAGNOSIS — M79.89 SWELLING OF LEFT LOWER EXTREMITY: Primary | ICD-10-CM

## 2021-08-13 DIAGNOSIS — R29.898 LEFT LEG WEAKNESS: ICD-10-CM

## 2021-08-13 DIAGNOSIS — R79.89 ELEVATED PLATELET COUNT: Primary | ICD-10-CM

## 2021-08-13 DIAGNOSIS — R79.89 ELEVATED PLATELET COUNT: ICD-10-CM

## 2021-08-13 DIAGNOSIS — M25.512 LEFT SHOULDER PAIN, UNSPECIFIED CHRONICITY: ICD-10-CM

## 2021-08-13 DIAGNOSIS — M79.662 PAIN OF LEFT CALF: ICD-10-CM

## 2021-08-13 DIAGNOSIS — M79.662 PAIN OF LEFT CALF: Primary | ICD-10-CM

## 2021-08-13 DIAGNOSIS — R32 URINARY INCONTINENCE, UNSPECIFIED TYPE: ICD-10-CM

## 2021-08-13 LAB
ERYTHROCYTE [DISTWIDTH] IN BLOOD BY AUTOMATED COUNT: 13.7 % (ref 10–15)
HCT VFR BLD AUTO: 42.8 % (ref 35–47)
HGB BLD-MCNC: 14.1 G/DL (ref 11.7–15.7)
MCH RBC QN AUTO: 29.6 PG (ref 26.5–33)
MCHC RBC AUTO-ENTMCNC: 32.9 G/DL (ref 31.5–36.5)
MCV RBC AUTO: 90 FL (ref 78–100)
PLATELET # BLD AUTO: 441 10E3/UL (ref 150–450)
RBC # BLD AUTO: 4.76 10E6/UL (ref 3.8–5.2)
WBC # BLD AUTO: 11.1 10E3/UL (ref 4–11)

## 2021-08-13 PROCEDURE — 99207 PR NO CHARGE LOS: CPT | Mod: GT | Performed by: NURSE PRACTITIONER

## 2021-08-13 PROCEDURE — 85027 COMPLETE CBC AUTOMATED: CPT | Performed by: FAMILY MEDICINE

## 2021-08-13 PROCEDURE — 99207 REFERRAL TO ACUTE AND DIAGNOSTIC SERVICES: CPT | Performed by: FAMILY MEDICINE

## 2021-08-13 PROCEDURE — 93971 EXTREMITY STUDY: CPT | Mod: LT

## 2021-08-13 PROCEDURE — 36415 COLL VENOUS BLD VENIPUNCTURE: CPT | Performed by: FAMILY MEDICINE

## 2021-08-13 PROCEDURE — 99214 OFFICE O/P EST MOD 30 MIN: CPT | Performed by: NURSE PRACTITIONER

## 2021-08-13 RX ORDER — DEXTROAMPHETAMINE SACCHARATE, AMPHETAMINE ASPARTATE, DEXTROAMPHETAMINE SULFATE AND AMPHETAMINE SULFATE 5; 5; 5; 5 MG/1; MG/1; MG/1; MG/1
20 TABLET ORAL 2 TIMES DAILY
COMMUNITY
End: 2021-08-15

## 2021-08-13 ASSESSMENT — MIFFLIN-ST. JEOR: SCORE: 1329.83

## 2021-08-13 NOTE — TELEPHONE ENCOUNTER
Left message to call clinic back.  Per PCP, trying to get patient scheduled for in person clinic visit somewhere other than [place] as no openings.  Experiencing some unilateral leg weakness following recent surgery.  Needs evaluation today in clinic or [facility] setting.

## 2021-08-13 NOTE — TELEPHONE ENCOUNTER
Contacted patient and informed unable to find any clinic openings today. Advised urgent care and gave locations of [facility], [place], or [facility].  Patient will go to [place], writer provided her address to this location. She will go today.

## 2021-08-13 NOTE — PROGRESS NOTES
"    Assessment & Plan     Swelling of left lower extremity  US negative for DVT  Continue monitoring  F/u if persists or worsens.    - US Lower Extremity Venous Duplex Left; Future          BMI:   Estimated body mass index is 25.02 kg/m  as calculated from the following:    Height as of this encounter: 1.676 m (5' 6\").    Weight as of this encounter: 70.3 kg (155 lb).       Return in about 1 week (around 8/20/2021), or if symptoms worsen or fail to improve, for with PCP.    Jennifer Stiles JAIME Diez CNP  [facility] CIERRA Stiles is a 51 year old who presents  urgently to [facility] by referral from Dr Raines to rule out DVT  for the following health issues     HPI     Musculoskeletal problem/pain  Onset/Duration: 2 weeks  Description  Location: leg - left  Joint Swelling: no  Redness: no  Pain: YES  Warmth: no  Intensity:  4/10  Progression of Symptoms:  same and constant  Accompanying signs and symptoms:   Fevers: no  Numbness/tingling/weakness: YES  History  Trauma to the area: no  Recent illness:  no  Previous similar problem: no  Previous evaluation:  no  Precipitating or alleviating factors:  Aggravating factors include: climbing stairs  Therapies tried and outcome: heat and acetaminophen    Never red or hot to touch but painful with weight bearing and ambulation.    Had a nissen fundoplication 2 weeks ago and was noted to have platelets of 500.      Review of Systems   Constitutional, HEENT, cardiovascular, pulmonary, GI, , musculoskeletal, neuro, skin, endocrine and psych systems are negative, except as otherwise noted.      Objective    BP (!) 140/101   Pulse 74   Temp 98.2  F (36.8  C) (Oral)   Ht 1.676 m (5' 6\")   Wt 70.3 kg (155 lb)   LMP 07/26/2013   SpO2 99%   BMI 25.02 kg/m    Body mass index is 25.02 kg/m .  Physical Exam   GENERAL: healthy, alert and no distress  NECK: no adenopathy, no asymmetry, masses, or scars and thyroid normal to palpation  RESP: lungs clear to " auscultation - no rales, rhonchi or wheezes  CV: regular rate and rhythm, normal S1 S2, no S3 or S4, no murmur, click or rub, no peripheral edema and peripheral pulses strong  ABDOMEN: soft, nontender, no hepatosplenomegaly, no masses and bowel sounds normal  MS: left posterior calf TTP.  Not red or warm to the touch.  no gross musculoskeletal defects noted, no edema

## 2021-08-13 NOTE — PROGRESS NOTES
"Linsey is a 51 year old who is being evaluated via a billable video visit.      How would you like to obtain your AVS? MyChart  If the video visit is dropped, the invitation should be resent by: Text to cell phone: [phone]  Will anyone else be joining your video visit? No      Video Start Time: transition to telephone visit     Assessment & Plan   Problem List Items Addressed This Visit     None      Visit Diagnoses     Elevated platelet count    -  Primary    Relevant Orders    CBC with platelets and differential    Left leg weakness        Urinary incontinence, unspecified type                 I spent a total of 13 minutes on the day of the visit.   Time spent doing chart review, history and exam, documentation and further activities per the note. No charge as patient is in need of assessment in clinic by a provider      Patient requires assessment in clinic an appt for today will be scheduled with a provider near her home that has availability. Did discuss with patient possible need for imagine.     No follow-ups on file.    Jacinda Banks, [facility]    Yvan Stiles is a 51 year old who presents for follow up.  Patient states she is \"doing ok\".  She has continued to have difficulty with bladder/bowel control post surgical.  She states \"when I have to go, it is an emergency\".  She will be seeing provider at the [facility].      She states she still has some shoulder, neck pain and headaches.     \"I'm certainly getting better, and I can feel myself getting better not quite there yet\".      Patient states \"I feel like my left leg is weak and the left side of my body is having pains and I even thought about bringing it up but felt it was not significant\" \"my left leg is bothering me even going up and down stairs\".  She states this began 2-3 weeks ago.  \"I felt general weakness\". She states the left leg is sore and \"I have to swing it\".  \"I would not say i'm dragging it, but it " "is definitely weaker\".        HPI      Pain History:  When did you first notice your pain Left shoulder and neck, patient had surgery on stomach 06/26/21 think the pain is referred pain from the surgery along with headaches    Patient has one suture that still has not dissolved from the surgery on her stomach    Still having trouble controlling her bladder and bowels since the surgery           Review of Systems   As noted above and below       Objective    Vitals - Patient Reported  Weight (Patient Reported): 69.9 kg (154 lb)  Height (Patient Reported): 167.6 cm (5' 6\")  BMI (Based on Pt Reported Ht/Wt): 24.86        Physical Exam   GENERAL: Healthy, alert and no distress  RESP: No audible wheeze, cough, or visible cyanosis.  No visible retractions or increased work of breathing.    PSYCH: Mentation appears normal, affect normal/bright, judgement and insight intact, normal speech and appearance well-groomed.    Lab on 08/10/2021   Component Date Value Ref Range Status     Campylobacter group 08/09/2021 Not Detected  Not Detected Final     Salmonella species 08/09/2021 Not Detected  Not Detected Final     Shigella species 08/09/2021 Not Detected  Not Detected Final     Vibrio group 08/09/2021 Not Detected  Not Detected Final     Rotavirus 08/09/2021 Not Detected  Not Detected Final     Shiga toxin 1 gene 08/09/2021 Not Detected  Not Detected Final     Shiga toxin 2 gene 08/09/2021 Not Detected  Not Detected Final     Norovirus I and II 08/09/2021 Not Detected  Not Detected Final     Yersinia enterocolitica 08/09/2021 Not Detected  Not Detected Final                 "

## 2021-08-13 NOTE — PROGRESS NOTES
Chief Complaint   Patient presents with     Urgent Care       Medical Decision Making:    ASSESMENT AND PLAN   Jaz was seen today for urgent care.    Diagnoses and all orders for this visit:    Pain of left calf  -     Referral to Acute and Diagnostic Services (Day of diagnostic / First order acute); Future    Elevated platelet count  -     CBC with platelets; Future  -     CBC with platelets    Left shoulder pain, unspecified chronicity    Other orders  -     Cancel: Occult blood stool      To [facility] for ultrasound of left calf   As her platelet was noted to be elevated 7 days back a repeat platelet was done which was within normal limits today    I have reviewed the nursing notes.    Differential Diagnosis:  Dvt,musculoskeletal pain,left shoulder sprain       Review of the result(s) of each unique test -    X-Ray was not done.    Time  spent on the date of the encounter doing chart review, interpretation of tests, patient visit, documentation and discussion with other provider(s)     see orders in Epic  Pt verbalized and agreed with the plan and is aware of the worsening symptoms for which would need to follow up .  Pt was stable during time of discharge from the clinic     SUBJECTIVE     Jaz Archibald is a 51 year old female presenting with a chief complaint of    Chief Complaint   Patient presents with     Urgent Care           Jaz Archibald is a 51 year old female presenting with a chief complaint of left shoulder pain and also left calf pain   She is s/p Nissens fundoplication   She has been noticing left calf pain since the surgery.  And also has been noticing left shoulder pain mostly in the posterior aspect of the left shoulder mostly when she is laying down.  And this is also started since her surgery.  She has no shortness of breath chest pain chest heaviness symptoms.  Patient did have a telephone visit with the surgeon today and was advised to follow-up in the urgent care for the left calf pain.    Since her surgery her bowel movements have all also been abnormal with noticing more loose stools.   She is an established patient of [place].  Onset of symptoms was 2 week(s) ago.  Course of illness is same.    Severity moderate  Current and Associated symptoms: left calf tenderness   Treatment measures tried include None tried.  Predisposing factors include recent surgery.    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 2 each 3     escitalopram (LEXAPRO) 20 MG tablet Take 1 tablet (20 mg) by mouth At Bedtime 90 tablet 1     olopatadine (PATANOL) 0.1 % ophthalmic solution Place 1-2 drops into both eyes 2 times daily   6     ondansetron (ZOFRAN-ODT) 4 MG ODT tab Take 1 tablet (4 mg) by mouth every 8 hours as needed for nausea 20 tablet 1     oxyCODONE (ROXICODONE) 5 MG/5ML solution Take 5 mLs (5 mg) by mouth every 6 hours as needed for moderate to severe pain 100 mL 0     polyethylene glycol (MIRALAX) 17 g packet Take 17 g by mouth 2 times daily 7 packet 0     simethicone (MYLICON) 40 MG/0.6ML suspension Take 0.6 mLs (40 mg) by mouth 4 times daily for 14 days 33.6 mL 0     sucralfate (CARAFATE) 1 GM tablet Take 1 g by mouth 4 times daily as needed        traZODone (DESYREL) 100 MG tablet Take 1 tablet (100 mg) by mouth At Bedtime 90 tablet 1     Social History     Tobacco Use     Smoking status: Never Smoker     Smokeless tobacco: Never  Used   Substance Use Topics     Alcohol use: Yes     Alcohol/week: 0.0 standard drinks     Comment: 2-3 drinks per week     Family History   Problem Relation Age of Onset     Lipids Mother         chol     Hypertension Mother      Lipids Father         chol     Hypertension Father      C.A.D. Father      Heart Disease Father          ROS:    10 point ROS of systems including Constitutional, Eyes, Respiratory, Cardiovascular, Gastroenterology, Genitourinary, Integumentary,  Psychiatric ,neurological were all negative except for pertinent positives noted in my HPI         OBJECTIVE:    BP (!) 134/93   Pulse 88   Temp 98.2  F (36.8  C) (Tympanic)   Resp 16   Wt 70.3 kg (155 lb)   LMP 07/26/2013   SpO2 94%   BMI 25.02 kg/m    GENERAL APPEARANCE: healthy, alert and no distress  EYES: EOMI,  PERRL, conjunctiva clear  HENT: ear canals and TM's normal.  Nose and mouth without ulcers, erythema or lesions  NECK: supple, nontender, no lymphadenopathy  RESP: lungs clear to auscultation - no rales, rhonchi or wheezes  CV: regular rates and rhythm, normal S1 S2, no murmur noted  ABDOMEN:  soft, nontender, no HSM or masses and bowel sounds normal incisional scars from laparoscopic surgery looked within normal limits,  SKIN: no suspicious lesions or rashes  MSK-left calf minimal tenderness noted over the posterior aspect of the mid left calf area no change in size compared to the other calf there was no redness, warmth noted in the calf area  Left shoulder - Rom of the shoulder was WNL   PSYCH: mentation appears normal  Physical Exam      (Note was completed, in part, with tydy voice-recognition software. Documentation reviewed, but some grammatical, spelling, and word errors may remain.)  Osiris Raines MD on 8/13/2021 at 4:00 PM

## 2021-08-15 ENCOUNTER — OFFICE VISIT (OUTPATIENT)
Dept: URGENT CARE | Facility: URGENT CARE | Age: 51
End: 2021-08-15
Payer: COMMERCIAL

## 2021-08-15 ENCOUNTER — HOSPITAL ENCOUNTER (OUTPATIENT)
Facility: CLINIC | Age: 51
Setting detail: OBSERVATION
Discharge: HOME | End: 2021-08-16
Attending: EMERGENCY MEDICINE | Admitting: INTERNAL MEDICINE
Payer: COMMERCIAL

## 2021-08-15 ENCOUNTER — APPOINTMENT (OUTPATIENT)
Dept: MRI IMAGING | Facility: CLINIC | Age: 51
End: 2021-08-15
Attending: EMERGENCY MEDICINE
Payer: COMMERCIAL

## 2021-08-15 VITALS
OXYGEN SATURATION: 98 % | TEMPERATURE: 98.3 F | RESPIRATION RATE: 16 BRPM | HEART RATE: 75 BPM | WEIGHT: 155 LBS | DIASTOLIC BLOOD PRESSURE: 86 MMHG | SYSTOLIC BLOOD PRESSURE: 114 MMHG | HEIGHT: 66 IN | BODY MASS INDEX: 24.91 KG/M2

## 2021-08-15 DIAGNOSIS — R20.2 NUMBNESS AND TINGLING: Primary | ICD-10-CM

## 2021-08-15 DIAGNOSIS — I77.74 DISSECTION, VERTEBRAL ARTERY (H): Primary | ICD-10-CM

## 2021-08-15 DIAGNOSIS — R20.0 NUMBNESS AND TINGLING: Primary | ICD-10-CM

## 2021-08-15 DIAGNOSIS — R42 DIZZINESS: ICD-10-CM

## 2021-08-15 DIAGNOSIS — I77.74 DISSECTING HEMORRHAGE OF RIGHT VERTEBRAL ARTERY (H): ICD-10-CM

## 2021-08-15 LAB
ANION GAP SERPL CALCULATED.3IONS-SCNC: 4 MMOL/L (ref 3–14)
BASOPHILS # BLD AUTO: 0.1 10E3/UL (ref 0–0.2)
BASOPHILS NFR BLD AUTO: 1 %
BUN SERPL-MCNC: 16 MG/DL (ref 7–30)
CALCIUM SERPL-MCNC: 8.4 MG/DL (ref 8.5–10.1)
CHLORIDE BLD-SCNC: 106 MMOL/L (ref 94–109)
CHOLEST SERPL-MCNC: 208 MG/DL
CO2 SERPL-SCNC: 26 MMOL/L (ref 20–32)
CREAT SERPL-MCNC: 0.63 MG/DL (ref 0.52–1.04)
EOSINOPHIL # BLD AUTO: 0.2 10E3/UL (ref 0–0.7)
EOSINOPHIL NFR BLD AUTO: 2 %
ERYTHROCYTE [DISTWIDTH] IN BLOOD BY AUTOMATED COUNT: 13.2 % (ref 10–15)
GFR SERPL CREATININE-BSD FRML MDRD: >90 ML/MIN/1.73M2
GLUCOSE BLD-MCNC: 143 MG/DL (ref 70–99)
GLUCOSE BLDC GLUCOMTR-MCNC: 83 MG/DL (ref 70–99)
GLUCOSE BLDC GLUCOMTR-MCNC: 88 MG/DL (ref 70–99)
HCT VFR BLD AUTO: 41.7 % (ref 35–47)
HDLC SERPL-MCNC: 47 MG/DL
HGB BLD-MCNC: 13.4 G/DL (ref 11.7–15.7)
IMM GRANULOCYTES # BLD: 0 10E3/UL
IMM GRANULOCYTES NFR BLD: 0 %
LDLC SERPL CALC-MCNC: 114 MG/DL
LYMPHOCYTES # BLD AUTO: 2.6 10E3/UL (ref 0.8–5.3)
LYMPHOCYTES NFR BLD AUTO: 34 %
MAGNESIUM SERPL-MCNC: 1.9 MG/DL (ref 1.6–2.3)
MCH RBC QN AUTO: 28.9 PG (ref 26.5–33)
MCHC RBC AUTO-ENTMCNC: 32.1 G/DL (ref 31.5–36.5)
MCV RBC AUTO: 90 FL (ref 78–100)
MONOCYTES # BLD AUTO: 0.6 10E3/UL (ref 0–1.3)
MONOCYTES NFR BLD AUTO: 7 %
NEUTROPHILS # BLD AUTO: 4.3 10E3/UL (ref 1.6–8.3)
NEUTROPHILS NFR BLD AUTO: 56 %
NONHDLC SERPL-MCNC: 161 MG/DL
NRBC # BLD AUTO: 0 10E3/UL
NRBC BLD AUTO-RTO: 0 /100
PLATELET # BLD AUTO: 436 10E3/UL (ref 150–450)
POTASSIUM BLD-SCNC: 3.6 MMOL/L (ref 3.4–5.3)
RBC # BLD AUTO: 4.64 10E6/UL (ref 3.8–5.2)
SARS-COV-2 RNA RESP QL NAA+PROBE: NEGATIVE
SODIUM SERPL-SCNC: 136 MMOL/L (ref 133–144)
TRIGL SERPL-MCNC: 236 MG/DL
TSH SERPL DL<=0.005 MIU/L-ACNC: 0.68 MU/L (ref 0.4–4)
WBC # BLD AUTO: 7.7 10E3/UL (ref 4–11)

## 2021-08-15 PROCEDURE — 99215 OFFICE O/P EST HI 40 MIN: CPT | Performed by: STUDENT IN AN ORGANIZED HEALTH CARE EDUCATION/TRAINING PROGRAM

## 2021-08-15 PROCEDURE — 83735 ASSAY OF MAGNESIUM: CPT | Performed by: EMERGENCY MEDICINE

## 2021-08-15 PROCEDURE — 80061 LIPID PANEL: CPT | Performed by: INTERNAL MEDICINE

## 2021-08-15 PROCEDURE — A9585 GADOBUTROL INJECTION: HCPCS | Performed by: EMERGENCY MEDICINE

## 2021-08-15 PROCEDURE — 255N000002 HC RX 255 OP 636: Performed by: EMERGENCY MEDICINE

## 2021-08-15 PROCEDURE — 70549 MR ANGIOGRAPH NECK W/O&W/DYE: CPT

## 2021-08-15 PROCEDURE — 250N000013 HC RX MED GY IP 250 OP 250 PS 637: Performed by: EMERGENCY MEDICINE

## 2021-08-15 PROCEDURE — G0378 HOSPITAL OBSERVATION PER HR: HCPCS

## 2021-08-15 PROCEDURE — 85025 COMPLETE CBC W/AUTO DIFF WBC: CPT | Performed by: EMERGENCY MEDICINE

## 2021-08-15 PROCEDURE — 99220 PR INITIAL OBSERVATION CARE,LEVEL III: CPT | Performed by: INTERNAL MEDICINE

## 2021-08-15 PROCEDURE — 96374 THER/PROPH/DIAG INJ IV PUSH: CPT

## 2021-08-15 PROCEDURE — C9803 HOPD COVID-19 SPEC COLLECT: HCPCS

## 2021-08-15 PROCEDURE — 250N000011 HC RX IP 250 OP 636: Performed by: EMERGENCY MEDICINE

## 2021-08-15 PROCEDURE — 80048 BASIC METABOLIC PNL TOTAL CA: CPT | Performed by: EMERGENCY MEDICINE

## 2021-08-15 PROCEDURE — 36415 COLL VENOUS BLD VENIPUNCTURE: CPT | Performed by: EMERGENCY MEDICINE

## 2021-08-15 PROCEDURE — 70553 MRI BRAIN STEM W/O & W/DYE: CPT

## 2021-08-15 PROCEDURE — 250N000013 HC RX MED GY IP 250 OP 250 PS 637: Performed by: INTERNAL MEDICINE

## 2021-08-15 PROCEDURE — 99285 EMERGENCY DEPT VISIT HI MDM: CPT | Mod: 25

## 2021-08-15 PROCEDURE — 70544 MR ANGIOGRAPHY HEAD W/O DYE: CPT

## 2021-08-15 PROCEDURE — 87635 SARS-COV-2 COVID-19 AMP PRB: CPT | Performed by: EMERGENCY MEDICINE

## 2021-08-15 PROCEDURE — 84443 ASSAY THYROID STIM HORMONE: CPT | Performed by: EMERGENCY MEDICINE

## 2021-08-15 RX ORDER — NALOXONE HYDROCHLORIDE 0.4 MG/ML
0.2 INJECTION, SOLUTION INTRAMUSCULAR; INTRAVENOUS; SUBCUTANEOUS
Status: DISCONTINUED | OUTPATIENT
Start: 2021-08-15 | End: 2021-08-16 | Stop reason: HOSPADM

## 2021-08-15 RX ORDER — ASPIRIN 81 MG/1
81 TABLET, CHEWABLE ORAL DAILY
Status: CANCELLED | OUTPATIENT
Start: 2021-08-15

## 2021-08-15 RX ORDER — ACETAMINOPHEN 650 MG/1
650 SUPPOSITORY RECTAL EVERY 4 HOURS PRN
Status: DISCONTINUED | OUTPATIENT
Start: 2021-08-15 | End: 2021-08-16 | Stop reason: HOSPADM

## 2021-08-15 RX ORDER — LORAZEPAM 2 MG/ML
1 INJECTION INTRAMUSCULAR ONCE
Status: COMPLETED | OUTPATIENT
Start: 2021-08-15 | End: 2021-08-15

## 2021-08-15 RX ORDER — NALOXONE HYDROCHLORIDE 0.4 MG/ML
0.4 INJECTION, SOLUTION INTRAMUSCULAR; INTRAVENOUS; SUBCUTANEOUS
Status: DISCONTINUED | OUTPATIENT
Start: 2021-08-15 | End: 2021-08-16 | Stop reason: HOSPADM

## 2021-08-15 RX ORDER — ACETAMINOPHEN 325 MG/1
650 TABLET ORAL EVERY 4 HOURS PRN
Status: DISCONTINUED | OUTPATIENT
Start: 2021-08-15 | End: 2021-08-16 | Stop reason: HOSPADM

## 2021-08-15 RX ORDER — ONDANSETRON 2 MG/ML
4 INJECTION INTRAMUSCULAR; INTRAVENOUS EVERY 6 HOURS PRN
Status: DISCONTINUED | OUTPATIENT
Start: 2021-08-15 | End: 2021-08-16 | Stop reason: HOSPADM

## 2021-08-15 RX ORDER — ASPIRIN 81 MG/1
324 TABLET, CHEWABLE ORAL ONCE
Status: COMPLETED | OUTPATIENT
Start: 2021-08-15 | End: 2021-08-15

## 2021-08-15 RX ORDER — ASPIRIN 81 MG/1
81 TABLET ORAL DAILY
Status: CANCELLED | OUTPATIENT
Start: 2021-08-15

## 2021-08-15 RX ORDER — OXYCODONE HCL 5 MG/5 ML
5 SOLUTION, ORAL ORAL EVERY 6 HOURS PRN
Status: DISCONTINUED | OUTPATIENT
Start: 2021-08-15 | End: 2021-08-16 | Stop reason: HOSPADM

## 2021-08-15 RX ORDER — PROCHLORPERAZINE MALEATE 10 MG
10 TABLET ORAL EVERY 6 HOURS PRN
Status: DISCONTINUED | OUTPATIENT
Start: 2021-08-15 | End: 2021-08-16 | Stop reason: HOSPADM

## 2021-08-15 RX ORDER — PROCHLORPERAZINE 25 MG
25 SUPPOSITORY, RECTAL RECTAL EVERY 12 HOURS PRN
Status: DISCONTINUED | OUTPATIENT
Start: 2021-08-15 | End: 2021-08-16 | Stop reason: HOSPADM

## 2021-08-15 RX ORDER — GADOBUTROL 604.72 MG/ML
10 INJECTION INTRAVENOUS ONCE
Status: COMPLETED | OUTPATIENT
Start: 2021-08-15 | End: 2021-08-15

## 2021-08-15 RX ORDER — LISINOPRIL 10 MG/1
10 TABLET ORAL DAILY
COMMUNITY
End: 2021-12-15

## 2021-08-15 RX ORDER — ONDANSETRON 4 MG/1
4 TABLET, ORALLY DISINTEGRATING ORAL EVERY 6 HOURS PRN
Status: DISCONTINUED | OUTPATIENT
Start: 2021-08-15 | End: 2021-08-16 | Stop reason: HOSPADM

## 2021-08-15 RX ORDER — ACETAMINOPHEN 500 MG
500-1000 TABLET ORAL EVERY 6 HOURS PRN
COMMUNITY
End: 2021-08-19

## 2021-08-15 RX ORDER — ESCITALOPRAM OXALATE 10 MG/1
20 TABLET ORAL AT BEDTIME
Status: DISCONTINUED | OUTPATIENT
Start: 2021-08-15 | End: 2021-08-16 | Stop reason: HOSPADM

## 2021-08-15 RX ORDER — ATORVASTATIN CALCIUM 40 MG/1
80 TABLET, FILM COATED ORAL EVERY EVENING
Status: DISCONTINUED | OUTPATIENT
Start: 2021-08-15 | End: 2021-08-16 | Stop reason: HOSPADM

## 2021-08-15 RX ORDER — TRAZODONE HYDROCHLORIDE 50 MG/1
100 TABLET, FILM COATED ORAL AT BEDTIME
Status: DISCONTINUED | OUTPATIENT
Start: 2021-08-15 | End: 2021-08-16 | Stop reason: HOSPADM

## 2021-08-15 RX ADMIN — OXYCODONE HYDROCHLORIDE 5 MG: 5 SOLUTION ORAL at 22:32

## 2021-08-15 RX ADMIN — ASPIRIN 81 MG CHEWABLE TABLET 324 MG: 81 TABLET CHEWABLE at 16:01

## 2021-08-15 RX ADMIN — TRAZODONE HYDROCHLORIDE 100 MG: 50 TABLET ORAL at 21:42

## 2021-08-15 RX ADMIN — LORAZEPAM 1 MG: 2 INJECTION INTRAMUSCULAR; INTRAVENOUS at 13:35

## 2021-08-15 RX ADMIN — ATORVASTATIN CALCIUM 80 MG: 40 TABLET, FILM COATED ORAL at 20:27

## 2021-08-15 RX ADMIN — ESCITALOPRAM OXALATE 20 MG: 10 TABLET ORAL at 21:42

## 2021-08-15 RX ADMIN — GADOBUTROL 10 ML: 604.72 INJECTION INTRAVENOUS at 13:33

## 2021-08-15 ASSESSMENT — ENCOUNTER SYMPTOMS
FACIAL ASYMMETRY: 0
SPEECH DIFFICULTY: 0
COUGH: 0
NUMBNESS: 1
DIZZINESS: 0
LIGHT-HEADEDNESS: 1
SHORTNESS OF BREATH: 0
NAUSEA: 1
HEADACHES: 0

## 2021-08-15 ASSESSMENT — MIFFLIN-ST. JEOR
SCORE: 1336.75
SCORE: 1329.83
SCORE: 1329.83

## 2021-08-15 NOTE — H&P
Dictated #[id number]    51F hx of htn presents with symptoms of right face tingling/numbess and right arm weakness lasting 1-2 minutes.  MRI shows right vertebral artery dissection, started on ASA.  Monitor overnight with neuro checks, neuro consult for re-eval in the am.

## 2021-08-15 NOTE — ED TRIAGE NOTES
[facility]  ED Arrival Note    Arrives through triage. A &O X4. ABC's intact. Pt arrives with c/o inttermittent episodes of R sided numbness on her face and arm. Went to , sent to ED. Onset last night at 21:30. No symptoms currently.       Triage Interventions: N/A    Ambulatory: Yes    Meets Stroke Criteria?: Tier 2 Stroke Code EVAL    Meets Trauma Criteria?: No    Directed to: Main ED

## 2021-08-15 NOTE — PHARMACY-ADMISSION MEDICATION HISTORY
Pharmacy Medication History  Admission medication history interview status for the 8/15/2021  admission is complete. See EPIC admission navigator for prior to admission medications     Location of Interview: Patient room  Medication history sources: Patient, Surescripts and Care Everywhere    Significant changes made to the medication list:  Added:  - lisinopril    In the past week, patient estimated taking medication this percent of the time: greater than 90%    Additional medication history information:   The patient was prescribed lisinopril on 8/6 but states she has not started taking it yet.     Medication reconciliation completed by provider prior to medication history? No    Time spent in this activity: 8 minutes    Prior to Admission medications    Medication Sig Last Dose Taking? Auth Provider   acetaminophen (TYLENOL) 500 MG tablet Take 500-1,000 mg by mouth every 6 hours as needed for mild pain 8/15/2021 at AM Yes Unknown, Entered By History   EPINEPHrine (EPIPEN) 0.3 MG/0.3ML injection Inject 0.3 mLs into the muscle once as needed for anaphylaxis.  at PRN Yes Jakob Ellis MD   escitalopram (LEXAPRO) 20 MG tablet Take 1 tablet (20 mg) by mouth At Bedtime 8/14/2021 at HS Yes Jacinda Banks NP   lisinopril (ZESTRIL) 10 MG tablet Take 10 mg by mouth daily  at has not started yet Yes Unknown, Entered By History   olopatadine (PATANOL) 0.1 % ophthalmic solution Place 1-2 drops into both eyes 2 times daily as needed for allergies   at PRN Yes Reported, Patient   ondansetron (ZOFRAN-ODT) 4 MG ODT tab Take 1 tablet (4 mg) by mouth every 8 hours as needed for nausea 8/14/2021 at HS Yes Janeth Goldberg APRN CNS   oxyCODONE (ROXICODONE) 5 MG/5ML solution Take 5 mLs (5 mg) by mouth every 6 hours as needed for moderate to severe pain 8/14/2021 at HS Yes Jacinda Banks NP   polyethylene glycol (MIRALAX) 17 g packet Take 17 g by mouth 2 times daily  at PRN Yes Tyrese Kwon PA-C   sucralfate  (CARAFATE) 1 GM tablet Take 1 g by mouth 4 times daily as needed   at PRN Yes Reported, Patient   traZODone (DESYREL) 100 MG tablet Take 1 tablet (100 mg) by mouth At Bedtime 8/14/2021 at HS Yes Jacinda Banks, NP       The information provided in this note is only as accurate as the sources available at the time of update(s)

## 2021-08-15 NOTE — ED PROVIDER NOTES
History   Chief Complaint:  Numbness    HPI   Jaz Archibald is a 51 year old female with a history of hypertension, hyperlipidemia, and anxiety who presents from urgent care for evaluation of intermittent right-sided numbness. The patient states that she was sitting talking with a friend last night around 21:30 when she felt the right half of her face and body become numb and tingly. Her friend helped her stand up because she felt lightheaded and nauseated. She noticed this sensation in her right arm but did not have it in either of her legs. She also felt that her face was drooping but her friend states she did not see this. Her symptoms lasted for 1 minute before fully resolving. She reports having 4 additional episodes of symptoms since last night. She went to urgent care who referred her to the ED for more extensive neuro work up. She denies double or blurry vision. No speech changes, dizziness, loss of balance, or headache. She denies chest pain, shortness of breath, or cough. No tobacco, alcohol, or illegal drug use. No known tick bites. She reports a distant history of migraines but they were not similar to her symptoms today. She has a personal history of borderline hypertension and hyperlipidemia. Family history of heart disease (mother, father). More distant family history of stroke and blood clots. Recent nissen fundoplication, 3 weeks ago.    Review of Systems   Eyes: Negative for visual disturbance.   Respiratory: Negative for cough and shortness of breath.    Cardiovascular: Negative for chest pain.   Gastrointestinal: Positive for nausea.   Neurological: Positive for light-headedness and numbness. Negative for dizziness, facial asymmetry, speech difficulty and headaches.   All other systems reviewed and are negative.      Allergies:  Bees  Suture    Medications:  Tylenol   Adderall   Epipen  Lexapro   Carafate  Trazodone    Past Medical History:    ADD  Anxiety  "  GERD  Hypertension  Hyperlipidemia  Urethral hypermobility   Basal cell carcinoma    Environmental allergies  Sleep disorder    Past Surgical History:    EGD  Novasure  Hysterectomy   Herniorrhaphy   Laparoscopic nissen fundoplication  Transvaginal sling    Family History:    Hyperlipidemia - mother, father   Hypertension - mother, father  Heart disease - mother, father    Social History:  Presents to the ED: with her   No tobacco, alcohol, or illegal drug use.   PCP: Jacinda Banks    Physical Exam     Patient Vitals for the past 24 hrs:   BP Temp Temp src Pulse Resp SpO2 Height Weight   08/15/21 1505 (!) 130/91 -- -- 77 -- -- -- --   08/15/21 1223 (!) 146/85 97  F (36.1  C) Temporal 71 17 100 % 1.676 m (5' 6\") 70.3 kg (155 lb)       Physical Exam  Constitutional: Well developed, nontox appearance  Head: Atraumatic.   Mouth/Throat: Oropharynx is clear and moist.   Neck:  no stridor  Eyes: no scleral icterus, PERRL, EOMI  Cardiovascular: RRR, 2+ bilat radial pulses  Pulmonary/Chest: nml resp effort  Ext: Warm, well perfused, no edema  Neurological: A&O,  CNII-XII intact, nml finger to nose, 5/5 strength throughout upper and lower ext, symmetric; sensation grossly intact, no dysarthria or aphasia  Skin: Skin is warm and dry.   Psychiatric: Behavior is normal. Thought content normal.   Nursing note and vitals reviewed.    Emergency Department Course     Imaging:  MRA Neck (Carotids) without and with Contrast  1. Findings consistent with recent right vertebral artery dissection   diffusely involving the V2 segment, with an associated severe focal   stenosis involving the true lumen of the mid V2 segment.   2. The dominant left vertebral artery is widely patent.   3. The bilateral common carotid arteries, carotid bifurcations, and   cervical internal carotid arteries are patent.     The findings were discussed by phone with Dr. Sheth by myself at   approximately 3:05 PM on 8/15/2021.    MR Brain without and " with Contrast  1. No acute intracranial process. Unremarkable appearance of the brain   and extra-axial spaces.   2. Abnormal flow-void involving the right vertebral artery V3 segment.   Please see MR angiogram of the neck report of same day for additional   Details.    MRA Brain ([place]) without Contrast  No high-grade stenosis or large vessel occlusion involving the major   intracranial arteries of the [place].    Reading per radiology.    Laboratory:  CBC: WBC: 7.7, HGB: 13.4, PLT: 436    BMP: Glucose 143 (H), Calcium: 8.4 (L), o/w WNL (Creatinine: 0.63)    TSH with Free T4 Reflex: 0.68    Magnesium: 1.9    COVID-19 by PCR: pending    Emergency Department Course:    Reviewed:  I reviewed the patient's nursing notes, vitals and past medical history.     Assessments:  1235 I performed an exam of the patient in room ED 21 as documented above.  1524 I updated the patient on results and discussed plan of care.    Consults:    1505 I consulted with Dr. Marcelino from radiology regarding patient's MRA results.   1513 I consulted with stroke/neuro regarding patient's results.   1528 Stroke/neuro regarding patient's plan of care.   1600 I spoke with Dr. Carrillo of the hospitalist service regarding patient's presentation, findings, and plan of care.    Interventions:  1335 Ativan, 1 mg, IV     Disposition:  The patient was admitted to the hospital under the care of Dr. Carrillo.     Impression & Plan     Medical Decision Making:  Jaz Archibald is a 51 year old female presenting w/ intermittent right-sided paresthesias     DDx includes paresthesias NOS, electrolyte abnormality, thyroid dysfunction, CVA or TIA although seemingly less likely given history and physical exam, multiple recurrent episodes; inflammatory CNS process; complex migraine.  Doubt meningitis, encephalitis given history and physical exam.  Labs significant for no remarkable abnormality.  Imaging sig for right vertebral artery dissection.   Discussed patient with stroke neurology who recommended aspirin.  He also recommended that if the patient is still symptomatic, it may be beneficial to admit her for observation given concern for possible sensory TIAs.  Given the patient's multiple episodes in the last 24 hours, she was admitted to the hospital service under observation status with plan for stroke neurology consult while in the hospital.  Patient and  counseled on all results, disposition and diagnosis.  They are understanding and agreeable to plan. Patient admitted in stable condition.      Covid-19  Jaz Archibald was evaluated during a global COVID-19 pandemic, which necessitated consideration that the patient might be at risk for infection with the SARS-CoV-2 virus that causes COVID-19.   Applicable protocols for evaluation were followed during the patient's care.   COVID-19 was considered as part of the patient's evaluation. The plan for testing is:  a test was obtained during this visit.    Diagnosis:    ICD-10-CM    1. 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    This note was completed in part using Dragon voice recognition software. Although reviewed after completion, some word and grammatical errors may occur.      Jose Sheth MD  08/16/21 1011

## 2021-08-15 NOTE — PROVIDER NOTIFICATION
MD Notification    Notified Person: MD    Notified Person Name:  Markathya     Notification Date/Time: 8/15/21 5:10pm     Notification Interaction: web page     Purpose of Notification: From my understanding pt has hemorrhage and this is on OBS exclusion criteria. Should pt be better suited for [facility] or other inpatient floor? Let us know thanks MR RN     Orders Received: pt did not have brain bleed, just dissection. TIA rule out. Plan to keep on OBS.     Comments:

## 2021-08-15 NOTE — ED NOTES
"[facility]  ED Nurse Handoff Report    ED Chief complaint: Numbness      ED Diagnosis:   Final diagnoses:   Dissecting hemorrhage of right vertebral artery (H)       Code Status: Full Code    Allergies:   Allergies   Allergen Reactions     Bees Swelling     Disfiguring      Suture Swelling     local swelling and sutures didn't dissolve vyrcil   Suture        Patient Story: intermittent numbness of R face and arm since last night  Focused Assessment:  No symptoms upon arrival but report one episode while here that lasted \"maybe around a minute  Labs Ordered and Resulted from Time of ED Arrival Up to the Time of Departure from the ED   BASIC METABOLIC PANEL - Abnormal; Notable for the following components:       Result Value    Calcium 8.4 (*)     Glucose 143 (*)     All other components within normal limits   TSH WITH FREE T4 REFLEX - Normal   MAGNESIUM - Normal   CBC WITH PLATELETS & DIFFERENTIAL    Narrative:     The following orders were created for panel order CBC with platelets differential.  Procedure                               Abnormality         Status                     ---------                               -----------         ------                     CBC with platelets and d...[[id number]]                      Final result                 Please view results for these tests on the individual orders.   CBC WITH PLATELETS AND DIFFERENTIAL   COVID-19 VIRUS (CORONAVIRUS) BY PCR     MRA Neck (Carotids) wo & w Contrast   Preliminary Result   IMPRESSION:   1. Findings consistent with recent right vertebral artery dissection   diffusely involving the V2 segment, with an associated severe focal   stenosis involving the true lumen of the mid V2 segment.   2. The dominant left vertebral artery is widely patent.   3. The bilateral common carotid arteries, carotid bifurcations, and   cervical internal carotid arteries are patent.      The findings were discussed by phone with Dr. Sheth by myself " "at   approximately 3:05 PM on 8/15/2021.      MR Brain w/o & w Contrast   Preliminary Result   IMPRESSION:   1. No acute intracranial process. Unremarkable appearance of the brain   and extra-axial spaces.   2. Abnormal flow-void involving the right vertebral artery V3 segment.   Please see MR angiogram of the neck report of same day for additional   details.      MRA Brain ([place]) wo Contrast   Preliminary Result   IMPRESSION:   No high-grade stenosis or large vessel occlusion involving the major   intracranial arteries of the [place].            Treatments and/or interventions provided: aspirin, monitoring  Patient's response to treatments and/or interventions: VSS    To be done/followed up on inpatient unit:  per admitting    Does this patient have any cognitive concerns?: n/a    Activity level - Baseline/Home:  Independent  Activity Level - Current:   Independent    Patient's Preferred language: English   Needed?: No    Isolation: None  Infection: Not Applicable  Patient tested for COVID 19 prior to admission: YES  Bariatric?: No    Vital Signs:   Vitals:    08/15/21 1223 08/15/21 1505   BP: (!) 146/85 (!) 130/91   Pulse: 71 77   Resp: 17    Temp: 97  F (36.1  C)    TempSrc: Temporal    SpO2: 100%    Weight: 70.3 kg (155 lb)    Height: 1.676 m (5' 6\")        Cardiac Rhythm:     Was the PSS-3 completed:   Yes  What interventions are required if any?               Family Comments:  at bedside in ED  OBS brochure/video discussed/provided to patient/family: No    For the majority of the shift this patient's behavior was Green - very wonderful and pleasant patient/.  Behavioral interventions performed were care and rounding.    ED NURSE PHONE NUMBER: *[phone]         "

## 2021-08-15 NOTE — CONSULTS
[facility]    Stroke Consult Note    Reason for Consult: Curbside Consult, Not a Code Stroke. But concerning finding on MRI.    Chief Complaint: Numbness    HPI  51F HTN, HLD, Anxiety presenting with episodic right-sided face/arm numbness. The patient states that she was sitting talking with a friend last night around 21:30 when she felt the right half of her face and body become numb and tingly. Her friend helped her stand up because she felt lightheaded and nauseated. She noticed this sensation in her right arm but did not have it in either of her legs. She also felt that her face was drooping but her friend states she did not see this. Her symptoms lasted for 1 minute before fully resolving. She reports having 4 additional episodes of symptoms since last night. She went to urgent care who referred her to the ED for more extensive neuro work up. She denies double or blurry vision. No speech changes, dizziness, loss of balance, or headache. She denies chest pain, shortness of breath, or cough. No tobacco, alcohol, or illegal drug use. She reports a distant history of migraines but they were not similar to her symptoms today. She has a personal history of borderline hypertension and hyperlipidemia. Family history of heart disease (mother, father). More distant family history of stroke and blood clots. Recent nissen fundoplication, 3 weeks ago.    Thrombolytic Treatment   Not given due to minor/isolated/quickly resolving symptoms.    Endovascular Treatment  Not initiated due to absence of proximal vessel occlusion    Impression   Transient ischemic attack vs. MRI Negative Stroke  Stroke Etiology: Dissection.     Recommendations  - Neurochecks and Vital Signs every Q4H   - Daily aspirin 325 mg for secondary stroke prevention  - Start Plavix 75mg daily, Load with Plavix 300mg Once  - Continue DAPT for 3months ( + Plavix 75)  - Repeat MRI/MRA Brain w/wo contrast with Post-Fat Sat  "in 3 months.   - Follow-up in Clinic for de-escalation of anti-platelet therapy.   - Statin: Lipitor 80mg   - TTE (with Bubble Study if age 60 yrs or less)- pending  - 24-hour Telemetry  - Bedside Glucose Monitoring  - A1c  - Stroke Education  - Euthermia, Euglycemia     Stroke Evaluation summarized:  MRI/Head CT: Negative for Stroke  Intracranial Vascular Imaging: normal vessel  Cervical Carotid and Vertebral Artery Vascular Imaging: Right Vert Dissection  Echocardiogram: Pending  EKG/Telemetry: SR with sinus arrhythmia, prolonged QTc.  LDL: 160-->114  A1c: pending  Troponin: NA    Patient Follow-up    - in 4-6 weeks weeks with Dr. Gabriel Isabel at [facility]. Patient will be contacted by virtual stroke clinic team after discharge.    Thank you for this consult. We will continue to follow.     The Stroke Staff is Dr. Harper.    Charlie Small MD  Vascular Neurology Fellow  To page me or covering stroke neurology team member, click here: AMCOM   Choose \"On Call\" tab at top, then search dropdown box for \"Neurology Adult\", select location, press Enter, then look for stroke/neuro ICU/telestroke.    ______________________________________________________    Clinically Significant Risk Factors Present on Admission          # Hypocalcemia: Ca = 8.4 mg/dL (Ref range: 8.5 - 10.1 mg/dL) and/or iCa = N/A on admission, will replace as needed          Past Medical History   Past Medical History:   Diagnosis Date     ADD (attention deficit disorder)      Anxiety      Gastroesophageal reflux disease without esophagitis      Hypertension      Urethral hypermobility 06/26/2013     Past Surgical History   Past Surgical History:   Procedure Laterality Date     EGD      7/2020     GYN SURGERY  07/12/2012    Novasure     HYSTERECTOMY, PAP NO LONGER INDICATED       LAPAROSCOPIC HERNIORRHAPHY HIATAL N/A 7/26/2021    Procedure: HERNIORRHAPHY, HIATAL, LAPAROSCOPIC;  Surgeon: Mady Potts MD;  Location:  OR "     LAPAROSCOPIC HYSTERECTOMY TOTAL  08/12/2013    Procedure: LAPAROSCOPIC HYSTERECTOMY TOTAL;  Laparoscopic Total Hysterectomy,Bilateral Salpingectomy with Sparing of the Ovaries,Uterosacral Suspension,Transvaginal Taping,Perineoplasty;  Surgeon: Sy Castro MD;  Location: [facility] OR     LAPAROSCOPIC NISSEN FUNDOPLICATION N/A 7/26/2021    Procedure: FUNDOPLICATION, NISSEN, LAPAROSCOPIC;  Surgeon: Mady Potts MD;  Location: [facility] OR     SLING TRANSVAGINAL  08/12/2013    Procedure: SLING TRANSVAGINAL;;  Surgeon: Sy Castro MD;  Location: [facility] OR     Medications   Home Meds  Prior to Admission medications    Medication Sig Start Date End Date Taking? Authorizing Provider   acetaminophen (TYLENOL) 325 MG/10.15ML solution Take 31.25 mLs (1,000 mg) by mouth every 6 hours as needed for mild pain or fever 8/6/21   Jacinda Banks, NP   amphetamine-dextroamphetamine (ADDERALL) 20 MG tablet Take 20 mg by mouth 2 times daily 20-40 prn  Patient not taking: Reported on 8/13/2021    Reported, Patient   EPINEPHrine (EPIPEN) 0.3 MG/0.3ML injection Inject 0.3 mLs into the muscle once as needed for anaphylaxis. 6/28/13   Jakob Ellis MD   escitalopram (LEXAPRO) 20 MG tablet Take 1 tablet (20 mg) by mouth At Bedtime 8/6/21   Jacinda Banks, NP   olopatadine (PATANOL) 0.1 % ophthalmic solution Place 1-2 drops into both eyes 2 times daily  10/18/16   Reported, Patient   ondansetron (ZOFRAN-ODT) 4 MG ODT tab Take 1 tablet (4 mg) by mouth every 8 hours as needed for nausea 7/30/21   Janeth Goldberg APRN CNS   oxyCODONE (ROXICODONE) 5 MG/5ML solution Take 5 mLs (5 mg) by mouth every 6 hours as needed for moderate to severe pain 8/6/21   Jacinda Banks, NP   polyethylene glycol (MIRALAX) 17 g packet Take 17 g by mouth 2 times daily 7/29/21   Tyrese Kwon PA-C   sucralfate (CARAFATE) 1 GM tablet Take 1 g by mouth 4 times daily as needed  6/11/20   Reported, Patient   traZODone (DESYREL) 100  MG tablet Take 1 tablet (100 mg) by mouth At Bedtime 8/6/21   Jacinda Banks, NP         Allergies   Allergies   Allergen Reactions     Bees Swelling     Disfiguring      Suture Swelling     local swelling and sutures didn't dissolve vyrcil   Suture      Family History   Family History   Problem Relation Age of Onset     Lipids Mother         chol     Hypertension Mother      Lipids Father         chol     Hypertension Father      C.A.D. Father      Heart Disease Father      Social History   Social History     Tobacco Use     Smoking status: Never Smoker     Smokeless tobacco: Never Used   Substance Use Topics     Alcohol use: Yes     Alcohol/week: 0.0 standard drinks     Comment: 2-3 drinks per week     Drug use: No       Review of Systems   The 10 point Review of Systems is negative other than noted in the HPI or here.       PHYSICAL EXAMINATION  Temp:  [97  F (36.1  C)-98.3  F (36.8  C)] 97  F (36.1  C)  Pulse:  [71-77] 77  Resp:  [16-17] 17  BP: (114-146)/(85-91) 130/91  SpO2:  [98 %-100 %] 100 %     General Exam  General:  patient lying in bed without any acute distress    HEENT:  normocephalic/atraumatic  Cardio:  RRR  Pulmonary:  no respiratory distress  Abdomen:  soft, non-tender, non-distended  Extremities:  no edema  Skin:  intact     Neuro Exam  Mental Status:  alert, oriented x 3, follows commands, speech clear and fluent, naming and repetition normal  Cranial Nerves:  visual fields intact, PERRL, EOMI with normal smooth pursuit, facial sensation intact and symmetric, facial movements symmetric, hearing not formally tested but intact to conversation, palate elevation symmetric and uvula midline, no dysarthria, shoulder shrug strong bilaterally, tongue protrusion midline  Motor:  normal muscle tone and bulk, no abnormal movements, able to move all limbs spontaneously, strength 5/5 throughout upper and lower extremities, no pronator drift  Reflexes:  2+ and symmetric throughout, toes  down-going  Sensory:  light touch sensation intact and symmetric throughout upper and lower extremities, no extinction on double simultaneous stimulation   Coordination:  normal finger-to-nose and heel-to-shin bilaterally without dysmetria, rapid alternating movements symmetric  Station/Gait:  normal width, turn, arm swing    Dysphagia Screen  Per Nursing    Stroke Scales    NIHSS  Interval baseline (08/15/21 1831)   Interval Comments     1a. Level of Consciousness 0-->Alert, keenly responsive   1b. LOC Questions 0-->Answers both questions correctly   1c. LOC Commands 0-->Performs both tasks correctly   2.   Best Gaze 0-->Normal   3.   Visual 0-->No visual loss   4.   Facial Palsy 0-->Normal symmetrical movements   5a. Motor Arm, Left 0-->No drift, limb holds 90 (or 45) degrees for full 10 secs   5b. Motor Arm, Right 0-->No drift, limb holds 90 (or 45) degrees for full 10 secs   6a. Motor Leg, Left 0-->No drift, leg holds 30 degree position for full 5 secs   6b. Motor Leg, right 0-->No drift, leg holds 30 degree position for full 5 secs   7.   Limb Ataxia 0-->Absent   8.   Sensory 0-->Normal, no sensory loss   9.   Best Language 0-->No aphasia, normal   10. Dysarthria 0-->Normal   11. Extinction and Inattention  0-->No abnormality   Total 0 (08/15/21 1831)       Modified [place] Score (Pre-morbid)  0-No symptoms    Imaging  I personally reviewed all imaging; relevant findings per HPI.     Lab Results Data   CBC  Recent Labs   Lab 08/15/21  1304 08/13/21  1547   WBC 7.7 11.1*   RBC 4.64 4.76   HGB 13.4 14.1   HCT 41.7 42.8    441     Basic Metabolic Panel    Recent Labs   Lab 08/15/21  1304      POTASSIUM 3.6   CHLORIDE 106   CO2 26   BUN 16   CR 0.63   *   JUANY 8.4*     Liver Panel  No results for input(s): PROTTOTAL, ALBUMIN, BILITOTAL, ALKPHOS, AST, ALT, BILIDIRECT in the last 168 hours.  INR  No lab results found.   Lipid Profile    Recent Labs   Lab Test 07/03/20  0859 05/20/16  0754  05/28/15  0840 06/05/13  1024   CHOL 264* 248* 312* 264*   HDL 57 60 76 68   * 148* 202* 165*   TRIG 235* 199* 171* 153*   CHOLHDLRATIO  --   --  4.1 4.0     A1C  No lab results found.  Troponin I  No results for input(s): TROPONIN in the last 168 hours.

## 2021-08-15 NOTE — PROGRESS NOTES
RECEIVING UNIT ED HANDOFF REVIEW    ED Nurse Handoff Report was reviewed by: Hernandez Little RN on August 15, 2021 at 6:05 PM

## 2021-08-15 NOTE — PROGRESS NOTES
SUBJECTIVE:  Jaz Archibald is an 51 year old female who presents for R face/arm numbness.  Numbness on R side of face and arm, started last night, has happened a couple more times this morning.  Has had some dizziness too.  [place] nauseated last night.  Surgery 3 weeks ago, Nissen fundoplication.  Was not a fun recovery, had chest tube.  Numbness lasts a few minutes at a time, maybe 2 minutes at a time and then is completely gone.  Has happened 3-4 times in the last 12 hours.  Feels dizzy and like her eye and face are drooping when it is happening, but others around her did not think it was drooping.  No speech difficulties during the episode that she remembers.  Noticed some weakness with the numbness episode.  She is worried about a stroke/stroke precursor.  Her grandmother  from a blood clot after surgery, unsure if it was a stroke or PE.  H/o high cholesterol, HTN in family.  Family h/o stroke in aunts.  Has never had stroke personally.  Has always had borderline BP and has not taken BP meds since being out of hospital.      PMH:   has a past medical history of ADD (attention deficit disorder), Anxiety, Gastroesophageal reflux disease without esophagitis, Hypertension, and Urethral hypermobility (2013).  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, insomnia type     Hypothyroidism     Health Care Home     Dyspareunia     Hyperlipidemia LDL goal <130     Essential hypertension     QT prolongation     Persons encountering health services in other specified circumstances     History of basal cell carcinoma     Environmental allergies     Encounter for examination for normal comparison and control in clinical research program     Elevated LDL cholesterol level     Vitamin D deficiency     Overweight with body mass index (BMI) of 25 to 25.9 in adult     GERD without esophagitis     Social History      Socioeconomic History     Marital status:      Spouse name: None     Number of children: 2     Years of education: None     Highest education level: None   Occupational History     Employer: [employer]   Tobacco Use     Smoking status: Never Smoker     Smokeless tobacco: Never Used   Substance and Sexual Activity     Alcohol use: Yes     Alcohol/week: 0.0 standard drinks     Comment: 2-3 drinks per week     Drug use: No     Sexual activity: Yes     Partners: Male     Birth control/protection: Surgical     Comment: vasectomy significant other   Other Topics Concern     Parent/sibling w/ CABG, MI or angioplasty before 65F 55M? No      Service Not Asked     Blood Transfusions Not Asked     Caffeine Concern Not Asked     Occupational Exposure Not Asked     Hobby Hazards Not Asked     Sleep Concern Not Asked     Stress Concern Not Asked     Weight Concern Not Asked     Special Diet No     Back Care Not Asked     Exercise No     Bike Helmet Not Asked     Seat Belt Yes     Self-Exams Not Asked   Social History Narrative     None     Social Determinants of Health     Financial Resource Strain:      Difficulty of Paying Living Expenses:    Food Insecurity:      Worried About Running Out of Food in the Last Year:      Ran Out of Food in the Last Year:    Transportation Needs:      Lack of Transportation (Medical):      Lack of Transportation (Non-Medical):    Physical Activity:      Days of Exercise per Week:      Minutes of Exercise per Session:    Stress:      Feeling of Stress :    Social Connections:      Frequency of Communication with Friends and Family:      Frequency of Social Gatherings with Friends and Family:      Attends [employer] Services:      Active Member of Clubs or Organizations:      Attends Club or Organization Meetings:      Marital Status:    Intimate Partner Violence:      Fear of Current or Ex-Partner:      Emotionally Abused:      Physically Abused:      Sexually Abused:   "    Family History   Problem Relation Age of Onset     Lipids Mother         chol     Hypertension Mother      Lipids Father         chol     Hypertension Father      C.A.D. Father      Heart Disease Father        ALLERGIES:  Bees and Suture    Current Outpatient Medications   Medication     acetaminophen (TYLENOL) 325 MG/10.15ML solution     escitalopram (LEXAPRO) 20 MG tablet     ondansetron (ZOFRAN-ODT) 4 MG ODT tab     traZODone (DESYREL) 100 MG tablet     amphetamine-dextroamphetamine (ADDERALL) 20 MG tablet     EPINEPHrine (EPIPEN) 0.3 MG/0.3ML injection     olopatadine (PATANOL) 0.1 % ophthalmic solution     oxyCODONE (ROXICODONE) 5 MG/5ML solution     polyethylene glycol (MIRALAX) 17 g packet     sucralfate (CARAFATE) 1 GM tablet     No current facility-administered medications for this visit.         ROS:  ROS is done and is negative for general/constitutional, eye, ENT, Respiratory, cardiovascular, GI, , Skin, musculoskeletal except as noted elsewhere.  All other review of systems negative except as noted elsewhere.    OBJECTIVE:  /86   Pulse 75   Temp 98.3  F (36.8  C) (Oral)   Resp 16   Ht 1.676 m (5' 6\")   Wt 70.3 kg (155 lb)   LMP 07/26/2013   SpO2 98%   BMI 25.02 kg/m    GENERAL APPEARANCE: Alert, in no acute distress  EYES: normal  EARS: negative  NOSE:normal  OROPHARYNX:normal  NECK: supple, symmetrical  RESP: no increased effort  CV:capillary refill less than two seconds  ABDOMEN: nondistended  SKIN: warm, dry, no rashes  MUSCULOSKELETAL:Musculoskeletal normal  Neuro: CN 2-12 intact.  Pronator drift negative bilaterally, normal and symmetrical strength and tone over all extremities.  Sensation intact to light touch over all extremities.  Normal gait, negative Romberg.  Normal FNF bilaterally, normal heel-shin bilaterally.      RESULTS  No results found for any visits on 08/15/21..  Recent Results (from the past 48 hour(s))   US Lower Extremity Venous Duplex Left    Narrative    " EXAM: US LOWER EXTREMITY VENOUS DUPLEX LEFT  LOCATION: [facility]  DATE/TIME: 8/13/2021 4:53 PM    INDICATION:  Swelling of left lower extremity  COMPARISON: None.  TECHNIQUE: Venous Duplex ultrasound of the left lower extremity with and without compression, augmentation and duplex. Color flow and spectral Doppler with waveform analysis performed.    FINDINGS: Exam includes the common femoral, femoral, popliteal, and contralateral common femoral veins as well as segmentally visualized deep calf veins and greater saphenous vein.     LEFT: No deep vein thrombosis. No superficial thrombophlebitis. No popliteal cyst.      Impression    IMPRESSION:  1.  No deep venous thrombosis in the left lower extremity.       ASSESSMENT/PLAN:  (R20.0,  R20.2) Numbness and tingling  (primary encounter diagnosis)  Comment: Patient's history and symptoms are concerning for TIA, especially in light of her family history and personal history of high cholesterol and HTN, although BP looks good today.  No evidence of active stroke.  My highest concern would be TIA, although discussed with the patient that there could be a mental health component due to the recent stressors in her life from surgery.  Extensive neuro exam normal today, no evidence of active stroke.  However, this condition requires more workup than we can perform at this facility so recommended patient go to the ED for futher workup and neurological consultation.  Patient and partner verbalized understanding and will go to [facility] ED.  Called and provided signout to receiving ED physician.  Plan: To ED for further workup, as above.    (R42) Dizziness  Comment: As above.  Plan: As above.    PPE worn: N95, goggles.    See [facility] for orders, medications, letters, patient instructions    Jose Yancey MD

## 2021-08-16 ENCOUNTER — TELEPHONE (OUTPATIENT)
Dept: CARE COORDINATION | Facility: CLINIC | Age: 51
End: 2021-08-16

## 2021-08-16 ENCOUNTER — APPOINTMENT (OUTPATIENT)
Dept: CARDIOLOGY | Facility: CLINIC | Age: 51
End: 2021-08-16
Attending: INTERNAL MEDICINE
Payer: COMMERCIAL

## 2021-08-16 ENCOUNTER — MYC MEDICAL ADVICE (OUTPATIENT)
Dept: INTERNAL MEDICINE | Facility: CLINIC | Age: 51
End: 2021-08-16

## 2021-08-16 ENCOUNTER — TELEPHONE (OUTPATIENT)
Dept: OTHER | Facility: CLINIC | Age: 51
End: 2021-08-16

## 2021-08-16 VITALS
DIASTOLIC BLOOD PRESSURE: 85 MMHG | SYSTOLIC BLOOD PRESSURE: 121 MMHG | HEART RATE: 69 BPM | OXYGEN SATURATION: 95 % | BODY MASS INDEX: 25.16 KG/M2 | HEIGHT: 66 IN | TEMPERATURE: 97.8 F | WEIGHT: 156.53 LBS | RESPIRATION RATE: 16 BRPM

## 2021-08-16 LAB
GLUCOSE BLDC GLUCOMTR-MCNC: 81 MG/DL (ref 70–99)
GLUCOSE BLDC GLUCOMTR-MCNC: 90 MG/DL (ref 70–99)
HBA1C MFR BLD: 5 % (ref 0–5.6)
LVEF ECHO: NORMAL

## 2021-08-16 PROCEDURE — 999N000208 ECHOCARDIOGRAM COMPLETE

## 2021-08-16 PROCEDURE — 99217 PR OBSERVATION CARE DISCHARGE: CPT | Performed by: PHYSICIAN ASSISTANT

## 2021-08-16 PROCEDURE — G0378 HOSPITAL OBSERVATION PER HR: HCPCS

## 2021-08-16 PROCEDURE — 93005 ELECTROCARDIOGRAM TRACING: CPT

## 2021-08-16 PROCEDURE — 93306 TTE W/DOPPLER COMPLETE: CPT | Performed by: INTERNAL MEDICINE

## 2021-08-16 PROCEDURE — 250N000013 HC RX MED GY IP 250 OP 250 PS 637: Performed by: PHYSICIAN ASSISTANT

## 2021-08-16 PROCEDURE — 99205 OFFICE O/P NEW HI 60 MIN: CPT | Mod: GC | Performed by: STUDENT IN AN ORGANIZED HEALTH CARE EDUCATION/TRAINING PROGRAM

## 2021-08-16 PROCEDURE — 255N000002 HC RX 255 OP 636: Performed by: INTERNAL MEDICINE

## 2021-08-16 PROCEDURE — 250N000013 HC RX MED GY IP 250 OP 250 PS 637: Performed by: INTERNAL MEDICINE

## 2021-08-16 RX ORDER — CLOPIDOGREL BISULFATE 75 MG/1
300 TABLET ORAL ONCE
Status: COMPLETED | OUTPATIENT
Start: 2021-08-16 | End: 2021-08-16

## 2021-08-16 RX ORDER — CLOPIDOGREL BISULFATE 75 MG/1
75 TABLET ORAL DAILY
Qty: 30 TABLET | Refills: 1 | Status: SHIPPED | OUTPATIENT
Start: 2021-08-17 | End: 2021-10-18

## 2021-08-16 RX ORDER — CLOPIDOGREL BISULFATE 75 MG/1
75 TABLET ORAL DAILY
Status: DISCONTINUED | OUTPATIENT
Start: 2021-08-17 | End: 2021-08-16 | Stop reason: HOSPADM

## 2021-08-16 RX ORDER — ATORVASTATIN CALCIUM 80 MG/1
80 TABLET, FILM COATED ORAL EVERY EVENING
Qty: 30 TABLET | Refills: 1 | Status: SHIPPED | OUTPATIENT
Start: 2021-08-16 | End: 2021-10-18

## 2021-08-16 RX ORDER — CLOPIDOGREL BISULFATE 75 MG/1
300 TABLET ORAL ONCE
Status: DISCONTINUED | OUTPATIENT
Start: 2021-08-16 | End: 2021-08-16

## 2021-08-16 RX ORDER — ASPIRIN 81 MG/1
81 TABLET ORAL DAILY
Status: DISCONTINUED | OUTPATIENT
Start: 2021-08-17 | End: 2021-08-16 | Stop reason: HOSPADM

## 2021-08-16 RX ADMIN — CLOPIDOGREL BISULFATE 300 MG: 75 TABLET ORAL at 11:14

## 2021-08-16 RX ADMIN — OXYCODONE HYDROCHLORIDE 5 MG: 5 SOLUTION ORAL at 04:36

## 2021-08-16 RX ADMIN — ACETAMINOPHEN 650 MG: 325 TABLET, FILM COATED ORAL at 07:56

## 2021-08-16 RX ADMIN — OXYCODONE HYDROCHLORIDE 5 MG: 5 SOLUTION ORAL at 11:14

## 2021-08-16 RX ADMIN — HUMAN ALBUMIN MICROSPHERES AND PERFLUTREN 9 ML: 10; .22 INJECTION, SOLUTION INTRAVENOUS at 09:20

## 2021-08-16 RX ADMIN — ACETAMINOPHEN 650 MG: 325 TABLET, FILM COATED ORAL at 03:23

## 2021-08-16 RX ADMIN — ASPIRIN 325 MG: 325 TABLET, COATED ORAL at 07:42

## 2021-08-16 NOTE — TELEPHONE ENCOUNTER
Pt referred to [facility] by Stacy Alexander PA-C for Dissection, vertebral artery.     Pt needs to be scheduled for in person consult with vascular medicine (referring provider requesting Dr. Austin).  Will route to scheduling to coordinate an appointment at next available.    JOHN GalindoN, RN  [facility]

## 2021-08-16 NOTE — PROGRESS NOTES
Observation goals PRIOR TO DISCHARGE     Comments: List all goals to be met before discharge home       Free from ACUTE neuro deficits: met   Testing complete: met    Other:   Nurse to notify provider when observation goals have been met and patient is ready for discharge.

## 2021-08-16 NOTE — PROGRESS NOTES
Observation goals PRIOR TO DISCHARGE     Comments: List all goals to be met before discharge home       Free from ACUTE neuro deficits: met   Testing complete: met    Other: AWAITING NEUROLOGY    Nurse to notify provider when observation goals have been met and patient is ready for discharge.

## 2021-08-16 NOTE — PLAN OF CARE
([id number]) patient is A&O x4. Up independent in room, voiding. Denies any numbness or tingling this shift. Intermittent abdomen pain managed with oxycodone. Will continue to monitor.

## 2021-08-16 NOTE — PLAN OF CARE
SLP: Orders received. Chart reviewed and discussed with care team.  SLP not indicated. RN states a swallow evaluation is not warranted. Pt reports her swallowing and her speech are fine and she feels at baseline in these areas. No SLP evaluation warranted. Defer discharge recommendations to PT/OT and medical team.  Will complete orders.

## 2021-08-16 NOTE — TELEPHONE ENCOUNTER
----- Message from Stacy Alexander PA-C sent at 8/16/2021 10:54 AM CDT -----  Regarding: Hospital follow up needed  Virtual stroke [facility]    Please schedule the patient for hospital follow up: schedule with specific stroke [facility] only: about 12 weeks after mri done, mri should be done around 10-12 weeks from now    Diagnosis: vertebral artery disection    Contact: patient him/herself    For any questions, or if this time frame does not work, please write to [facility]    Thank you    Stacy Alexander PA-C  10:54 AM 08/16/2021

## 2021-08-16 NOTE — PROVIDER NOTIFICATION
Paged hospitalist regarding pt requesting for oxycodone, lexapro and trazodone. Awaiting for call back.       @2045: received call back, Paged Dr Carrillo for med rec per DANILO Betancur.     @ 2046: paged Dr. Carrillo for above request. Awaiting for call back.

## 2021-08-16 NOTE — PLAN OF CARE
Aox4. VSS on RA. Headache and neck pain managed with ice, prn tylenol, and oxycodone. Moves independently. Q4 nueros were intact. Denies numbness or tingling. Regular diet. Neuro and SL consults. Discharge pending.    Observation goals PRIOR TO DISCHARGE     Comments: List all goals to be met before discharge home       Free from ACUTE neuro deficits: met   Testing complete: met    Other:   Nurse to notify provider when observation goals have been met and patient is ready for discharge.

## 2021-08-16 NOTE — TELEPHONE ENCOUNTER
MB full, unable to LM at time of call.    Next available with Dr. Austin in [place] is 9/15/2021.    Rosa GILLESPIE

## 2021-08-16 NOTE — H&P
Admitted: 08/15/2021    PRIMARY CARE PHYSICIAN:  Dr. Jacinda Banks.    CODE STATUS:  FULL CODE.    CHIEF COMPLAINT:  Right-sided facial numbness and right arm weakness.    HISTORY OF PRESENT ILLNESS:  Ms. Archibald is a 51-year-old female with a past medical history significant for hypertension, dyslipidemia, anxiety, GERD with recent Nissen fundoplication, who presents to the Emergency Department with the above concerns.  History is obtained through discussion with the ED physician, the patient and her  at bedside.  The patient notes that she was spending time with a friend last evening when all of a sudden she had trouble speaking and felt that the right half of her face just went tingly and then numb and it felt like she was having a hard time moving her right arm and that she had loss of sensation.  She states that it is like they literally darren a line down the center of her face and the right side felt odd and felt like she was having a facial droop.  Her friend at that time stated that she was not actually having facial droop and the conversation seemed to go okay, though the patient states that she was feeling like she had a hard time speaking.  She has had stuttering symptoms since that time including a few more episodes today, which prompted a visit to urgent care and she was ultimately sent to the ED.    Here in the ED, the patient had an MRI of the brain showing right vertebral artery dissection in the V2 segment.  Neurology initially was thinking discharged home on aspirin, but given her stuttering symptoms, elected to observe her overnight for potentially further evaluation.  When I went to see the patient, she had not had an episode the entire time in the ED until just before I arrived.  She had a very similar episode as all the previous ones that she had with her face and right arm, but nothing on the left side and nothing in her legs.  The symptoms last at most 1 minute to maybe 2 minutes before  complete resolution.  She denies any new chest pain or shortness of breath.  She does have a headache at times.  No vision changes with this, but she has had some blurred vision at times over the past couple of weeks.  She gets headaches occasionally, but it is not associated with these episodes.  She has not had any chiropractic manipulation, nor any trauma.  She has had some pain medications and nausea medications from her recent surgery.  She has never before had a TIA or any stroke-like symptoms, nor has she had dissection any connective tissue disorder or any vasculature issues.  She is wondering whether or not this potentially could be related to her recent surgery, noting that the Nissen was on 07/26/2021.  The patient does state that she had some complications from the surgery including them nicking her lung when they were fixing her hiatal hernia, so she did have a chest tube in for a period of time.    MEDICATIONS:    Medications Prior to Admission   Medication Sig Dispense Refill Last Dose     acetaminophen (TYLENOL) 500 MG tablet Take 500-1,000 mg by mouth every 6 hours as needed for mild pain   8/15/2021 at AM     EPINEPHrine (EPIPEN) 0.3 MG/0.3ML injection Inject 0.3 mLs into the muscle once as needed for anaphylaxis. 2 each 3  at PRN     escitalopram (LEXAPRO) 20 MG tablet Take 1 tablet (20 mg) by mouth At Bedtime 90 tablet 1 8/14/2021 at HS     lisinopril (ZESTRIL) 10 MG tablet Take 10 mg by mouth daily    at has not started yet     olopatadine (PATANOL) 0.1 % ophthalmic solution Place 1-2 drops into both eyes 2 times daily as needed for allergies   6  at PRN     ondansetron (ZOFRAN-ODT) 4 MG ODT tab Take 1 tablet (4 mg) by mouth every 8 hours as needed for nausea 20 tablet 1 8/14/2021 at HS     oxyCODONE (ROXICODONE) 5 MG/5ML solution Take 5 mLs (5 mg) by mouth every 6 hours as needed for moderate to severe pain 100 mL 0 8/14/2021 at HS     polyethylene glycol (MIRALAX) 17 g packet Take 17 g by  mouth 2 times daily 7 packet 0  at PRN     sucralfate (CARAFATE) 1 GM tablet Take 1 g by mouth 4 times daily as needed     at PRN     traZODone (DESYREL) 100 MG tablet Take 1 tablet (100 mg) by mouth At Bedtime 90 tablet 1 8/14/2021 at          SOCIAL HISTORY:  The patient denies any use of tobacco or alcohol.    FAMILY HISTORY:  Mother had hypertension.  Father had heart disease and many aunts and uncles had strokes.    ALLERGIES:  BEES AND SUTURES.    REVIEW OF SYSTEMS:  A complete review of systems reviewed and negative, save for the pertinent positives recorded in the HPI.    PHYSICAL EXAMINATION:    VITAL SIGNS:  Show blood pressure of 130/91, heart rate 77, respirations 17, satting 100% on room air, temperature 97.6 degrees Fahrenheit.  GENERAL:  The patient is lying in bed and resting comfortably.  HEENT:  Pupils equal, round, reactive to light, extraocular muscle function intact.  No scleral icterus.  Oropharynx is clear.  NECK:  No lymphadenopathy or thyromegaly.  CARDIOVASCULAR:  Heart is regular rate and rhythm without any murmur, rub or gallop.  PULMONARY:  Lungs are clear to auscultation bilaterally without wheeze or rhonchi.  GASTROINTESTINAL:  Positive bowel sounds, soft, nontender and nondistended.  SKIN:  No rashes or lesions.  LYMPHATICS:  No peripheral edema.  PSYCHIATRIC:  Alert and oriented x 3, normal affect.  NEUROLOGIC:  Cranial nerves II-XII grossly intact.  Pronator drift is negative.  Finger-nose-finger testing is normal.  Muscle strength is equal and symmetric in both upper and lower extremities.  No focal deficits are noted.    LABORATORY DATA:  CBC and BMP are unremarkable.  The last cholesterol she had was about a year ago and noted to be 264 with an LDL of 160 and HDL of 57.  MRI of the head, brain and neck show evidence of a right vertebral artery dissection diffusely involving the V2 segment with an associated severe focal stenosis involving the true lumen of the mid V2 segments.   The dominant left vertebral artery is widely patent.  I do note that she had a left lower extremity ultrasound a couple of days ago, which was negative for DVT.    IMPRESSION AND PLAN:  Ms. Flynn is a 51-year-old female with a past medical history significant for hypertension, dyslipidemia, anxiety and gastroesophageal reflux disease with recent Nissen fundoplication, who presents to the Emergency Departments with a right-sided facial weakness and numbness and found to have a right vertebral artery dissection.  1.  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  This would account for her TIA symptoms of right-sided facial numbness and tingling and right arm weakness.  She has been initiated on aspirin, which will be continued.  A formal neurology consultation for any further workup has been entered.  I am going to hold on any therapies for now, given the initial plan was to discharge to home, but these always can be added on.  We will monitor on telemetry.  Etiology of this vertebral artery dissection is not entirely clear at this point.  Her symptoms are transient and at this point, I think it is fine to give her a diet.  2.  Anxiety:  Continue with what appears to be Lexapro.  3.  Gastroesophageal reflux disease with recent Nissen fundoplication:  She also had a hiatal hernia repair.  Pain control as needed and follow up with surgeon as an outpatient as indicated.  4.  Dyslipidemia:  Repeat lipid panel has been ordered.  5.  Disposition:  Anticipate an overnight stay for further observation and Neurology consultation with likely discharge to home tomorrow. Brought in under observation status.    Luis Carrillo DO        D: 08/15/2021   T: 08/15/2021   MT: [id number]    Name:     TERRANCE FLYNN  MRN:      [id number]        Account:     [id number]   :      1970           Admitted:    08/15/2021       Document: [id number]

## 2021-08-16 NOTE — PLAN OF CARE
Patient is discharging. All belongings with patient. Discharge meds will be filled here and sent with patient. All discharge instructions discussed w/ patient and questions were answered. PIV removed.  is coming to  patient.

## 2021-08-16 NOTE — PROGRESS NOTES
A/OX4. VSS on RA. Negative Orthostatic BP. Denies numbness or tingling at the moment. Denies pain. Antonio regular diet well. Independent. Neurology consulted. ECHO tomorrow.

## 2021-08-16 NOTE — DISCHARGE SUMMARY
[facility]    Discharge Summary  Hospitalist    Date of Admission:  8/15/2021  Date of Discharge:  8/16/2021  Discharging Provider: Clarence Rhodes  Date of Service (when I saw the patient): 08/16/21    Discharge Diagnoses   1. 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 Dyslipidemia  3. Anxiety  4. GERD    History of Present Illness   Ms. Archibald is a 51-year-old female with a past medical history significant for hypertension, dyslipidemia, anxiety and gastroesophageal reflux disease with recent Nissen fundoplication, who presents to the Emergency Departments with a right-sided facial weakness and numbness and found to have a right vertebral artery dissection.    Please refer to the history and physical from Dr. Carrillo for additional details.    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    This would account for her TIA symptoms of right-sided facial numbness and tingling and right arm weakness.  Etiology of this vertebral artery dissection is not entirely clear at this point.  Her symptoms are transient and at this point neurochecks stable, no neurological deficits while hospitalized.  - Transthoracic echocardiogram normal LVEF 55-60%, no RWMA, RV normal, no significant evidence for valvular pathology, bubble study negative.  - 24-hour Telemetry without any arrhythmia  - Bedside Glucose Monitoring  - A1c 5.0, WNL    Plan  - Daily aspirin  mg for secondary stroke prevention  - Start Plavix 75mg daily, Load with Plavix 300mg Once  - Continue DAPT for 3months ( + Plavix 75)  - Repeat MRI/MRA Brain w/wo contrast with Post-Fat Sat in 3 months.   - Follow-up in Clinic for de-escalation of anti-platelet therapy.   - Statin: Lipitor 80mg   - Stroke Education    Stroke Evaluation summarized:  MRI/Head CT: Negative for Stroke  Intracranial Vascular Imaging: normal vessel  Cervical Carotid and  Vertebral Artery Vascular Imaging: Right Vert Dissection  Echocardiogram: As above, normal  EKG/Telemetry: SR with sinus arrhythmia, prolonged QTc.  LDL: 160-->114  A1c: 5.0  Troponin: NA     Anxiety:  Continue PTA Lexapro.     Gastroesophageal reflux disease with recent Nissen fundoplication:  She also had a hiatal hernia repair.  Pain control as needed and follow up with surgeon as an outpatient as indicated.     Dyslipidemia:  Repeat lipid panel shows , , HDL 47, .  High-dose statin as above.      Clarence Rhodes PA-C    Significant Results and Procedures   As documented above.  Detailed imaging results are available in the data section below.    Pending Results   None    Code Status   Full Code       Primary Care Physician   Jacinda Banks    Physical Exam   Temp: 97.8  F (36.6  C) Temp src: Oral BP: 121/85 Pulse: 69   Resp: 16 SpO2: 95 % O2 Device: None (Room air)    Vitals:    08/15/21 1223 08/15/21 1836   Weight: 70.3 kg (155 lb) 71 kg (156 lb 8.4 oz)     Vital Signs with Ranges  Temp:  [97.7  F (36.5  C)-98.3  F (36.8  C)] 97.8  F (36.6  C)  Pulse:  [63-78] 69  Resp:  [14-18] 16  BP: (117-138)/(78-90) 121/85  SpO2:  [95 %-97 %] 95 %  I/O last 3 completed shifts:  In: 240 [P.O.:240]  Out: -     Constitutional: Alert, oriented to person, place, date, situation.  Cooperative, lying in bed in NAD.   Respiratory:   Breathing comfortably, lungs clear.  Cardiovascular:  Well perfused, no edema.  Skin/Integumen:  Dry, non-diaphoretic.  MSK:  Limbs atraumatic.  Moves all 4 extremities equally.  Neuro: Speech fluent and normal.  No facial droop.  Moves all 4 extremities with normal strength.  Coordination grossly normal.  No overt deficits.    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 Gabriel Isabel at [facility]. You will be contacted by virtual stroke clinic team after discharge. Repeat MRI/MRA Brain in 3 months, Neurology will arrange this. Follow up with primary care provider, Jacinda Banks, within 7 days to evaluate medication change and for hospital follow- up.     Diet    Follow this diet upon discharge: Regular     Discharge Medications   Discharge Medication List as of 8/16/2021  1:50 PM      START taking these medications    Details   aspirin (ASA) 325 MG EC tablet Take 1 tablet (325 mg) by mouth daily . Future refills by PCP Dr. Jacinda Banks with phone number [phone]., Disp-30 tablet, R-1, E-Prescribe      atorvastatin (LIPITOR) 80 MG tablet Take 1 tablet (80 mg) by mouth every evening . Future refills by PCP Dr. Jacinda Banks with phone number [phone]., Disp-30 tablet, R-1, E-Prescribe      clopidogrel (PLAVIX) 75 MG tablet Take 1 tablet (75 mg) by mouth daily . Future refills by PCP Dr. Jacinda Banks with phone number [phone]., Disp-30 tablet, R-1, E-Prescribe         CONTINUE these medications which have NOT CHANGED    Details   acetaminophen (TYLENOL) 500 MG tablet Take 500-1,000 mg by mouth every 6 hours as needed for mild pain, Historical      EPINEPHrine (EPIPEN) 0.3 MG/0.3ML injection Inject 0.3 mLs into the muscle once as needed for anaphylaxis., Disp-2 each, R-3, E-Prescribe      escitalopram (LEXAPRO) 20 MG tablet Take 1 tablet (20 mg) by mouth At Bedtime, Disp-90 tablet, R-1, E-Prescribe      lisinopril (ZESTRIL) 10 MG tablet Take 10 mg by mouth daily, Historical      olopatadine (PATANOL) 0.1 % ophthalmic solution Place 1-2 drops into both eyes 2 times daily as needed for  allergies , R-6, Historical      ondansetron (ZOFRAN-ODT) 4 MG ODT tab Take 1 tablet (4 mg) by mouth every 8 hours as needed for nausea, Disp-20 tablet, R-1, E-Prescribe      oxyCODONE (ROXICODONE) 5 MG/5ML solution Take 5 mLs (5 mg) by mouth every 6 hours as needed for moderate to severe pain, Disp-100 mL, R-0, E-Prescribe      polyethylene glycol (MIRALAX) 17 g packet Take 17 g by mouth 2 times daily, Disp-7 packet, R-0, Local Print      sucralfate (CARAFATE) 1 GM tablet Take 1 g by mouth 4 times daily as needed , Historical      traZODone (DESYREL) 100 MG tablet Take 1 tablet (100 mg) by mouth At Bedtime, Disp-90 tablet, R-1, E-Prescribe           Allergies   Allergies   Allergen Reactions     Bees Swelling     Disfiguring      Suture Swelling     local swelling and sutures didn't dissolve vyrcil   Suture      Data   Most Recent 3 CBC's:  Recent Labs   Lab Test 08/15/21  1304 08/13/21  1547 08/06/21  0818   WBC 7.7 11.1* 6.8   HGB 13.4 14.1 14.6   MCV 90 90 88    441 500*      Most Recent 3 BMP's:  Recent Labs   Lab Test 08/16/21  1128 08/16/21  0741 08/15/21  2134 08/15/21  1304 08/06/21  0818 08/06/21  0818 07/29/21  0706   NA  --   --   --  136  --  138 140   POTASSIUM  --   --   --  3.6  --  4.7 3.2*   CHLORIDE  --   --   --  106  --  106 109   CO2  --   --   --  26  --  24 25   BUN  --   --   --  16  --  9 5*   CR  --   --   --  0.63  --  0.72 0.61   ANIONGAP  --   --   --  4  --  8 6   JUANY  --   --   --  8.4*  --  9.1 8.0*   GLC 81 90 88 143*   < > 91 88    < > = values in this interval not displayed.     Most Recent 2 LFT's:  Recent Labs   Lab Test 08/06/21  0818 07/03/20  0859   AST 17 9   ALT 66* 21   ALKPHOS 70 57   BILITOTAL 0.5 0.4     Most Recent INR's and Anticoagulation Dosing History:  Anticoagulation Dose History    There is no flowsheet data to display.       Most Recent 3 Troponin's:  Recent Labs   Lab Test 07/27/21  0533   TROPONIN <0.015     Most Recent Cholesterol Panel:  Recent Labs    Lab Test 08/15/21  1304   CHOL 208*   *   HDL 47*   TRIG 236*     Most Recent 6 Bacteria Isolates From Any Culture (See EPIC Reports for Culture Details):  Recent Labs   Lab Test 09/03/13  1632 08/19/13  1141   CULT No growth after 2 days >100,000 colonies/mL Klebsiella pneumoniae ssp pneumoniae     Most Recent TSH, T4 and A1c Labs:  Recent Labs   Lab Test 08/15/21  1304 08/13/21  1547 06/24/15  1528   TSH 0.68  --  0.97   T4  --   --  1.03   A1C  --  5.0  --      Results for orders placed or performed during the hospital encounter of 08/15/21   MR Brain w/o & w Contrast    Narrative    MRI BRAIN WITHOUT AND WITH CONTRAST  8/15/2021 2:38 PM     HISTORY:  Neurologic deficit, acute, stroke suspected. Right-sided  paresthesias .    TECHNIQUE:  Multiplanar, multisequence MRI of the brain without and  with 10 mL Gadavist     COMPARISON: None.     FINDINGS: No abnormal intracranial restricted diffusion to suggest  acute infarct. The ventricles are normal in size and configuration.  Mild, age-commensurate generalized brain parenchymal volume loss. The  brain parenchyma appears normal in morphology, volume, and signal  intensity for the patient's age. No intracranial hemorrhage  identified. No extra-axial fluid collection, mass lesion, or  herniation. No abnormal intracranial postcontrast enhancement.    Asymmetrically diminished flow void of the V3 segment of the right  vertebral artery is noted (series 8 image 3). Please see separate  report from MR angiogram of the neck for additional details. The other  major arterial flow voids at the skull base appear to be grossly  maintained.    Orbits appear normal. Calvarium, skull base and mid face otherwise are  unremarkable.      Impression    IMPRESSION:  1. No acute intracranial process. Unremarkable appearance of the brain  and extra-axial spaces.  2. Abnormal flow-void involving the right vertebral artery V3 segment.  Please see MR angiogram of the neck report of  same day for additional  details.    GHAZALA KELLER MD         SYSTEM ID:  [id number]   MRA Neck (Carotids) wo & w Contrast    Narrative    MRA NECK WITHOUT AND WITH CONTRAST  8/15/2021 2:39 PM     HISTORY: Neuro deficit, acute, stroke suspected; intermittent R sided  paresthesias     TECHNIQUE: 2D time-of-flight MR angiogram of the neck without contrast  and 3D MR angiogram of the neck with  10 mL Gadavist. Estimates of  carotid stenoses are made relative to the distal internal carotid  artery diameters except as noted.     COMPARISON: None.     FINDINGS: Conventional three-vessel aortic arch branching pattern  without significant stenosis of the origins of the great vessels. The  common carotid arteries, carotid bifurcations, and bilateral cervical  internal carotid arteries are patent. Somewhat tortuous  hzdnchwf-ok-[id number] right cervical internal carotid artery and tortuous  lwy-es-cjszkw left cervical internal carotid artery.    Crescentic intrinsically T1 hyperintense signal involving the V2  segment of the right vertebral artery, consistent with intramural  hematoma (for example see series 17 image 16). There is a severe  associated focal stenosis involving the true lumen of the mid V2  segment of the right vertebral artery (series 6 image 45, series 20  image 53). Mild/moderate degrees of diffuse stenosis elsewhere  involving the right vertebral artery V2 segment. The left vertebral  artery is dominant and the right vertebral artery is developmentally  smaller in size compared to the left. The left vertebral artery is  widely patent without evidence of recent dissection.      Impression    IMPRESSION:  1. Findings consistent with recent right vertebral artery dissection  diffusely involving the V2 segment, with an associated severe focal  stenosis involving the true lumen of the mid V2 segment.  2. The dominant left vertebral artery is widely patent.  3. The bilateral common carotid arteries, carotid  bifurcations, and  cervical internal carotid arteries are patent.    The findings were discussed by phone with Dr. Sheth by myself at  approximately 3:05 PM on 8/15/2021.    GHAZALA KELLER MD         SYSTEM ID:  [id number]   MRA Brain ([place]) wo Contrast    Narrative    MR ANGIOGRAM OF THE HEAD WITHOUT CONTRAST   8/15/2021 2:22 PM     HISTORY: Neurologic deficit, acute, stroke suspected. Intermittent  right-sided paresthesias.    TECHNIQUE:  3D time-of-flight MR angiogram of the head without  contrast.    COMPARISON: None.    FINDINGS: The distal cervical, petrous, cavernous, and supraclinoid  segments of the internal carotid arteries are patent. The major  proximal branches of the middle and anterior cerebral arteries are  patent. The left vertebral artery is dominant. The visualized aspects  of the intracranial vertebral arteries and the basilar artery appear  patent. The proximal visualized branches of the posterior cerebral  arteries are patent. No aneurysm or high flow vascular malformation is  identified.      Impression    IMPRESSION:  No high-grade stenosis or large vessel occlusion involving the major  intracranial arteries of the [place].    GHAZALA KELLER MD         SYSTEM ID:  [id number]   Echocardiogram Complete     Value    LVEF  55-60%    Narrative    [id number]  [id number]  [id number]  [id number]^JUMA^FRANCI     [facility]  Echocardiography Laboratory  [address] [place] [address]     Name: TERRANCE FLYNN  MRN: [id number]  : 1970  Study Date: 2021 08:45 AM  Age: 51 yrs  Gender: Female  Patient Location: [facility]  Reason For Study: TIA  Ordering Physician: FRANCI DENNISON  Referring Physician: Jacinda Banks  Performed By: Laurie Smith     BSA: 1.8 m2  Height: 66 in  Weight: 156 lb  HR: 67  BP: 133/89 mmHg  ______________________________________________________________________________  Procedure  Complete Portable Bubble Echo Adult. Optison (NDC  #7701-2197) given  intravenously.  ______________________________________________________________________________  Interpretation Summary     1. Left ventricular systolic function is normal. The visual ejection fraction  is 55-60%.  2. No regional wall motion abnormalities noted.  3. The right ventricle is normal in structure, function and size.  4. No evidence for significant valvular pathology.  5. There is no color Doppler evidence of an atrial shunt. A contrast injection  (Bubble Study) was performed that was negative for flow across the interatrial  septum.  ______________________________________________________________________________  Left Ventricle  The left ventricle is normal in size. There is normal left ventricular wall  thickness. Left ventricular systolic function is normal. The visual ejection  fraction is 55-60%. Left ventricular diastolic function is normal. No regional  wall motion abnormalities noted.     Right Ventricle  The right ventricle is normal in structure, function and size.     Atria  Normal left atrial size. Right atrial size is normal. There is no color  Doppler evidence of an atrial shunt. A contrast injection (Bubble Study) was  performed that was negative for flow across the interatrial septum.     Mitral Valve  The mitral valve is normal in structure and function.     Aortic Valve  The aortic valve is normal in structure and function.     Pulmonic Valve  The pulmonic valve is normal in structure and function.     Vessels  IVC diameter <2.1 cm collapsing >50% with sniff suggests a normal RA pressure  of 3 mmHg.     Pericardium  There is no pericardial effusion.     ______________________________________________________________________________  MMode/2D Measurements & Calculations  IVSd: 0.79 cm  LVIDd: 4.5 cm  LVIDs: 2.4 cm  LVPWd: 0.73 cm  FS: 45.9 %  LV mass(C)d: 106.1 grams  LV mass(C)dI: 58.9 grams/m2  Ao root diam: 2.9 cm  LA dimension: 3.0 cm  asc Aorta Diam: 2.9 cm  LA/Ao:  1.0     LA Volume (BP): 40.2 ml  LA Volume Index (BP): 22.3 ml/m2  RWT: 0.32  TAPSE: 2.0 cm     Doppler Measurements & Calculations  MV E max alexandro: 74.8 cm/sec  MV A max alexandro: 53.7 cm/sec  MV E/A: 1.4  MV dec time: 0.16 sec  E/E' av.0  Lateral E/e': 7.8     Medial E/e': 8.2     ______________________________________________________________________________  Report approved by: Amber Bellamy 2021 12:26 PM

## 2021-08-16 NOTE — PROGRESS NOTES
Observation goals PRIOR TO DISCHARGE    Comments: List all goals to be met before discharge home         Free from ACUTE neuro deficits: Met    Testing complete: Met    Other: ECHO. Neurology.     Nurse to notify provider when observation goals have been met and patient is ready for discharge.

## 2021-08-17 ENCOUNTER — PATIENT OUTREACH (OUTPATIENT)
Dept: CARE COORDINATION | Facility: CLINIC | Age: 51
End: 2021-08-17

## 2021-08-17 ENCOUNTER — LAB (OUTPATIENT)
Dept: LAB | Facility: CLINIC | Age: 51
End: 2021-08-17
Payer: COMMERCIAL

## 2021-08-17 DIAGNOSIS — K92.1 BLOOD IN STOOL: Primary | ICD-10-CM

## 2021-08-17 DIAGNOSIS — Z71.89 OTHER SPECIFIED COUNSELING: ICD-10-CM

## 2021-08-17 LAB
HEMOCCULT SP1 STL QL: NEGATIVE
HEMOCCULT SP2 STL QL: NEGATIVE
HEMOCCULT SP3 STL QL: NEGATIVE

## 2021-08-17 PROCEDURE — 82274 ASSAY TEST FOR BLOOD FECAL: CPT

## 2021-08-17 NOTE — TELEPHONE ENCOUNTER
Patient's is home from hospital and has new medication - she would like to discuss with PCP.    Scheduled virtual appt with PCP tomorrow 8/18.    Patient also has questions about her leave of absence from work - wants to know how much time she will be missing due to the new health issues that were discovered.     Declined to schedule hospital follow up appt at this time - wants to talk to PCP first and find out what she needs and recommends first.

## 2021-08-17 NOTE — PROGRESS NOTES
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    This would account for her TIA symptoms of right-sided facial numbness and tingling and right arm weakness.  Etiology of this vertebral artery dissection is not entirely clear at this point.  Her symptoms are transient and at this point.  - Neurochecks and Vital Signs every Q4H   - Daily aspirin 325 mg for secondary stroke prevention  - Start Plavix 75mg daily, Load with Plavix 300mg Once  - Continue DAPT for 3months ( + Plavix 75)  - Repeat MRI/MRA Brain w/wo contrast with Post-Fat Sat in 3 months.   - Follow-up in Clinic for de-escalation of anti-platelet therapy.   - Statin: Lipitor 80mg   - TTE (with Bubble Study if age 60 yrs or less)- pending  - 24-hour Telemetry  - Bedside Glucose Monitoring  - A1c  - Stroke Education  - Euthermia, Euglycemia     ASSESSMENT      Discharge Assessment  How are you doing now that you are home?: doing okay  How are your symptoms? (Red Flag symptoms escalate to triage hotline per guidelines): Improved  Do you feel your condition is stable enough to be safe at home until your provider visit?: Yes  Does the patient have their discharge instructions? : Yes  Does the patient have questions regarding their discharge instructions? : No  Were you started on any new medications or were there changes to any of your previous medications? : Yes - Schedule RN[facility] appt within 48 business hours (no current questions)  Does the patient have all of their medications?: Yes  Do you have questions  regarding any of your medications? : No  Do you have all of your needed medical supplies or equipment (DME)?  (i.e. oxygen tank, CPAP, cane, etc.): Yes  Discharge follow-up appointment scheduled within 14 calendar days? : Yes  Discharge Follow Up Appointment Date: 08/18/21  Discharge Follow Up Appointment Scheduled with?: Primary Care Provider    Post-op  Did the patient have surgery or a procedure: No  Fever: No  Chills: No  Eating & Drinking: eating and drinking without complaints/concerns  PO Intake: regular diet  Bowel Function: normal  Urinary Status: voiding without complaint/concerns    PLAN                                                      Outpatient Plan:  Follow up with Neurology in 4-6 weeks with Dr. Gabriel Isabel at [facility]. You will be contacted by virtual stroke clinic team after discharge. Repeat MRI/MRA Brain in 3 months, Neurology will arrange this. Follow up with primary care provider, Jacinda Banks, within 7 days to evaluate medication change and for hospital follow- up.    Future Appointments   Date Time Provider Department Center   8/18/2021  5:40 PM [facility]   9/1/2021  1:00 PM Janeth Goldberg APRN CNS [facility]   9/15/2021  2:10 PM Jose Austin MD [facility]   9/23/2021  3:30 PM Pepe Yu APRN CNP [facility]   10/1/2021  1:10 PM [facility] Ross, [facility]   10/22/2021  2:30 PM Deann Ross, PT [facility] [place]   10/29/2021  2:30 PM Deann Ross, PT [facility] [place]   12/2/2021  1:00 PM Pepe Ames CCE [facility]   12/9/2021  4:00 PM Pepe Ames CCGABRIELA [facility]         For any urgent concerns, please contact our 24 hour nurse triage line: [phone] ([phone])         MAE Gibbs  [phone]  [facility]

## 2021-08-17 NOTE — TELEPHONE ENCOUNTER
LM to schedule NEW CONSULT with Dr. Austin at next available. (9/15/2021).    Appt Note:  Pt referred to [facility] by Stacy Alexander PA-C for Dissection, vertebral artery.     Rosa GILLESPIE

## 2021-08-18 ASSESSMENT — ANXIETY QUESTIONNAIRES
8. IF YOU CHECKED OFF ANY PROBLEMS, HOW DIFFICULT HAVE THESE MADE IT FOR YOU TO DO YOUR WORK, TAKE CARE OF THINGS AT HOME, OR GET ALONG WITH OTHER PEOPLE?: VERY DIFFICULT
7. FEELING AFRAID AS IF SOMETHING AWFUL MIGHT HAPPEN: NEARLY EVERY DAY
4. TROUBLE RELAXING: SEVERAL DAYS
3. WORRYING TOO MUCH ABOUT DIFFERENT THINGS: NEARLY EVERY DAY
2. NOT BEING ABLE TO STOP OR CONTROL WORRYING: MORE THAN HALF THE DAYS
1. FEELING NERVOUS, ANXIOUS, OR ON EDGE: MORE THAN HALF THE DAYS
7. FEELING AFRAID AS IF SOMETHING AWFUL MIGHT HAPPEN: NEARLY EVERY DAY
GAD7 TOTAL SCORE: 14
6. BECOMING EASILY ANNOYED OR IRRITABLE: SEVERAL DAYS
GAD7 TOTAL SCORE: 14
GAD7 TOTAL SCORE: 14
5. BEING SO RESTLESS THAT IT IS HARD TO SIT STILL: MORE THAN HALF THE DAYS

## 2021-08-18 ASSESSMENT — PATIENT HEALTH QUESTIONNAIRE - PHQ9
SUM OF ALL RESPONSES TO PHQ QUESTIONS 1-9: 4
10. IF YOU CHECKED OFF ANY PROBLEMS, HOW DIFFICULT HAVE THESE PROBLEMS MADE IT FOR YOU TO DO YOUR WORK, TAKE CARE OF THINGS AT HOME, OR GET ALONG WITH OTHER PEOPLE: SOMEWHAT DIFFICULT
SUM OF ALL RESPONSES TO PHQ QUESTIONS 1-9: 4

## 2021-08-19 ENCOUNTER — OFFICE VISIT (OUTPATIENT)
Dept: INTERNAL MEDICINE | Facility: CLINIC | Age: 51
End: 2021-08-19
Payer: COMMERCIAL

## 2021-08-19 ENCOUNTER — TELEPHONE (OUTPATIENT)
Dept: SURGERY | Facility: CLINIC | Age: 51
End: 2021-08-19

## 2021-08-19 VITALS
BODY MASS INDEX: 24.27 KG/M2 | DIASTOLIC BLOOD PRESSURE: 68 MMHG | SYSTOLIC BLOOD PRESSURE: 114 MMHG | OXYGEN SATURATION: 97 % | HEIGHT: 66 IN | WEIGHT: 151 LBS | HEART RATE: 91 BPM

## 2021-08-19 DIAGNOSIS — Z09 HOSPITAL DISCHARGE FOLLOW-UP: Primary | ICD-10-CM

## 2021-08-19 DIAGNOSIS — I77.74 DISSECTION, VERTEBRAL ARTERY (H): ICD-10-CM

## 2021-08-19 DIAGNOSIS — G45.9 TIA (TRANSIENT ISCHEMIC ATTACK): ICD-10-CM

## 2021-08-19 LAB
ATRIAL RATE - MUSE: 75 BPM
DIASTOLIC BLOOD PRESSURE - MUSE: NORMAL MMHG
INTERPRETATION ECG - MUSE: NORMAL
P AXIS - MUSE: 48 DEGREES
PR INTERVAL - MUSE: 162 MS
QRS DURATION - MUSE: 72 MS
QT - MUSE: 434 MS
QTC - MUSE: 484 MS
R AXIS - MUSE: 13 DEGREES
SYSTOLIC BLOOD PRESSURE - MUSE: NORMAL MMHG
T AXIS - MUSE: 40 DEGREES
VENTRICULAR RATE- MUSE: 75 BPM

## 2021-08-19 PROCEDURE — 99496 TRANSJ CARE MGMT HIGH F2F 7D: CPT | Performed by: NURSE PRACTITIONER

## 2021-08-19 ASSESSMENT — MIFFLIN-ST. JEOR: SCORE: 1311.68

## 2021-08-19 ASSESSMENT — ANXIETY QUESTIONNAIRES: GAD7 TOTAL SCORE: 14

## 2021-08-19 ASSESSMENT — PATIENT HEALTH QUESTIONNAIRE - PHQ9: SUM OF ALL RESPONSES TO PHQ QUESTIONS 1-9: 4

## 2021-08-19 NOTE — PROGRESS NOTES
"    {PROVIDER CHARTING PREFERENCE:732466}    Subjective   Linsey is a 51 year old who presents for the following health issues {ACCOMPANIED BY STATEMENT (Optional):945207}    HPI       Hospital Follow-up Visit:    Hospital/Nursing Home/IP Rehab Facility: {INPT AND SNF DISCHARGE:195024}  Date of Admission: ***  Date of Discharge: ***  Reason(s) for Admission: ***      Was your hospitalization related to COVID-19? {Yes add questions_No:166705}  Problems taking medications regularly:  {NONE DEFAULTED:069410::\"None\"}  Medication changes since discharge: {NONE DEFAULTED:162077::\"None\"}  Problems adhering to non-medication therapy:  {NONE DEFAULTED:719399::\"None\"}    Summary of hospitalization:  {HOSPITAL DISCHARGE SUMMARY INFO:107287::\"[facility] hospital discharge summary reviewed\"}  Diagnostic Tests/Treatments reviewed.  Follow up needed: {NONE DEFAULTED:917925::\"none\"}  Other Healthcare Providers Involved in Patient s Care:         {those currently involved after discharge:374084::\"None\"}  Update since discharge: {IMPROVED DEFAULT:737987::\"improved.\"} {TIP  Include information from family/caregivers, SNF, Care Coordination :234846}      Post Discharge Medication Reconciliation: {ACO Med Rec (Provider):646229}.  Plan of care communicated with {Communicate Plan to:470715::\"patient\"}     {Reference  Coding guidelines- Moderate Complexity F2F/Video within 7 - 14 days of discharge 3083518, High Complexity F2F/Video within 7 days 4771179 or owzzor28 days [id number] :669949}         {additonal problems for provider to add (Optional):391308}    Review of Systems   {ROS COMP (Optional):597682}      Objective    /68 (BP Location: Right arm, Patient Position: Sitting)   Pulse 91   Ht 1.676 m (5' 6\")   Wt 68.5 kg (151 lb)   LMP 07/26/2013   SpO2 97%   BMI 24.37 kg/m    Body mass index is 24.37 kg/m .  Physical Exam   {Exam List (Optional):272622}    {Diagnostic Test Results (Optional):865604}    {AMBULATORY " ATTESTATION (Optional):385956}        Answers for HPI/ROS submitted by the patient on 8/18/2021  If you checked off any problems, how difficult have these problems made it for you to do your work, take care of things at home, or get along with other people?: Somewhat difficult  PHQ9 TOTAL SCORE: 4  ALBERTO 7 TOTAL SCORE: 14

## 2021-08-19 NOTE — LETTER
[address] [address] [phone]          August 19, 2021    RE:  Jaz Archibald                                                                                                                                                       [address] [facility] [address]            To whom it may concern:    Jaz Archibald is under my professional care for    Hospital discharge follow-up  TIA (transient ischemic attack)  Dissection, vertebral artery (H) She  may return to work with the following: work from home from 8/24/2021 for 2 weeks then return to normal work scheduled         Sincerely,        Jacinda Banks CNP

## 2021-08-19 NOTE — TELEPHONE ENCOUNTER
"I called Linsey to discuss her recent hospitalization for TIA.  She is doing well now, is on blood thinners and has f/up planned with vascular surgery and neurology.  She is not experiencing any further deficits but admits it has been scary.   She told me that when we talked on 8/10 about shoulder/neck pain, she was not experiencing any facial numbness or other deficits.    She is eating fairly well with occasional food sticking \"If I eat too fast or don't chew enough\".    We discussed her follow-up.  She said Dr. Boland had mentioned after surgery that she had a lot of inflammation and \"he seemed concerned about this\".   She said that doctors at [facility] suggested she may have an auto immune or inflammatory process which caused this TIA/dissection to occur.   She would like to talk to Dr. Boland more about this possibility.    I will arrange for UGI esophagram xray and follow-up with Dr. Boland on 9/1, instead of seeing me, so she can have this discussion.    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   "

## 2021-08-19 NOTE — PROGRESS NOTES
Assessment & Plan   Problem List Items Addressed This Visit        Circulatory    Dissection, vertebral artery (H)    Relevant Orders    Vascular Surgery Referral      Other Visit Diagnoses     Hospital discharge follow-up    -  Primary    TIA (transient ischemic attack)        Relevant Orders    Physical Therapy Referral    Adult Neurology Referral           Patient reports she is not having any difficulty with the medications recommend continue on the Plavix aspirin, lipitor, lisinopril.  Did reach out to vascular as she is booked for 4 weeks out to request whether she could be seen sooner they will contact patient to schedule after review of record and imaging.    Patient advised if symptoms persist, worsen or new symptoms arise they are to seek medical care.  All patients questions addressed. Patient verbalized understanding and agreement with plan.       I spent a total of 55 minutes on the day of the visit.   Time spent doing chart review, history and exam, documentation and further activities per the note           Return in about 4 weeks (around 9/16/2021).    Jacinda Banks NP  [facility] STEFANIE Stiles is a 51 year old who presents for the following health issues Answers for HPI/ROS submitted by the patient on 8/18/2021  If you checked off any problems, how difficult have these problems made it for you to do your work, take care of things at home, or get along with other people?: Somewhat difficult  PHQ9 TOTAL SCORE: 4  ALBERTO 7 TOTAL SCORE: 14        HPI         Hospital Follow-up Visit:    Hospital/Nursing Home/IP Rehab Facility: [facility]  Date of Admission: 08/15/2021  Date of Discharge: 08/16/2021  Reason(s) for Admission: TIA      Was your hospitalization related to COVID-19? No   Problems taking medications regularly:  None  Medication changes since discharge: Added Plavix Atorvastatin and Aspirin 325mg  Problems adhering to non-medication  "therapy:  None    Summary of hospitalization:  [facility] discharge summary reviewed  Diagnostic Tests/Treatments reviewed.  Follow up needed: vascular, neurology, therapy  Other Healthcare Providers Involved in Patient s Care:         Specialist appointment - yes  Update since discharge: stable. Post Discharge Medication Reconciliation: discharge medications reconciled, continue medications without change.  Plan of care communicated with patient        Patient went home and developed numbness and drooping on the right side 1-2 days after being evaluated in the clinic.  She states it went away within a minute or two and then the next day it occurred every hour and was then evaluated in the ED.      She states she did have symptoms again last night and states it typically digits 3-5 and up the arm.  Previous it was the entire arm when she had presented to the ED.     She indicates prior to all of this happening she reports having some generalized pain and muscle aches and not feeling well.     She reports feeling anxious and concerned.     Review of Systems   Unremarkable other than listed above and below       Objective    /68 (BP Location: Right arm, Patient Position: Sitting)   Pulse 91   Ht 1.676 m (5' 6\")   Wt 68.5 kg (151 lb)   LMP 07/26/2013   SpO2 97%   BMI 24.37 kg/m    Body mass index is 24.37 kg/m .  Physical Exam   GENERAL: healthy, alert and no distress  EYES: Eyes grossly normal to inspection, PERRL and conjunctivae and sclerae normal  HENT: ear canals and TM's normal, nose and mouth without ulcers or lesions  NECK: no adenopathy, no asymmetry, masses, or scars and thyroid normal to palpation  RESP: lungs clear to auscultation - no rales, rhonchi or wheezes  CV: regular rate and rhythm, normal S1 S2, no S3 or S4, no murmur, click or rub, no peripheral edema and peripheral pulses strong  MS: no gross musculoskeletal defects noted, no edema  SKIN: no suspicious lesions or " zackary  NEURO: Normal strength and tone, mentation intact and speech normal, reflexes are symmetrical throughout, cranial nerves II through XII grossly intact. Ambulates without difficulty   PSYCH: mentation appears normal, affect normal/bright    Lab on 2021   Component Date Value Ref Range Status     Occult Blood Slide 1 2021 Negative  Negative Final    2021     Occult Blood Slide 2 2021 Negative  Negative Final    2021     Occult Blood Slide 3 2021 Negative  Negative Final    2021     Echocardiogram Complete    Result Date: 2021  [id number]  [facility] [address] [place] [address]  Name: TERRANCE FLYNN MRN: [id number] : 1970 Study Date: 2021 08:45 AM Age: 51 yrs Gender: Female Patient Location: [facility] Reason For Study: TIA Ordering Physician: FRANCI DENNISON Referring Physician: Jacinda Banks Performed By: Laurie Smith  BSA: 1.8 m2 Height: 66 in Weight: 156 lb HR: 67 BP: 133/89 mmHg ______________________________________________________________________________ Procedure Complete Portable Bubble Echo Adult. Optison (NDC #5009-2079) given intravenously. ______________________________________________________________________________ Interpretation Summary  1. Left ventricular systolic function is normal. The visual ejection fraction is 55-60%. 2. No regional wall motion abnormalities noted. 3. The right ventricle is normal in structure, function and size. 4. No evidence for significant valvular pathology. 5. There is no color Doppler evidence of an atrial shunt. A contrast injection (Bubble Study) was performed that was negative for flow across the interatrial septum. ______________________________________________________________________________ Left Ventricle The left ventricle is normal in size. There is normal left ventricular wall thickness. Left ventricular  systolic function is normal. The visual ejection fraction is 55-60%. Left ventricular diastolic function is normal. No regional wall motion abnormalities noted.  Right Ventricle The right ventricle is normal in structure, function and size.  Atria Normal left atrial size. Right atrial size is normal. There is no color Doppler evidence of an atrial shunt. A contrast injection (Bubble Study) was performed that was negative for flow across the interatrial septum.  Mitral Valve The mitral valve is normal in structure and function.  Aortic Valve The aortic valve is normal in structure and function.  Pulmonic Valve The pulmonic valve is normal in structure and function.  Vessels IVC diameter <2.1 cm collapsing >50% with sniff suggests a normal RA pressure of 3 mmHg.  Pericardium There is no pericardial effusion.  ______________________________________________________________________________ MMode/2D Measurements & Calculations IVSd: 0.79 cm LVIDd: 4.5 cm LVIDs: 2.4 cm LVPWd: 0.73 cm FS: 45.9 % LV mass(C)d: 106.1 grams LV mass(C)dI: 58.9 grams/m2 Ao root diam: 2.9 cm LA dimension: 3.0 cm asc Aorta Diam: 2.9 cm LA/Ao: 1.0  LA Volume (BP): 40.2 ml LA Volume Index (BP): 22.3 ml/m2 RWT: 0.32 TAPSE: 2.0 cm  Doppler Measurements & Calculations MV E max alexandro: 74.8 cm/sec MV A max alexandro: 53.7 cm/sec MV E/A: 1.4 MV dec time: 0.16 sec E/E' av.0 Lateral E/e': 7.8  Medial E/e': 8.2  ______________________________________________________________________________ Report approved by: Amber Bellamy 2021 12:26 PM       US Lower Extremity Venous Duplex Left    Result Date: 2021  EXAM: US LOWER EXTREMITY VENOUS DUPLEX LEFT LOCATION: [facility] DATE/TIME: 2021 4:53 PM INDICATION:  Swelling of left lower extremity COMPARISON: None. TECHNIQUE: Venous Duplex ultrasound of the left lower extremity with and without compression, augmentation and duplex. Color flow and spectral Doppler with waveform  analysis performed. FINDINGS: Exam includes the common femoral, femoral, popliteal, and contralateral common femoral veins as well as segmentally visualized deep calf veins and greater saphenous vein. LEFT: No deep vein thrombosis. No superficial thrombophlebitis. No popliteal cyst.     IMPRESSION: 1.  No deep venous thrombosis in the left lower extremity.    MRA Brain ([place]) wo Contrast    Result Date: 8/15/2021  MR ANGIOGRAM OF THE HEAD WITHOUT CONTRAST   8/15/2021 2:22 PM HISTORY: Neurologic deficit, acute, stroke suspected. Intermittent right-sided paresthesias. TECHNIQUE:  3D time-of-flight MR angiogram of the head without contrast. COMPARISON: None. FINDINGS: The distal cervical, petrous, cavernous, and supraclinoid segments of the internal carotid arteries are patent. The major proximal branches of the middle and anterior cerebral arteries are patent. The left vertebral artery is dominant. The visualized aspects of the intracranial vertebral arteries and the basilar artery appear patent. The proximal visualized branches of the posterior cerebral arteries are patent. No aneurysm or high flow vascular malformation is identified.     IMPRESSION: No high-grade stenosis or large vessel occlusion involving the major intracranial arteries of the [place]. GHAZALA KELLER MD   SYSTEM ID:  [id number]    MRA Neck (Carotids) wo & w Contrast    Result Date: 8/15/2021  MRA NECK WITHOUT AND WITH CONTRAST  8/15/2021 2:39 PM HISTORY: Neuro deficit, acute, stroke suspected; intermittent R sided paresthesias TECHNIQUE: 2D time-of-flight MR angiogram of the neck without contrast and 3D MR angiogram of the neck with  10 mL Gadavist. Estimates of carotid stenoses are made relative to the distal internal carotid artery diameters except as noted. COMPARISON: None. FINDINGS: Conventional three-vessel aortic arch branching pattern without significant stenosis of the origins of the great vessels. The common carotid  arteries, carotid bifurcations, and bilateral cervical internal carotid arteries are patent. Somewhat tortuous uplymrhe-nk-wfl right cervical internal carotid artery and tortuous gfb-ow-qehvgd left cervical internal carotid artery. Crescentic intrinsically T1 hyperintense signal involving the V2 segment of the right vertebral artery, consistent with intramural hematoma (for example see series 17 image 16). There is a severe associated focal stenosis involving the true lumen of the mid V2 segment of the right vertebral artery (series 6 image 45, series 20 image 53). Mild/moderate degrees of diffuse stenosis elsewhere involving the right vertebral artery V2 segment. The left vertebral artery is dominant and the right vertebral artery is developmentally smaller in size compared to the left. The left vertebral artery is widely patent without evidence of recent dissection.     IMPRESSION: 1. 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 2. The dominant left vertebral artery is widely patent. 3. The bilateral common carotid arteries, carotid bifurcations, and cervical internal carotid arteries are patent. The findings were discussed by phone with Dr. Sheth by myself at approximately 3:05 PM on 8/15/2021. GHAZALA KELLER MD   SYSTEM ID:  [id number]    MR Brain w/o & w Contrast    Result Date: 8/15/2021  MRI BRAIN WITHOUT AND WITH CONTRAST  8/15/2021 2:38 PM HISTORY:  Neurologic deficit, acute, stroke suspected. Right-sided paresthesias . TECHNIQUE:  Multiplanar, multisequence MRI of the brain without and with 10 mL Gadavist COMPARISON: None. FINDINGS: No abnormal intracranial restricted diffusion to suggest acute infarct. The ventricles are normal in size and configuration. Mild, age-commensurate generalized brain parenchymal volume loss. The brain parenchyma appears normal in  morphology, volume, and signal intensity for the patient's age. No intracranial hemorrhage identified. No extra-axial fluid collection, mass lesion, or herniation. No abnormal intracranial postcontrast enhancement. Asymmetrically diminished flow void of the V3 segment of the right vertebral artery is noted (series 8 image 3). Please see separate report from MR angiogram of the neck for additional details. The other major arterial flow voids at the skull base appear to be grossly maintained. Orbits appear normal. Calvarium, skull base and mid face otherwise are unremarkable.     IMPRESSION: 1. No acute intracranial process. Unremarkable appearance of the brain and extra-axial spaces. 2. Abnormal flow-void involving the right vertebral artery V3 segment. Please see MR angiogram of the neck report of same day for additional details. GHAZALA KELLER MD   SYSTEM ID:  [id number]

## 2021-08-19 NOTE — LETTER
[address] [address] [phone]          August 19, 2021    RE:  Jaz Archibald                                                                                                                                                       [facility] [id number]            To whom it may concern:    Jaz Archibald is under my professional care. She  may return to work with the following: The employee is ABLE to return to work on Tuesday 8/14/2021    When the patient returns to work, would recommend working from home for the first two weeks then return to regular in office shedule.      Sincerely,        Jacinda Banks CNP

## 2021-08-20 NOTE — TELEPHONE ENCOUNTER
RECORDS RECEIVED FROM: Internal   REASON FOR VISIT: TIA   Date of Appt: 11/9/21   NOTES (FOR ALL VISITS) STATUS DETAILS   OFFICE NOTE from referring provider Internal Jacinda Banks NP @ [facility] PCP:  8/19/21   OFFICE NOTE from other specialist N/A    DISCHARGE SUMMARY from hospital [facility]:  8/15/21-8/16/21   DISCHARGE REPORT from the ER N/A    OPERATIVE REPORT N/A    MEDICATION LIST Internal    IMAGING  (FOR ALL VISITS)     EMG N/A    EEG N/A    LUMBAR PUNCTURE N/A    SEAN SCAN N/A    ULTRASOUND (CAROTID BILAT) *VASCULAR* N/A    MRI (HEAD, NECK, SPINE) [facility]:  MRA Neck Carotids 8/15/21  MRI Brain 8/15/21  MRA Brain COW 8/15/21   CT (HEAD, NECK, SPINE) N/A

## 2021-08-27 ENCOUNTER — APPOINTMENT (OUTPATIENT)
Dept: CT IMAGING | Facility: CLINIC | Age: 51
End: 2021-08-27
Attending: EMERGENCY MEDICINE
Payer: COMMERCIAL

## 2021-08-27 ENCOUNTER — APPOINTMENT (OUTPATIENT)
Dept: MRI IMAGING | Facility: CLINIC | Age: 51
End: 2021-08-27
Attending: EMERGENCY MEDICINE
Payer: COMMERCIAL

## 2021-08-27 ENCOUNTER — HOSPITAL ENCOUNTER (EMERGENCY)
Facility: CLINIC | Age: 51
Discharge: HOME | End: 2021-08-27
Attending: EMERGENCY MEDICINE | Admitting: EMERGENCY MEDICINE
Payer: COMMERCIAL

## 2021-08-27 VITALS
WEIGHT: 150 LBS | SYSTOLIC BLOOD PRESSURE: 135 MMHG | DIASTOLIC BLOOD PRESSURE: 97 MMHG | HEART RATE: 74 BPM | RESPIRATION RATE: 20 BRPM | BODY MASS INDEX: 24.21 KG/M2 | OXYGEN SATURATION: 97 %

## 2021-08-27 DIAGNOSIS — I77.74 VERTEBRAL ARTERY DISSECTION (H): ICD-10-CM

## 2021-08-27 DIAGNOSIS — R20.2 PARESTHESIAS: ICD-10-CM

## 2021-08-27 DIAGNOSIS — N63.0 BREAST NODULE: ICD-10-CM

## 2021-08-27 DIAGNOSIS — R10.13 ABDOMINAL PAIN, EPIGASTRIC: ICD-10-CM

## 2021-08-27 LAB
ALBUMIN SERPL-MCNC: 3.8 G/DL (ref 3.4–5)
ALP SERPL-CCNC: 72 U/L (ref 40–150)
ALT SERPL W P-5'-P-CCNC: 26 U/L (ref 0–50)
ANION GAP SERPL CALCULATED.3IONS-SCNC: 4 MMOL/L (ref 3–14)
AST SERPL W P-5'-P-CCNC: 10 U/L (ref 0–45)
ATRIAL RATE - MUSE: 60 BPM
BASOPHILS # BLD AUTO: 0.1 10E3/UL (ref 0–0.2)
BASOPHILS NFR BLD AUTO: 1 %
BILIRUB SERPL-MCNC: 0.2 MG/DL (ref 0.2–1.3)
BUN SERPL-MCNC: 15 MG/DL (ref 7–30)
CALCIUM SERPL-MCNC: 8.6 MG/DL (ref 8.5–10.1)
CHLORIDE BLD-SCNC: 108 MMOL/L (ref 94–109)
CO2 SERPL-SCNC: 24 MMOL/L (ref 20–32)
CREAT SERPL-MCNC: 0.75 MG/DL (ref 0.52–1.04)
DIASTOLIC BLOOD PRESSURE - MUSE: NORMAL MMHG
EOSINOPHIL # BLD AUTO: 0.3 10E3/UL (ref 0–0.7)
EOSINOPHIL NFR BLD AUTO: 3 %
ERYTHROCYTE [DISTWIDTH] IN BLOOD BY AUTOMATED COUNT: 13 % (ref 10–15)
GFR SERPL CREATININE-BSD FRML MDRD: >90 ML/MIN/1.73M2
GLUCOSE BLD-MCNC: 93 MG/DL (ref 70–99)
HCT VFR BLD AUTO: 44.3 % (ref 35–47)
HGB BLD-MCNC: 14.6 G/DL (ref 11.7–15.7)
HOLD SPECIMEN: NORMAL
HOLD SPECIMEN: NORMAL
IMM GRANULOCYTES # BLD: 0 10E3/UL
IMM GRANULOCYTES NFR BLD: 0 %
INTERPRETATION ECG - MUSE: NORMAL
LIPASE SERPL-CCNC: 219 U/L (ref 73–393)
LYMPHOCYTES # BLD AUTO: 2.8 10E3/UL (ref 0.8–5.3)
LYMPHOCYTES NFR BLD AUTO: 25 %
MCH RBC QN AUTO: 28.9 PG (ref 26.5–33)
MCHC RBC AUTO-ENTMCNC: 33 G/DL (ref 31.5–36.5)
MCV RBC AUTO: 88 FL (ref 78–100)
MONOCYTES # BLD AUTO: 1.1 10E3/UL (ref 0–1.3)
MONOCYTES NFR BLD AUTO: 10 %
NEUTROPHILS # BLD AUTO: 6.6 10E3/UL (ref 1.6–8.3)
NEUTROPHILS NFR BLD AUTO: 61 %
NRBC # BLD AUTO: 0 10E3/UL
NRBC BLD AUTO-RTO: 0 /100
P AXIS - MUSE: 53 DEGREES
PLATELET # BLD AUTO: 343 10E3/UL (ref 150–450)
POTASSIUM BLD-SCNC: 3.9 MMOL/L (ref 3.4–5.3)
PR INTERVAL - MUSE: 134 MS
PROT SERPL-MCNC: 7.7 G/DL (ref 6.8–8.8)
QRS DURATION - MUSE: 72 MS
QT - MUSE: 448 MS
QTC - MUSE: 448 MS
R AXIS - MUSE: 0 DEGREES
RBC # BLD AUTO: 5.06 10E6/UL (ref 3.8–5.2)
SODIUM SERPL-SCNC: 136 MMOL/L (ref 133–144)
SYSTOLIC BLOOD PRESSURE - MUSE: NORMAL MMHG
T AXIS - MUSE: 34 DEGREES
VENTRICULAR RATE- MUSE: 60 BPM
WBC # BLD AUTO: 11 10E3/UL (ref 4–11)

## 2021-08-27 PROCEDURE — 96374 THER/PROPH/DIAG INJ IV PUSH: CPT | Mod: 59

## 2021-08-27 PROCEDURE — 70450 CT HEAD/BRAIN W/O DYE: CPT

## 2021-08-27 PROCEDURE — 36415 COLL VENOUS BLD VENIPUNCTURE: CPT | Performed by: EMERGENCY MEDICINE

## 2021-08-27 PROCEDURE — 250N000011 HC RX IP 250 OP 636: Performed by: EMERGENCY MEDICINE

## 2021-08-27 PROCEDURE — A9585 GADOBUTROL INJECTION: HCPCS | Performed by: EMERGENCY MEDICINE

## 2021-08-27 PROCEDURE — 74177 CT ABD & PELVIS W/CONTRAST: CPT

## 2021-08-27 PROCEDURE — 80053 COMPREHEN METABOLIC PANEL: CPT | Performed by: EMERGENCY MEDICINE

## 2021-08-27 PROCEDURE — 96375 TX/PRO/DX INJ NEW DRUG ADDON: CPT | Mod: 59

## 2021-08-27 PROCEDURE — 255N000002 HC RX 255 OP 636: Performed by: EMERGENCY MEDICINE

## 2021-08-27 PROCEDURE — 70498 CT ANGIOGRAPHY NECK: CPT

## 2021-08-27 PROCEDURE — 96376 TX/PRO/DX INJ SAME DRUG ADON: CPT

## 2021-08-27 PROCEDURE — 83690 ASSAY OF LIPASE: CPT | Performed by: EMERGENCY MEDICINE

## 2021-08-27 PROCEDURE — 93005 ELECTROCARDIOGRAM TRACING: CPT

## 2021-08-27 PROCEDURE — 96361 HYDRATE IV INFUSION ADD-ON: CPT

## 2021-08-27 PROCEDURE — 99285 EMERGENCY DEPT VISIT HI MDM: CPT | Mod: 25

## 2021-08-27 PROCEDURE — 250N000009 HC RX 250: Performed by: EMERGENCY MEDICINE

## 2021-08-27 PROCEDURE — 70553 MRI BRAIN STEM W/O & W/DYE: CPT

## 2021-08-27 PROCEDURE — 258N000003 HC RX IP 258 OP 636: Performed by: EMERGENCY MEDICINE

## 2021-08-27 PROCEDURE — 85025 COMPLETE CBC W/AUTO DIFF WBC: CPT | Performed by: EMERGENCY MEDICINE

## 2021-08-27 RX ORDER — ONDANSETRON 2 MG/ML
4 INJECTION INTRAMUSCULAR; INTRAVENOUS EVERY 30 MIN PRN
Status: DISCONTINUED | OUTPATIENT
Start: 2021-08-27 | End: 2021-08-28 | Stop reason: HOSPADM

## 2021-08-27 RX ORDER — IOPAMIDOL 755 MG/ML
70 INJECTION, SOLUTION INTRAVASCULAR ONCE
Status: COMPLETED | OUTPATIENT
Start: 2021-08-27 | End: 2021-08-27

## 2021-08-27 RX ORDER — GADOBUTROL 604.72 MG/ML
6 INJECTION INTRAVENOUS ONCE
Status: COMPLETED | OUTPATIENT
Start: 2021-08-27 | End: 2021-08-27

## 2021-08-27 RX ORDER — MORPHINE SULFATE 4 MG/ML
4 INJECTION, SOLUTION INTRAMUSCULAR; INTRAVENOUS
Status: DISCONTINUED | OUTPATIENT
Start: 2021-08-27 | End: 2021-08-28 | Stop reason: HOSPADM

## 2021-08-27 RX ORDER — LORAZEPAM 2 MG/ML
1 INJECTION INTRAMUSCULAR
Status: COMPLETED | OUTPATIENT
Start: 2021-08-27 | End: 2021-08-27

## 2021-08-27 RX ORDER — ONDANSETRON 4 MG/1
4-8 TABLET, ORALLY DISINTEGRATING ORAL EVERY 8 HOURS PRN
Qty: 18 TABLET | Refills: 0 | Status: SHIPPED | OUTPATIENT
Start: 2021-08-27 | End: 2021-08-30

## 2021-08-27 RX ORDER — LIDOCAINE 40 MG/G
CREAM TOPICAL
Status: DISCONTINUED | OUTPATIENT
Start: 2021-08-27 | End: 2021-08-28 | Stop reason: HOSPADM

## 2021-08-27 RX ADMIN — ONDANSETRON 4 MG: 2 INJECTION INTRAMUSCULAR; INTRAVENOUS at 17:22

## 2021-08-27 RX ADMIN — LORAZEPAM 1 MG: 2 INJECTION INTRAMUSCULAR; INTRAVENOUS at 20:17

## 2021-08-27 RX ADMIN — SODIUM CHLORIDE 90 ML: 9 INJECTION, SOLUTION INTRAVENOUS at 18:04

## 2021-08-27 RX ADMIN — MORPHINE SULFATE 4 MG: 4 INJECTION, SOLUTION INTRAMUSCULAR; INTRAVENOUS at 17:22

## 2021-08-27 RX ADMIN — MORPHINE SULFATE 4 MG: 4 INJECTION, SOLUTION INTRAMUSCULAR; INTRAVENOUS at 18:25

## 2021-08-27 RX ADMIN — GADOBUTROL 6 ML: 604.72 INJECTION INTRAVENOUS at 20:28

## 2021-08-27 RX ADMIN — IOPAMIDOL 70 ML: 755 INJECTION, SOLUTION INTRAVENOUS at 18:04

## 2021-08-27 RX ADMIN — SODIUM CHLORIDE 1000 ML: 9 INJECTION, SOLUTION INTRAVENOUS at 17:22

## 2021-08-27 RX ADMIN — ONDANSETRON 4 MG: 2 INJECTION INTRAMUSCULAR; INTRAVENOUS at 18:25

## 2021-08-27 ASSESSMENT — ENCOUNTER SYMPTOMS
ABDOMINAL PAIN: 1
NUMBNESS: 1
DIZZINESS: 1

## 2021-08-27 NOTE — ED TRIAGE NOTES
"Hx TIA 2 weeks ago. Today around 1630 felt right sided arm/face and leg weakness or numbness and dizziness. Pt. States \"I just feel weird and its starting to go away\". Complains of nausea and abdominal pain also.   "

## 2021-08-27 NOTE — CONSULTS
"    [facility]    Stroke Telephone Note    I was called by Ilda Rm on 08/27/21 regarding patient Jaz Archibald. 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  Patient presents 8/27/2021 with complaints of right sided sensory changes and possible weakness which presented with a progressive pattern starting in her right arm migrating to her face and then migrating to her leg over the time course of 30 minutes.  Patient concurrently experiencing a burning abdominal sensation and anxiety..    Imaging Findings   Pending    Impression  Progression of symptoms in setting of anxiety less likely to be caused by stroke particularly since last seen abnormality was posterior circulation which would be expected to cause simultaneous loss of sensation if any at all given the tightly packed fibers within the brainstem.  Symptoms are more reminiscent of jacksonian march progressing from face to arm to leg and in which case complex migraine may be preferred versus anxiety/conversion disorder    Recommendations   CT, CTA head and neck  MRI if positive for acute ischemic infarct admit patient  If MRI negative continue patient on aspirin and clopidogrel and resume plan to repeat MRA in 3 months time.  Continue atorvastatin 80 mg daily    My recommendations are based on the information provided over the phone by Jaz Archibald's in-person providers. They are not intended to replace the clinical judgment of her in-person providers. I was not requested to personally see or examine the patient at this time.    The Stroke Staff is Dr. Argueta.    Harvey Romo MD  Vascular Neurology Fellow  To page me or covering stroke neurology team member, click here: AMCOM   Choose \"On Call\" tab at top, then search " "dropdown box for \"Neurology Adult\", select location, press Enter, then look for stroke/neuro ICU/telestroke.        "

## 2021-08-27 NOTE — ED PROVIDER NOTES
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 She recently had surgery for a vertebral dissection on 08/15/21. Today, she had onset abdominal pain with associated dizziness. She went to lay down and then had onset right sided numbness beginning in her right arm migrating into her right side of her face, and a strong sensation into her right leg approximately 30 minutes ago. It has reduced in severity here, but currently feels her right side of her face is still somewhat stiff. She states this is reminiscent to when she had her vertebral dissections. She feels nauseous here and is experiencing a \"burning\" abdominal pain. She follows Dr. Gabriel Isabel of neurology with [facility].     Review of Systems   Gastrointestinal: Positive for abdominal pain.   Neurological: Positive for dizziness and numbness.   All other systems reviewed and are negative.    Allergies:  Bees  Suture    Medications:  Aspirin 325 mg   Lipitor   Plavix   Epinephrine   Lexapro   Lisinopril   Zofran   Carafte   Trazodone    Past Medical History:    ADD  Anxiety   Gastroesophageal Reflux Disease without Esophagitis  Vertebral Artery Dissection, 08/15/21  BCC  Hypertension   Hyperlipidemia   Sleep Disorder      Past Surgical History:    EGD   Novasure   Laparoscopic Hysterectomy   Laparoscopic Herniorrhaphy, 07/26/21  Laparoscopic Nissen Fundoplication, 07/26/21  Sling Transvaginal      Family History:    Mother - Lipids, Hypertension  Father - Lipids, Hypertension, CAD, Heart Disease    Social History:  She is accompanied to the emergency department with her     Physical Exam     Patient Vitals for the past 24 hrs:   BP Temp src Pulse Resp SpO2 Weight "   08/27/21 1900 (!) 135/97 -- 74 -- 97 % --   08/27/21 1815 -- -- 71 -- 97 % --   08/27/21 1800 -- -- 67 -- 97 % --   08/27/21 1745 (!) 150/97 -- 66 -- 97 % --   08/27/21 1730 (!) 149/95 -- 81 -- 96 % --   08/27/21 1725 (!) 161/91 -- 81 -- 97 % --   08/27/21 1708 (!) 141/95 Oral 72 20 94 % 68 kg (150 lb)       Physical Exam  General: Well-nourished, appears to be in pain and anxious.  Eyes: PERRL, conjunctivae pink no scleral icterus or conjunctival injection  ENT:  Moist mucus membranes, posterior oropharynx clear without erythema or exudates  Respiratory:  Lungs clear to auscultation bilaterally, no crackles/rubs/wheezes.  Good air movement  CV: Normal rate and rhythm, no murmurs/rubs/gallops  GI:  Abdomen soft and non-distended.  Normoactive BS.  +epigastric and mid abdominal tenderness, no guarding or rebound  Skin: Warm, dry.  No rashes or petechiae  Musculoskeletal: No peripheral edema or calf tenderness  Neuro: Alert and oriented to person/place/time. PERRL, EOMI no nystagmus, no aphasia/facial droop/dysarthria, tongue midline, symmetric palatal elevation, normal strength at SCM/trapezius/BUE/BLE, normal coordination to FNF at BUE, normal casual gait, negative romberg, sensation intact to LT over face/BUE/BLE  Psychiatric: Anxious affect      Emergency Department Course   ECG  ECG taken at 1733, ECG read at 1748  Normal sinus rhythm   Normal ECG   No change as compared to prior, dated 8/16/21.  Rate 60 bpm. PA interval 134 ms. QRS duration 72 ms. QT/QTc 448/448 ms. P-R-T axes 53 0 34.     Imaging:  Head CT w/o contrast  Normal head CT.   Reading per radiology    CTA Head Neck with Contrast  1. Findings consistent with long segment dissection and moderate  narrowing of the entire V2 segment of the right vertebral artery again  noted.  2. Otherwise, normal neck and head CTA.  Reading per radiology    CT Abdomen Pelvis w Contrast  1.  No acute abnormality in the abdomen or pelvis. No cause for  abdominal pain is  identified.  2.  An indeterminate 0.7 cm nodule is noted in the right breast  laterally. Clinical and mammographic correlation are recommended.  Reading per radiology    MR Brain w/o & w Contrast:  Normal brain MRI.  Reading per radiology    Laboratory:   CBC: WBC 11.0, HGB 14.6,   CMP: AWNL (Creatinine: 0.75)     Lipase: 219     Emergency Department Course:    Reviewed:  I reviewed nursing notes, vitals, past medical history and care everywhere    Assessments:  1705 I obtained history and examined the patient as noted above.   1948 I rechecked the patient and explained findings.   2158 I updated the patient.     Consults:   1736 I consulted with Dr. Harvey Romo, [facility] [place] [facility], regarding the patient's history and presentation here in the emergency department.    2152 I consulted with Dr. Roselyn Bermudez, Stroke Neuro, regarding the patient's history and presentation here in the emergency department.    Interventions:  1722 0.9% NaCl bolus 1,000 mL IV  1722 Morphine 4 mg IV   1722 Zofran 4 mg IV   1825 Morphine 4 mg IV   1825 Zofran 4 mg IV     Disposition:  The patient was discharged to home.     Impression & Plan     Medical Decision Making:  Jaz Archibald is a 51 year old female with recent Nissen fundoplication as well as a recent TIA with diagnosis of vertebral artery dissection who comes with marching paresthesias and numbness on the right side as well as abdominal pain with nausea.  In terms of the abdominal pain, I do suspect she may have some sort of gastroparesis like phenomenon related to eating more than she has been.  CT scan was reassuring.  Laboratory studies were also reassuring.  She does not have a surgical abdomen.  She improved with antiemetics.  In terms of the right-sided paresthesias and numbness, I was concerned about the possibility of a complication from her known vertebral artery dissection.  I spoke with stroke neurology.  Please refer to their consultation note.  They recommended  repeat imaging including CT and CTA followed by MRI.  These were completed and demonstrate no change in the vertebral artery dissection and no evidence of acute stroke.  Symptoms have resolved.  She was reassured.  She is to follow-up with her repeat CT angiogram in 3 months and follow-up as previously scheduled with the stroke neurology clinic.  She was notified of the incidental finding of a nodule on the breast and recommended mammogram.  She is to follow-up with her own doctor in the next few days and have a low threshold to return if any worsening.  With reasonable clinical confidence, I do feel she safe for discharge home at this time.      Diagnosis:    ICD-10-CM    1. Paresthesias  R20.2    2. Abdominal pain, epigastric  R10.13    3. Vertebral artery dissection (H)  I77.74    4. Breast nodule  N63.0     right lateral breast nodule seen on CT scan 8/27/21     Scribe Disclosure:  Alcides NEW, am serving as a scribe at 5:05 PM on 8/27/2021 to document services personally performed by Ilda Rm MD based on my observations and the provider's statements to me.              Ilda Rm MD  08/29/21 0136

## 2021-08-28 NOTE — DISCHARGE INSTRUCTIONS
*You may resume diet and activities.  *Zofran as directed as needed for nausea and vomiting.  Eat small meals and increase in size cautiously.  Continue your aspirin and Plavix.  *Follow-up with your thoracic surgeon and with neurology as previously scheduled.  He should have a repeat MR angiogram in 3 months as previously scheduled.  Recommend mammogram as well.  *Return if you develop new or worsening symptoms.    Discharge Instructions  Abdominal Pain    Abdominal pain can be caused by many things. Your evaluation today does not show the exact cause for your pain. Your doctor today has decided that it is unlikely your pain is due to a life threatening problem, or a problem requiring surgery or hospital admission. Sometimes those problems cannot be found right away, so it is very important that you follow up as directed.  Sometimes only the changes which occur over time allow the cause of your pain to be found.    Return to the Emergency Department for a recheck in 8-12 hours if your pain continues.  If your pain gets worse, changes in location, or feels different, return to the Emergency Department right away.    ADULTS:  Return to the Emergency Department right away if:    You get an oral temperature above 102oF or as directed by your doctor.  You have blood in your stools (bright red or black, tarry stools).  You keep throwing up or can t drink liquids.  You see blood when you throw up.  You can t have a bowel movement or you can t pass gas.  Your stomach gets bloated or bigger.  Your skin or the whites of your eyes look yellow.  You faint.  You have bloody, frequent or painful urination.  You have new symptoms or anything that worries you.    CHILDREN:  Return to the Emergency Department right away if your child has any of the above-listed symptoms or the following:    Pushes your hand away or screams/cries when his/her belly is touched.  You notice your child is very fussy or weak.  Your child is very tired  and is too tired to eat or drink.  Your child is dehydrated.  Signs of dehydration can be:  Your infant has had no wet diapers in 4-5 hours.  Your older child has not passed urine in 6-8 hours.  Your infant or child starts to have dry mouth and lips, or no saliva or tears.    PREGNANT WOMEN:  Return to the Emergency Department right away if you have any of the above-listed symptoms or the following:    You have bleeding, leaking fluid or passing tissue from the vagina.  You have worse pain or cramping, or pain in your shoulder or back.  You have vomiting that will not stop.  You have painful or bloody urination.  You have a temperature of 100oF or more.  Your baby is not moving as much as usual.  You faint.  You get a bad headache with or without eye problems and abdominal pain.  You have a convulsion or seizure.  You have unusual discharge from your vagina and abdominal pain.    Abdominal pain is pretty common during pregnancy.  Your pain may or may not be related to your pregnancy. You should follow-up closely with your OB doctor so they can evaluate you and your baby.  Until you follow-up with your regular doctor, do the following:     Avoid sex and do not put anything in your vagina.  Drink clear fluids.  Only take medications approved by your doctor.    MORE INFORMATION:    Appendicitis:  A possible cause of abdominal pain in any person who still has their appendix is acute appendicitis. Appendicitis is often hard to diagnose.  Testing does not always rule out early appendicitis or other causes of abdominal pain. Close follow-up with your doctor and re-evaluations may be needed to figure out the reason for your abdominal pain.    Follow-up:  It is very important that you make an appointment with your clinic and go to the appointment.  If you do not follow-up with your primary doctor, it may result in missing an important development which could result in permanent injury or disability and/or lasting pain.  If  "there is any problem keeping your appointment, call your doctor or return to the Emergency Department.    Medications:  Take your medications as directed by your doctor today.  Before using over-the-counter medications, ask your doctor and make sure to take the medications as directed.  If you have any questions about medications, ask your doctor.    Diet:  Resume your normal diet as much as possible, but do not eat fried, fatty or spicy foods while you have pain.  Do not drink alcohol or have caffeine.  Do not smoke tobacco.    Probiotics: If you have been given an antibiotic, you may want to also take a probiotic pill or eat yogurt with live cultures. Probiotics have \"good bacteria\" to help your intestines stay healthy. Studies have shown that probiotics help prevent diarrhea and other intestine problems (including C. diff infection) when you take antibiotics. You can buy these without a prescription in the pharmacy section of the store.     If you were given a prescription for medicine here today, be sure to read all of the information (including the package insert) that comes with your prescription.  This will include important information about the medicine, its side effects, and any warnings that you need to know about.  The pharmacist who fills the prescription can provide more information and answer questions you may have about the medicine.  If you have questions or concerns that the pharmacist cannot address, please call or return to the Emergency Department.         Opioid Medication Information    Pain medications are among the most commonly prescribed medicines, so we are including this information for all our patients. If you did not receive pain medication or get a prescription for pain medicine, you can ignore it.     You may have been given a prescription for an opioid (narcotic) pain medicine and/or have received a pain medicine while here in the Emergency Department. These medicines can make you " drowsy or impaired. You must not drive, operate dangerous equipment, or engage in any other dangerous activities while taking these medications. If you drive while taking these medications, you could be arrested for DUI, or driving under the influence. Do not drink any alcohol while you are taking these medications.     Opioid pain medications can cause addiction. If you have a history of chemical dependency of any type, you are at a higher risk of becoming addicted to pain medications.  Only take these prescribed medications to treat your pain when all other options have been tried. Take it for as short a time and as few doses as possible. Store your pain pills in a secure place, as they are frequently stolen and provide a dangerous opportunity for children or visitors in your house to start abusing these powerful medications. We will not replace any lost or stolen medicine.  As soon as your pain is better, you should flush all your remaining medication.     Many prescription pain medications contain Tylenol  (acetaminophen), including Vicodin , Tylenol #3 , Norco , Lortab , and Percocet .  You should not take any extra pills of Tylenol  if you are using these prescription medications or you can get very sick.  Do not ever take more than 3000 mg of acetaminophen in any 24 hour period.    All opioids tend to cause constipation. Drink plenty of water and eat foods that have a lot of fiber, such as fruits, vegetables, prune juice, apple juice and high fiber cereal.  Take a laxative if you don t move your bowels at least every other day. Miralax , Milk of Magnesia, Colace , or Senna  can be used to keep you regular.      Remember that you can always come back to the Emergency Department if you are not able to see your regular doctor in the amount of time listed above, if you get any new symptoms, or if there is anything that worries you.

## 2021-09-01 ENCOUNTER — OFFICE VISIT (OUTPATIENT)
Dept: SURGERY | Facility: CLINIC | Age: 51
End: 2021-09-01
Attending: THORACIC SURGERY (CARDIOTHORACIC VASCULAR SURGERY)
Payer: COMMERCIAL

## 2021-09-01 ENCOUNTER — ANCILLARY PROCEDURE (OUTPATIENT)
Dept: GENERAL RADIOLOGY | Facility: CLINIC | Age: 51
End: 2021-09-01
Attending: CLINICAL NURSE SPECIALIST
Payer: COMMERCIAL

## 2021-09-01 VITALS
RESPIRATION RATE: 16 BRPM | WEIGHT: 154.4 LBS | SYSTOLIC BLOOD PRESSURE: 102 MMHG | HEIGHT: 66 IN | TEMPERATURE: 97.5 F | OXYGEN SATURATION: 99 % | DIASTOLIC BLOOD PRESSURE: 70 MMHG | HEART RATE: 77 BPM | BODY MASS INDEX: 24.81 KG/M2

## 2021-09-01 DIAGNOSIS — K21.9 GASTROESOPHAGEAL REFLUX DISEASE WITHOUT ESOPHAGITIS: ICD-10-CM

## 2021-09-01 DIAGNOSIS — K21.9 GASTROESOPHAGEAL REFLUX DISEASE WITHOUT ESOPHAGITIS: Primary | ICD-10-CM

## 2021-09-01 DIAGNOSIS — K44.9 HIATAL HERNIA: ICD-10-CM

## 2021-09-01 PROCEDURE — G0463 HOSPITAL OUTPT CLINIC VISIT: HCPCS

## 2021-09-01 PROCEDURE — 99024 POSTOP FOLLOW-UP VISIT: CPT | Performed by: THORACIC SURGERY (CARDIOTHORACIC VASCULAR SURGERY)

## 2021-09-01 PROCEDURE — 74240 X-RAY XM UPR GI TRC 1CNTRST: CPT | Mod: GC | Performed by: RADIOLOGY

## 2021-09-01 ASSESSMENT — MIFFLIN-ST. JEOR: SCORE: 1327.1

## 2021-09-01 ASSESSMENT — PAIN SCALES - GENERAL: PAINLEVEL: NO PAIN (0)

## 2021-09-01 NOTE — NURSING NOTE
"Oncology Rooming Note    September 1, 2021 2:19 PM   Jaz Archibald is a 51 year old female who presents for:    Chief Complaint   Patient presents with     Oncology Clinic Visit     Return: Hiatal hernia     Initial Vitals: /70 (BP Location: Right arm, Patient Position: Sitting, Cuff Size: Adult Regular)   Pulse 77   Temp 97.5  F (36.4  C) (Tympanic)   Resp 16   Ht 1.676 m (5' 6\")   Wt 70 kg (154 lb 6.4 oz)   LMP 07/26/2013   SpO2 99%   BMI 24.92 kg/m   Estimated body mass index is 24.92 kg/m  as calculated from the following:    Height as of this encounter: 1.676 m (5' 6\").    Weight as of this encounter: 70 kg (154 lb 6.4 oz). Body surface area is 1.81 meters squared.  No Pain (0) Comment: Data Unavailable   Patient's last menstrual period was 07/26/2013.  Allergies reviewed: Yes  Medications reviewed: Yes    Medications: Medication refills not needed today.  Pharmacy name entered into DreamNotes:    [id number] - [place], [place] - [address]  [facility] [place], [place] - [phone]    Clinical concerns: N/A        Dionna Enriquez CMA              "

## 2021-09-01 NOTE — PROGRESS NOTES
THORACIC SURGERY FOLLOW UP VISIT     Dear Dr. Banks, VIRGEN Jason saw Ms. Archibald in follow-up today. The clinical summary follows:      PREOP DIAGNOSIS   Hiatal hernia, gastroesophageal reflux disease, gastric polyps     PROCEDURE   7/26/2021 Laparoscopic hiatal hernia repair with Nissen fundoplication.      HISTOPATHOLOGY   Benign fibroadipose tissue consistent with hernia sac     COMPLICATIONS  None     INTERVAL STUDIES  Esophagram 9/1/21       Past Medical History:   Diagnosis Date     ADD (attention deficit disorder)      Anxiety      Gastroesophageal reflux disease without esophagitis      Hypertension      Urethral hypermobility 06/26/2013     Past Surgical History:   Procedure Laterality Date     EGD      7/2020     GYN SURGERY  07/12/2012    Novasure     HYSTERECTOMY, PAP NO LONGER INDICATED       LAPAROSCOPIC HERNIORRHAPHY HIATAL N/A 7/26/2021    Procedure: HERNIORRHAPHY, HIATAL, LAPAROSCOPIC;  Surgeon: Mady Potts MD;  Location: [facility] OR     LAPAROSCOPIC HYSTERECTOMY TOTAL  08/12/2013    Procedure: LAPAROSCOPIC HYSTERECTOMY TOTAL;  Laparoscopic Total Hysterectomy,Bilateral Salpingectomy with Sparing of the Ovaries,Uterosacral Suspension,Transvaginal Taping,Perineoplasty;  Surgeon: Sy Castro MD;  Location: [facility]     LAPAROSCOPIC NISSEN FUNDOPLICATION N/A 7/26/2021    Procedure: FUNDOPLICATION, NISSEN, LAPAROSCOPIC;  Surgeon: Mady Potts MD;  Location: [facility] OR     SLING TRANSVAGINAL  08/12/2013    Procedure: SLING TRANSVAGINAL;;  Surgeon: Sy Castro MD;  Location: [facility]        Allergies   Allergen Reactions     Bees Swelling     Disfiguring      Suture Swelling     local swelling and sutures didn't dissolve vyrcil   Suture      Current Outpatient Medications   Medication     aspirin (ASA) 325 MG EC tablet     atorvastatin (LIPITOR) 80 MG tablet     clopidogrel (PLAVIX) 75 MG tablet     EPINEPHrine (EPIPEN) 0.3 MG/0.3ML injection     escitalopram (LEXAPRO) 20 MG  "tablet     lisinopril (ZESTRIL) 10 MG tablet     olopatadine (PATANOL) 0.1 % ophthalmic solution     ondansetron (ZOFRAN-ODT) 4 MG ODT tab     oxyCODONE (ROXICODONE) 5 MG/5ML solution     polyethylene glycol (MIRALAX) 17 g packet     sucralfate (CARAFATE) 1 GM tablet     traZODone (DESYREL) 100 MG tablet     No current facility-administered medications for this visit.     Exam  /70 (BP Location: Right arm, Patient Position: Sitting, Cuff Size: Adult Regular)   Pulse 77   Temp 97.5  F (36.4  C) (Tympanic)   Resp 16   Ht 1.676 m (5' 6\")   Wt 70 kg (154 lb 6.4 oz)   LMP 07/26/2013   SpO2 99%   BMI 24.92 kg/m    Alert and oriented.   Bilateral breath sounds.   Abd soft, incisions well healed. ND NT.      SUBJECTIVE   Ms. Archibald is doing well, she is very happy that her reflux symptoms are resolved. She continues to have persistent cough, although not sure if is related to her reflux. She had mild dysphagia which is improving, and moderate nausea. Since her last visit, she had a TIA secondary to a vertebral artery dissection and is currently on a antiplatelet medications.        IMPRESSION   51 year old female 1 month after laparoscopic hiatal hernia repair with Nissen fundoplication.     PLAN  I reviewed the plan as follows:    No more diet or activity restrictions.   We will make a referral to GI to follow up on gastric polyps.   Follow up with thoracic PRN.    She had all her questions answered and was in agreement with the plan.  I appreciate the opportunity to participate in the care of your patient and will keep you updated.    Sincerely,    Mady Archer MD    "

## 2021-09-01 NOTE — LETTER
9/1/2021         RE: Jaz Archibald  [address]  [facility] [address]        Dear Colleague,    Thank you for referring your patient, Jaz Archibald, to the [facility]. Please see a copy of my visit note below.    THORACIC SURGERY FOLLOW UP VISIT     Dear Jacinda Fragoso,    I saw Ms. Archibald in follow-up today. The clinical summary follows:      PREOP DIAGNOSIS   Hiatal hernia, gastroesophageal reflux disease, gastric polyps     PROCEDURE   7/26/2021 Laparoscopic hiatal hernia repair with Nissen fundoplication.      HISTOPATHOLOGY   Benign fibroadipose tissue consistent with hernia sac     COMPLICATIONS  None     INTERVAL STUDIES  Esophagram 9/1/21       Past Medical History:   Diagnosis Date     ADD (attention deficit disorder)      Anxiety      Gastroesophageal reflux disease without esophagitis      Hypertension      Urethral hypermobility 06/26/2013     Past Surgical History:   Procedure Laterality Date     EGD      7/2020     GYN SURGERY  07/12/2012    Novasure     HYSTERECTOMY, PAP NO LONGER INDICATED       LAPAROSCOPIC HERNIORRHAPHY HIATAL N/A 7/26/2021    Procedure: HERNIORRHAPHY, HIATAL, LAPAROSCOPIC;  Surgeon: Mady Potts MD;  Location: [facility]     LAPAROSCOPIC HYSTERECTOMY TOTAL  08/12/2013    Procedure: LAPAROSCOPIC HYSTERECTOMY TOTAL;  Laparoscopic Total Hysterectomy,Bilateral Salpingectomy with Sparing of the Ovaries,Uterosacral Suspension,Transvaginal Taping,Perineoplasty;  Surgeon: Sy Castro MD;  Location: [facility]     LAPAROSCOPIC NISSEN FUNDOPLICATION N/A 7/26/2021    Procedure: FUNDOPLICATION, NISSEN, LAPAROSCOPIC;  Surgeon: Mady Potts MD;  Location: [facility] OR     SLING TRANSVAGINAL  08/12/2013    Procedure: SLING TRANSVAGINAL;;  Surgeon: Sy Castro MD;  Location: [facility]        Allergies   Allergen Reactions     Bees Swelling     Disfiguring      Suture Swelling     local swelling and sutures didn't dissolve  "vyrcil   Suture      Current Outpatient Medications   Medication     aspirin (ASA) 325 MG EC tablet     atorvastatin (LIPITOR) 80 MG tablet     clopidogrel (PLAVIX) 75 MG tablet     EPINEPHrine (EPIPEN) 0.3 MG/0.3ML injection     escitalopram (LEXAPRO) 20 MG tablet     lisinopril (ZESTRIL) 10 MG tablet     olopatadine (PATANOL) 0.1 % ophthalmic solution     ondansetron (ZOFRAN-ODT) 4 MG ODT tab     oxyCODONE (ROXICODONE) 5 MG/5ML solution     polyethylene glycol (MIRALAX) 17 g packet     sucralfate (CARAFATE) 1 GM tablet     traZODone (DESYREL) 100 MG tablet     No current facility-administered medications for this visit.     Exam  /70 (BP Location: Right arm, Patient Position: Sitting, Cuff Size: Adult Regular)   Pulse 77   Temp 97.5  F (36.4  C) (Tympanic)   Resp 16   Ht 1.676 m (5' 6\")   Wt 70 kg (154 lb 6.4 oz)   LMP 07/26/2013   SpO2 99%   BMI 24.92 kg/m    Alert and oriented.   Bilateral breath sounds.   Abd soft, incisions well healed. ND NT.      SUBJECTIVE   Ms. Archibald is doing well, she is very happy that her reflux symptoms are resolved. She continues to have persistent cough, although not sure if is related to her reflux. She had mild dysphagia which is improving, and moderate nausea. Since her last visit, she had a TIA secondary to a vertebral artery dissection and is currently on a antiplatelet medications.        IMPRESSION   51 year old female 1 month after laparoscopic hiatal hernia repair with Nissen fundoplication.     PLAN  I reviewed the plan as follows:    No more diet or activity restrictions.   We will make a referral to GI to follow up on gastric polyps.   Follow up with thoracic PRN.    She had all her questions answered and was in agreement with the plan.  I appreciate the opportunity to participate in the care of your patient and will keep you updated.    Sincerely,    Mady Archer MD        Again, thank you for allowing me to participate in the care of your patient. "        Sincerely,        Morris Boland MD

## 2021-09-08 ENCOUNTER — TELEPHONE (OUTPATIENT)
Dept: INTERNAL MEDICINE | Facility: CLINIC | Age: 51
End: 2021-09-08
Payer: COMMERCIAL

## 2021-09-08 DIAGNOSIS — R92.8 OTH ABN AND INCONCLUSIVE FINDINGS ON DX IMAGING OF BREAST: Primary | ICD-10-CM

## 2021-09-08 NOTE — TELEPHONE ENCOUNTER
Patient calling to report that she recently had a CT on 8/27/21 and a lump/abnormality was found in her right breast.  Patient needing diagnostic mammogram along with US right breast to examine further.  Please place orders and facility will call patient to schedule.    Any additional questions, please call patient [phone].

## 2021-09-09 NOTE — TELEPHONE ENCOUNTER
Please place requested orders. Thank you. Carmen      CT ABDOMEN/PELVIS 8/27/21    IMPRESSION:   1.  No acute abnormality in the abdomen or pelvis. No cause for  abdominal pain is identified.  2.  An indeterminate 0.7 cm nodule is noted in the right breast  laterally. Clinical and mammographic correlation are recommended.   Pt stable for dc home today. Hospitalist service to reassign care in am.

## 2021-09-09 NOTE — TELEPHONE ENCOUNTER
THIS WAS ORDERED.    They will likely call her to schedule.  Otherwise, she can call [phone] to schedule.    Tyrese Cardozo MD  General Internal Medicine  [facility]  9/9/2021, 3:13 PM

## 2021-09-14 ENCOUNTER — DOCUMENTATION ONLY (OUTPATIENT)
Dept: OTHER | Facility: CLINIC | Age: 51
End: 2021-09-14

## 2021-09-14 ENCOUNTER — VIRTUAL VISIT (OUTPATIENT)
Dept: INTERNAL MEDICINE | Facility: CLINIC | Age: 51
End: 2021-09-14
Payer: COMMERCIAL

## 2021-09-14 DIAGNOSIS — R11.2 NAUSEA AND VOMITING, INTRACTABILITY OF VOMITING NOT SPECIFIED, UNSPECIFIED VOMITING TYPE: Primary | ICD-10-CM

## 2021-09-14 DIAGNOSIS — I77.74 DISSECTION, VERTEBRAL ARTERY (H): ICD-10-CM

## 2021-09-14 DIAGNOSIS — K21.9 GERD WITHOUT ESOPHAGITIS: ICD-10-CM

## 2021-09-14 PROCEDURE — 99214 OFFICE O/P EST MOD 30 MIN: CPT | Mod: 95 | Performed by: NURSE PRACTITIONER

## 2021-09-14 RX ORDER — METOCLOPRAMIDE 5 MG/1
5 TABLET ORAL 3 TIMES DAILY PRN
Qty: 60 TABLET | Refills: 1 | Status: SHIPPED | OUTPATIENT
Start: 2021-09-14 | End: 2021-12-09

## 2021-09-14 RX ORDER — OXYCODONE HCL 5 MG/5 ML
5-10 SOLUTION, ORAL ORAL EVERY 6 HOURS PRN
Qty: 100 ML | Refills: 0 | Status: SHIPPED | OUTPATIENT
Start: 2021-09-14 | End: 2021-10-20

## 2021-09-14 NOTE — PROGRESS NOTES
Internal Medicine Office Visit  [facility]   Patient Name: Jaz Archibald  Patient Age: 51 year old  YOB: 1970  MRN: [id number]    Date of Visit: 9/14/2021  Patient presents with:  Neck Pain  Dizziness: also gets dry heaves           Assessment / Plan / Medical Decision Making:    Problem List Items Addressed This Visit        Digestive    GERD without esophagitis    Relevant Medications    metoclopramide (REGLAN) 5 MG tablet    oxyCODONE (ROXICODONE) 5 MG/5ML solution    Other Relevant Orders    Adult Gastro Ref - Consult Only    Nausea and vomiting, intractability of vomiting not specified, unspecified vomiting type - Primary     She seems to have some gastroparesis type symptoms status post Nissen fundoplication in July.  I will have her try metoclopramide 5 mg 3 times daily as needed for nausea.  She is advised of the rare but possible side effect of tardive dyskinesia but reassured that with less than 3 months of use this is a rare side effect.  I will order a gastric emptying study as well as referral to gastroenterology for consult regarding the ongoing nausea and vomiting.           Relevant Orders    Adult Gastro Ref - Consult Only    NM Gastric Emptying       Circulatory    Dissection, vertebral artery (H)     She does not have any new neurological changes.  I reviewed the imaging from her hospital stay as well as an ER visit on 8/27/2021.  She is advised to monitor for new or increasing neurological symptoms but I otherwise expect that the left head and neck pain are expected so long as symptoms are generally improving and not worsening.  She has a follow-up neurology appointment and imaging that will be completed in November.  If there is any sudden change in her symptoms she should be seen in the emergency department.  Related to the pain, acetaminophen has been ineffective.  NSAIDs are relatively contraindicated given the dual antiplatelet therapy she is taking.   I will prescribe oxycodone in a solution form for use for severe pain.  She is advised to use up to 4000 mg total of acetaminophen per day for pain as needed.              Video visit duration: 29 minutes with an additional 10 minutes for chart review    I have changed Linsey Archibald's oxyCODONE. I am also having her start on metoclopramide. Additionally, I am having her maintain her EPINEPHrine, olopatadine, sucralfate, polyethylene glycol, ondansetron, traZODone, escitalopram, lisinopril, clopidogrel, atorvastatin, and aspirin.          Orders Placed This Encounter   Procedures     NM Gastric Emptying     Adult Gastro Ref - Consult Only   Followup: Return in about 1 week (around 9/21/2021) for Follow up with PCP . earlier if needed.      Tiesha Jacinto NP, CNP        HPI:  Jaz Archibald is a 51 year old year old female with a PMH of vertebral artery dissection of unknown etiology 8/2021, HLD, GERD, HTN, ADD, and anxiety who is seen for a video visit today for a c/o left neck pain and shoulder pain and left head pain. She has had this pain intermittently ever since the diagnosis of the dissection and is concerned about when she should be worried vs. Expected healing. She denies any new neurological symptoms of numbness/tingling, or loss of strength. She is taking the prescribed medications aspirin and clopidogrel plus atorvastatin faithfully. Acetaminophen is ineffective in alleviating this pain.     S/p Nissen fundoplication in July. She is still getting dry heaves frequently and when this happens her neck hurts. She has less frequent GI symptoms with smaller meals.       Health Maintenance Review  Health Maintenance   Topic Date Due     INFLUENZA VACCINE (1) 09/01/2021     MAMMO SCREENING  10/01/2021 (Originally 6/5/2021)     PREVENTIVE CARE VISIT  08/26/2022 (Originally 3/12/2021)     HEPATITIS C SCREENING  08/26/2022 (Originally 2/1/1987)     ZOSTER IMMUNIZATION (2 of 2) 08/26/2022 (Originally 4/10/2019)     BMP   02/27/2022     ANNUAL REVIEW OF HM ORDERS  08/06/2022     LIPID  08/15/2022     DTAP/TDAP/TD IMMUNIZATION (3 - Td or Tdap) 06/05/2023     ADVANCE CARE PLANNING  09/14/2026     COLORECTAL CANCER SCREENING  05/08/2029     HIV SCREENING  Completed     PHQ-2  Completed     COVID-19 Vaccine  Completed     Pneumococcal Vaccine: Pediatrics (0 to 5 Years) and At-Risk Patients (6 to 64 Years)  Aged Out     IPV IMMUNIZATION  Aged Out     MENINGITIS IMMUNIZATION  Aged Out     HEPATITIS B IMMUNIZATION  Aged Out     PAP  Discontinued       Current Scheduled Meds:  Outpatient Encounter Medications as of 9/14/2021   Medication Sig Dispense Refill     aspirin (ASA) 325 MG EC tablet Take 1 tablet (325 mg) by mouth daily . Future refills by PCP Dr. Jacinda Banks with phone number [phone]. 30 tablet 1     atorvastatin (LIPITOR) 80 MG tablet Take 1 tablet (80 mg) by mouth every evening . Future refills by PCP Dr. Jacinda Banks with phone number [phone]. 30 tablet 1     clopidogrel (PLAVIX) 75 MG tablet Take 1 tablet (75 mg) by mouth daily . Future refills by PCP Dr. Jacinda Banks with phone number [phone]. 30 tablet 1     escitalopram (LEXAPRO) 20 MG tablet Take 1 tablet (20 mg) by mouth At Bedtime 90 tablet 1     lisinopril (ZESTRIL) 10 MG tablet Take 10 mg by mouth daily       metoclopramide (REGLAN) 5 MG tablet Take 1 tablet (5 mg) by mouth 3 times daily as needed (nausea) 60 tablet 1     olopatadine (PATANOL) 0.1 % ophthalmic solution Place 1-2 drops into both eyes 2 times daily as needed for allergies   6     ondansetron (ZOFRAN-ODT) 4 MG ODT tab Take 1 tablet (4 mg) by mouth every 8 hours as needed for nausea 20 tablet 1     oxyCODONE (ROXICODONE) 5 MG/5ML solution Take 5-10 mLs (5-10 mg) by mouth every 6 hours as needed for moderate to severe pain 100 mL 0     polyethylene glycol (MIRALAX) 17 g packet Take 17 g by mouth 2 times daily 7 packet 0     sucralfate (CARAFATE) 1 GM tablet Take 1 g by mouth 4 times  daily as needed        traZODone (DESYREL) 100 MG tablet Take 1 tablet (100 mg) by mouth At Bedtime 90 tablet 1     EPINEPHrine (EPIPEN) 0.3 MG/0.3ML injection Inject 0.3 mLs into the muscle once as needed for anaphylaxis. (Patient not taking: Reported on 8/19/2021) 2 each 3     [DISCONTINUED] oxyCODONE (ROXICODONE) 5 MG/5ML solution Take 5 mLs (5 mg) by mouth every 6 hours as needed for moderate to severe pain (Patient not taking: Reported on 9/14/2021) 100 mL 0     No facility-administered encounter medications on file as of 9/14/2021.       Objective / Physical Examination:  There were no vitals filed for this visit.  Wt Readings from Last 3 Encounters:   09/01/21 70 kg (154 lb 6.4 oz)   08/27/21 68 kg (150 lb)   08/19/21 68.5 kg (151 lb)     There is no height or weight on file to calculate BMI.     GENERAL: Healthy, alert and no distress  EYES: Eyes grossly normal to inspection.  No discharge or erythema, or obvious scleral/conjunctival abnormalities.  RESP: No audible wheeze, cough, or visible cyanosis.  No visible retractions or increased work of breathing.    SKIN: Visible skin clear. No significant rash, abnormal pigmentation or lesions.  NEURO: Cranial nerves grossly intact.  Mentation and speech appropriate for age.  PSYCH: Mentation appears normal, affect normal/bright, judgement and insight intact, normal speech and appearance well-groomed.

## 2021-09-15 ENCOUNTER — OFFICE VISIT (OUTPATIENT)
Dept: OTHER | Facility: CLINIC | Age: 51
End: 2021-09-15
Attending: PHYSICIAN ASSISTANT
Payer: COMMERCIAL

## 2021-09-15 ENCOUNTER — TELEPHONE (OUTPATIENT)
Dept: OTHER | Facility: CLINIC | Age: 51
End: 2021-09-15

## 2021-09-15 VITALS
RESPIRATION RATE: 18 BRPM | SYSTOLIC BLOOD PRESSURE: 113 MMHG | HEART RATE: 98 BPM | DIASTOLIC BLOOD PRESSURE: 79 MMHG | WEIGHT: 156 LBS | HEIGHT: 66 IN | BODY MASS INDEX: 25.07 KG/M2 | OXYGEN SATURATION: 96 %

## 2021-09-15 DIAGNOSIS — E78.5 HYPERLIPIDEMIA LDL GOAL <70: ICD-10-CM

## 2021-09-15 DIAGNOSIS — Z86.59 HISTORY OF CLAUSTROPHOBIA: ICD-10-CM

## 2021-09-15 DIAGNOSIS — K21.9 GASTROESOPHAGEAL REFLUX DISEASE WITHOUT ESOPHAGITIS: ICD-10-CM

## 2021-09-15 DIAGNOSIS — N63.10 BREAST MASS, RIGHT: Primary | ICD-10-CM

## 2021-09-15 DIAGNOSIS — I77.74 DISSECTION, VERTEBRAL ARTERY (H): ICD-10-CM

## 2021-09-15 PROBLEM — R11.2 NAUSEA AND VOMITING, INTRACTABILITY OF VOMITING NOT SPECIFIED, UNSPECIFIED VOMITING TYPE: Status: ACTIVE | Noted: 2021-09-15

## 2021-09-15 PROCEDURE — G0463 HOSPITAL OUTPT CLINIC VISIT: HCPCS

## 2021-09-15 PROCEDURE — 99205 OFFICE O/P NEW HI 60 MIN: CPT | Performed by: INTERNAL MEDICINE

## 2021-09-15 RX ORDER — LORAZEPAM 0.5 MG/1
0.5 TABLET ORAL ONCE
Status: CANCELLED | OUTPATIENT
Start: 2021-09-15 | End: 2021-09-15

## 2021-09-15 RX ORDER — DIAZEPAM 5 MG
5 TABLET ORAL ONCE
Status: CANCELLED | OUTPATIENT
Start: 2021-09-15 | End: 2021-09-15

## 2021-09-15 RX ORDER — LORAZEPAM 1 MG/1
TABLET ORAL
Qty: 2 TABLET | Refills: 0 | Status: SHIPPED | OUTPATIENT
Start: 2021-09-15 | End: 2021-10-04

## 2021-09-15 ASSESSMENT — MIFFLIN-ST. JEOR: SCORE: 1334.36

## 2021-09-15 NOTE — ASSESSMENT & PLAN NOTE
She seems to have some gastroparesis type symptoms status post Nissen fundoplication in July.  I will have her try metoclopramide 5 mg 3 times daily as needed for nausea.  She is advised of the rare but possible side effect of tardive dyskinesia but reassured that with less than 3 months of use this is a rare side effect.  I will order a gastric emptying study as well as referral to gastroenterology for consult regarding the ongoing nausea and vomiting.

## 2021-09-15 NOTE — ASSESSMENT & PLAN NOTE
She does not have any new neurological changes.  I reviewed the imaging from her hospital stay as well as an ER visit on 8/27/2021.  She is advised to monitor for new or increasing neurological symptoms but I otherwise expect that the left head and neck pain are expected so long as symptoms are generally improving and not worsening.  She has a follow-up neurology appointment and imaging that will be completed in November.  If there is any sudden change in her symptoms she should be seen in the emergency department.  Related to the pain, acetaminophen has been ineffective.  NSAIDs are relatively contraindicated given the dual antiplatelet therapy she is taking.  I will prescribe oxycodone in a solution form for use for severe pain.  She is advised to use up to 4000 mg total of acetaminophen per day for pain as needed.

## 2021-09-15 NOTE — TELEPHONE ENCOUNTER
Follow up to:  9/15/21    Please arrange for the following imaging in the next 1-2 weeks:      CTA Head/Neck with contrast    CTA Chest/Abdomen/Pelvis with contrast    MRI Brain w & w/o contrast    In clinic visit - already scheduled 10/4/21 at 3:10.    Elizabeth Adhikari RN BSN  [facility]  [phone]

## 2021-09-15 NOTE — PROGRESS NOTES
INITIAL VASCULAR MEDICINE ASSESSMENT  REFERRING CLINICIAN: CONRADO BULLOCK PA-C  REASON FOR REFERRAL: VERTEBRAL ARTERY DISSECTION    HPI: Jaz Archibald is a 51 year old female w/ a h/o laparoscopic Nissen fundoplication 7/26/2021 who then presented to the ED 8/15/2021 with transient right facial numbness which has since resolved. MY revealed no stroke. CTA and MRA of the H/N revealed rt V2 segment vertebral dissection. She was treated with Plavix and discharged without further incident until 8/27/2021, at which point in time she represented with abdominal pain and right sided face and arm numbness which resolved spontaneously. Non CTA CT of the abd and pelvis subsequently revealed no etiology for abdominal pain. The pain spontaneously resolved. She additionally subsequently reports several intermittent episodes of facial numbness. She presently has no neurologic sxs but is developing abdominal discomfort on DAP. She is on an audlt aspirin in [facility] to Plavix.    She was referred by Conrado Bullock to comment upon further evaluation and management.         Review Of Systems  Skin: negative  Eyes: negative  Ears/Nose/Throat: negative  Respiratory: No shortness of breath, dyspnea on exertion, cough, or hemoptysis  Cardiovascular: negative  Gastrointestinal: asabove  Genitourinary: negative  Musculoskeletal: negative  Neurologic: as above, none presently  Psychiatric: negative  Hematologic/Lymphatic/Immunologic: negative  Endocrine: negative      PAST MEDICAL HISTORY:                  Past Medical History:   Diagnosis Date     ADD (attention deficit disorder)      Anxiety      Gastroesophageal reflux disease without esophagitis      Hypertension      Urethral hypermobility 06/26/2013       PAST SURGICAL HISTORY:                  Past Surgical History:   Procedure Laterality Date     EGD      7/2020     GYN SURGERY  07/12/2012    Novasure     HYSTERECTOMY, PAP NO LONGER INDICATED       LAPAROSCOPIC HERNIORRHAPHY HIATAL N/A  7/26/2021    Procedure: HERNIORRHAPHY, HIATAL, LAPAROSCOPIC;  Surgeon: Mady Potts MD;  Location: [facility] OR     LAPAROSCOPIC HYSTERECTOMY TOTAL  08/12/2013    Procedure: LAPAROSCOPIC HYSTERECTOMY TOTAL;  Laparoscopic Total Hysterectomy,Bilateral Salpingectomy with Sparing of the Ovaries,Uterosacral Suspension,Transvaginal Taping,Perineoplasty;  Surgeon: Sy Castro MD;  Location: [facility] OR     LAPAROSCOPIC NISSEN FUNDOPLICATION N/A 7/26/2021    Procedure: FUNDOPLICATION, NISSEN, LAPAROSCOPIC;  Surgeon: Mady Potts MD;  Location: [facility] OR     SLING TRANSVAGINAL  08/12/2013    Procedure: SLING TRANSVAGINAL;;  Surgeon: Sy Castro MD;  Location: [facility] OR       CURRENT MEDICATIONS:                  Current Outpatient Medications   Medication Sig Dispense Refill     aspirin (ASA) 325 MG EC tablet Take 1 tablet (325 mg) by mouth daily . Future refills by PCP Dr. Jacinda Banks with phone number [phone]. 30 tablet 1     atorvastatin (LIPITOR) 80 MG tablet Take 1 tablet (80 mg) by mouth every evening . Future refills by PCP Dr. Jacinda Banks with phone number [phone]. 30 tablet 1     clopidogrel (PLAVIX) 75 MG tablet Take 1 tablet (75 mg) by mouth daily . Future refills by PCP Dr. Jacinda Banks with phone number [phone]. 30 tablet 1     EPINEPHrine (EPIPEN) 0.3 MG/0.3ML injection Inject 0.3 mLs into the muscle once as needed for anaphylaxis. 2 each 3     escitalopram (LEXAPRO) 20 MG tablet Take 1 tablet (20 mg) by mouth At Bedtime 90 tablet 1     lisinopril (ZESTRIL) 10 MG tablet Take 10 mg by mouth daily       metoclopramide (REGLAN) 5 MG tablet Take 1 tablet (5 mg) by mouth 3 times daily as needed (nausea) 60 tablet 1     olopatadine (PATANOL) 0.1 % ophthalmic solution Place 1-2 drops into both eyes 2 times daily as needed for allergies   6     ondansetron (ZOFRAN-ODT) 4 MG ODT tab Take 1 tablet (4 mg) by mouth every 8 hours as needed for nausea 20 tablet 1     oxyCODONE  (ROXICODONE) 5 MG/5ML solution Take 5-10 mLs (5-10 mg) by mouth every 6 hours as needed for moderate to severe pain 100 mL 0     polyethylene glycol (MIRALAX) 17 g packet Take 17 g by mouth 2 times daily 7 packet 0     sucralfate (CARAFATE) 1 GM tablet Take 1 g by mouth 4 times daily as needed        traZODone (DESYREL) 100 MG tablet Take 1 tablet (100 mg) by mouth At Bedtime 90 tablet 1       ALLERGIES:                  Allergies   Allergen Reactions     Bees Swelling     Disfiguring      Suture Swelling     local swelling and sutures didn't dissolve vyrcil   Suture        SOCIAL HISTORY:                  Social History     Socioeconomic History     Marital status:      Spouse name: Not on file     Number of children: 2     Years of education: Not on file     Highest education level: Not on file   Occupational History     Employer: [employer]   Tobacco Use     Smoking status: Never Smoker     Smokeless tobacco: Never Used   Substance and Sexual Activity     Alcohol use: Not Currently     Drug use: No     Sexual activity: Yes     Partners: Male     Birth control/protection: Surgical     Comment: vasectomy significant other   Other Topics Concern     Parent/sibling w/ CABG, MI or angioplasty before 65F 55M? No      Service Not Asked     Blood Transfusions Not Asked     Caffeine Concern Not Asked     Occupational Exposure Not Asked     Hobby Hazards Not Asked     Sleep Concern Not Asked     Stress Concern Not Asked     Weight Concern Not Asked     Special Diet No     Back Care Not Asked     Exercise No     Bike Helmet Not Asked     Seat Belt Yes     Self-Exams Not Asked   Social History Narrative     Not on file     Social Determinants of Health     Financial Resource Strain:      Difficulty of Paying Living Expenses:    Food Insecurity:      Worried About Running Out of Food in the Last Year:      Ran Out of Food in the Last Year:    Transportation Needs:      Lack of Transportation (Medical):       Lack of Transportation (Non-Medical):    Physical Activity:      Days of Exercise per Week:      Minutes of Exercise per Session:    Stress:      Feeling of Stress :    Social Connections:      Frequency of Communication with Friends and Family:      Frequency of Social Gatherings with Friends and Family:      Attends Mandaeism Services:      Active Member of Clubs or Organizations:      Attends Club or Organization Meetings:      Marital Status:    Intimate Partner Violence:      Fear of Current or Ex-Partner:      Emotionally Abused:      Physically Abused:      Sexually Abused:        FAMILY HISTORY:                   Family History   Problem Relation Age of Onset     Lipids Mother         chol     Hypertension Mother      Lipids Father         chol     Hypertension Father      C.A.D. Father      Heart Disease Father          Physical exam Reveals:    O/P: WNL  HEENT: WNL  NECK: No JVD, thyromegaly, or lymphadenopathy  HEART: RRR, no murmurs, gallops, or rubs  LUNGS: CTA bilaterally without rales, wheezes, or rhonchi  GI: NABS, nondistended, nontender, soft  EXT:without cyanosis, clubbing, or edema  NEURO: nonfocal  : no flank tenderness    MRI OF THE BRAIN WITHOUT AND WITH CONTRAST 8/27/2021 8:57 PM      COMPARISON: Head CT same day.     HISTORY:  Neurological deficit, acute, stroke suspected, altered  mental status.     TECHNIQUE: Multi-sequence, multi-planar MRI images of the brain were  acquired before and after the administration of IV gadolinium (6 mL  Gadavist).     FINDINGS: The ventricles and basal cisterns are normal in  configuration. There is no midline shift. There are no extra-axial  fluid collections. Gray-white differentiation is well maintained.  There is no evidence for stroke or acute intracranial hemorrhage.  There is no abnormal contrast enhancement in the brain or its  coverings.     There is no sinusitis or mastoiditis.                                                                       IMPRESSION: Normal brain MRI.     RAÚL YOUSIF MD     CT OF THE HEAD WITHOUT CONTRAST 8/27/2021 6:45 PM      COMPARISON: Brain MRI 8/15/2021.     HISTORY: Neurological deficit, acute, stroke suspected.     TECHNIQUE: Axial CT images of the head from the skull base to the  vertex were acquired without IV contrast.     FINDINGS: The ventricles and basal cisterns are within normal limits  in configuration. There is no midline shift. There are no extra-axial  fluid collections. Gray-white differentiation is well maintained.      No intracranial hemorrhage, mass or recent infarct.     The visualized paranasal sinuses are well-aerated. There is no  mastoiditis. There are no fractures of the visualized bones.                                                                       IMPRESSION: Normal head CT.         Radiation dose for this scan was reduced using automated exposure  control, adjustment of the mA and/or kV according to patient size, or  iterative reconstruction technique        RAÚL YOUSIF MD          CT ANGIOGRAM OF THE HEAD AND NECK WITHOUT AND WITH CONTRAST  8/27/2021  6:44 PM      COMPARISON: Head and neck MRA 8/15/2021.     HISTORY: Neurological deficit, acute, stroke suspected.     TECHNIQUE:  Precontrast localizing scans were followed by CT  angiography with an injection of 70 ml Isovue-370 nonionic intravenous  contrast material with scans through the head and neck.  Images were  transferred to a separate 3-D workstation where multiplanar  reformations and 3-D images were created.  Estimates of carotid  stenoses are made relative to the distal internal carotid artery  diameters except as noted.       FINDINGS:   Neck CTA: The common carotid arteries bilaterally remain widely  patent. The cervical internal carotid arteries are very tortuous, but  are patent without stenosis or kinking. The large left vertebral  artery is widely patent. The origin of the right vertebral artery is  normal in  caliber. The entire V2 segment is again noted to be  irregular in contour and moderately narrowed suggesting long segment  dissection. The V3 and V4 segments of the right vertebral artery  appear normal.     Head CTA: The basilar, bilateral intracranial distal internal carotid,  bilateral anterior cerebral, bilateral middle cerebral and bilateral  posterior cerebral arteries are patent and unremarkable. The anterior  communicating and left posterior communicating arteries are patent.                                                                      IMPRESSION:    1. Findings consistent with long segment dissection and moderate  narrowing of the entire V2 segment of the right vertebral artery again  Noted.    2. Otherwise, normal neck and head CTA.        Radiation dose for this scan was reduced using automated exposure  control, adjustment of the mA and/or kV according to patient size, or  iterative reconstruction technique     RAÚL YOUSIF MD       CT ABDOMEN AND PELVIS WITH CONTRAST 8/27/2021 6:40 PM     CLINICAL HISTORY: Abdominal pain. Nausea and vomiting.     TECHNIQUE: CT scan of the abdomen and pelvis was performed following  injection of IV contrast. Multiplanar reformats were obtained. Dose  reduction techniques were used.  CONTRAST: 70 mL Isovue-370  COMPARISON: None.     FINDINGS:   LOWER CHEST: The visualized lung bases are clear. Bilateral breast  implants are partially included on this exam. A 0.7 cm indeterminate  nodule is noted in the right breast laterally (series 6 image 25).     HEPATOBILIARY: Unremarkable. No hepatic masses are seen.     PANCREAS: Normal.     SPLEEN: Normal.     ADRENAL GLANDS: Normal.     KIDNEYS/BLADDER: Unremarkable. No hydronephrosis.     BOWEL: Postoperative changes of prior Nissen fundoplication. No bowel  obstruction. Sigmoid diverticulosis. No convincing evidence for  colitis or diverticulitis. Unremarkable appendix.     PELVIC ORGANS: Prior hysterectomy.     LYMPH  NODES: No enlarged lymph nodes are identified in the abdomen or  pelvis.     VASCULATURE: Unremarkable.     ADDITIONAL FINDINGS: None.     MUSCULOSKELETAL: Unremarkable.                                                                      IMPRESSION:     1.  No acute abnormality in the abdomen or pelvis. No cause for  abdominal pain is identified.    2.  An indeterminate 0.7 cm nodule is noted in the right breast  laterally. Clinical and mammographic correlation are recommended.     ANAY DICKSON MD            MRA NECK WITHOUT AND WITH CONTRAST  8/15/2021 2:39 PM      HISTORY: Neuro deficit, acute, stroke suspected; intermittent R sided  paresthesias      TECHNIQUE: 2D time-of-flight MR angiogram of the neck without contrast  and 3D MR angiogram of the neck with  10 mL Gadavist. Estimates of  carotid stenoses are made relative to the distal internal carotid  artery diameters except as noted.      COMPARISON: None.      FINDINGS: Conventional three-vessel aortic arch branching pattern  without significant stenosis of the origins of the great vessels. The  common carotid arteries, carotid bifurcations, and bilateral cervical  internal carotid arteries are patent. Somewhat tortuous  [id number] right cervical internal carotid artery and tortuous  [id number]-de-yxytbb left cervical internal carotid artery.     Crescentic intrinsically T1 hyperintense signal involving the V2  segment of the right vertebral artery, consistent with intramural  hematoma (for example see series 17 image 16). There is a severe  associated focal stenosis involving the true lumen of the mid V2  segment of the right vertebral artery (series 6 image 45, series 20  image 53). Mild/moderate degrees of diffuse stenosis elsewhere  involving the right vertebral artery V2 segment. The left vertebral  artery is dominant and the right vertebral artery is developmentally  smaller in size compared to the left. The left vertebral artery is  widely patent  without evidence of recent dissection.                                                                      IMPRESSION:    1. Findings consistent with recent right vertebral artery dissection  diffusely involving the V2 segment, with an associated severe focal  stenosis involving the true lumen of the mid V2 segment.    2. The dominant left vertebral artery is widely patent.    3. The bilateral common carotid arteries, carotid bifurcations, and  cervical internal carotid arteries are patent.     The findings were discussed by phone with Dr. Sheth by myself at  approximately 3:05 PM on 8/15/2021.     GHAZALA KELLER MD     MRI BRAIN WITHOUT AND WITH CONTRAST  8/15/2021 2:38 PM      HISTORY:  Neurologic deficit, acute, stroke suspected. Right-sided  paresthesias .     TECHNIQUE:  Multiplanar, multisequence MRI of the brain without and  with 10 mL Gadavist      COMPARISON: None.      FINDINGS: No abnormal intracranial restricted diffusion to suggest  acute infarct. The ventricles are normal in size and configuration.  Mild, age-commensurate generalized brain parenchymal volume loss. The  brain parenchyma appears normal in morphology, volume, and signal  intensity for the patient's age. No intracranial hemorrhage  identified. No extra-axial fluid collection, mass lesion, or  herniation. No abnormal intracranial postcontrast enhancement.     Asymmetrically diminished flow void of the V3 segment of the right  vertebral artery is noted (series 8 image 3). Please see separate  report from MR angiogram of the neck for additional details. The other  major arterial flow voids at the skull base appear to be grossly  maintained.     Orbits appear normal. Calvarium, skull base and mid face otherwise are  unremarkable.                                                                      IMPRESSION:    1. No acute intracranial process. Unremarkable appearance of the brain  and extra-axial spaces.    2. Abnormal flow-void involving  the right vertebral artery V3 segment.  Please see MR angiogram of the neck report of same day for additional  details.     GHAZALA KELLER MD     MR ANGIOGRAM OF THE HEAD WITHOUT CONTRAST   8/15/2021 2:22 PM      HISTORY: Neurologic deficit, acute, stroke suspected. Intermittent  right-sided paresthesias.     TECHNIQUE:  3D time-of-flight MR angiogram of the head without  contrast.     COMPARISON: None.     FINDINGS: The distal cervical, petrous, cavernous, and supraclinoid  segments of the internal carotid arteries are patent. The major  proximal branches of the middle and anterior cerebral arteries are  patent. The left vertebral artery is dominant. The visualized aspects  of the intracranial vertebral arteries and the basilar artery appear  patent. The proximal visualized branches of the posterior cerebral  arteries are patent. No aneurysm or high flow vascular malformation is  identified.                                                                      IMPRESSION:    No high-grade stenosis or large vessel occlusion involving the major  intracranial arteries of the [place].     GHAZALA KELLER MD         Chief Complaint:  One-sided Weakness     HPI   Jaz Archibald is a 51 year old female on Plavix with history of hypertension, hyperlipidemia, and TIA due to right vertebral artery dissection of the V2 segment with focal stenosis of the mid V2 segment on 08/15/21 who presents with right-sided weakness. She recently had surgery for a vertebral dissection on 08/15/21. Today, she had onset abdominal pain with associated dizziness. She went to lay down and then had onset right sided numbness beginning in her right arm migrating into her right side of her face, and a strong sensation into her right leg approximately 30 minutes ago. It has reduced in severity here, but currently feels her right side of her face is still somewhat stiff. She states this is reminiscent to when she had her vertebral dissections.  "She feels nauseous here and is experiencing a \"burning\" abdominal pain. She follows Dr. Gabriel Isabel of neurology with [facility].      Review of Systems   Gastrointestinal: Positive for abdominal pain.   Neurological: Positive for dizziness and numbness.   All other systems reviewed and are negative.     Allergies:  Bees  Suture     Medications:  Aspirin 325 mg   Lipitor   Plavix   Epinephrine   Lexapro   Lisinopril   Zofran   Carafte   Trazodone     Past Medical History:    ADD  Anxiety   Gastroesophageal Reflux Disease without Esophagitis  Vertebral Artery Dissection, 08/15/21  [facility]  Hypertension   Hyperlipidemia   Sleep Disorder       Past Surgical History:    EGD   Novasure   Laparoscopic Hysterectomy   Laparoscopic Herniorrhaphy, 07/26/21  Laparoscopic Nissen Fundoplication, 07/26/21  Sling Transvaginal       Family History:    Mother - Lipids, Hypertension  Father - Lipids, Hypertension, CAD, Heart Disease     Social History:  She is accompanied to the emergency department with her      Physical Exam      Patient Vitals for the past 24 hrs:    BP Temp src Pulse Resp SpO2 Weight   08/27/21 1900 (!) 135/97 -- 74 -- 97 % --   08/27/21 1815 -- -- 71 -- 97 % --   08/27/21 1800 -- -- 67 -- 97 % --   08/27/21 1745 (!) 150/97 -- 66 -- 97 % --   08/27/21 1730 (!) 149/95 -- 81 -- 96 % --   08/27/21 1725 (!) 161/91 -- 81 -- 97 % --   08/27/21 1708 (!) 141/95 Oral 72 20 94 % 68 kg (150 lb)         Physical Exam  General: Well-nourished, appears to be in pain and anxious.  Eyes: PERRL, conjunctivae pink no scleral icterus or conjunctival injection  ENT:  Moist mucus membranes, posterior oropharynx clear without erythema or exudates  Respiratory:  Lungs clear to auscultation bilaterally, no crackles/rubs/wheezes.  Good air movement  CV: Normal rate and rhythm, no murmurs/rubs/gallops  GI:  Abdomen soft and non-distended.  Normoactive BS.  +epigastric and mid abdominal tenderness, no guarding " or rebound  Skin: Warm, dry.  No rashes or petechiae  Musculoskeletal: No peripheral edema or calf tenderness  Neuro: Alert and oriented to person/place/time. PERRL, EOMI no nystagmus, no aphasia/facial droop/dysarthria, tongue midline, symmetric palatal elevation, normal strength at SCM/trapezius/BUE/BLE, normal coordination to FNF at BUE, normal casual gait, negative romberg, sensation intact to LT over face/BUE/BLE  Psychiatric: Anxious affect        Emergency Department Course   ECG  ECG taken at 1733, ECG read at 1748  Normal sinus rhythm   Normal ECG   No change as compared to prior, dated 8/16/21.  Rate 60 bpm. NE interval 134 ms. QRS duration 72 ms. QT/QTc 448/448 ms. P-R-T axes 53 0 34.      Imaging:  Head CT w/o contrast  Normal head CT.   Reading per radiology     CTA Head Neck with Contrast  1. Findings consistent with long segment dissection and moderate  narrowing of the entire V2 segment of the right vertebral artery again  noted.  2. Otherwise, normal neck and head CTA.  Reading per radiology     CT Abdomen Pelvis w Contrast  1.  No acute abnormality in the abdomen or pelvis. No cause for  abdominal pain is identified.  2.  An indeterminate 0.7 cm nodule is noted in the right breast  laterally. Clinical and mammographic correlation are recommended.  Reading per radiology     MR Brain w/o & w Contrast:  Normal brain MRI.  Reading per radiology     Laboratory:   CBC: WBC 11.0, HGB 14.6,   CMP: AWNL (Creatinine: 0.75)      Lipase: 219      Emergency Department Course:     Reviewed:  I reviewed nursing notes, vitals, past medical history and care everywhere     Assessments:  1705    I obtained history and examined the patient as noted above.   1948    I rechecked the patient and explained findings.   2158    I updated the patient.      Consults:   1736    I consulted with Dr. Harvey Romo, [facility], regarding the patient's history and presentation here in the emergency department.     2152     I consulted with Dr. Roselyn Bermudez, Stroke Neuro, regarding the patient's history and presentation here in the emergency department.     Interventions:  1722    0.9% NaCl bolus 1,000 mL IV  1722    Morphine 4 mg IV   1722    Zofran 4 mg IV   1825    Morphine 4 mg IV   1825    Zofran 4 mg IV      Disposition:  The patient was discharged to home.      Impression & Plan      Medical Decision Making:  Jaz Archibald is a 51 year old female with recent Nissen fundoplication as well as a recent TIA with diagnosis of vertebral artery dissection who comes with marching paresthesias and numbness on the right side as well as abdominal pain with nausea.  In terms of the abdominal pain, I do suspect she may have some sort of gastroparesis like phenomenon related to eating more than she has been.  CT scan was reassuring.  Laboratory studies were also reassuring.  She does not have a surgical abdomen.  She improved with antiemetics.  In terms of the right-sided paresthesias and numbness, I was concerned about the possibility of a complication from her known vertebral artery dissection.  I spoke with stroke neurology.  Please refer to their consultation note.  They recommended repeat imaging including CT and CTA followed by MRI.  These were completed and demonstrate no change in the vertebral artery dissection and no evidence of acute stroke.  Symptoms have resolved.  She was reassured.  She is to follow-up with her repeat CT angiogram in 3 months and follow-up as previously scheduled with the stroke neurology clinic.  She was notified of the incidental finding of a nodule on the breast and recommended mammogram.  She is to follow-up with her own doctor in the next few days and have a low threshold to return if any worsening.  With reasonable clinical confidence, I do feel she safe for discharge home at this time.        Diagnosis:      ICD-10-CM     1. Paresthesias  R20.2     2. Abdominal pain, epigastric  R10.13     3.  Vertebral artery dissection (H)  I77.74     4. Breast nodule  N63.0       right lateral breast nodule seen on CT scan 8/27/21      Scribe Disclosure:  I, Alcides ArguetaMaria Guadalupe, am serving as a scribe at 5:05 PM on 8/27/2021 to document services personally performed by Ilda Rm MD based on my observations and the provider's statements to me.                  Ilda Rm MD  08/29/21 [address] [place] [address]     Discharge Summary  Hospitalist     Date of Admission:  8/15/2021  Date of Discharge:  8/16/2021  Discharging Provider: Clarence Rhodes  Date of Service (when I saw the patient): 08/16/21        Discharge Diagnoses     1. 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 Dyslipidemia  3. Anxiety  4. GERD           History of Present Illness     Ms. Archibald is a 51-year-old female with a past medical history significant for hypertension, dyslipidemia, anxiety and gastroesophageal reflux disease with recent Nissen fundoplication, who presents to the Emergency Departments with a right-sided facial weakness and numbness and found to have a right vertebral artery dissection.     Please refer to the history and physical from Dr. Carrillo for additional details.           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    This would account for her TIA symptoms of right-sided facial numbness and tingling and right arm weakness.  Etiology of this vertebral artery dissection is not entirely clear at this point.  Her symptoms are transient and at this point neurochecks stable, no neurological deficits while hospitalized.  - Transthoracic echocardiogram normal LVEF 55-60%, no RWMA, RV normal, no significant evidence for valvular pathology, bubble study negative.  - 24-hour Telemetry without any arrhythmia  - Bedside Glucose Monitoring  - A1c 5.0, WNL     Plan  - Daily aspirin  mg for secondary stroke  prevention  - Start Plavix 75mg daily, Load with Plavix 300mg Once  - Continue DAPT for 3months ( + Plavix 75)  - Repeat MRI/MRA Brain w/wo contrast with Post-Fat Sat in 3 months.   - Follow-up in Clinic for de-escalation of anti-platelet therapy.   - Statin: Lipitor 80mg   - Stroke Education     Stroke Evaluation summarized:  MRI/Head CT: Negative for Stroke  Intracranial Vascular Imaging: normal vessel  Cervical Carotid and Vertebral Artery Vascular Imaging: Right Vert Dissection  Echocardiogram: As above, normal  EKG/Telemetry: SR with sinus arrhythmia, prolonged QTc.  LDL: 160-->114  A1c: 5.0  Troponin: NA     Anxiety:  Continue PTA Lexapro.     Gastroesophageal reflux disease with recent Nissen fundoplication:  She also had a hiatal hernia repair.  Pain control as needed and follow up with surgeon as an outpatient as indicated.     Dyslipidemia:  Repeat lipid panel shows , , HDL 47, .  High-dose statin as above.        Clarence Rhodes PA-C           Significant Results and Procedures     As documented above.  Detailed imaging results are available in the data section below.           Pending Results     None           Code Status      Full Code             Primary Care Physician      Jacinda Banks           Physical Exam     Temp: 97.8  F (36.6  C) Temp src: Oral BP: 121/85 Pulse: 69   Resp: 16 SpO2: 95 % O2 Device: None (Room air)         Vitals:     08/15/21 1223 08/15/21 1836   Weight: 70.3 kg (155 lb) 71 kg (156 lb 8.4 oz)      Vital Signs with Ranges  Temp:  [97.7  F (36.5  C)-98.3  F (36.8  C)] 97.8  F (36.6  C)  Pulse:  [63-78] 69  Resp:  [14-18] 16  BP: (117-138)/(78-90) 121/85  SpO2:  [95 %-97 %] 95 %  I/O last 3 completed shifts:  In: 240 [P.O.:240]  Out: -      Constitutional: Alert, oriented to person, place, date, situation.  Cooperative, lying in bed in NAD.   Respiratory:   Breathing comfortably, lungs clear.  Cardiovascular:  Well perfused, no  edema.  Skin/Integumen:  Dry, non-diaphoretic.  MSK:  Limbs atraumatic.  Moves all 4 extremities equally.  Neuro: Speech fluent and normal.  No facial droop.  Moves all 4 extremities with normal strength.  Coordination grossly normal.  No overt deficits.           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 Gabriel Isabel at [facility]. You will be contacted by virtual stroke clinic team after discharge. Repeat MRI/MRA Brain in 3 months, Neurology will arrange this. Follow up with primary care provider, Jacinda Banks, within 7 days to evaluate medication change and for hospital follow- up.          Diet     Follow this diet upon discharge: Regular            Discharge Medications         Discharge Medication List as of 8/16/2021  1:50 PM           START taking these medications     Details   aspirin (ASA) 325 MG EC tablet Take 1 tablet (325 mg) by mouth daily . Future refills by PCP Dr. Jacinda Banks with phone number [phone]., Disp-30 tablet, R-1, E-Prescribe       atorvastatin (LIPITOR) 80 MG tablet Take 1 tablet (80 mg) by mouth every evening . Future refills by PCP Dr. Jacinda Banks with phone number [phone]., Disp-30 tablet, R-1, E-Prescribe       clopidogrel (PLAVIX) 75 MG tablet Take 1 tablet (75 mg) by mouth daily . Future refills by PCP Dr. Jacinda Banks with phone number [phone]., Disp-30 tablet, R-1, E-Prescribe               CONTINUE these medications which have  NOT CHANGED     Details   acetaminophen (TYLENOL) 500 MG tablet Take 500-1,000 mg by mouth every 6 hours as needed for mild pain, Historical       EPINEPHrine (EPIPEN) 0.3 MG/0.3ML injection Inject 0.3 mLs into the muscle once as needed for anaphylaxis., Disp-2 each, R-3, E-Prescribe       escitalopram (LEXAPRO) 20 MG tablet Take 1 tablet (20 mg) by mouth At Bedtime, Disp-90 tablet, R-1, E-Prescribe       lisinopril (ZESTRIL) 10 MG tablet Take 10 mg by mouth daily, Historical       olopatadine (PATANOL) 0.1 % ophthalmic solution Place 1-2 drops into both eyes 2 times daily as needed for allergies , R-6, Historical       ondansetron (ZOFRAN-ODT) 4 MG ODT tab Take 1 tablet (4 mg) by mouth every 8 hours as needed for nausea, Disp-20 tablet, R-1, E-Prescribe       oxyCODONE (ROXICODONE) 5 MG/5ML solution Take 5 mLs (5 mg) by mouth every 6 hours as needed for moderate to severe pain, Disp-100 mL, R-0, E-Prescribe       polyethylene glycol (MIRALAX) 17 g packet Take 17 g by mouth 2 times daily, Disp-7 packet, R-0, Local Print       sucralfate (CARAFATE) 1 GM tablet Take 1 g by mouth 4 times daily as needed , Historical       traZODone (DESYREL) 100 MG tablet Take 1 tablet (100 mg) by mouth At Bedtime, Disp-90 tablet, R-1, E-Prescribe                    Allergies            Allergies   Allergen Reactions     Bees Swelling       Disfiguring      Suture Swelling       local swelling and sutures didn't dissolve vyrcil   Suture             Data      Most Recent 3 CBC's:        Recent Labs   Lab Test 08/15/21  1304 08/13/21  1547 08/06/21  0818   WBC 7.7 11.1* 6.8   HGB 13.4 14.1 14.6   MCV 90 90 88    441 500*      Most Recent 3 BMP's:            Recent Labs   Lab Test 08/16/21  1128 08/16/21  0741 08/15/21  2134 08/15/21  1304 08/06/21  0818 08/06/21  0818 07/29/21  0706   NA  --   --   --  136  --  138 140   POTASSIUM  --   --   --  3.6  --  4.7 3.2*   CHLORIDE  --   --   --  106  --  106 109   CO2  --   --   --  26   --  24 25   BUN  --   --   --  16  --  9 5*   CR  --   --   --  0.63  --  0.72 0.61   ANIONGAP  --   --   --  4  --  8 6   JUANY  --   --   --  8.4*  --  9.1 8.0*   GLC 81 90 88 143*   < > 91 88    < > = values in this interval not displayed.      Most Recent 2 LFT's:       Recent Labs   Lab Test 08/06/21  0818 07/03/20  0859   AST 17 9   ALT 66* 21   ALKPHOS 70 57   BILITOTAL 0.5 0.4      Most Recent INR's and Anticoagulation Dosing History:  Anticoagulation Dose History    There is no flowsheet data to display.         Most Recent 3 Troponin's:      Recent Labs   Lab Test 07/27/21  0533   TROPONIN <0.015      Most Recent Cholesterol Panel:      Recent Labs   Lab Test 08/15/21  1304   CHOL 208*   *   HDL 47*   TRIG 236*      Most Recent 6 Bacteria Isolates From Any Culture (See EPIC Reports for Culture Details):       Recent Labs   Lab Test 09/03/13  1632 08/19/13  1141   CULT No growth after 2 days >100,000 colonies/mL Klebsiella pneumoniae ssp pneumoniae      Most Recent TSH, T4 and A1c Labs:        Recent Labs   Lab Test 08/15/21  1304 08/13/21  1547 06/24/15  1528   TSH 0.68  --  0.97   T4  --   --  1.03   A1C  --  5.0  --            Results for orders placed or performed during the hospital encounter of 08/15/21   MR Brain w/o & w Contrast     Narrative     MRI BRAIN WITHOUT AND WITH CONTRAST  8/15/2021 2:38 PM      HISTORY:  Neurologic deficit, acute, stroke suspected. Right-sided  paresthesias .     TECHNIQUE:  Multiplanar, multisequence MRI of the brain without and  with 10 mL Gadavist      COMPARISON: None.      FINDINGS: No abnormal intracranial restricted diffusion to suggest  acute infarct. The ventricles are normal in size and configuration.  Mild, age-commensurate generalized brain parenchymal volume loss. The  brain parenchyma appears normal in morphology, volume, and signal  intensity for the patient's age. No intracranial hemorrhage  identified. No extra-axial fluid collection, mass lesion,  or  herniation. No abnormal intracranial postcontrast enhancement.     Asymmetrically diminished flow void of the V3 segment of the right  vertebral artery is noted (series 8 image 3). Please see separate  report from MR angiogram of the neck for additional details. The other  major arterial flow voids at the skull base appear to be grossly  maintained.     Orbits appear normal. Calvarium, skull base and mid face otherwise are  unremarkable.        Impression     IMPRESSION:  1. No acute intracranial process. Unremarkable appearance of the brain  and extra-axial spaces.  2. Abnormal flow-void involving the right vertebral artery V3 segment.  Please see MR angiogram of the neck report of same day for additional  details.     GHAZALA KELLER MD         SYSTEM ID:  [id number]   MRA Neck (Carotids) wo & w Contrast     Narrative     MRA NECK WITHOUT AND WITH CONTRAST  8/15/2021 2:39 PM      HISTORY: Neuro deficit, acute, stroke suspected; intermittent R sided  paresthesias      TECHNIQUE: 2D time-of-flight MR angiogram of the neck without contrast  and 3D MR angiogram of the neck with  10 mL Gadavist. Estimates of  carotid stenoses are made relative to the distal internal carotid  artery diameters except as noted.      COMPARISON: None.      FINDINGS: Conventional three-vessel aortic arch branching pattern  without significant stenosis of the origins of the great vessels. The  common carotid arteries, carotid bifurcations, and bilateral cervical  internal carotid arteries are patent. Somewhat tortuous  nnyoqnxy-ge-rso right cervical internal carotid artery and tortuous  [id number]-[id number] left cervical internal carotid artery.     [place]rescent[place] intrinsically T1 hyperintense signal involving the V2  segment of the right vertebral artery, consistent with intramural  hematoma (for example see series 17 image 16). There is a severe  associated focal stenosis involving the true lumen of the mid V2  segment of the right vertebral artery  (series 6 image 45, series 20  image 53). Mild/moderate degrees of diffuse stenosis elsewhere  involving the right vertebral artery V2 segment. The left vertebral  artery is dominant and the right vertebral artery is developmentally  smaller in size compared to the left. The left vertebral artery is  widely patent without evidence of recent dissection.        Impression     IMPRESSION:  1. Findings consistent with recent right vertebral artery dissection  diffusely involving the V2 segment, with an associated severe focal  stenosis involving the true lumen of the mid V2 segment.  2. The dominant left vertebral artery is widely patent.  3. The bilateral common carotid arteries, carotid bifurcations, and  cervical internal carotid arteries are patent.     The findings were discussed by phone with Dr. Sheth by myself at  approximately 3:05 PM on 8/15/2021.     GHAZALA KELLER MD         SYSTEM ID:  [id number]   MRA Brain ([place]) wo Contrast     Narrative     MR ANGIOGRAM OF THE HEAD WITHOUT CONTRAST   8/15/2021 2:22 PM      HISTORY: Neurologic deficit, acute, stroke suspected. Intermittent  right-sided paresthesias.     TECHNIQUE:  3D time-of-flight MR angiogram of the head without  contrast.     COMPARISON: None.     FINDINGS: The distal cervical, petrous, cavernous, and supraclinoid  segments of the internal carotid arteries are patent. The major  proximal branches of the middle and anterior cerebral arteries are  patent. The left vertebral artery is dominant. The visualized aspects  of the intracranial vertebral arteries and the basilar artery appear  patent. The proximal visualized branches of the posterior cerebral  arteries are patent. No aneurysm or high flow vascular malformation is  identified.        Impression     IMPRESSION:  No high-grade stenosis or large vessel occlusion involving the major  intracranial arteries of the [place].     GHAZALA KELLER MD         SYSTEM ID:  [id number]    Echocardiogram Complete     Value     LVEF  55-60%     [facility]     [id number]  [id number]  [id number]  [id number]     [facility]  Echocardiography Laboratory  [address] [place] [address]     Name: TERRANCE FLYNN  MRN: [id number]  : 1970  Study Date: 2021 08:45 AM  Age: 51 yrs  Gender: Female  Patient Location: [facility]  Reason For Study: TIA  Ordering Physician: FRANCI DENNISON  Referring Physician: Jacinda Banks  Performed By: Laurie Smith     BSA: 1.8 m2  Height: 66 in  Weight: 156 lb  HR: 67  BP: 133/89 mmHg  ______________________________________________________________________________  Procedure  Complete Portable Bubble Echo Adult. Optison (NDC #9404-7441) given  intravenously.  ______________________________________________________________________________  Interpretation Summary     1. Left ventricular systolic function is normal. The visual ejection fraction  is 55-60%.  2. No regional wall motion abnormalities noted.  3. The right ventricle is normal in structure, function and size.  4. No evidence for significant valvular pathology.  5. There is no color Doppler evidence of an atrial shunt. A contrast injection  (Bubble Study) was performed that was negative for flow across the interatrial  septum.  ______________________________________________________________________________  Left Ventricle  The left ventricle is normal in size. There is normal left ventricular wall  thickness. Left ventricular systolic function is normal. The visual ejection  fraction is 55-60%. Left ventricular diastolic function is normal. No regional  wall motion abnormalities noted.     Right Ventricle  The right ventricle is normal in structure, function and size.     Atria  Normal left atrial size. Right atrial size is normal. There is no color  Doppler evidence of an atrial shunt. A contrast injection (Bubble Study) was  performed that was negative for flow across the  interatrial septum.     Mitral Valve  The mitral valve is normal in structure and function.     Aortic Valve  The aortic valve is normal in structure and function.     Pulmonic Valve  The pulmonic valve is normal in structure and function.     Vessels  IVC diameter <2.1 cm collapsing >50% with sniff suggests a normal RA pressure  of 3 mmHg.     Pericardium  There is no pericardial effusion.     ______________________________________________________________________________  MMode/2D Measurements & Calculations  IVSd: 0.79 cm  LVIDd: 4.5 cm  LVIDs: 2.4 cm  LVPWd: 0.73 cm  FS: 45.9 %  LV mass(C)d: 106.1 grams  LV mass(C)dI: 58.9 grams/m2  Ao root diam: 2.9 cm  LA dimension: 3.0 cm  asc Aorta Diam: 2.9 cm  LA/Ao: 1.0     LA Volume (BP): 40.2 ml  LA Volume Index (BP): 22.3 ml/m2  RWT: 0.32  TAPSE: 2.0 cm     Doppler Measurements & Calculations  MV E max alexandro: 74.8 cm/sec  MV A max alexandro: 53.7 cm/sec  MV E/A: 1.4  MV dec time: 0.16 sec  E/E' av.0  Lateral E/e': 7.8     Medial E/e': 8.2     ______________________________________________________________________________  Report approved by: Amber Bellamy 2021 12:26 PM                         Cosigned by: Iam Monique MD at 2021  4:07 PM          A/P:    (N63.10) 7 mm right breast nodule  (primary encounter diagnosis)  Comment: she is informed of this incidental discovery on imaging as above  Plan: She is advised to address this with her PCP    (I77.74) Spontaneous atraumatic right vertebral artery dissection  Comment: The patient and her  are concerned this could be due to prolonged neck hyperextension in association with intubation at her 21 Nissen fundoplication. I informed them the her op report does not connote prolonged neck hyperextension. She is having intermittently continuing sxs of right facial numbness. She has none now. Repeat imaging as below.  F/u one to two weeks later. I advised we should rule out FMD or other visceral  vascular findings given the spontaneous nature of her current dissection.   Plan: LipoFit by NMR, Lipoprotein (a), CRP, CARDIAC         RISK, TSH, T4 free, T3 Free, CTA Chest Abdomen         Pelvis Runoff w Contrast, MR Brain w/o & w         Contrast, CTA Head Neck with Contrast            (E78.5) Hyperlipidemia LDL goal <70  Comment: check the below in November, f/u with me two weeks later.  Plan: LipoFit by NMR, Lipoprotein (a), CRP, CARDIAC         RISK, TSH, T4 free, T3 Free            (Z86.59) History of claustrophobia  Comment: she requires this for the MRI  Plan: LORazepam (ATIVAN) 1 MG tablet           (K21.9) Gastroesophageal reflux disease without esophagitis  Comment: she is on DAPT. She is reporting post Nissen fundoplication GERD sxs. She is on Carafate as well as Plavix. I would prefer to prescribe Protonix but her problem list lists long QT syndrome. Her EKG does not reveal a Long QTc. I will therefore not prescribe Protonix. I told her not to take Prilosec or Nexium or Prevacid with Plavix.   Plan: continue carafate      65 minutes total medical care on today's date. Prolonged time as this is the first time I have met the patient, need to review multiple imaging studies, review multipel notes, answer mutliple questions.,

## 2021-09-15 NOTE — PROGRESS NOTES
"[facility]        Patient is here for a consult to discuss about dissection vertebral artery.      /79 (BP Location: Left arm, Patient Position: Chair, Cuff Size: Adult Regular)   Pulse 98   Resp 18   Ht 5' 6\" (1.676 m)   Wt 156 lb (70.8 kg)   LMP 07/26/2013   SpO2 96%   BMI 25.18 kg/m      The provider has been notified that the patient has concerns of about having neck pain.     Questions patient would like addressed today are: N/A.    Refills are needed: No    Has homecare services and agency name:  [facility]      "

## 2021-09-16 NOTE — TELEPHONE ENCOUNTER
Patient is scheduled for her CTA's on 09/17/21 and MRI on 09/28/21.    Call patient to make sure she knows she was scheduled with Dr Austin on 10/04/21 @ 3:10.

## 2021-09-17 ENCOUNTER — HOSPITAL ENCOUNTER (OUTPATIENT)
Dept: CT IMAGING | Facility: CLINIC | Age: 51
End: 2021-09-17
Attending: INTERNAL MEDICINE
Payer: COMMERCIAL

## 2021-09-17 DIAGNOSIS — I77.74 DISSECTION, VERTEBRAL ARTERY (H): ICD-10-CM

## 2021-09-17 PROCEDURE — 70496 CT ANGIOGRAPHY HEAD: CPT

## 2021-09-17 PROCEDURE — 250N000009 HC RX 250: Performed by: INTERNAL MEDICINE

## 2021-09-17 PROCEDURE — 75635 CT ANGIO ABDOMINAL ARTERIES: CPT

## 2021-09-17 PROCEDURE — 250N000011 HC RX IP 250 OP 636: Performed by: INTERNAL MEDICINE

## 2021-09-17 RX ORDER — IOPAMIDOL 755 MG/ML
125 INJECTION, SOLUTION INTRAVASCULAR ONCE
Status: COMPLETED | OUTPATIENT
Start: 2021-09-17 | End: 2021-09-17

## 2021-09-17 RX ADMIN — SODIUM CHLORIDE 100 ML: 9 INJECTION, SOLUTION INTRAVENOUS at 08:00

## 2021-09-17 RX ADMIN — IOPAMIDOL 125 ML: 755 INJECTION, SOLUTION INTRAVENOUS at 08:00

## 2021-09-20 ENCOUNTER — HOSPITAL ENCOUNTER (OUTPATIENT)
Dept: MAMMOGRAPHY | Facility: CLINIC | Age: 51
End: 2021-09-20
Attending: INTERNAL MEDICINE
Payer: COMMERCIAL

## 2021-09-20 DIAGNOSIS — R92.8 OTH ABN AND INCONCLUSIVE FINDINGS ON DX IMAGING OF BREAST: ICD-10-CM

## 2021-09-20 PROCEDURE — G0279 TOMOSYNTHESIS, MAMMO: HCPCS

## 2021-09-20 PROCEDURE — 76642 ULTRASOUND BREAST LIMITED: CPT | Mod: RT

## 2021-09-22 ENCOUNTER — OFFICE VISIT (OUTPATIENT)
Dept: INTERNAL MEDICINE | Facility: CLINIC | Age: 51
End: 2021-09-22
Payer: COMMERCIAL

## 2021-09-22 VITALS
WEIGHT: 154.6 LBS | BODY MASS INDEX: 24.85 KG/M2 | RESPIRATION RATE: 18 BRPM | DIASTOLIC BLOOD PRESSURE: 70 MMHG | HEIGHT: 66 IN | OXYGEN SATURATION: 97 % | SYSTOLIC BLOOD PRESSURE: 110 MMHG | HEART RATE: 96 BPM

## 2021-09-22 DIAGNOSIS — M25.50 MULTIPLE JOINT PAIN: ICD-10-CM

## 2021-09-22 DIAGNOSIS — R11.2 NAUSEA AND VOMITING, INTRACTABILITY OF VOMITING NOT SPECIFIED, UNSPECIFIED VOMITING TYPE: Primary | ICD-10-CM

## 2021-09-22 DIAGNOSIS — K21.00 GASTROESOPHAGEAL REFLUX DISEASE WITH ESOPHAGITIS WITHOUT HEMORRHAGE: ICD-10-CM

## 2021-09-22 DIAGNOSIS — K58.9 IRRITABLE BOWEL SYNDROME, UNSPECIFIED TYPE: ICD-10-CM

## 2021-09-22 DIAGNOSIS — I77.74 DISSECTION, VERTEBRAL ARTERY (H): ICD-10-CM

## 2021-09-22 DIAGNOSIS — G45.9 TIA (TRANSIENT ISCHEMIC ATTACK): ICD-10-CM

## 2021-09-22 DIAGNOSIS — M79.10 GENERALIZED MUSCLE ACHE: ICD-10-CM

## 2021-09-22 LAB
C REACTIVE PROTEIN LHE: 0.1 MG/DL (ref 0–0.8)
ERYTHROCYTE [SEDIMENTATION RATE] IN BLOOD BY WESTERGREN METHOD: 7 MM/HR (ref 0–20)
FERRITIN SERPL-MCNC: 52 NG/ML (ref 10–130)
MAGNESIUM SERPL-MCNC: 1.9 MG/DL (ref 1.8–2.6)
RHEUMATOID FACT SER NEPH-ACNC: <15 IU/ML (ref 0–30)
VIT B12 SERPL-MCNC: 468 PG/ML (ref 213–816)

## 2021-09-22 PROCEDURE — 82306 VITAMIN D 25 HYDROXY: CPT | Performed by: NURSE PRACTITIONER

## 2021-09-22 PROCEDURE — 86141 C-REACTIVE PROTEIN HS: CPT | Performed by: NURSE PRACTITIONER

## 2021-09-22 PROCEDURE — 99214 OFFICE O/P EST MOD 30 MIN: CPT | Mod: 25 | Performed by: NURSE PRACTITIONER

## 2021-09-22 PROCEDURE — 86038 ANTINUCLEAR ANTIBODIES: CPT | Performed by: NURSE PRACTITIONER

## 2021-09-22 PROCEDURE — 86431 RHEUMATOID FACTOR QUANT: CPT | Performed by: NURSE PRACTITIONER

## 2021-09-22 PROCEDURE — 36415 COLL VENOUS BLD VENIPUNCTURE: CPT | Performed by: NURSE PRACTITIONER

## 2021-09-22 PROCEDURE — 90686 IIV4 VACC NO PRSV 0.5 ML IM: CPT | Performed by: NURSE PRACTITIONER

## 2021-09-22 PROCEDURE — 90471 IMMUNIZATION ADMIN: CPT | Performed by: NURSE PRACTITIONER

## 2021-09-22 PROCEDURE — 85652 RBC SED RATE AUTOMATED: CPT | Performed by: NURSE PRACTITIONER

## 2021-09-22 PROCEDURE — 82607 VITAMIN B-12: CPT | Performed by: NURSE PRACTITIONER

## 2021-09-22 PROCEDURE — 83735 ASSAY OF MAGNESIUM: CPT | Performed by: NURSE PRACTITIONER

## 2021-09-22 PROCEDURE — 82728 ASSAY OF FERRITIN: CPT | Performed by: NURSE PRACTITIONER

## 2021-09-22 PROCEDURE — 83550 IRON BINDING TEST: CPT | Performed by: NURSE PRACTITIONER

## 2021-09-22 RX ORDER — PROMETHAZINE HYDROCHLORIDE 25 MG/1
12.5-25 TABLET ORAL EVERY 6 HOURS PRN
Qty: 60 TABLET | Refills: 1 | Status: SHIPPED | OUTPATIENT
Start: 2021-09-22 | End: 2021-12-09

## 2021-09-22 ASSESSMENT — ANXIETY QUESTIONNAIRES
5. BEING SO RESTLESS THAT IT IS HARD TO SIT STILL: SEVERAL DAYS
GAD7 TOTAL SCORE: 16
4. TROUBLE RELAXING: MORE THAN HALF THE DAYS
7. FEELING AFRAID AS IF SOMETHING AWFUL MIGHT HAPPEN: NEARLY EVERY DAY
1. FEELING NERVOUS, ANXIOUS, OR ON EDGE: NEARLY EVERY DAY
8. IF YOU CHECKED OFF ANY PROBLEMS, HOW DIFFICULT HAVE THESE MADE IT FOR YOU TO DO YOUR WORK, TAKE CARE OF THINGS AT HOME, OR GET ALONG WITH OTHER PEOPLE?: EXTREMELY DIFFICULT
GAD7 TOTAL SCORE: 16
2. NOT BEING ABLE TO STOP OR CONTROL WORRYING: NEARLY EVERY DAY
3. WORRYING TOO MUCH ABOUT DIFFERENT THINGS: NEARLY EVERY DAY
7. FEELING AFRAID AS IF SOMETHING AWFUL MIGHT HAPPEN: NEARLY EVERY DAY
6. BECOMING EASILY ANNOYED OR IRRITABLE: SEVERAL DAYS
GAD7 TOTAL SCORE: 16

## 2021-09-22 ASSESSMENT — ENCOUNTER SYMPTOMS
CHILLS: 0
INCREASED ENERGY: 1
ALTERED TEMPERATURE REGULATION: 0
WEIGHT GAIN: 0
WEIGHT LOSS: 0
FEVER: 0
NIGHT SWEATS: 0
HALLUCINATIONS: 0
POLYDIPSIA: 0
FATIGUE: 1
POLYPHAGIA: 0
DECREASED APPETITE: 0

## 2021-09-22 ASSESSMENT — SLEEP AND FATIGUE QUESTIONNAIRES
HOW LIKELY ARE YOU TO NOD OFF OR FALL ASLEEP WHEN YOU ARE A PASSENGER IN A CAR FOR AN HOUR WITHOUT A BREAK: WOULD NEVER DOZE
HOW LIKELY ARE YOU TO NOD OFF OR FALL ASLEEP WHILE SITTING AND READING: SLIGHT CHANCE OF DOZING
HOW LIKELY ARE YOU TO NOD OFF OR FALL ASLEEP WHILE LYING DOWN TO REST IN THE AFTERNOON WHEN CIRCUMSTANCES PERMIT: MODERATE CHANCE OF DOZING
HOW LIKELY ARE YOU TO NOD OFF OR FALL ASLEEP WHILE SITTING AND TALKING TO SOMEONE: WOULD NEVER DOZE
HOW LIKELY ARE YOU TO NOD OFF OR FALL ASLEEP WHILE WATCHING TV: SLIGHT CHANCE OF DOZING
HOW LIKELY ARE YOU TO NOD OFF OR FALL ASLEEP WHILE SITTING INACTIVE IN A PUBLIC PLACE: WOULD NEVER DOZE
HOW LIKELY ARE YOU TO NOD OFF OR FALL ASLEEP WHILE SITTING QUIETLY AFTER LUNCH WITHOUT ALCOHOL: WOULD NEVER DOZE
HOW LIKELY ARE YOU TO NOD OFF OR FALL ASLEEP IN A CAR, WHILE STOPPED FOR A FEW MINUTES IN TRAFFIC: WOULD NEVER DOZE

## 2021-09-22 ASSESSMENT — PATIENT HEALTH QUESTIONNAIRE - PHQ9
SUM OF ALL RESPONSES TO PHQ QUESTIONS 1-9: 15
10. IF YOU CHECKED OFF ANY PROBLEMS, HOW DIFFICULT HAVE THESE PROBLEMS MADE IT FOR YOU TO DO YOUR WORK, TAKE CARE OF THINGS AT HOME, OR GET ALONG WITH OTHER PEOPLE: VERY DIFFICULT
SUM OF ALL RESPONSES TO PHQ QUESTIONS 1-9: 15

## 2021-09-22 ASSESSMENT — MIFFLIN-ST. JEOR: SCORE: 1328.01

## 2021-09-22 NOTE — PROGRESS NOTES
Linsey is a 51 year old who is being evaluated via a billable video visit.      How would you like to obtain your AVS? MyChart  If the video visit is dropped, the invitation should be resent by: Text to cell phone: [phone]  Will anyone else be joining your video visit? Oneida Ziegler MA      Video-Visit Details    Type of service:  Video Visit  Video Start Time: 3:39 PM  Video End Time:  4:17 PM    Originating Location (pt. Location): Home    Distant Location (provider location):  [facility] SLE[facility]     Platform used for Video Visit: Enjoi       Sleep Consultation:    Date on this visit: 9/23/2021    Jaz Archibald is sent by Jacinda Banks for a sleep consultation regarding snoring, witnessed apneas in the hospital, possible sleep apnea.    Primary Physician: Jacinda Banks     Jaz Archibald is a pleasant 51-year-old female with a PMH pertinent for HTN, HLD, hypothyroidism, GERD, s/p Nissen fundoplication, vertebral artery dissection, ADHD, anxiety, allergic rhinitis, history of QT prolongation, and insomnia who presents today with reports of occasional snoring, kicking, and nightmare with sleep paralysis usually when is in supine sleep for several years. She was observed with breathing pauses in her sleep during her recent hospitalization on 7/26/2021 for laparoscopic hiatal hernia repair with Nissen fundoplication.  Of note, she was also hospitalized on 8/15/2021 at [facility] for TIA due to right vertebral artery dissection. Her transthoracic echocardiogram showed normal LVEF 55-60%, no RWMA, RV normal, no significant evidence for valvular pathology, and bubble study was negative.  She was started on ASA  mg daily and Plavix 75 mg daily.    Jaz goes to sleep at 9:00 -10:00 PM during the week. She wakes up at 4:30 - 5:30 AM with an alarm, or wakes before it. She falls asleep in 30 minutes.  Jaz has difficulty falling asleep sometimes,  but not lately.  She wakes up 1-2 times a night for 30 minutes before falling back to sleep.  Jaz wakes up to go to the bathroom, uncertain reasons and anxiety.  On weekends, Jaz goes to sleep at 9:00 - 10:00 PM.  She wakes up at 7:00 AM without an alarm. She falls asleep in 30 minutes.  Patient gets an average of 7-8 hours of sleep per night.     Patient does use electronics in bed, watch TV in bed and read in bed and does not worry in bed about anything.     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 May occasionally work 12-hour shifts occasionally 2 days/month.     Jaz does snore frequently and snoring is loud. Patient does have a regular bed partner. There is report of snoring, kicking and nightmare/sleep paralysis.  She does not have witnessed apneas. They occasionally sleep separately.  Patient sleeps on her side. She has occasional morning dry mouth and restless legs that do interfere with sleep, denies occasional snort arousals, morning headaches and morning confusion. Jaz has occasional bruxism, sleep talking and sleep paralysis and denies any sleep walking, dream enactment, cataplexy and hypnogogic/hypnopompic hallucinations.    She confirms sleep walking and denies sleep talking, enuresis and sleep terrors as a child.  Jaz has depression and anxiety, denies difficulty breathing through her nose, claustrophobia, reflux at night and heartburn.      Jaz has gained 10 pounds in the last year.  Patient describes themself as a morning person.  She would prefer to go to sleep at 9:00 PM and wake up at 7:00 AM.  Patient's [place] Sleepiness score 4/24 consistent with no daytime sleepiness.      Jaz rarely takes naps. She takes no inadvertant naps.  She denies closing eyes, dozing and falling asleep while driving. Patient was counseled on the importance of driving while alert, to pull over if drowsy, or nap before getting  into the vehicle if sleepy.  She uses occasional 1 cup of coffee. Last caffeine intake is usually before 10 AM.    Alcohol use: Very occasional 1-2 glasses of wine on weekend  Nicotine/tobacco use: None  Recreational drug use: None    Other Tests:  Recent Results (from the past 4320 hour(s))   Echocardiogram Complete   Result Value    LVEF  55-60%    [facility]    [id number]  [id number]  [id number]  [id number]^[id number]     [facility]  Echocardiography Laboratory  [facility] [place] [address]     Name: TERRANCE FLYNN  MRN: [id number]  : 1970  Study Date: 2021 08:45 AM  Age: 51 yrs  Gender: Female  Patient Location: [facility]  Reason For Study: TIA  Ordering Physician: FRANCI DENNISON  Referring Physician: Jacinda Banks  Performed By: Laurie Smith     BSA: 1.8 m2  Height: 66 in  Weight: 156 lb  HR: 67  BP: 133/89 mmHg  ______________________________________________________________________________  Procedure  Complete Portable Bubble Echo Adult. Optison (NDC #8603-8104) given  intravenously.  ______________________________________________________________________________  Interpretation Summary     1. Left ventricular systolic function is normal. The visual ejection fraction  is 55-60%.  2. No regional wall motion abnormalities noted.  3. The right ventricle is normal in structure, function and size.  4. No evidence for significant valvular pathology.  5. There is no color Doppler evidence of an atrial shunt. A contrast injection  (Bubble Study) was performed that was negative for flow across the interatrial  septum.  ______________________________________________________________________________  Left Ventricle  The left ventricle is normal in size. There is normal left ventricular wall  thickness. Left ventricular systolic function is normal. The visual ejection  fraction is 55-60%. Left ventricular diastolic function is normal. No regional  wall motion abnormalities noted.     Right  Ventricle  The right ventricle is normal in structure, function and size.     Atria  Normal left atrial size. Right atrial size is normal. There is no color  Doppler evidence of an atrial shunt. A contrast injection (Bubble Study) was  performed that was negative for flow across the interatrial septum.     Mitral Valve  The mitral valve is normal in structure and function.     Aortic Valve  The aortic valve is normal in structure and function.     Pulmonic Valve  The pulmonic valve is normal in structure and function.     Vessels  IVC diameter <2.1 cm collapsing >50% with sniff suggests a normal RA pressure  of 3 mmHg.     Pericardium  There is no pericardial effusion.     ______________________________________________________________________________  MMode/2D Measurements & Calculations  IVSd: 0.79 cm  LVIDd: 4.5 cm  LVIDs: 2.4 cm  LVPWd: 0.73 cm  FS: 45.9 %  LV mass(C)d: 106.1 grams  LV mass(C)dI: 58.9 grams/m2  Ao root diam: 2.9 cm  LA dimension: 3.0 cm  asc Aorta Diam: 2.9 cm  LA/Ao: 1.0     LA Volume (BP): 40.2 ml  LA Volume Index (BP): 22.3 ml/m2  RWT: 0.32  TAPSE: 2.0 cm     Doppler Measurements & Calculations  MV E max alexandro: 74.8 cm/sec  MV A max alexandro: 53.7 cm/sec  MV E/A: 1.4  MV dec time: 0.16 sec  E/E' av.0  Lateral E/e': 7.8     Medial E/e': 8.2     ______________________________________________________________________________  Report approved by: Amber Bellamy 2021 12:26 PM               Ferritin   Date Value Ref Range Status   2021 52 10 - 130 ng/mL Final     Iron   Date Value Ref Range Status   2021 99 35 - 180 ug/dL Final     Iron Binding Capacity   Date Value Ref Range Status   2021 305 240 - 430 ug/dL Final     TSH   Date Value Ref Range Status   08/15/2021 0.68 0.40 - 4.00 mU/L Final   2020 2.22 0.40 - 4.00 mU/L Final   2015 0.97 0.40 - 4.00 mU/L Final   2012 <0.02 (L) 0.4 - 5.0 mU/L Final   2008 0.90 0.4 - 5.0 mU/L Final       CBC RESULTS:  Recent Labs   Lab Test 08/27/21  1716   WBC 11.0   RBC 5.06   HGB 14.6   HCT 44.3   MCV 88   MCH 28.9   MCHC 33.0   RDW 13.0              Allergies:    Allergies   Allergen Reactions     Bees Swelling     Disfiguring      Suture Swelling     local swelling and sutures didn't dissolve vyrcil   Suture        Medications:    Current Outpatient Medications   Medication Sig Dispense Refill     aspirin (ASA) 325 MG EC tablet Take 1 tablet (325 mg) by mouth daily . Future refills by PCP Dr. Jacinda Banks with phone number [phone]. 30 tablet 1     atorvastatin (LIPITOR) 80 MG tablet Take 1 tablet (80 mg) by mouth every evening . Future refills by PCP Dr. Jacinda Banks with phone number [phone]. 30 tablet 1     clopidogrel (PLAVIX) 75 MG tablet Take 1 tablet (75 mg) by mouth daily . Future refills by PCP Dr. Jacinda Banks with phone number [phone]. 30 tablet 1     EPINEPHrine (EPIPEN) 0.3 MG/0.3ML injection Inject 0.3 mLs into the muscle once as needed for anaphylaxis. 2 each 3     escitalopram (LEXAPRO) 20 MG tablet Take 1 tablet (20 mg) by mouth At Bedtime 90 tablet 1     lisinopril (ZESTRIL) 10 MG tablet Take 10 mg by mouth daily       LORazepam (ATIVAN) 1 MG tablet Take one to two tablets prior to MRI 2 tablet 0     metoclopramide (REGLAN) 5 MG tablet Take 1 tablet (5 mg) by mouth 3 times daily as needed (nausea) 60 tablet 1     olopatadine (PATANOL) 0.1 % ophthalmic solution Place 1-2 drops into both eyes 2 times daily as needed for allergies   6     ondansetron (ZOFRAN-ODT) 4 MG ODT tab Take 1 tablet (4 mg) by mouth every 8 hours as needed for nausea 20 tablet 1     oxyCODONE (ROXICODONE) 5 MG/5ML solution Take 5-10 mLs (5-10 mg) by mouth every 6 hours as needed for moderate to severe pain 100 mL 0     polyethylene glycol (MIRALAX) 17 g packet Take 17 g by mouth 2 times daily 7 packet 0     promethazine (PHENERGAN) 25 MG tablet Take 0.5-1 tablets (12.5-25 mg) by mouth every 6 hours as needed  for nausea 60 tablet 1     sucralfate (CARAFATE) 1 GM tablet Take 1 g by mouth 4 times daily as needed        traZODone (DESYREL) 100 MG tablet Take 1 tablet (100 mg) by mouth At Bedtime 90 tablet 1       Problem List:  Patient Active Problem List    Diagnosis Date Noted     Nausea and vomiting, intractability of vomiting not specified, unspecified vomiting type 09/15/2021     Priority: Medium     Dissection, vertebral artery (H) 08/15/2021     Priority: Medium     GERD without esophagitis 05/28/2021     Priority: Medium     Added automatically from request for surgery [id number]       Overweight with body mass index (BMI) of 25 to 25.9 in adult 09/03/2020     Priority: Medium     Encounter for examination for normal comparison and control in clinical research program 08/19/2019     Priority: Medium     History of basal cell carcinoma 05/09/2018     Priority: Medium     BCC, central chest and right breast treated with Aldara 11/2017.  BCC, central chest and right breast treated with Aldara 11/2017.       Elevated LDL cholesterol level 04/23/2018     Priority: Medium     Essential hypertension 11/14/2016     Priority: Medium     Hyperlipidemia LDL goal <130 05/28/2015     Priority: Medium     QT prolongation 08/18/2013     Priority: Medium     8/13 - EKG for pre-op.  New finding.  8/13 - EKG for pre-op.  New finding.       Dyspareunia 06/26/2013     Priority: Medium     Persons encountering health services in other specified circumstances 06/04/2013     Priority: Medium     Formatting of this note might be different from the original.  EMERGENCY CARE PLAN  June 4, 2013: No current Care Coordination follow up planned. Please refer if Care Coordination services are needed.    Presenting Problem Signs and Symptoms Treatment Plan   Questions or concerns   during clinic hours   I will call my clinic directly:  [facility]  [address] [place] [id number] [phone].    Questions or concerns outside  clinic hours   I will call the 24 hour nurse line at   [phone] or [phone].   Need to schedule an appointment   I will call the 24 hour scheduling team at [phone] or my clinic directly at [phone].    Same day treatment     I will call my clinic first, nurse line if after hours, urgent care and express care if needed.   Clinic care coordination services (regular clinic hours)     I will call a clinic care coordinator directly:     Jesus Ivy RN  Mon, Tues, Fri - [phone]  Wed, Thurs - [phone]    Madison Samano, :    [phone]    Or call my clinic at [phone] and ask to speak with care coordination.   Crisis Services: Behavioral or Mental Health  Crisis Connection 24 Hour Phone Line  [phone]    [facility] 24 Hour Crisis Services  [phone]    [facility] (Behavioral Health Providers) Network [phone]    [facility]   [phone]    Emergency treatment -- Immediately    CAll 911       Environmental allergies 07/11/2012     Priority: Medium     Vitamin D deficiency 07/11/2012     Priority: Medium     Hypothyroidism 03/27/2012     Priority: Medium     Attention deficit disorder 10/25/2006     Priority: Medium     Tested year ago - psychologist   Rx from PCP  Problem list name updated by automated process. Provider to review       Anxiety state 10/25/2006     Priority: Medium     Problem list name updated by automated process. 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 Provider to review       Health Care Home 06/04/2013     Priority: Low     EMERGENCY CARE PLAN  June 4, 2013: No current Care Coordination follow up planned. Please refer if Care Coordination services are needed.    Presenting Problem Signs and Symptoms Treatment Plan   Questions or concerns   during clinic hours   I will call  my clinic directly:  [facility]  [address] [place] [address]  [phone].    Questions or concerns outside clinic hours   I will call the 24 hour nurse line at   [phone] or [phone].   Need to schedule an appointment   I will call the 24 hour scheduling team at [phone] or my clinic directly at [phone].    Same day treatment     I will call my clinic first, nurse line if after hours, urgent care and express care if needed.   Clinic care coordination services (regular clinic hours)     I will call a clinic care coordinator directly:     Jesus Ivy RN  Mon, Tues, Fri - [phone]  Wed, Thurs - [phone]    Madison Samano :    [phone]    Or call my clinic at [phone] and ask to speak with care coordination.   Crisis Services: Behavioral or Mental Health  Crisis Connection 24 Hour Phone Line  [phone]    [facility] 24 Hour Crisis Services  [phone]    [facility] (Behavioral Health Providers) Network [phone]    [facility]   [phone]       Emergency treatment -- Immediately    CAll 1             Past Medical/Surgical History:  Past Medical History:   Diagnosis Date     ADD (attention deficit disorder)      Anxiety      Gastroesophageal reflux disease without esophagitis      Hypertension      Urethral hypermobility 06/26/2013     Past Surgical History:   Procedure Laterality Date     EGD      7/2020     GYN SURGERY  07/12/2012    Novasure     HYSTERECTOMY, PAP NO LONGER INDICATED       LAPAROSCOPIC HERNIORRHAPHY HIATAL N/A 7/26/2021    Procedure: HERNIORRHAPHY, HIATAL, LAPAROSCOPIC;  Surgeon: Mady Potts MD;  Location:  OR     LAPAROSCOPIC HYSTERECTOMY TOTAL  08/12/2013    Procedure: LAPAROSCOPIC HYSTERECTOMY TOTAL;  Laparoscopic Total Hysterectomy,Bilateral Salpingectomy with Sparing of the Ovaries,Uterosacral Suspension,Transvaginal Taping,Perineoplasty;  Surgeon: Sy Castro MD;  Location: [facility]      LAPAROSCOPIC NISSEN FUNDOPLICATION N/A 7/26/2021    Procedure: FUNDOPLICATION, NISSEN, LAPAROSCOPIC;  Surgeon: Mady Potts MD;  Location: [facility] OR     SLING TRANSVAGINAL  08/12/2013    Procedure: SLING TRANSVAGINAL;;  Surgeon: Sy Castro MD;  Location: [facility] OR       Social History:  Social History     Socioeconomic History     Marital status:      Spouse name: Not on file     Number of children: 2     Years of education: Not on file     Highest education level: Not on file   Occupational History     Employer: [employer]   Tobacco Use     Smoking status: Never Smoker     Smokeless tobacco: Never Used   Substance and Sexual Activity     Alcohol use: Not Currently     Drug use: No     Sexual activity: Yes     Partners: Male     Birth control/protection: Surgical     Comment: vasectomy significant other   Other Topics Concern     Parent/sibling w/ CABG, MI or angioplasty before 65F 55M? No      Service Not Asked     Blood Transfusions Not Asked     Caffeine Concern Not Asked     Occupational Exposure Not Asked     Hobby Hazards Not Asked     Sleep Concern Not Asked     Stress Concern Not Asked     Weight Concern Not Asked     Special Diet No     Back Care Not Asked     Exercise No     Bike Helmet Not Asked     Seat Belt Yes     Self-Exams Not Asked   Social History Narrative     Not on file     Social Determinants of Health     Financial Resource Strain:      Difficulty of Paying Living Expenses:    Food Insecurity:      Worried About Running Out of Food in the Last Year:      Ran Out of Food in the Last Year:    Transportation Needs:      Lack of Transportation (Medical):      Lack of Transportation (Non-Medical):    Physical Activity:      Days of Exercise per Week:      Minutes of Exercise per Session:    Stress:      Feeling of Stress :    Social Connections:      Frequency of Communication with Friends and Family:      Frequency of Social Gatherings with Friends and Family:   "    Attends Islam Services:      Active Member of Clubs or Organizations:      Attends Club or Organization Meetings:      Marital Status:    Intimate Partner Violence:      Fear of Current or Ex-Partner:      Emotionally Abused:      Physically Abused:      Sexually Abused:        Family History:  Family History   Problem Relation Age of Onset     Lipids Mother         chol     Hypertension Mother      Lipids Father         chol     Hypertension Father      C.A.D. Father      Heart Disease Father        Review of Systems:  Answers for HPI/ROS submitted by the patient on 9/22/2021  General Symptoms: Yes  Skin Symptoms: No  HENT Symptoms: No  EYE SYMPTOMS: No  HEART SYMPTOMS: No  LUNG SYMPTOMS: No  INTESTINAL SYMPTOMS: No  URINARY SYMPTOMS: No  GYNECOLOGIC SYMPTOMS: No  BREAST SYMPTOMS: No  SKELETAL SYMPTOMS: No  BLOOD SYMPTOMS: No  NERVOUS SYSTEM SYMPTOMS: No  MENTAL HEALTH SYMPTOMS: No  Fever: No  Loss of appetite: No  Weight loss: No  Weight gain: No  Fatigue: Yes  Night sweats: No  Chills: No  Increased stress: Yes  Excessive hunger: No  Excessive thirst: No  Feeling hot or cold when others believe the temperature is normal: No  Loss of height: No  Post-operative complications: Yes  Surgical site pain: No  Hallucinations: No  Change in or Loss of Energy: Yes  Hyperactivity: No  Confusion: No        Physical Examination:  Vitals: LMP 07/26/2013   Estimated body mass index is 24.95 kg/m  as calculated from the following:    Height as of 9/22/21: 1.676 m (5' 6\").    Weight as of 9/22/21: 70.1 kg (154 lb 9.6 oz).         [place] Total Score 9/22/2021   Total score - [place] 4       AL Total Score: 14 (09/22/21 1729)    GENERAL APPEARANCE: healthy, alert, no distress and cooperative  EYES: Eyes grossly normal to inspection  HENT: nose and mouth without ulcers or lesions, tongue base enlarged, oral mucous membranes moist and normal cephalic/atraumatic  RESP: Unlabored, easy breathing with normal conversational " speech  CV: color normal  NEURO: Alert and oriented x3, mentation intact and speech normal  PSYCH: mentation appears normal and affect normal/bright  Mallampati Class: II.  Tonsillar Stage: 0  surgically removed.    Impression/Plan:  1. Sleep disturbance  2. Witnessed apneic spells  3. 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 Snoring  5. Restless legs syndrome (RLS)  6. Parasomnia associated with another health condition  - SLEEP EVALUATION & MANAGEMENT REFERRAL - ADULT -  - Comprehensive Sleep Study; Future    This is a pleasant 51-year-old female with a PMH pertinent for HTN, HLD, hypothyroidism, GERD, s/p Nissen fundoplication, vertebral artery dissection, ADHD, anxiety, allergic rhinitis, history of QT prolongation, and insomnia who presents today with reports of occasional snoring, kicking, and nightmare with sleep paralysis usually when is in supine sleep for several years. She was observed with breathing pauses in her sleep during her recent hospitalization on 7/26/2021 for laparoscopic hiatal hernia repair with Nissen fundoplication.  Her symptoms appear to be consistent with possible sleep disordered breathing, RLS, and possible parasomnia.  Her [place] score today is 4 which is consistent with no or minimal daytime somnolence.  Her Insomnia Severity Index score is 14 which is consistent with mild severity clinical insomnia.    We discussed the pathophysiology of obstructive sleep apnea and the risks associated with untreated ZHANG.  We also discussed the pros and cons of in lab polysomnography versus home sleep testing.  The patient has elected to undergo in lab polysomnography (PSG) to further evaluate her symptoms of possible obstructive sleep apnea, restless leg syndrome, and parasomnia behavior of occasional nightmare/sleep paralysis.    Literature provided regarding sleep apnea, restless leg syndrome and sleep hygiene.      She will follow up with me in approximately two  weeks after her sleep study has been competed to review the results and discuss plan of care.       Polysomnography/HST reviewed.  Obstructive sleep apnea reviewed.  Complications of untreated sleep apnea were reviewed.    50 minutes were spent on the date of the encounter doing chart review, history and exam, documentation and further activities as noted above.       JAMIE Mathias CNP  Sleep Medicine    CC: Jacinda Banks      This note was written with the assistance of the Dragon voice-dictation technology software. The final document, although reviewed, may contain errors. For corrections, please contact the office.

## 2021-09-22 NOTE — PROGRESS NOTES
[facility]  Patient Name: Jaz Arhcibald  Patient Age: 51 year old  YOB: 1970  MRN: [id number]    Date of Visit: 9/22/2021  Reason for Office Visit: chief complaint        1. Nausea and vomiting, intractability of vomiting not specified, unspecified vomiting type  Previous history of QT elongation will discontinue the Zofran patient was started on promethazine to take schedule 2-3 times daily 1/2 to 1 tablet for prevention of the nausea gagging and coughing  - promethazine (PHENERGAN) 25 MG tablet; Take 0.5-1 tablets (12.5-25 mg) by mouth every 6 hours as needed for nausea  Dispense: 60 tablet; Refill: 1  - Adult Gastro Ref - Consult Only; Future    2. Gastroesophageal reflux disease with esophagitis without hemorrhage  3. Irritable bowel syndrome, unspecified type  - Adult Gastro Ref - Consult Only; Future  - Nutrition Referral; Future    4. Multiple joint pain  Referral be made to dietitian for discussion of anti-inflammatory dietary recommendations in addition to addressing patient's IBS  - Nutrition Referral; Future    5. Generalized muscle ache  - Anti Nuclear Марина IgG by IFA with Reflex; Future  - Rheumatoid factor; Future  - Vitamin B12; Future  - Iron and iron binding capacity; Future  - Ferritin; Future  - Magnesium; Future  - Vitamin D deficiency screening; Future  - CRP, inflammation; Future  - ESR: Erythrocyte sedimentation rate; Future  - Nutrition Referral; Future    6. Dissection, vertebral artery (H)  Patient has follow-up imaging October 2021 and then follow-up with vascular after imaging    7. TIA (transient ischemic attack)  Patient continues on antiplatelet and follow-up with neurology in November 2021    I spent a total of 33 minutes on the day of the visit.   Time spent doing chart review, history and exam, documentation and further activities per the note    Orders Placed This Encounter   Procedures     Anti Nuclear Марина IgG by IFA with Reflex     Rheumatoid  factor     Vitamin B12     Iron and iron binding capacity     Ferritin     Magnesium     Vitamin D deficiency screening     CRP, inflammation     ESR: Erythrocyte sedimentation rate     Adult Gastro Ref - Consult Only     Nutrition Referral   Followup: Return in about 4 weeks (around 10/20/2021) for Follow up. earlier if needed.                Health Maintenance Review  Health Maintenance   Topic Date Due     INFLUENZA VACCINE (1) 09/01/2021     PREVENTIVE CARE VISIT  08/26/2022 (Originally 3/12/2021)     HEPATITIS C SCREENING  08/26/2022 (Originally 2/1/1987)     ZOSTER IMMUNIZATION (2 of 2) 08/26/2022 (Originally 4/10/2019)     BMP  02/27/2022     ANNUAL REVIEW OF HM ORDERS  08/06/2022     LIPID  08/15/2022     MAMMO SCREENING  09/20/2022     DTAP/TDAP/TD IMMUNIZATION (3 - Td or Tdap) 06/05/2023     ADVANCE CARE PLANNING  09/14/2026     COLORECTAL CANCER SCREENING  05/08/2029     HIV SCREENING  Completed     PHQ-2  Completed     COVID-19 Vaccine  Completed     Pneumococcal Vaccine: Pediatrics (0 to 5 Years) and At-Risk Patients (6 to 64 Years)  Aged Out     IPV IMMUNIZATION  Aged Out     MENINGITIS IMMUNIZATION  Aged Out     HEPATITIS B IMMUNIZATION  Aged Out     PAP  Discontinued         I am having Linsey Archibald start on promethazine. I am also having her maintain her EPINEPHrine, olopatadine, sucralfate, polyethylene glycol, ondansetron, traZODone, escitalopram, lisinopril, clopidogrel, atorvastatin, aspirin, metoclopramide, oxyCODONE, and LORazepam.      HPI:  Jaz Archibald is a 51 year old year old who presents to the office today with chronic conditions including       Patient states she had reoccurrence of symptoms and was seen in the ED. No new findings.    She reports ongoing n/t in right forearm and hand.she is referred to Neurology and will be seen in November at [facility]. Vascular October repeat MRI     Post nissen continues to have coughing and dry heaves though no vomiting. She is taking zofran prn  but often finds she is chasing symptoms as she is not taking preemptively . Hx of IBS in the past and does have abd cramping.     Muscle and joint pain throughout body and finds it difficulty later in the day as is progresses. She reports she does take some tylenol without relief. She is concerned about inflammation in her body.     She will get flu shot today.     She has a history of attention deficit and does have an upcoming appointment for rescreening was previously diagnosed these records were currently available for review    Review of Systems- unremarkable other than listed above and below       Current Scheduled Meds:  Outpatient Encounter Medications as of 9/22/2021   Medication Sig Dispense Refill     aspirin (ASA) 325 MG EC tablet Take 1 tablet (325 mg) by mouth daily . Future refills by PCP Dr. Jacinda Banks with phone number [phone]. 30 tablet 1     atorvastatin (LIPITOR) 80 MG tablet Take 1 tablet (80 mg) by mouth every evening . Future refills by PCP Dr. Jacinda Banks with phone number [phone]. 30 tablet 1     clopidogrel (PLAVIX) 75 MG tablet Take 1 tablet (75 mg) by mouth daily . Future refills by PCP Dr. Jacinda Banks with phone number [phone]. 30 tablet 1     EPINEPHrine (EPIPEN) 0.3 MG/0.3ML injection Inject 0.3 mLs into the muscle once as needed for anaphylaxis. 2 each 3     escitalopram (LEXAPRO) 20 MG tablet Take 1 tablet (20 mg) by mouth At Bedtime 90 tablet 1     lisinopril (ZESTRIL) 10 MG tablet Take 10 mg by mouth daily       LORazepam (ATIVAN) 1 MG tablet Take one to two tablets prior to MRI 2 tablet 0     metoclopramide (REGLAN) 5 MG tablet Take 1 tablet (5 mg) by mouth 3 times daily as needed (nausea) 60 tablet 1     olopatadine (PATANOL) 0.1 % ophthalmic solution Place 1-2 drops into both eyes 2 times daily as needed for allergies   6     ondansetron (ZOFRAN-ODT) 4 MG ODT tab Take 1 tablet (4 mg) by mouth every 8 hours as needed for nausea 20 tablet 1     oxyCODONE  (ROXICODONE) 5 MG/5ML solution Take 5-10 mLs (5-10 mg) by mouth every 6 hours as needed for moderate to severe pain 100 mL 0     polyethylene glycol (MIRALAX) 17 g packet Take 17 g by mouth 2 times daily 7 packet 0     promethazine (PHENERGAN) 25 MG tablet Take 0.5-1 tablets (12.5-25 mg) by mouth every 6 hours as needed for nausea 60 tablet 1     sucralfate (CARAFATE) 1 GM tablet Take 1 g by mouth 4 times daily as needed        traZODone (DESYREL) 100 MG tablet Take 1 tablet (100 mg) by mouth At Bedtime 90 tablet 1     No facility-administered encounter medications on file as of 9/22/2021.     Past Medical History:   Diagnosis Date     ADD (attention deficit disorder)      Anxiety      Gastroesophageal reflux disease without esophagitis      Hypertension      Urethral hypermobility 06/26/2013     Past Surgical History:   Procedure Laterality Date     EGD      7/2020     GYN SURGERY  07/12/2012    Novasure     HYSTERECTOMY, PAP NO LONGER INDICATED       LAPAROSCOPIC HERNIORRHAPHY HIATAL N/A 7/26/2021    Procedure: HERNIORRHAPHY, HIATAL, LAPAROSCOPIC;  Surgeon: Mady Potts MD;  Location: [facility]     LAPAROSCOPIC HYSTERECTOMY TOTAL  08/12/2013    Procedure: LAPAROSCOPIC HYSTERECTOMY TOTAL;  Laparoscopic Total Hysterectomy,Bilateral Salpingectomy with Sparing of the Ovaries,Uterosacral Suspension,Transvaginal Taping,Perineoplasty;  Surgeon: Sy Castro MD;  Location: [facility]     LAPAROSCOPIC NISSEN FUNDOPLICATION N/A 7/26/2021    Procedure: FUNDOPLICATION, NISSEN, LAPAROSCOPIC;  Surgeon: Mady Potts MD;  Location: [facility]     SLING TRANSVAGINAL  08/12/2013    Procedure: SLING TRANSVAGINAL;;  Surgeon: Sy Castro MD;  Location: [facility]     Social History     Tobacco Use     Smoking status: Never Smoker     Smokeless tobacco: Never Used   Substance Use Topics     Alcohol use: Not Currently     Drug use: No     Family History   Problem Relation Age of Onset     Lipids Mother         chol  "    Hypertension Mother      Lipids Father         chol     Hypertension Father      C.A.D. Father      Heart Disease Father      Social History     Social History Narrative     Not on file       Objective / Physical Examination:  Vitals:    09/22/21 0927   BP: 110/70   Pulse: 96   Resp: 18   SpO2: 97%   Weight: 70.1 kg (154 lb 9.6 oz)   Height: 1.676 m (5' 6\")     Wt Readings from Last 3 Encounters:   09/22/21 70.1 kg (154 lb 9.6 oz)   09/15/21 70.8 kg (156 lb)   09/01/21 70 kg (154 lb 6.4 oz)     Body mass index is 24.95 kg/m .     General Appearance: Alert and oriented, cooperative, affect appropriate, speech clear, in no apparent distress  Head: Normocephalic, atraumatic  Eyes: Conjunctivae clear and sclerae non-icteric  Lungs: Normal inspiratory and expiratory effort  Integumentary: Warm and dry.   Neuro: Alert and oriented, follows commands appropriately.     Admission on 08/27/2021, Discharged on 08/27/2021   Component Date Value Ref Range Status     Sodium 08/27/2021 136  133 - 144 mmol/L Final     Potassium 08/27/2021 3.9  3.4 - 5.3 mmol/L Final     Chloride 08/27/2021 108  94 - 109 mmol/L Final     Carbon Dioxide (CO2) 08/27/2021 24  20 - 32 mmol/L Final     Anion Gap 08/27/2021 4  3 - 14 mmol/L Final     Urea Nitrogen 08/27/2021 15  7 - 30 mg/dL Final     Creatinine 08/27/2021 0.75  0.52 - 1.04 mg/dL Final     Calcium 08/27/2021 8.6  8.5 - 10.1 mg/dL Final     Glucose 08/27/2021 93  70 - 99 mg/dL Final     Alkaline Phosphatase 08/27/2021 72  40 - 150 U/L Final     AST 08/27/2021 10  0 - 45 U/L Final     ALT 08/27/2021 26  0 - 50 U/L Final     Protein Total 08/27/2021 7.7  6.8 - 8.8 g/dL Final     Albumin 08/27/2021 3.8  3.4 - 5.0 g/dL Final     Bilirubin Total 08/27/2021 0.2  0.2 - 1.3 mg/dL Final     GFR Estimate 08/27/2021 >90  >60 mL/min/1.73m2 Final    As of July 11, 2021, eGFR is calculated by the CKD-EPI creatinine equation, without race adjustment. eGFR can be influenced by muscle mass, exercise, " and diet. The reported eGFR is an estimation only and is only applicable if the renal function is stable.     Lipase 08/27/2021 219  73 - 393 U/L Final     Ventricular Rate 08/27/2021 60  BPM Final     Atrial Rate 08/27/2021 60  BPM Final     [place] Interval 08/27/2021 134  ms Final     QRS Duration 08/27/2021 72  ms Final     QT 08/27/2021 448  ms Final     QTc 08/27/2021 448  ms Final     P Axis 08/27/2021 53  degrees Final     R AXIS 08/27/2021 0  degrees Final     T Axis 08/27/2021 34  degrees Final     Interpretation ECG 08/27/2021    Final                    Value:Sinus rhythm  Normal ECG  When compared with ECG of 16-AUG-2021 10:03,  Nonspecific T wave abnormality, improved in Anterior leads  Confirmed by GENERATED REPORT, COMPUTER ([id number]),  MATT KAY ([id number]) on 8/27/2021 7:26:20 PM       WBC Count 08/27/2021 11.0  4.0 - 11.0 10e3/uL Final     RBC Count 08/27/2021 5.06  3.80 - 5.20 10e6/uL Final     Hemoglobin 08/27/2021 14.6  11.7 - 15.7 g/dL Final     Hematocrit 08/27/2021 44.3  35.0 - 47.0 % Final     MCV 08/27/2021 88  78 - 100 fL Final     MCH 08/27/2021 28.9  26.5 - 33.0 pg Final     MCHC 08/27/2021 33.0  31.5 - 36.5 g/dL Final     RDW 08/27/2021 13.0  10.0 - 15.0 % Final     Platelet Count 08/27/2021 343  150 - 450 10e3/uL Final     % Neutrophils 08/27/2021 61  % Final     % Lymphocytes 08/27/2021 25  % Final     % Monocytes 08/27/2021 10  % Final     % Eosinophils 08/27/2021 3  % Final     % Basophils 08/27/2021 1  % Final     % Immature Granulocytes 08/27/2021 0  % Final     NRBCs per 100 WBC 08/27/2021 0  <1 /100 Final     Absolute Neutrophils 08/27/2021 6.6  1.6 - 8.3 10e3/uL Final     Absolute Lymphocytes 08/27/2021 2.8  0.8 - 5.3 10e3/uL Final     Absolute Monocytes 08/27/2021 1.1  0.0 - 1.3 10e3/uL Final     Absolute Eosinophils 08/27/2021 0.3  0.0 - 0.7 10e3/uL Final     Absolute Basophils 08/27/2021 0.1  0.0 - 0.2 10e3/uL Final     Absolute Immature Granulocytes 08/27/2021 0.0  <=0.0 10e3/uL  Final     Absolute NRBCs 08/27/2021 0.0  10e3/uL Final     Hold Specimen 08/27/2021 [facility]   Final     Hold Specimen 08/27/2021 [facility]   Final      Diagnostics:     No results found for any visits on 09/22/21.    Quality review:     PHQ 8/18/2021 9/22/2021   PHQ-9 Total Score 4 15   Q9: Thoughts of better off dead/self-harm past 2 weeks Not at all Not at all     ALBERTO-7 SCORE 8/6/2021 8/18/2021 9/22/2021   Total Score - - -   Total Score - 14 (moderate anxiety) 16 (severe anxiety)   Total Score 7 14 16                 Jacinda Banks, CNP  Internal Medicine  [address] [address]  Answers for HPI/ROS submitted by the patient on 9/22/2021  If you checked off any problems, how difficult have these problems made it for you to do your work, take care of things at home, or get along with other people?: Very difficult  PHQ9 TOTAL SCORE: 15  ALBERTO 7 TOTAL SCORE: 16

## 2021-09-23 ENCOUNTER — VIRTUAL VISIT (OUTPATIENT)
Dept: SLEEP MEDICINE | Facility: CLINIC | Age: 51
End: 2021-09-23
Attending: NURSE PRACTITIONER
Payer: COMMERCIAL

## 2021-09-23 DIAGNOSIS — G47.9 SLEEP DISTURBANCE: Primary | ICD-10-CM

## 2021-09-23 DIAGNOSIS — G25.81 RESTLESS LEGS SYNDROME (RLS): ICD-10-CM

## 2021-09-23 DIAGNOSIS — G47.01 SLEEP DISORDER DUE TO A GENERAL MEDICAL CONDITION, INSOMNIA TYPE: ICD-10-CM

## 2021-09-23 DIAGNOSIS — R06.83 SNORING: ICD-10-CM

## 2021-09-23 DIAGNOSIS — G47.50: ICD-10-CM

## 2021-09-23 DIAGNOSIS — R06.81 WITNESSED APNEIC SPELLS: ICD-10-CM

## 2021-09-23 LAB
DEPRECATED CALCIDIOL+CALCIFEROL SERPL-MC: 40 UG/L (ref 30–80)
IRON SATN MFR SERPL: 32 % (ref 15–46)
IRON SERPL-MCNC: 99 UG/DL (ref 35–180)
TIBC SERPL-MCNC: 305 UG/DL (ref 240–430)

## 2021-09-23 PROCEDURE — 99204 OFFICE O/P NEW MOD 45 MIN: CPT | Mod: 95 | Performed by: NURSE PRACTITIONER

## 2021-09-23 ASSESSMENT — PATIENT HEALTH QUESTIONNAIRE - PHQ9: SUM OF ALL RESPONSES TO PHQ QUESTIONS 1-9: 15

## 2021-09-23 ASSESSMENT — ANXIETY QUESTIONNAIRES: GAD7 TOTAL SCORE: 16

## 2021-09-23 NOTE — PATIENT INSTRUCTIONS
Patient Education     What Are Snoring and Obstructive Sleep Apnea?  If you ve ever had a stuffed-up nose, you know the feeling of trying to breathe through a very narrow passageway. This is what happens in your throat when you snore. While you sleep, structures in your throat partly block your air passage. This makes the passage narrow and hard to breathe through. In some cases, the entire passage may become blocked so you can t breathe at all. This is called obstructive sleep apnea.      Snoring       Obstructive sleep apnea      Snoring  If your throat structures are too large or the muscles relax too much during sleep, the air passage may be partly blocked for a brief moment. But the air eventually passes through. As air from the nose or mouth passes around this blockage, the throat structures vibrate. This causes the familiar sound of snoring. This snoring sound can be loud and may wake up others, or even themselves, during the night. Snoring gets worse as more and more of the air passage is blocked.   Obstructive sleep apnea  If the structures partly or completely block the throat, air can t flow to the lungs at all. This is called hypopnea (decreased breathing) or apnea (meaning  no breathing ). The lungs aren t getting fresh air. So the brain tells the body to wake up just enough to tighten the muscles and unblock the air passage. With a loud gasp, breathing starts again. This process may be repeated over and over again during the night. This can make your sleep fragmented with lighter stages of sleep. You may not remember waking up many times during the night however due to lighter sleep you will most likely feel tired the next day. The lack of sleep and fresh air can also strain your lungs, heart, and other organs. This may lead to problems such as high blood pressure, diabetes, behavioral disorders, heart attack, or stroke.   Problems in the nose and jaw  Problems in the structure of the nose may block  breathing. A crooked (deviated) septum or swollen turbinates can make snoring worse or lead to apnea. Also, a receding jaw may make the tongue sit too far back. Then it s more likely to block the airway when you re asleep.   [employer] last reviewed this educational content on 9/1/2019 2000-2021 [employer]. All rights reserved. This information is not intended as a substitute for professional medical care. Always follow your healthcare professional's instructions.    Patient Education     Insomnia  Insomnia is repeated difficulty going to sleep or staying asleep, or both. Whether you have insomnia is not defined by a specific amount of sleep. Different people need different amounts of sleep, and you may need more or less sleep at different times of your life.  There are 3 major types of insomnia: short-term, chronic, and  other.  Short-term, or acute insomnia lasts less than 3 months. The symptoms are temporary and can be linked directly to a stressor, such as the death of a loved one, financial problems, or a new physical problem. Short-term insomnia stops when the stressor resolves or the person adapts to its presence.  Chronic insomnia occurs at least 3 times a week and lasts longer than 3 months. Chronic insomnia can occur when either the cause of the sleeping problem is not clear, or the insomnia does not get better when the stressor is resolved. A number of other criteria are also used to make the diagnosis of chronic insomnia.    Other insomnia  is the third type of insomnia-related sleep disorders. This description applies to people who have problems getting to sleep or staying asleep, but don't meet all of the factors that describe either short-term or chronic insomnia.    Many things cause insomnia. Different people may have different causes. It can be from an underlying medical or psychological condition, or lifestyle. It can also be primary insomnia, which means no cause can be  found.  Causes of insomnia include:    Chronic medical problems- heart disease, gastrointestinal problems, hormonal changes, breathing problems    Anxiety    Stress    Depression    Pain    Work schedule    Sleep apnea    Illegal drugs    Certain medicines  Many different medicines can affect your sleep, such as stimulants, caffeine, alcohol, some decongestants, and diet pills. Other medicines may include some types of blood pressure pills, steroids, asthma medicines, antihistamines, antidepressants, seizure medicines and statins. Not all of these will affect your sleep, and they shouldn t be stopped without talking to your doctor.  Symptoms of insomnia can include:    Lying awake for long periods at night before falling asleep    Waking up several times during the night    Waking up early in the morning and not being able to get back to sleep    Feeling tired and not refreshed by sleep    Not being able to function properly during the day and finding it hard to concentrate    Irritability    Tiredness and fatigue during the day  Home care    Review your medicines with your doctor or pharmacist to find out if they can cause insomnia. Not all medicines will affect your sleep, but they shouldn't be stopped without reviewing them with your doctor. There may be serious side effects and consequences from suddenly stopping your medicines. Not taking them may cause strokes, heart attacks, and many other problems.    Caffeine, smoking, and alcohol also affect sleep. Limit your daily use and don't use these before bedtime. Alcohol may make you sleepy at first, but as its effects wear off, you may awaken a few hours later and have trouble returning to sleep.    Don't exercise, eat or drink large amounts of liquid within 2 hours of your bedtime.    Improve your sleep habits. Have a fixed bed and wake-up time. Try to keep noise, light and heat in your bedroom at a comfortable level. Try using earplugs or eyeshades if  needed.     Don't watch TV in bed.    If you don't fall asleep within 30 minutes, try to relax by reading or listening to soft music.    Limit daytime napping to one 30 minute period, early in the day.    Get regular exercise. Find other ways to lessen your stress level.    If a medicine was prescribed to help reset your sleep patterns, take it as directed. Sleeping pills are intended for short-term use, only. If taken for too long, the effect wears off while the risk of physical addiction and psychological dependence increases.  Sleep diary  If the cause isn t obvious and it is not improving, try keeping a  sleep diary  for a couple of weeks. Include in it:    The time you go to bed    How long it takes to fall asleep    How many times you wake up    What time you wake up    Your meal times and what you eat    What time you drink alcohol and caffeine    Your exercise habits and times  Follow-up care  Follow up with your healthcare provider, or as advised. If an X-ray or CT scan was done, you will be notified if there is a change in the reading, especially if it affects treatment.  Call 911  Call 911 if any of these occur:    Trouble breathing    Confusion or trouble waking    Fainting or loss of consciousness    Rapid heart rate    New chest, arm, shoulder, neck or upper back pain    Trouble with speech or vision, weakness of an arm or leg    Trouble walking or talking, loss of balance, numbness or weakness in one side of your body, facial droop  When to seek medical advice  Call your healthcare provider right away if any of these occur:    Extreme restlessness or irritability    Confusion or hallucinations (seeing or hearing things that are not there)    Anxiety, depression    Several days without sleeping  [employer] last reviewed this educational content on 5/1/2018 2000-2021 [employer]. All rights reserved. This information is not intended as a substitute for professional medical care. Always  "follow your healthcare professional's instructions.    Patient Education     Tips for Sleep Hygiene  \"Sleep hygiene\" means having good sleep habits.Follow these tips to sleep better at night:     Get on a schedule. Go to bed and get up at about the same time every day.    Listen to your body. Only try to sleep when you actually feel tired or sleepy.    Be patient. If you haven't been able to get to sleep after about 30 minutes or more, get up and do something calming or boring until you feel sleepy. Then return to bed and try again.    Don't have caffeine (coffee, tea, cola drinks, chocolate and some medicines), alcohol or nicotine (cigarettes). These can make it harder for you to fall asleep and stay asleep.    Use your bed for sleeping only. That means no TV, computer or homework in bed, especially during the evening and before bedtime.    Don't nap during the day. If you must nap, make sure it is for less than 20 minutes.    Create sleep rituals that remind your body it is time to sleep. Examples include breathing exercises, stretching or reading a book.    Avoid all electronic media (smart phone, computer, tablet) within 2 hours of bed time. The \"blue light\" in these devices activates the part of the brain that keeps you awake.    Dim the lights at night.    Get early morning sources of light (walk in the sunshine) to help set sleep patterns at night.    Try a bath or shower before bed. Having a warm bath 1 to 2 hours before bedtime can help you feel sleepy. Hot baths can make you alert, so be mindful of the temperature.    Don't watch the clock. Checking the clock during the night can wake you up. It can also lead to negative thoughts such as, \"I will never fall asleep,\" which can increase anxiety and sleeplessness.    Use a sleep diary. Track your sleep schedule to know your sleep patterns and to see where you can improve.    Get regular exercise every day. Try not to do heavy exercise in the 4 hours before " bedtime.    Eat a healthy, balanced diet.    Try eating a light, healthy snack before bed, but avoid eating a heavy meal.    Create the right sleeping area. A cool, dark, quiet room is best. If needed, try earplugs, fans and blackout curtains.    Keep your daytime routine the same even if you have a bad night sleep. Avoiding activities the next day can make it harder to sleep.  For informational purposes only. Not to replace the advice of your health care provider.   Copyright   2013 [facility]. All rights reserved. "[employer] 161663 - 01/16.

## 2021-09-24 ENCOUNTER — TELEPHONE (OUTPATIENT)
Dept: INTERNAL MEDICINE | Facility: CLINIC | Age: 51
End: 2021-09-24

## 2021-09-24 LAB — ANA SER QL IF: NEGATIVE

## 2021-09-24 NOTE — TELEPHONE ENCOUNTER
Called and spoke with patient    FMLA is not what she needs, she needs FMLA    She will contact her employer to have to correct forms sent    Closing this encounter

## 2021-09-27 NOTE — PROGRESS NOTES
Attempted to contact patient to schedule psg and follow up, mail box is full. [employer] message has been sent.   Jennifer Ziegler MA

## 2021-09-28 ENCOUNTER — HOSPITAL ENCOUNTER (OUTPATIENT)
Dept: MRI IMAGING | Facility: CLINIC | Age: 51
Discharge: HOME | End: 2021-09-28
Attending: INTERNAL MEDICINE | Admitting: INTERNAL MEDICINE
Payer: COMMERCIAL

## 2021-09-28 DIAGNOSIS — I77.74 DISSECTION, VERTEBRAL ARTERY (H): ICD-10-CM

## 2021-09-28 PROCEDURE — A9585 GADOBUTROL INJECTION: HCPCS | Performed by: INTERNAL MEDICINE

## 2021-09-28 PROCEDURE — 70553 MRI BRAIN STEM W/O & W/DYE: CPT

## 2021-09-28 PROCEDURE — 255N000002 HC RX 255 OP 636: Performed by: INTERNAL MEDICINE

## 2021-09-28 RX ORDER — GADOBUTROL 604.72 MG/ML
7 INJECTION INTRAVENOUS ONCE
Status: COMPLETED | OUTPATIENT
Start: 2021-09-28 | End: 2021-09-28

## 2021-09-28 RX ADMIN — GADOBUTROL 7 ML: 604.72 INJECTION INTRAVENOUS at 09:45

## 2021-10-04 ENCOUNTER — OFFICE VISIT (OUTPATIENT)
Dept: OTHER | Facility: CLINIC | Age: 51
End: 2021-10-04
Attending: INTERNAL MEDICINE
Payer: COMMERCIAL

## 2021-10-04 VITALS
HEIGHT: 66 IN | WEIGHT: 156 LBS | OXYGEN SATURATION: 96 % | SYSTOLIC BLOOD PRESSURE: 122 MMHG | DIASTOLIC BLOOD PRESSURE: 83 MMHG | HEART RATE: 134 BPM | BODY MASS INDEX: 25.07 KG/M2 | RESPIRATION RATE: 20 BRPM

## 2021-10-04 DIAGNOSIS — I77.74 DISSECTION, VERTEBRAL ARTERY (H): Primary | ICD-10-CM

## 2021-10-04 PROCEDURE — G0463 HOSPITAL OUTPT CLINIC VISIT: HCPCS

## 2021-10-04 PROCEDURE — 99215 OFFICE O/P EST HI 40 MIN: CPT | Performed by: INTERNAL MEDICINE

## 2021-10-04 ASSESSMENT — MIFFLIN-ST. JEOR: SCORE: 1334.36

## 2021-10-04 NOTE — PROGRESS NOTES
Jaz Archibald is a 51 year old female who is presenting at the current time to discuss her diagnosi(es) of     Dissection, vertebral artery (H)      HPI: Jaz Archibald is a 51 year old female w/ a h/o laparoscopic Nissen fundoplication 7/26/2021 who then presented to the ED 8/15/2021 with transient right facial numbness which has since resolved. MY revealed no stroke. CTA and MRA of the H/N revealed rt V2 segment vertebral dissection. She was treated with Plavix and discharged without further incident until 8/27/2021, at which point in time she represented with abdominal pain and right sided face and arm numbness which resolved spontaneously. Non CTA CT of the abd and pelvis subsequently revealed no etiology for abdominal pain. The pain spontaneously resolved. She additionally subsequently reports several intermittent episodes of facial numbness. She presently has no neurologic sxs but is developing abdominal discomfort on DAP. She is on an audlt aspirin in adiditon to Plavix.     She was referred by Stacy Alexander to comment upon further evaluation and management.     When lat seen on 9/15/2021, the patient and her  were concerned this could have been due to prolonged neck hyperextension in association with intubation at her 7/26/21 Nissen fundoplication. I informed them the her op report does not connote prolonged neck hyperextension. She ws having intermittently continuing sxs of right facial numbness. She had none on 9/15, and has none now. We repeated imaging w/ a MR Brain w/o & w Contrast, CTA Head Neck with Contrast.  She is presenting currently for f/u of the above. I advised we should rule out FMD or other visceral vascular findings given the spontaneous nature of her current dissection. I also advised she should get labs done in November to f/u upon her lipids.        Review Of Systems  Skin: negative  Eyes: negative  Ears/Nose/Throat: negative  Respiratory: No shortness of breath, dyspnea on  exertion, cough, or hemoptysis  Cardiovascular: negative  Gastrointestinal: negative  Genitourinary: negative  Musculoskeletal: negative  Neurologic: negative  Psychiatric: negative  Hematologic/Lymphatic/Immunologic: negative  Endocrine: negative      PAST MEDICAL HISTORY:                  Past Medical History:   Diagnosis Date     ADD (attention deficit disorder)      Anxiety      Gastroesophageal reflux disease without esophagitis      Hypertension      Urethral hypermobility 06/26/2013       PAST SURGICAL HISTORY:                  Past Surgical History:   Procedure Laterality Date     EGD      7/2020     GYN SURGERY  07/12/2012    Novasure     HYSTERECTOMY, PAP NO LONGER INDICATED       LAPAROSCOPIC HERNIORRHAPHY HIATAL N/A 7/26/2021    Procedure: HERNIORRHAPHY, HIATAL, LAPAROSCOPIC;  Surgeon: Mady Potts MD;  Location: [facility] OR     LAPAROSCOPIC HYSTERECTOMY TOTAL  08/12/2013    Procedure: LAPAROSCOPIC HYSTERECTOMY TOTAL;  Laparoscopic Total Hysterectomy,Bilateral Salpingectomy with Sparing of the Ovaries,Uterosacral Suspension,Transvaginal Taping,Perineoplasty;  Surgeon: Sy Castro MD;  Location: [facility]     LAPAROSCOPIC NISSEN FUNDOPLICATION N/A 7/26/2021    Procedure: FUNDOPLICATION, NISSEN, LAPAROSCOPIC;  Surgeon: Mady Potts MD;  Location: [facility] OR     SLING TRANSVAGINAL  08/12/2013    Procedure: SLING TRANSVAGINAL;;  Surgeon: Sy Castro MD;  Location: [facility] OR       CURRENT MEDICATIONS:                  Current Outpatient Medications   Medication Sig Dispense Refill     aspirin (ASA) 325 MG EC tablet Take 1 tablet (325 mg) by mouth daily . Future refills by PCP Dr. Jacinda Banks with phone number [phone]. 30 tablet 1     atorvastatin (LIPITOR) 80 MG tablet Take 1 tablet (80 mg) by mouth every evening . Future refills by PCP Dr. Jacinda Banks with phone number [phone]. 30 tablet 1     clopidogrel (PLAVIX) 75 MG tablet Take 1 tablet (75 mg) by mouth daily .  Future refills by PCP Dr. Jacinda Banks with phone number [phone]. 30 tablet 1     EPINEPHrine (EPIPEN) 0.3 MG/0.3ML injection Inject 0.3 mLs into the muscle once as needed for anaphylaxis. 2 each 3     escitalopram (LEXAPRO) 20 MG tablet Take 1 tablet (20 mg) by mouth At Bedtime 90 tablet 1     lisinopril (ZESTRIL) 10 MG tablet Take 10 mg by mouth daily       metoclopramide (REGLAN) 5 MG tablet Take 1 tablet (5 mg) by mouth 3 times daily as needed (nausea) 60 tablet 1     olopatadine (PATANOL) 0.1 % ophthalmic solution Place 1-2 drops into both eyes 2 times daily as needed for allergies   6     ondansetron (ZOFRAN-ODT) 4 MG ODT tab Take 1 tablet (4 mg) by mouth every 8 hours as needed for nausea 20 tablet 1     oxyCODONE (ROXICODONE) 5 MG/5ML solution Take 5-10 mLs (5-10 mg) by mouth every 6 hours as needed for moderate to severe pain 100 mL 0     polyethylene glycol (MIRALAX) 17 g packet Take 17 g by mouth 2 times daily 7 packet 0     promethazine (PHENERGAN) 25 MG tablet Take 0.5-1 tablets (12.5-25 mg) by mouth every 6 hours as needed for nausea 60 tablet 1     sucralfate (CARAFATE) 1 GM tablet Take 1 g by mouth 4 times daily as needed        traZODone (DESYREL) 100 MG tablet Take 1 tablet (100 mg) by mouth At Bedtime 90 tablet 1       ALLERGIES:                  Allergies   Allergen Reactions     Bees Swelling     Disfiguring      Suture Swelling     local swelling and sutures didn't dissolve vyrcil   Suture        SOCIAL HISTORY:                  Social History     Socioeconomic History     Marital status:      Spouse name: Not on file     Number of children: 2     Years of education: Not on file     Highest education level: Not on file   Occupational History     Employer: [employer]   Tobacco Use     Smoking status: Never Smoker     Smokeless tobacco: Never Used   Substance and Sexual Activity     Alcohol use: Not Currently     Drug use: No     Sexual activity: Yes     Partners: Male      Birth control/protection: Surgical     Comment: vasectomy significant other   Other Topics Concern     Parent/sibling w/ CABG, MI or angioplasty before 65F 55M? No      Service Not Asked     Blood Transfusions Not Asked     Caffeine Concern Not Asked     Occupational Exposure Not Asked     Hobby Hazards Not Asked     Sleep Concern Not Asked     Stress Concern Not Asked     Weight Concern Not Asked     Special Diet No     Back Care Not Asked     Exercise No     Bike Helmet Not Asked     Seat Belt Yes     Self-Exams Not Asked   Social History Narrative     Not on file     Social Determinants of Health     Financial Resource Strain:      Difficulty of Paying Living Expenses:    Food Insecurity:      Worried About Running Out of Food in the Last Year:      Ran Out of Food in the Last Year:    Transportation Needs:      Lack of Transportation (Medical):      Lack of Transportation (Non-Medical):    Physical Activity:      Days of Exercise per Week:      Minutes of Exercise per Session:    Stress:      Feeling of Stress :    Social Connections:      Frequency of Communication with Friends and Family:      Frequency of Social Gatherings with Friends and Family:      Attends Taoism Services:      Active Member of Clubs or Organizations:      Attends Club or Organization Meetings:      Marital Status:    Intimate Partner Violence:      Fear of Current or Ex-Partner:      Emotionally Abused:      Physically Abused:      Sexually Abused:        FAMILY HISTORY:                   Family History   Problem Relation Age of Onset     Lipids Mother         chol     Hypertension Mother      Lipids Father         chol     Hypertension Father      C.A.D. Father      Heart Disease Father          Physical exam Reveals:    O/P: WNL  HEENT: WNL  NECK: No JVD, thyromegaly, or lymphadenopathy  HEART: RRR, no murmurs, gallops, or rubs  LUNGS: CTA bilaterally without rales, wheezes, or rhonchi  GI: NABS, nondistended, nontender,  soft  EXT:without cyanosis, clubbing, or edema  NEURO: nonfocal  : no flank tenderness           MRI OF THE BRAIN WITHOUT AND WITH CONTRAST 8/27/2021 8:57 PM      COMPARISON: Head CT same day.     HISTORY:  Neurological deficit, acute, stroke suspected, altered  mental status.     TECHNIQUE: Multi-sequence, multi-planar MRI images of the brain were  acquired before and after the administration of IV gadolinium (6 mL  Gadavist).     FINDINGS: The ventricles and basal cisterns are normal in  configuration. There is no midline shift. There are no extra-axial  fluid collections. Gray-white differentiation is well maintained.  There is no evidence for stroke or acute intracranial hemorrhage.  There is no abnormal contrast enhancement in the brain or its  coverings.     There is no sinusitis or mastoiditis.                                                                      IMPRESSION: Normal brain MRI.     RAÚL YOUSIF MD      CT OF THE HEAD WITHOUT CONTRAST 8/27/2021 6:45 PM      COMPARISON: Brain MRI 8/15/2021.     HISTORY: Neurological deficit, acute, stroke suspected.     TECHNIQUE: Axial CT images of the head from the skull base to the  vertex were acquired without IV contrast.     FINDINGS: The ventricles and basal cisterns are within normal limits  in configuration. There is no midline shift. There are no extra-axial  fluid collections. Gray-white differentiation is well maintained.      No intracranial hemorrhage, mass or recent infarct.     The visualized paranasal sinuses are well-aerated. There is no  mastoiditis. There are no fractures of the visualized bones.                                                                       IMPRESSION: Normal head CT.         Radiation dose for this scan was reduced using automated exposure  control, adjustment of the mA and/or kV according to patient size, or  iterative reconstruction technique        RAÚL YOUSIF MD             CT ANGIOGRAM OF THE HEAD AND NECK  WITHOUT AND WITH CONTRAST  8/27/2021  6:44 PM      COMPARISON: Head and neck MRA 8/15/2021.     HISTORY: Neurological deficit, acute, stroke suspected.     TECHNIQUE:  Precontrast localizing scans were followed by CT  angiography with an injection of 70 ml Isovue-370 nonionic intravenous  contrast material with scans through the head and neck.  Images were  transferred to a separate 3-D workstation where multiplanar  reformations and 3-D images were created.  Estimates of carotid  stenoses are made relative to the distal internal carotid artery  diameters except as noted.       FINDINGS:   Neck CTA: The common carotid arteries bilaterally remain widely  patent. The cervical internal carotid arteries are very tortuous, but  are patent without stenosis or kinking. The large left vertebral  artery is widely patent. The origin of the right vertebral artery is  normal in caliber. The entire V2 segment is again noted to be  irregular in contour and moderately narrowed suggesting long segment  dissection. The V3 and V4 segments of the right vertebral artery  appear normal.     Head CTA: The basilar, bilateral intracranial distal internal carotid,  bilateral anterior cerebral, bilateral middle cerebral and bilateral  posterior cerebral arteries are patent and unremarkable. The anterior  communicating and left posterior communicating arteries are patent.                                                                      IMPRESSION:     1. Findings consistent with long segment dissection and moderate  narrowing of the entire V2 segment of the right vertebral artery again  Noted.     2. Otherwise, normal neck and head CTA.        Radiation dose for this scan was reduced using automated exposure  control, adjustment of the mA and/or kV according to patient size, or  iterative reconstruction technique     RAÚL YOUSIF MD         CT ABDOMEN AND PELVIS WITH CONTRAST 8/27/2021 6:40 PM     CLINICAL HISTORY: Abdominal pain.  Nausea and vomiting.     TECHNIQUE: CT scan of the abdomen and pelvis was performed following  injection of IV contrast. Multiplanar reformats were obtained. Dose  reduction techniques were used.  CONTRAST: 70 mL Isovue-370  COMPARISON: None.     FINDINGS:   LOWER CHEST: The visualized lung bases are clear. Bilateral breast  implants are partially included on this exam. A 0.7 cm indeterminate  nodule is noted in the right breast laterally (series 6 image 25).     HEPATOBILIARY: Unremarkable. No hepatic masses are seen.     PANCREAS: Normal.     SPLEEN: Normal.     ADRENAL GLANDS: Normal.     KIDNEYS/BLADDER: Unremarkable. No hydronephrosis.     BOWEL: Postoperative changes of prior Nissen fundoplication. No bowel  obstruction. Sigmoid diverticulosis. No convincing evidence for  colitis or diverticulitis. Unremarkable appendix.     PELVIC ORGANS: Prior hysterectomy.     LYMPH NODES: No enlarged lymph nodes are identified in the abdomen or  pelvis.     VASCULATURE: Unremarkable.     ADDITIONAL FINDINGS: None.     MUSCULOSKELETAL: Unremarkable.                                                                      IMPRESSION:      1.  No acute abnormality in the abdomen or pelvis. No cause for  abdominal pain is identified.     2.  An indeterminate 0.7 cm nodule is noted in the right breast  laterally. Clinical and mammographic correlation are recommended.     ANAY DICKSON MD             MRA NECK WITHOUT AND WITH CONTRAST  8/15/2021 2:39 PM      HISTORY: Neuro deficit, acute, stroke suspected; intermittent R sided  paresthesias      TECHNIQUE: 2D time-of-flight MR angiogram of the neck without contrast  and 3D MR angiogram of the neck with  10 mL Gadavist. Estimates of  carotid stenoses are made relative to the distal internal carotid  artery diameters except as noted.      COMPARISON: None.      FINDINGS: Conventional three-vessel aortic arch branching pattern  without significant stenosis of the origins of the  great vessels. The  common carotid arteries, carotid bifurcations, and bilateral cervical  internal carotid arteries are patent. Somewhat tortuous  [id number] right cervical internal carotid artery and tortuous  [id number] left cervical internal carotid artery.     Crescentic intrinsically T1 hyperintense signal involving the V2  segment of the right vertebral artery, consistent with intramural  hematoma (for example see series 17 image 16). There is a severe  associated focal stenosis involving the true lumen of the mid V2  segment of the right vertebral artery (series 6 image 45, series 20  image 53). Mild/moderate degrees of diffuse stenosis elsewhere  involving the right vertebral artery V2 segment. The left vertebral  artery is dominant and the right vertebral artery is developmentally  smaller in size compared to the left. The left vertebral artery is  widely patent without evidence of recent dissection.                                                                      IMPRESSION:     1. Findings consistent with recent right vertebral artery dissection  diffusely involving the V2 segment, with an associated severe focal  stenosis involving the true lumen of the mid V2 segment.     2. The dominant left vertebral artery is widely patent.     3. The bilateral common carotid arteries, carotid bifurcations, and  cervical internal carotid arteries are patent.     The findings were discussed by phone with Dr. Sheth by myself at  approximately 3:05 PM on 8/15/2021.     GHAZALA KELLER MD      MRI BRAIN WITHOUT AND WITH CONTRAST  8/15/2021 2:38 PM      HISTORY:  Neurologic deficit, acute, stroke suspected. Right-sided  paresthesias .     TECHNIQUE:  Multiplanar, multisequence MRI of the brain without and  with 10 mL Gadavist      COMPARISON: None.      FINDINGS: No abnormal intracranial restricted diffusion to suggest  acute infarct. The ventricles are normal in size and configuration.  Mild,  age-commensurate generalized brain parenchymal volume loss. The  brain parenchyma appears normal in morphology, volume, and signal  intensity for the patient's age. No intracranial hemorrhage  identified. No extra-axial fluid collection, mass lesion, or  herniation. No abnormal intracranial postcontrast enhancement.     Asymmetrically diminished flow void of the V3 segment of the right  vertebral artery is noted (series 8 image 3). Please see separate  report from MR angiogram of the neck for additional details. The other  major arterial flow voids at the skull base appear to be grossly  maintained.     Orbits appear normal. Calvarium, skull base and mid face otherwise are  unremarkable.                                                                      IMPRESSION:     1. No acute intracranial process. Unremarkable appearance of the brain  and extra-axial spaces.     2. Abnormal flow-void involving the right vertebral artery V3 segment.  Please see MR angiogram of the neck report of same day for additional  details.     GHAZALA KELLER MD      MR ANGIOGRAM OF THE HEAD WITHOUT CONTRAST   8/15/2021 2:22 PM      HISTORY: Neurologic deficit, acute, stroke suspected. Intermittent  right-sided paresthesias.     TECHNIQUE:  3D time-of-flight MR angiogram of the head without  contrast.     COMPARISON: None.     FINDINGS: The distal cervical, petrous, cavernous, and supraclinoid  segments of the internal carotid arteries are patent. The major  proximal branches of the middle and anterior cerebral arteries are  patent. The left vertebral artery is dominant. The visualized aspects  of the intracranial vertebral arteries and the basilar artery appear  patent. The proximal visualized branches of the posterior cerebral  arteries are patent. No aneurysm or high flow vascular malformation is  identified.                                                                      IMPRESSION:     No high-grade stenosis or large vessel  "occlusion involving the major  intracranial arteries of the [place].     GHAZALA KELLER MD            Chief Complaint:  One-sided Weakness     HPI   Jaz Archibald is a 51 year old female on Plavix with history of hypertension, hyperlipidemia, and TIA due to right vertebral artery dissection of the V2 segment with focal stenosis of the mid V2 segment on 08/15/21 who presents with right-sided weakness. She recently had surgery for a vertebral dissection on 08/15/21. Today, she had onset abdominal pain with associated dizziness. She went to lay down and then had onset right sided numbness beginning in her right arm migrating into her right side of her face, and a strong sensation into her right leg approximately 30 minutes ago. It has reduced in severity here, but currently feels her right side of her face is still somewhat stiff. She states this is reminiscent to when she had her vertebral dissections. She feels nauseous here and is experiencing a \"burning\" abdominal pain. She follows Dr. Gabriel Isabel of neurology with [facility].      Review of Systems   Gastrointestinal: Positive for abdominal pain.   Neurological: Positive for dizziness and numbness.   All other systems reviewed and are negative.     Allergies:  Bees  Suture     Medications:  Aspirin 325 mg   Lipitor   Plavix   Epinephrine   Lexapro   Lisinopril   Zofran   Carafte   Trazodone     Past Medical History:    ADD  Anxiety   Gastroesophageal Reflux Disease without Esophagitis  Vertebral Artery Dissection, 08/15/21  [facility]  Hypertension   Hyperlipidemia   Sleep Disorder       Past Surgical History:    EGD   Novasure   Laparoscopic Hysterectomy   Laparoscopic Herniorrhaphy, 07/26/21  Laparoscopic Nissen Fundoplication, 07/26/21  Sling Transvaginal       Family History:    Mother - Lipids, Hypertension  Father - Lipids, Hypertension, CAD, Heart Disease     Social History:  She is accompanied to the emergency department with " her      Physical Exam      Patient Vitals for the past 24 hrs:    BP Temp src Pulse Resp SpO2 Weight   08/27/21 1900 (!) 135/97 -- 74 -- 97 % --   08/27/21 1815 -- -- 71 -- 97 % --   08/27/21 1800 -- -- 67 -- 97 % --   08/27/21 1745 (!) 150/97 -- 66 -- 97 % --   08/27/21 1730 (!) 149/95 -- 81 -- 96 % --   08/27/21 1725 (!) 161/91 -- 81 -- 97 % --   08/27/21 1708 (!) 141/95 Oral 72 20 94 % 68 kg (150 lb)         Physical Exam  General: Well-nourished, appears to be in pain and anxious.  Eyes: PERRL, conjunctivae pink no scleral icterus or conjunctival injection  ENT:  Moist mucus membranes, posterior oropharynx clear without erythema or exudates  Respiratory:  Lungs clear to auscultation bilaterally, no crackles/rubs/wheezes.  Good air movement  CV: Normal rate and rhythm, no murmurs/rubs/gallops  GI:  Abdomen soft and non-distended.  Normoactive BS.  +epigastric and mid abdominal tenderness, no guarding or rebound  Skin: Warm, dry.  No rashes or petechiae  Musculoskeletal: No peripheral edema or calf tenderness  Neuro: Alert and oriented to person/place/time. PERRL, EOMI no nystagmus, no aphasia/facial droop/dysarthria, tongue midline, symmetric palatal elevation, normal strength at SCM/trapezius/BUE/BLE, normal coordination to FNF at BUE, normal casual gait, negative romberg, sensation intact to LT over face/BUE/BLE  Psychiatric: Anxious affect        Emergency Department Course   ECG  ECG taken at 1733, ECG read at 1748  Normal sinus rhythm   Normal ECG   No change as compared to prior, dated 8/16/21.  Rate 60 bpm. ND interval 134 ms. QRS duration 72 ms. QT/QTc 448/448 ms. P-R-T axes 53 0 34.      Imaging:  Head CT w/o contrast  Normal head CT.   Reading per radiology     CTA Head Neck with Contrast  1. Findings consistent with long segment dissection and moderate  narrowing of the entire V2 segment of the right vertebral artery again  noted.  2. Otherwise, normal neck and head CTA.  Reading per  radiology     CT Abdomen Pelvis w Contrast  1.  No acute abnormality in the abdomen or pelvis. No cause for  abdominal pain is identified.  2.  An indeterminate 0.7 cm nodule is noted in the right breast  laterally. Clinical and mammographic correlation are recommended.  Reading per radiology     MR Brain w/o & w Contrast:  Normal brain MRI.  Reading per radiology     Laboratory:   CBC: WBC 11.0, HGB 14.6,   CMP: AWNL (Creatinine: 0.75)      Lipase: 219      Emergency Department Course:     Reviewed:  I reviewed nursing notes, vitals, past medical history and care everywhere     Assessments:  1705    I obtained history and examined the patient as noted above.   1948    I rechecked the patient and explained findings.   2158    I updated the patient.      Consults:   1736    I consulted with Dr. Harvey Romo, [facility], regarding the patient's history and presentation here in the emergency department.     2152    I consulted with Dr. Roselyn Bermudez, Stroke Neuro, regarding the patient's history and presentation here in the emergency department.     Interventions:  1722    0.9% NaCl bolus 1,000 mL IV  1722    Morphine 4 mg IV   1722    Zofran 4 mg IV   1825    Morphine 4 mg IV   1825    Zofran 4 mg IV      Disposition:  The patient was discharged to home.      Impression & Plan      Medical Decision Making:  Jaz Archibald is a 51 year old female with recent Nissen fundoplication as well as a recent TIA with diagnosis of vertebral artery dissection who comes with marching paresthesias and numbness on the right side as well as abdominal pain with nausea.  In terms of the abdominal pain, I do suspect she may have some sort of gastroparesis like phenomenon related to eating more than she has been.  CT scan was reassuring.  Laboratory studies were also reassuring.  She does not have a surgical abdomen.  She improved with antiemetics.  In terms of the right-sided paresthesias and numbness, I was concerned about  the possibility of a complication from her known vertebral artery dissection.  I spoke with stroke neurology.  Please refer to their consultation note.  They recommended repeat imaging including CT and CTA followed by MRI.  These were completed and demonstrate no change in the vertebral artery dissection and no evidence of acute stroke.  Symptoms have resolved.  She was reassured.  She is to follow-up with her repeat CT angiogram in 3 months and follow-up as previously scheduled with the stroke neurology clinic.  She was notified of the incidental finding of a nodule on the breast and recommended mammogram.  She is to follow-up with her own doctor in the next few days and have a low threshold to return if any worsening.  With reasonable clinical confidence, I do feel she safe for discharge home at this time.        Diagnosis:      ICD-10-CM     1. Paresthesias  R20.2     2. Abdominal pain, epigastric  R10.13     3. Vertebral artery dissection (H)  I77.74     4. Breast nodule  N63.0       right lateral breast nodule seen on CT scan 8/27/21      Scribe Disclosure:  I, Alcides Beyer, am serving as a scribe at 5:05 PM on 8/27/2021 to document services personally performed by Ilda Rm MD based on my observations and the provider's statements to me.                  Ilda Rm MD  08/29/21 [address] [place] [address]     Discharge Summary  Hospitalist     Date of Admission:  8/15/2021  Date of Discharge:  8/16/2021  Discharging Provider: Clarence Rhodes  Date of Service (when I saw the patient): 08/16/21        Discharge Diagnoses     1. 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 Dyslipidemia  3. Anxiety  4. GERD           History of Present Illness     Ms. Archibald is a 51-year-old female with a past medical history significant for hypertension, dyslipidemia, anxiety and gastroesophageal reflux disease with recent Nissen fundoplication, who presents to the Emergency  Departments with a right-sided facial weakness and numbness and found to have a right vertebral artery dissection.     Please refer to the history and physical from Dr. Carrillo for additional details.           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    This would account for her TIA symptoms of right-sided facial numbness and tingling and right arm weakness.  Etiology of this vertebral artery dissection is not entirely clear at this point.  Her symptoms are transient and at this point neurochecks stable, no neurological deficits while hospitalized.  - Transthoracic echocardiogram normal LVEF 55-60%, no RWMA, RV normal, no significant evidence for valvular pathology, bubble study negative.  - 24-hour Telemetry without any arrhythmia  - Bedside Glucose Monitoring  - A1c 5.0, WNL     Plan  - Daily aspirin  mg for secondary stroke prevention  - Start Plavix 75mg daily, Load with Plavix 300mg Once  - Continue DAPT for 3months ( + Plavix 75)  - Repeat MRI/MRA Brain w/wo contrast with Post-Fat Sat in 3 months.   - Follow-up in Clinic for de-escalation of anti-platelet therapy.   - Statin: Lipitor 80mg   - Stroke Education     Stroke Evaluation summarized:  MRI/Head CT: Negative for Stroke  Intracranial Vascular Imaging: normal vessel  Cervical Carotid and Vertebral Artery Vascular Imaging: Right Vert Dissection  Echocardiogram: As above, normal  EKG/Telemetry: SR with sinus arrhythmia, prolonged QTc.  LDL: 160-->114  A1c: 5.0  Troponin: NA     Anxiety:  Continue PTA Lexapro.     Gastroesophageal reflux disease with recent Nissen fundoplication:  She also had a hiatal hernia repair.  Pain control as needed and follow up with surgeon as an outpatient as indicated.     Dyslipidemia:  Repeat lipid panel shows , , HDL 47, .  High-dose statin as above.        Clarence Rhodes PA-C           Significant  Results and Procedures     As documented above.  Detailed imaging results are available in the data section below.           Pending Results     None           Code Status      Full Code             Primary Care Physician      Jacinda Banks           Physical Exam     Temp: 97.8  F (36.6  C) Temp src: Oral BP: 121/85 Pulse: 69   Resp: 16 SpO2: 95 % O2 Device: None (Room air)            Vitals:     08/15/21 1223 08/15/21 1836   Weight: 70.3 kg (155 lb) 71 kg (156 lb 8.4 oz)      Vital Signs with Ranges  Temp:  [97.7  F (36.5  C)-98.3  F (36.8  C)] 97.8  F (36.6  C)  Pulse:  [63-78] 69  Resp:  [14-18] 16  BP: (117-138)/(78-90) 121/85  SpO2:  [95 %-97 %] 95 %  I/O last 3 completed shifts:  In: 240 [P.O.:240]  Out: -      Constitutional: Alert, oriented to person, place, date, situation.  Cooperative, lying in bed in NAD.   Respiratory:   Breathing comfortably, lungs clear.  Cardiovascular:  Well perfused, no edema.  Skin/Integumen:  Dry, non-diaphoretic.  MSK:  Limbs atraumatic.  Moves all 4 extremities equally.  Neuro: Speech fluent and normal.  No facial droop.  Moves all 4 extremities with normal strength.  Coordination grossly normal.  No overt deficits.           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 Gabriel Isabel at [facility]. You will be contacted by virtual stroke clinic team after discharge. Repeat MRI/MRA Brain in 3 months, Neurology will arrange this. Follow up with  primary care provider, Jacinda Banks, within 7 days to evaluate medication change and for hospital follow- up.            Diet     Follow this diet upon discharge: Regular            Discharge Medications           Discharge Medication List as of 8/16/2021  1:50 PM             START taking these medications     Details   aspirin (ASA) 325 MG EC tablet Take 1 tablet (325 mg) by mouth daily . Future refills by PCP Dr. Jacinda Banks with phone number [phone]., Disp-30 tablet, R-1, E-Prescribe       atorvastatin (LIPITOR) 80 MG tablet Take 1 tablet (80 mg) by mouth every evening . Future refills by PCP Dr. Jacinda Banks with phone number [phone]., Disp-30 tablet, R-1, E-Prescribe       clopidogrel (PLAVIX) 75 MG tablet Take 1 tablet (75 mg) by mouth daily . Future refills by PCP Dr. Jacinda Banks with phone number [phone]., Disp-30 tablet, R-1, E-Prescribe                 CONTINUE these medications which have NOT CHANGED     Details   acetaminophen (TYLENOL) 500 MG tablet Take 500-1,000 mg by mouth every 6 hours as needed for mild pain, Historical       EPINEPHrine (EPIPEN) 0.3 MG/0.3ML injection Inject 0.3 mLs into the muscle once as needed for anaphylaxis., Disp-2 each, R-3, E-Prescribe       escitalopram (LEXAPRO) 20 MG tablet Take 1 tablet (20 mg) by mouth At Bedtime, Disp-90 tablet, R-1, E-Prescribe       lisinopril (ZESTRIL) 10 MG tablet Take 10 mg by mouth daily, Historical       olopatadine (PATANOL) 0.1 % ophthalmic solution Place 1-2 drops into both eyes 2 times daily as needed for allergies , R-6, Historical       ondansetron (ZOFRAN-ODT) 4 MG ODT tab Take 1 tablet (4 mg) by mouth every 8 hours as needed for nausea, Disp-20 tablet, R-1, E-Prescribe       oxyCODONE (ROXICODONE) 5 MG/5ML solution Take 5 mLs (5 mg) by mouth every 6 hours as needed for moderate to severe pain, Disp-100 mL, R-0, E-Prescribe       polyethylene glycol (MIRALAX) 17 g packet Take 17 g by mouth 2 times daily, Disp-7  packet, R-0, Local Print       sucralfate (CARAFATE) 1 GM tablet Take 1 g by mouth 4 times daily as needed , Historical       traZODone (DESYREL) 100 MG tablet Take 1 tablet (100 mg) by mouth At Bedtime, Disp-90 tablet, R-1, E-Prescribe                    Allergies                Allergies   Allergen Reactions     Bees Swelling       Disfiguring      Suture Swelling       local swelling and sutures didn't dissolve vyrcil   Suture             Data      Most Recent 3 CBC's:            Recent Labs   Lab Test 08/15/21  1304 08/13/21  1547 08/06/21  0818   WBC 7.7 11.1* 6.8   HGB 13.4 14.1 14.6   MCV 90 90 88    441 500*      Most Recent 3 BMP's:                    Recent Labs   Lab Test 08/16/21  1128 08/16/21  0741 08/15/21  2134 08/15/21  1304 08/06/21  0818 08/06/21  0818 07/29/21  0706   NA  --   --   --  136  --  138 140   POTASSIUM  --   --   --  3.6  --  4.7 3.2*   CHLORIDE  --   --   --  106  --  106 109   CO2  --   --   --  26  --  24 25   BUN  --   --   --  16  --  9 5*   CR  --   --   --  0.63  --  0.72 0.61   ANIONGAP  --   --   --  4  --  8 6   JUANY  --   --   --  8.4*  --  9.1 8.0*   GLC 81 90 88 143*   < > 91 88    < > = values in this interval not displayed.      Most Recent 2 LFT's:          Recent Labs   Lab Test 08/06/21  0818 07/03/20  0859   AST 17 9   ALT 66* 21   ALKPHOS 70 57   BILITOTAL 0.5 0.4      Most Recent INR's and Anticoagulation Dosing History:  Anticoagulation Dose History    There is no flowsheet data to display.         Most Recent 3 Troponin's:        Recent Labs   Lab Test 07/27/21  0533   TROPONIN <0.015      Most Recent Cholesterol Panel:        Recent Labs   Lab Test 08/15/21  1304   CHOL 208*   *   HDL 47*   TRIG 236*      Most Recent 6 Bacteria Isolates From Any Culture (See EPIC Reports for Culture Details):          Recent Labs   Lab Test 09/03/13  1632 08/19/13  1141   CULT No growth after 2 days >100,000 colonies/mL Klebsiella pneumoniae ssp pneumoniae       Most Recent TSH, T4 and A1c Labs:            Recent Labs   Lab Test 08/15/21  1304 08/13/21  1547 06/24/15  1528   TSH 0.68  --  0.97   T4  --   --  1.03   A1C  --  5.0  --               Results for orders placed or performed during the hospital encounter of 08/15/21   MR Brain w/o & w Contrast     Narrative     MRI BRAIN WITHOUT AND WITH CONTRAST  8/15/2021 2:38 PM      HISTORY:  Neurologic deficit, acute, stroke suspected. Right-sided  paresthesias .     TECHNIQUE:  Multiplanar, multisequence MRI of the brain without and  with 10 mL Gadavist      COMPARISON: None.      FINDINGS: No abnormal intracranial restricted diffusion to suggest  acute infarct. The ventricles are normal in size and configuration.  Mild, age-commensurate generalized brain parenchymal volume loss. The  brain parenchyma appears normal in morphology, volume, and signal  intensity for the patient's age. No intracranial hemorrhage  identified. No extra-axial fluid collection, mass lesion, or  herniation. No abnormal intracranial postcontrast enhancement.     Asymmetrically diminished flow void of the V3 segment of the right  vertebral artery is noted (series 8 image 3). Please see separate  report from MR angiogram of the neck for additional details. The other  major arterial flow voids at the skull base appear to be grossly  maintained.     Orbits appear normal. Calvarium, skull base and mid face otherwise are  unremarkable.        Impression     IMPRESSION:  1. No acute intracranial process. Unremarkable appearance of the brain  and extra-axial spaces.  2. Abnormal flow-void involving the right vertebral artery V3 segment.  Please see MR angiogram of the neck report of same day for additional  details.     GHAZALA KELLER MD         SYSTEM ID:  [id number]   MRA Neck (Carotids) wo & w Contrast     Narrative     MRA NECK WITHOUT AND WITH CONTRAST  8/15/2021 2:39 PM      HISTORY: Neuro deficit, acute, stroke suspected; intermittent R  sided  paresthesias      TECHNIQUE: 2D time-of-flight MR angiogram of the neck without contrast  and 3D MR angiogram of the neck with  10 mL Gadavist. Estimates of  carotid stenoses are made relative to the distal internal carotid  artery diameters except as noted.      COMPARISON: None.      FINDINGS: Conventional three-vessel aortic arch branching pattern  without significant stenosis of the origins of the great vessels. The  common carotid arteries, carotid bifurcations, and bilateral cervical  internal carotid arteries are patent. Somewhat tortuous  [id number] right cervical internal carotid artery and tortuous  [id number] left cervical internal carotid artery.     [place]cent[place] intrinsically T1 hyperintense signal involving the V2  segment of the right vertebral artery, consistent with intramural  hematoma (for example see series 17 image 16). There is a severe  associated focal stenosis involving the true lumen of the mid V2  segment of the right vertebral artery (series 6 image 45, series 20  image 53). Mild/moderate degrees of diffuse stenosis elsewhere  involving the right vertebral artery V2 segment. The left vertebral  artery is dominant and the right vertebral artery is developmentally  smaller in size compared to the left. The left vertebral artery is  widely patent without evidence of recent dissection.        Impression     IMPRESSION:  1. Findings consistent with recent right vertebral artery dissection  diffusely involving the V2 segment, with an associated severe focal  stenosis involving the true lumen of the mid V2 segment.  2. The dominant left vertebral artery is widely patent.  3. The bilateral common carotid arteries, carotid bifurcations, and  cervical internal carotid arteries are patent.     The findings were discussed by phone with Dr. Sheth by myself at  approximately 3:05 PM on 8/15/2021.     GHAZALA KELLER MD         SYSTEM ID:  [id number]   MRA Brain ([place]) wo  Contrast     Narrative     MR ANGIOGRAM OF THE HEAD WITHOUT CONTRAST   8/15/2021 2:22 PM      HISTORY: Neurologic deficit, acute, stroke suspected. Intermittent  right-sided paresthesias.     TECHNIQUE:  3D time-of-flight MR angiogram of the head without  contrast.     COMPARISON: None.     FINDINGS: The distal cervical, petrous, cavernous, and supraclinoid  segments of the internal carotid arteries are patent. The major  proximal branches of the middle and anterior cerebral arteries are  patent. The left vertebral artery is dominant. The visualized aspects  of the intracranial vertebral arteries and the basilar artery appear  patent. The proximal visualized branches of the posterior cerebral  arteries are patent. No aneurysm or high flow vascular malformation is  identified.        Impression     IMPRESSION:  No high-grade stenosis or large vessel occlusion involving the major  intracranial arteries of the [place].     GHAZALA KELLER MD         SYSTEM ID:  [id number]   Echocardiogram Complete     Value     LVEF  55-60%     Narrative     [id number]  [id number]  [id number]  [id number]^FRANCI     [facility]  Echocardiography Laboratory  [address] [place] [address]     Name: TERRANCE FLYNN  MRN: [id number]  : 1970  Study Date: 2021 08:45 AM  Age: 51 yrs  Gender: Female  Patient Location: [facility]  Reason For Study: TIA  Ordering Physician: FRANCI DENNISON  Referring Physician: Jacinda Banks  Performed By: Laurie Smith     BSA: 1.8 m2  Height: 66 in  Weight: 156 lb  HR: 67  BP: 133/89 mmHg  ______________________________________________________________________________  Procedure  Complete Portable Bubble Echo Adult. Optison (NDC #8904-5639) given  intravenously.  ______________________________________________________________________________  Interpretation Summary     1. Left ventricular systolic function is normal. The visual ejection fraction  is 55-60%.  2. No regional  wall motion abnormalities noted.  3. The right ventricle is normal in structure, function and size.  4. No evidence for significant valvular pathology.  5. There is no color Doppler evidence of an atrial shunt. A contrast injection  (Bubble Study) was performed that was negative for flow across the interatrial  septum.  ______________________________________________________________________________  Left Ventricle  The left ventricle is normal in size. There is normal left ventricular wall  thickness. Left ventricular systolic function is normal. The visual ejection  fraction is 55-60%. Left ventricular diastolic function is normal. No regional  wall motion abnormalities noted.     Right Ventricle  The right ventricle is normal in structure, function and size.     Atria  Normal left atrial size. Right atrial size is normal. There is no color  Doppler evidence of an atrial shunt. A contrast injection (Bubble Study) was  performed that was negative for flow across the interatrial septum.     Mitral Valve  The mitral valve is normal in structure and function.     Aortic Valve  The aortic valve is normal in structure and function.     Pulmonic Valve  The pulmonic valve is normal in structure and function.     Vessels  IVC diameter <2.1 cm collapsing >50% with sniff suggests a normal RA pressure  of 3 mmHg.     Pericardium  There is no pericardial effusion.     ______________________________________________________________________________  MMode/2D Measurements & Calculations  IVSd: 0.79 cm  LVIDd: 4.5 cm  LVIDs: 2.4 cm  LVPWd: 0.73 cm  FS: 45.9 %  LV mass(C)d: 106.1 grams  LV mass(C)dI: 58.9 grams/m2  Ao root diam: 2.9 cm  LA dimension: 3.0 cm  asc Aorta Diam: 2.9 cm  LA/Ao: 1.0     LA Volume (BP): 40.2 ml  LA Volume Index (BP): 22.3 ml/m2  RWT: 0.32  TAPSE: 2.0 cm     Doppler Measurements & Calculations  MV E max alexandro: 74.8 cm/sec  MV A max alexandro: 53.7 cm/sec  MV E/A: 1.4  MV dec time: 0.16 sec  E/E' av.0  Lateral  E/e': 7.8     Medial E/e': 8.2     ______________________________________________________________________________  Report approved by: Amber Bellamy 08/16/2021 12:26 PM                         Cosigned by: Iam Monique MD at 8/17/2021  4:07 PM       MRI OF THE BRAIN WITHOUT AND WITH CONTRAST  9/28/2021 9:42 AM      COMPARISON: Brain MR 8/27/2021.     HISTORY:  Dissection, vertebral artery (H).     TECHNIQUE: Multi-sequence, multi-planar MRI images of the brain were  acquired before and after the administration of IV gadolinium (7 mL  Gadavist).     FINDINGS: The ventricles and basal cisterns are normal in  configuration. There is no midline shift. There are no extra-axial  fluid collections. Gray-white differentiation is well maintained.  There is no evidence for stroke or acute intracranial hemorrhage.  There is no abnormal contrast enhancement in the brain or its  coverings.     There is no sinusitis or mastoiditis.                                                                      IMPRESSION: Normal brain MRI.     RAÚL YOUSIF MD       CTA CHEST, ABDOMEN, PELVIS RUNOFF WITH CONTRAST 9/17/2021 8:28 AM     HISTORY: 51-year-old woman with vertebral artery dissection with  associated TIA. Request made for evaluation of possible fibromuscular  dysplasia and/or mesenteric aneurysmal disease.     COMPARISON: None     TECHNIQUE: Multiplanar multiformatted CTA images obtained from the  lung apices through the chest, abdomen, pelvis, and both lower  extremities after the uneventful administration of Isovue 370  intravenous contrast given for a total of 125 mL. Radiation dose for  this scan was reduced using automated exposure control, adjustment of  the mA and/or kV according to patient size, or iterative  reconstruction technique. 3D reformatted images created at a separate  workstation.     FINDINGS: The visible thyroid gland is unremarkable. No abnormally  enlarged mediastinal lymph nodes. Heart size is  normal. No pericardial  or pleural effusion. Bilateral breast implants incidentally  identified. Patchy bibasilar opacities suggestive of atelectasis. No  pulmonary masses. Focal sclerosis in the vertebral body of what is  thought to be T5, though may be T4, measuring 7 mm. May be a bone  island, though other etiologies could not be excluded. No acute  osseous abnormality.     Previous surgery noted at the gastroesophageal junction, likely a  fundoplication. The liver, gallbladder, spleen, adrenal glands, and  pancreas are unremarkable. Both kidneys are normally perfused. No  hydronephrosis or nephrolithiasis. No intraperitoneal fluid or air.  Bladder is mostly decompressed. No dilated loops of bowel. Moderate  amount of stool throughout the colon.     There is motion artifact in the ascending aorta, though size estimated  to be 2.8 cm AP by 2.9 cm transverse. Origins of the great arteries  are patent. The descending thoracic aorta is normal in size and  appearance. The thoracic aorta and visceral arteries are somewhat  diminished in contrast enhancement given overall large field-of-view.  The celiac axis, SMA, and inferior mesenteric arteries are patent.  Both renal arteries are patent. The infrarenal abdominal aorta is  normal in size and appearance. Both common iliac, internal iliac, and  external iliac arteries are patent. I do not see obvious stenosis or  beaded appearance of the visceral or iliac arteries. Both common  femoral, profunda femoral, superficial femoral, popliteal, and runoff  arteries are patent, where visible. The distal most runoff arteries  are incompletely opacified. No stenosis, occlusion, or atherosclerotic  plaque.                                                                      IMPRESSION:  1. Normal size and appearance of thoracoabdominal aorta.  2. Visceral arteries are patent. Do not clearly identify stenoses,  beaded appearance, or other stigmata of fibromuscular  dysplasia.  Essentially no atherosclerotic plaque in the visible arteries.  3. Status post surgery at the gastroesophageal junction, perhaps a  Nissen fundoplication.     AXEL ACKERMAN MD      CT ANGIOGRAM OF THE HEAD AND NECK WITHOUT AND WITH CONTRAST  9/17/2021  8:27 AM      COMPARISON: Head and neck CT angiogram 8/27/2021, neck MRA 8/15/2021     HISTORY: Dissection, vertebral artery (H).     TECHNIQUE:  Precontrast localizing scans were followed by CT  angiography with an injection of 125mL Isovue-370 nonionic intravenous  contrast material with scans through the head and neck.  Images were  transferred to a separate 3-D workstation where multiplanar  reformations and 3-D images were created.  Estimates of carotid  stenoses are made relative to the distal internal carotid artery  diameters except as noted.       FINDINGS:   Neck CTA: The bilateral common carotid, bilateral cervical internal  carotid and left vertebral arteries remain normal in caliber with no  stenosis. Diffuse narrowing in caliber of the right vertebral artery  is again noted without any change from the comparison CTA.     Head CTA: The basilar, bilateral intracranial distal internal carotid,  bilateral anterior cerebral, bilateral middle cerebral and bilateral  posterior cerebral arteries are patent and unremarkable.                                                                      IMPRESSION:  1. No change in diffuse caliber or contour of the right vertebral  artery consistent with prior dissection.  2. Otherwise, normal neck and head CTA.        Radiation dose for this scan was reduced using automated exposure  control, adjustment of the mA and/or kV according to patient size, or  iterative reconstruction technique.     RAÚL YOUSIF MD           A/P:     (N63.10) 7 mm right breast nodule  (primary encounter diagnosis)  Comment: she is informed of this incidental discovery on imaging as above  Plan: She was advised to address this with  her PCP.      (I77.74) Spontaneous atraumatic right vertebral artery dissection  Comment: The patient and her  are concerned this could be due to prolonged neck hyperextension in association with intubation at her 7/26/21 Nissen fundoplication. I informed them the her op report does not connote prolonged neck hyperextension. She had intermittently continuing sxs of right facial numbness but this has resolved. She has none now. Repeat imaging (MR Brain w/o & w Contrast, CTA Head Neck with Contrast) revealed no findings of evolving stroke, FMD, other aneurysmal ds.   Plan: continue DAP             (E78.5) Hyperlipidemia LDL goal <70  Comment: check the below in November, f/u with me two weeks later.  Plan: LipoFit by NMR, Lipoprotein (a), CRP, CARDIAC         RISK, TSH, T4 free, T3 Free               (K21.9) Gastroesophageal reflux disease without esophagitis  Comment: she is on DAPT. She is reporting post Nissen fundoplication GERD sxs. She is on Carafate as well as Plavix. I would prefer to prescribe Protonix but her problem list lists long QT syndrome. Her EKG does not reveal a Long QTc. I will therefore not prescribe Protonix. I told her not to take Prilosec or Nexium or Prevacid with Plavix.   Plan: continue carafate          Fortyfive minutes medical care regarding the above matters. Multiple questions answered. Reassurance given.

## 2021-10-04 NOTE — PROGRESS NOTES
"[facility]        Patient is here for a follow up  to discuss about CTA results      /83 (BP Location: Right arm, Patient Position: Chair, Cuff Size: Adult Regular)   Pulse (!) 134   Resp 20   Ht 5' 6\" (1.676 m)   Wt 156 lb (70.8 kg)   LMP 07/26/2013   SpO2 96%   BMI 25.18 kg/m      The provider has been notified that the patient has no concerns.     Questions patient would like addressed today are: N/A.    Refills are needed: No    Has homecare services and agency name:  Oneida Vega CMA      "

## 2021-10-05 ENCOUNTER — TELEPHONE (OUTPATIENT)
Dept: OTHER | Facility: CLINIC | Age: 51
End: 2021-10-05

## 2021-10-05 NOTE — TELEPHONE ENCOUNTER
Follow up to 10/4/21    Please arrange for:      Fasting labs around 11/15/21    In clinic visit two weeks later.    Elizabeth Adhikari RN BSN  [facility]  [phone]

## 2021-10-06 ENCOUNTER — TELEPHONE (OUTPATIENT)
Dept: NEUROLOGY | Facility: CLINIC | Age: 51
End: 2021-10-06

## 2021-10-06 ENCOUNTER — MYC MEDICAL ADVICE (OUTPATIENT)
Dept: NEUROLOGY | Facility: CLINIC | Age: 51
End: 2021-10-06

## 2021-10-06 NOTE — TELEPHONE ENCOUNTER
Pt is scheduled with Dr Newman this month and also has appointments with another neurologist in Nov and Dr Isabel in Dec all are scheduled as new and for the same DX. Per Dr Newman we should cancel her appointment on Friday 10/8/2021 as the other appointment in Nov has been scheduled for longer.       Voice mail picked up but is full unable to leave a message will try again.

## 2021-10-07 NOTE — TELEPHONE ENCOUNTER
I was reviewing your chart in Neurology today and tried to call you, but your voicemail was full. It looks like you have a neurology appointment set up in November with a specialist at the [facility]. I spoke with Dr Newman and he said since that appointment was scheduled first it would make the most sense for you to keep that appointment (since it is closer to your home as well ) and cancel your appointment at the [facility] in [place] this Friday October 8th. Im happy to assist with this but wanted to find out what you preferred.      You can message me back on Kenzei or call our office at [phone]

## 2021-10-07 NOTE — TELEPHONE ENCOUNTER
Yes You have a MRA (similar to MRI ) scheduled on 11/5/2021 at 1 pm at [address] [place] [id number] and then a video visit with Dr Dennis on 11/9/2021 at 8:15 AM to review the MRA and discuss your case     It looks like they did want the MRA prior to the appointment which is why it may be best to cancel with us tomorrow and keep the 11/9/2021 appointment as they will have the results to give you better info than we can as Dr macdonald would just order the same procedure. Let me know I can cancel your appointment with us.      Will cancel appointment if do not here back in a few hours as it would be a waste of patients time to come for appointment prior to MRA

## 2021-10-07 NOTE — TELEPHONE ENCOUNTER
I'm not sure what appointment in November I have with Neurology? Do I need another MRI before my neurology visit?

## 2021-10-07 NOTE — TELEPHONE ENCOUNTER
mail box is full unable to leave message     Do have a MyChart message out to patient as there is requested testing prior to appointment that has been scheduled at [facility] with the Nov neurology appointment being right after. Will cancel appointment at this time because patient will not have completed  to the needed testing to make this appointment worthwhile

## 2021-10-11 NOTE — TELEPHONE ENCOUNTER
.LM for patient to call us back to schedule:    Follow up to 10/4/21       Fasting labs around 11/15/21    In clinic visit two weeks later.

## 2021-10-14 ENCOUNTER — TRANSFERRED RECORDS (OUTPATIENT)
Dept: HEALTH INFORMATION MANAGEMENT | Facility: CLINIC | Age: 51
End: 2021-10-14
Payer: COMMERCIAL

## 2021-10-18 DIAGNOSIS — I77.74 DISSECTION, VERTEBRAL ARTERY (H): ICD-10-CM

## 2021-10-18 RX ORDER — CLOPIDOGREL BISULFATE 75 MG/1
75 TABLET ORAL DAILY
Qty: 30 TABLET | Refills: 1 | Status: SHIPPED | OUTPATIENT
Start: 2021-10-18 | End: 2021-12-06

## 2021-10-18 RX ORDER — ATORVASTATIN CALCIUM 80 MG/1
80 TABLET, FILM COATED ORAL EVERY EVENING
Qty: 30 TABLET | Refills: 1 | Status: SHIPPED | OUTPATIENT
Start: 2021-10-18 | End: 2021-12-09

## 2021-10-18 NOTE — TELEPHONE ENCOUNTER
Patient states she is calling for four things.    1)  Refills for the following:    Atorvastatin 80 mg  Asprin 325 MG  Clopidogrel 75MG    Pt is out of medications    [facility] [address] is the preferred pharmacy.    Request RX is sent today if at all possible.    Last office visit with PCP 9/22/21    2) Would like to know if records where sent to  Dr. Gracia?    3. Following up on her request for FMLA paperwork.  Appears [employer] message sent yesterday at  3:03 PM FMLA forms downloaded.    4) Wants to schedule appt with PCP  Scheduled Virtual Video Appt Wed 10/20/21

## 2021-10-18 NOTE — PROGRESS NOTES
"Linsey is a 51 year old who is being evaluated via a billable video visit.      How would you like to obtain your AVS? MyChart  If the video visit is dropped, the invitation should be resent by: Text to cell phone:   Will anyone else be joining your video visit? No      Assessment & Plan   Problem List Items Addressed This Visit        Digestive    Nausea and vomiting, intractability of vomiting not specified, unspecified vomiting type - Primary      Other Visit Diagnoses     Gastroesophageal reflux disease with esophagitis without hemorrhage        Irritable bowel syndrome, unspecified type          Recommend keeping follow-up as per gastrology's recommendation continue all current medications as prescribed continue to keep follow-up appointments with specialties as scheduled.  Patient will continue with Zofran on a scheduled basis             BMI:   Estimated body mass index is 27.12 kg/m  as calculated from the following:    Height as of 12/6/21: 1.676 m (5' 6\").    Weight as of 12/6/21: 76.2 kg (168 lb).           Return in about 2 weeks (around 11/3/2021) for Follow up.    Jacinda Banks NP  [facility]    Subjective   Linsey is a 51 year old who presents for the following health issues     HPI       Patient reports things are going well.  Patient was seen Dr. Everett last week and is interested in completion of EGD and colonoscopy and concern for nerve damage causing coughing and vomitting and occurs 1-4 times per day and is sporatic.  She is taking zofran preemptovitely.  She is taking iron supplement daily.      She has been taking time off if feeling unwell, increase in vomiting.        She has been evaluated and was not found to have a connective tissue disorder.            Review of Systems   Unremarkable as listed above and below      Objective           Vitals:  No vitals were obtained today due to virtual visit.    Physical Exam   RESP: No audible wheeze, cough  PSYCH: Mentation " appears normal, affect normal/bright, judgement and insight intact, normal speech         Total time 10 minutes

## 2021-10-18 NOTE — TELEPHONE ENCOUNTER
Called and spoke with patient    Has run out of medication, requesting a refill today    Patient forms received for FMLA, placed on Jacinda Banks CNP desk for virtual visit 10/20/21    Asked that we send medical history to Dr. Gracia    Phone [phone]    Fax [phone]

## 2021-10-18 NOTE — TELEPHONE ENCOUNTER
Refills sent.    Other issues noted below to be addressed by Jacinda Banks NP.    Tyrese Cardozo MD  General Internal Medicine  [facility]  10/18/2021, 5:00 PM

## 2021-10-19 NOTE — TELEPHONE ENCOUNTER
Records faxed as requested     Patient will go over forms at virtual visit with Jacinda Banks CNP 10/20/21

## 2021-10-19 NOTE — TELEPHONE ENCOUNTER
2nd and final attempt to schedule     Follow up to 10/4/2021    Fasting labs around 11/15/2021  In clinic with Dr. Austin 2 weeks later.     Rosa GILLESPIE

## 2021-10-20 ENCOUNTER — VIRTUAL VISIT (OUTPATIENT)
Dept: INTERNAL MEDICINE | Facility: CLINIC | Age: 51
End: 2021-10-20
Payer: COMMERCIAL

## 2021-10-20 DIAGNOSIS — K58.9 IRRITABLE BOWEL SYNDROME, UNSPECIFIED TYPE: ICD-10-CM

## 2021-10-20 DIAGNOSIS — R11.2 NAUSEA AND VOMITING, INTRACTABILITY OF VOMITING NOT SPECIFIED, UNSPECIFIED VOMITING TYPE: Primary | ICD-10-CM

## 2021-10-20 DIAGNOSIS — K21.00 GASTROESOPHAGEAL REFLUX DISEASE WITH ESOPHAGITIS WITHOUT HEMORRHAGE: ICD-10-CM

## 2021-10-20 PROCEDURE — 99213 OFFICE O/P EST LOW 20 MIN: CPT | Mod: GT | Performed by: NURSE PRACTITIONER

## 2021-11-05 ENCOUNTER — HOSPITAL ENCOUNTER (OUTPATIENT)
Dept: MRI IMAGING | Facility: CLINIC | Age: 51
Discharge: HOME | End: 2021-11-05
Attending: PHYSICIAN ASSISTANT | Admitting: PHYSICIAN ASSISTANT
Payer: COMMERCIAL

## 2021-11-05 DIAGNOSIS — I77.74 DISSECTION, VERTEBRAL ARTERY (H): ICD-10-CM

## 2021-11-05 PROCEDURE — 70548 MR ANGIOGRAPHY NECK W/DYE: CPT | Mod: 26 | Performed by: STUDENT IN AN ORGANIZED HEALTH CARE EDUCATION/TRAINING PROGRAM

## 2021-11-05 PROCEDURE — 255N000002 HC RX 255 OP 636

## 2021-11-05 PROCEDURE — A9585 GADOBUTROL INJECTION: HCPCS

## 2021-11-05 PROCEDURE — 70548 MR ANGIOGRAPHY NECK W/DYE: CPT

## 2021-11-05 RX ORDER — GADOBUTROL 604.72 MG/ML
7.5 INJECTION INTRAVENOUS ONCE
Status: COMPLETED | OUTPATIENT
Start: 2021-11-05 | End: 2021-11-05

## 2021-11-05 RX ADMIN — GADOBUTROL 7.5 ML: 604.72 INJECTION INTRAVENOUS at 14:06

## 2021-11-09 ENCOUNTER — PRE VISIT (OUTPATIENT)
Dept: NEUROLOGY | Facility: CLINIC | Age: 51
End: 2021-11-09

## 2021-11-09 PROBLEM — K29.80 DUODENITIS: Status: ACTIVE | Noted: 2020-07-30

## 2021-11-09 PROBLEM — K44.9 DIAPHRAGMATIC HERNIA: Status: ACTIVE | Noted: 2020-07-30

## 2021-11-23 ENCOUNTER — THERAPY VISIT (OUTPATIENT)
Dept: PHYSICAL THERAPY | Facility: CLINIC | Age: 51
End: 2021-11-23
Payer: COMMERCIAL

## 2021-11-23 DIAGNOSIS — N81.9 PELVIC PROLAPSE: Primary | ICD-10-CM

## 2021-11-23 DIAGNOSIS — R10.2 PELVIC PAIN IN FEMALE: ICD-10-CM

## 2021-11-23 PROCEDURE — 97161 PT EVAL LOW COMPLEX 20 MIN: CPT | Mod: GP | Performed by: PHYSICAL THERAPIST

## 2021-11-23 PROCEDURE — 97530 THERAPEUTIC ACTIVITIES: CPT | Mod: GP | Performed by: PHYSICAL THERAPIST

## 2021-11-23 PROCEDURE — 97112 NEUROMUSCULAR REEDUCATION: CPT | Mod: GP | Performed by: PHYSICAL THERAPIST

## 2021-11-23 PROCEDURE — 97110 THERAPEUTIC EXERCISES: CPT | Mod: GP | Performed by: PHYSICAL THERAPIST

## 2021-11-23 NOTE — PROGRESS NOTES
"Physical Therapy Initial Evaluation  Subjective:  The history is provided by the patient. No  was used.   Patient Health History  Jaz Archibald being seen for pelvic floor concerns.     Problem began: 2/1/2015.   Problem occurred: after surgery   Pain is reported as 4/10 on pain scale.  General health as reported by patient is fair.  Pertinent medical history includes: anemia, changes in bowel/bladder, calf pain-swelling-warmth, high blood pressure, numbness/tingling, osteoarthritis, pain at night/rest and weakness.     Medical allergies: none.   Surgeries include:  Other. Other surgery history details: hysterectomy, bladder sling, nissen fundoplication.    Current medications:  Anti-depressants, anti-inflammatory, high blood pressure medication and sleep medication.    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   Primary job tasks include:  Computer work and prolonged sitting.                  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  She reports specific pubic bone pain that feels like \"2 hooks\" sticking in, it was mentioned she could have a steroid injection but she is not certain if this will help.  The pubic pain feels more external.  She does not have pain but reports \"sandpaper like\" feeling internally.    Urination: patients reports stress and urge incontinence with daily leakage events; she wears 2-4 pads daily with small to medium leakage.  She reports changing hip positions to help with fully emptying bladder. Unable to delay very long, notes hesitancy and slow stream.  2 UTI's in past 12 mths treated with antibiotics.    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  And Nissen Fundoplication; LBP with sciatica.  Currently on blood thinners. Current cough due to irritation from " reflux.      with preclampsia.    Goals: return to weight lifting, help with pain.      No imaging of sling; has not had urology workup.    Pain: pubic pain worse with external pressure, no change with movement..         Type of problem:  Incontinence and pelvic dysfunction.    This is a chronic condition.  Condition occurred with:  After surgery.  Where condition occurred: other.  Patient reports pain:  Pubic.  Pain is described as sharp and aching and is constant.  Pain is worse during the day.    Symptoms are exacerbated by other  and relieved by other.      Restrictions due to condition include:  Working in normal job without restrictions.                          Objective:  System         Lumbar/SI Evaluation              Lumbar Palpation:  Palpation (lumbar): Left pubic tubercle.            SI joint/Sacrum:            Sacral conclusion left:  Elevated pubic sym and posterior inominate                          Pelvic Dysfunction Evaluation:        Flexibility:    Tightness present at:Adductors; Iliopsoas and Piriformis    Abdominal Wall:    Diastasis Recti:  Present  Trigger Points:  Transverse abdominals  Scar Mobility:  Fair  Pelvic Clock Exam:  Pelvic clock exam: Left>right.  Ischiocavernosis pain:  +  Bulbocavernosis pain:  +  Transverse Perineal:  +  Levator ANI:  +  Perineal Body:  +      External Assessment:  External assessment pelvic: grade 2 with good support on strain.      Bearing Down/Coughing:  Cystocele      Muscle Contraction/Perineal Mobility:  No movement  Internal Assessment:      Contraction/Grade:  Fllicker muscles stretched (1) and weak squeeze, 2 second hold (2)      Accessory Muscle use-Adductors:  Significant    SEMG Biofeedback:        Suraface electrode placement--Perianal:  Y  Baseline EMG PM:  1.2-1.5 mV                                 General     ROS    Assessment/Plan:    Patient is a 51 year old female with pelvic complaints.    Patient has the following significant  findings with corresponding treatment plan.                Diagnosis 1:  Pelvic prolapse  Decreased strength - therapeutic exercise and therapeutic activities  Decreased proprioception - neuro re-education and therapeutic activities  Impaired muscle performance - biofeedback and neuro re-education  Decreased function - therapeutic activities  Impaired posture - neuro re-education  Diagnosis 2:  Pelvic pain   Pain -  manual therapy, self management, education, directional preference exercise and home program  Decreased strength - therapeutic exercise and therapeutic activities  Impaired muscle performance - neuro re-education  Decreased function - therapeutic activities  Impaired posture - neuro re-education    Therapy Evaluation Codes:   1) History comprised of:   Personal factors that impact the plan of care:      Time since onset of symptoms.    Comorbidity factors that impact the plan of care are:      cough from reflux.     Medications impacting care: None.  2) Examination of Body Systems comprised of:   Body structures and functions that impact the plan of care:      Pelvis.   Activity limitations that impact the plan of care are:      Stress incontinence and Urge incontinence.  3) Clinical presentation characteristics are:   Stable/Uncomplicated.  4) Decision-Making    Low complexity using standardized patient assessment instrument and/or measureable assessment of functional outcome.  Cumulative Therapy Evaluation is: Low complexity.    Previous and current functional limitations:  (See Goal Flow Sheet for this information)    Short term and Long term goals: (See Goal Flow Sheet for this information)     Communication ability:  Patient appears to be able to clearly communicate and understand verbal and written communication and follow directions correctly.  Treatment Explanation - The following has been discussed with the patient:   RX ordered/plan of care  Anticipated outcomes  Possible risks and side  effects  This patient would benefit from PT intervention to resume normal activities.   Rehab potential is good.    Frequency:  1 X week, once daily for 3 weeks then 1x every 3 weeks  Duration:  for 12 weeks  Discharge Plan:  Achieve all LTG.  Independent in home treatment program.  Reach maximal therapeutic benefit.    Please refer to the daily flowsheet for treatment today, total treatment time and time spent performing 1:1 timed codes.

## 2021-11-24 ENCOUNTER — LAB REQUISITION (OUTPATIENT)
Dept: LAB | Facility: CLINIC | Age: 51
End: 2021-11-24

## 2021-11-24 ENCOUNTER — APPOINTMENT (OUTPATIENT)
Dept: URGENT CARE | Facility: URGENT CARE | Age: 51
End: 2021-11-24
Payer: COMMERCIAL

## 2021-11-24 LAB — SARS-COV-2 RNA RESP QL NAA+PROBE: POSITIVE

## 2021-11-24 PROCEDURE — U0003 INFECTIOUS AGENT DETECTION BY NUCLEIC ACID (DNA OR RNA); SEVERE ACUTE RESPIRATORY SYNDROME CORONAVIRUS 2 (SARS-COV-2) (CORONAVIRUS DISEASE [COVID-19]), AMPLIFIED PROBE TECHNIQUE, MAKING USE OF HIGH THROUGHPUT TECHNOLOGIES AS DESCRIBED BY CMS-2020-01-R: HCPCS | Performed by: INTERNAL MEDICINE

## 2021-11-25 ENCOUNTER — E-VISIT (OUTPATIENT)
Dept: URGENT CARE | Facility: CLINIC | Age: 51
End: 2021-11-25
Payer: COMMERCIAL

## 2021-11-25 DIAGNOSIS — U07.1 INFECTION DUE TO 2019 NOVEL CORONAVIRUS: Primary | ICD-10-CM

## 2021-11-25 DIAGNOSIS — J20.9 ACUTE BRONCHITIS WITH COEXISTING CONDITION REQUIRING PROPHYLACTIC TREATMENT: ICD-10-CM

## 2021-11-25 PROCEDURE — 99421 OL DIG E/M SVC 5-10 MIN: CPT | Performed by: PHYSICIAN ASSISTANT

## 2021-11-25 RX ORDER — PREDNISONE 20 MG/1
20 TABLET ORAL DAILY
Qty: 5 TABLET | Refills: 0 | Status: SHIPPED | OUTPATIENT
Start: 2021-11-25 | End: 2021-12-09

## 2021-11-25 RX ORDER — AZITHROMYCIN 250 MG/1
TABLET, FILM COATED ORAL
Qty: 6 TABLET | Refills: 0 | Status: SHIPPED | OUTPATIENT
Start: 2021-11-25 | End: 2021-12-09

## 2021-11-25 NOTE — PATIENT INSTRUCTIONS
Dear Jaz Archibald,    Your symptoms show that you may have coronavirus (COVID-19). This illness can cause fever, cough and trouble breathing. Many people get a mild case and get better on their own. Some people can get very sick.    Your can seek Monoclonal antibody treatment through the [place] department of health;   COVID-19 [facility] Monoclonal Antibody Program Information  Monoclonal antibody infusion via the [place] [employer] (MNRAP) system      [email or web address] - Providers, patients or someone helping may complete this screening tool. [facility] offers this lottery process to refer patients for COVID-19 monoclonal antibody therapeutic REGEN-COV (casirivimab and imdevimab) including post-exposure prophylaxis. Referrals are provisional, and final clinical judgment remains with the infusion site to determine eligibility and scheduling.     I will prescribe an oral steroid and an antibiotic with some antiviral properties but cannot prescribe any other antivirals. You can take part in a study called Covid Out through the [facility] or can go to [email or web address] to seek other treatment protocols.     Return to work/school/ guidance:  Please let your workplace manager and staffing office know when your quarantine ends     We can t give you an exact date as it depends on the above. You can calculate this on your own or work with your manager/staffing office to calculate this. (For example if you were exposed on 10/4, you would have to quarantine for 14 full days. That would be through 10/18. You could return on 10/19.)      If you receive a positive COVID-19 test result, follow the guidance of the those who are giving you the results. Usually the return to work is 10 (or in some cases 20 days from symptom onset.) If you work at [employer], you must also be cleared by Employee Occupational Health and Safety to return to work.        If you receive a negative COVID-19 test result  and did not have a high risk exposure to someone with a known positive COVID-19 test, you can return to work once you're free of fever for 24 hours without fever-reducing medication and your symptoms are improving or resolved.      If you receive a negative COVID-19 test and If you had a high risk exposure to someone who has tested positive for COVID-19 then you can return to work 14 days after your last contact with the positive individual    Note: If you have ongoing exposure to the covid positive person, this quarantine period may be more than 14 days. (For example, if you are continued to be exposed to your child who tested positive and cannot isolate from them, then the quarantine of 7-14 days can't start until your child is no longer contagious. This is typically 10 days from onset of the child's symptoms. So the total duration may be 17-24 days in this case.)    Sign up for [employer] [facility]   We know it's scary to hear that you might have COVID-19. We want to track your symptoms to make sure you're okay over the next 2 weeks. Please look for an email from [employer] ([facility])--this is a free, online program that we'll use to keep in touch. To sign up, follow the link in the email you will receive. Learn more at [email or web address]    How can I take care of myself?    Get lots of rest. Drink extra fluids (unless a doctor has told you not to)    Take Tylenol (acetaminophen) or ibuprofen for fever or pain. If you have liver or kidney problems, ask your family doctor if it's okay to take Tylenol o ibuprofen    If you have other health problems (like cancer, heart failure, an organ transplant or severe kidney disease): Call your specialty clinic if you don't feel better in the next 2 days.    Know when to call 911. Emergency warning signs include:  o Trouble breathing or shortness of breath  o Pain or pressure in the chest that doesn't go away  o Feeling confused like you haven't felt before, or not being able  to wake up  o Bluish-colored lips or face    Where can I get more information?  [facility] - About COVID-19:   [email or web address]    CDC - What to Do If You're Sick:   www.cdc.gov/coronavirus/2019-ncov/about/steps-when-sick.html

## 2021-11-30 ENCOUNTER — TELEPHONE (OUTPATIENT)
Dept: BEHAVIORAL HEALTH | Facility: CLINIC | Age: 51
End: 2021-11-30
Payer: COMMERCIAL

## 2021-12-02 ENCOUNTER — VIRTUAL VISIT (OUTPATIENT)
Dept: PSYCHOLOGY | Facility: CLINIC | Age: 51
End: 2021-12-02
Payer: COMMERCIAL

## 2021-12-02 DIAGNOSIS — F41.1 GENERALIZED ANXIETY DISORDER: Primary | ICD-10-CM

## 2021-12-02 PROCEDURE — 90834 PSYTX W PT 45 MINUTES: CPT | Mod: 95 | Performed by: PSYCHOLOGIST

## 2021-12-02 ASSESSMENT — COLUMBIA-SUICIDE SEVERITY RATING SCALE - C-SSRS
1. HAVE YOU WISHED YOU WERE DEAD OR WISHED YOU COULD GO TO SLEEP AND NOT WAKE UP?: NO
ATTEMPT LIFETIME: NO
TOTAL  NUMBER OF ABORTED OR SELF INTERRUPTED ATTEMPTS LIFETIME: NO
6. HAVE YOU EVER DONE ANYTHING, STARTED TO DO ANYTHING, OR PREPARED TO DO ANYTHING TO END YOUR LIFE?: NO
1. IN THE PAST MONTH, HAVE YOU WISHED YOU WERE DEAD OR WISHED YOU COULD GO TO SLEEP AND NOT WAKE UP?: NO
TOTAL  NUMBER OF INTERRUPTED ATTEMPTS LIFETIME: NO
2. HAVE YOU ACTUALLY HAD ANY THOUGHTS OF KILLING YOURSELF?: NO

## 2021-12-02 ASSESSMENT — ANXIETY QUESTIONNAIRES
6. BECOMING EASILY ANNOYED OR IRRITABLE: MORE THAN HALF THE DAYS
8. IF YOU CHECKED OFF ANY PROBLEMS, HOW DIFFICULT HAVE THESE MADE IT FOR YOU TO DO YOUR WORK, TAKE CARE OF THINGS AT HOME, OR GET ALONG WITH OTHER PEOPLE?: VERY DIFFICULT
2. NOT BEING ABLE TO STOP OR CONTROL WORRYING: MORE THAN HALF THE DAYS
4. TROUBLE RELAXING: MORE THAN HALF THE DAYS
7. FEELING AFRAID AS IF SOMETHING AWFUL MIGHT HAPPEN: MORE THAN HALF THE DAYS
1. FEELING NERVOUS, ANXIOUS, OR ON EDGE: MORE THAN HALF THE DAYS
GAD7 TOTAL SCORE: 13
GAD7 TOTAL SCORE: 13
5. BEING SO RESTLESS THAT IT IS HARD TO SIT STILL: SEVERAL DAYS
7. FEELING AFRAID AS IF SOMETHING AWFUL MIGHT HAPPEN: MORE THAN HALF THE DAYS
GAD7 TOTAL SCORE: 13
3. WORRYING TOO MUCH ABOUT DIFFERENT THINGS: MORE THAN HALF THE DAYS

## 2021-12-02 ASSESSMENT — PATIENT HEALTH QUESTIONNAIRE - PHQ9
SUM OF ALL RESPONSES TO PHQ QUESTIONS 1-9: 21
10. IF YOU CHECKED OFF ANY PROBLEMS, HOW DIFFICULT HAVE THESE PROBLEMS MADE IT FOR YOU TO DO YOUR WORK, TAKE CARE OF THINGS AT HOME, OR GET ALONG WITH OTHER PEOPLE: VERY DIFFICULT
SUM OF ALL RESPONSES TO PHQ QUESTIONS 1-9: 21

## 2021-12-02 NOTE — PROGRESS NOTES
[facility]   Mental Health & Addiction Services     Provider Name:  Pepe Ames PsyD     Credentials:  LP      Progress Note - Initial Visit      Patient  Name: Jaz Archibald Date: 12/2/21         Service Type: Individual     Visit Start Time: 1:02pm  Visit End Time: 1:45pm    Visit #: 1 43 minutes    Attendees: Client attended alone    Service Modality:  Video Visit:      Provider verified identity through the following two step process.  Patient provided:  Patient photo    Telemedicine Visit: The patient's condition can be safely assessed and treated via synchronous audio and visual telemedicine encounter.      Reason for Telemedicine Visit: Services only offered telehealth    Originating Site (Patient Location): Patient's home    Distant Site (Provider Location): Provider Remote Setting- Home Office    Consent:  The patient/guardian has verbally consented to: the potential risks and benefits of telemedicine (video visit) versus in person care; bill my insurance or make self-payment for services provided; and responsibility for payment of non-covered services.     Patient would like the video invitation sent by:  Send to e-mail at: [email or web address]    Mode of Communication:  Video Conference via well    As the provider I attest to compliance with applicable laws and regulations related to telemedicine.       DATA:   Interactive Complexity: No   Crisis: No     Presenting Concerns/  Current Stressors:   Client struggling at work and has in relationships; Client is sick today      ASSESSMENT:  Mental Status Assessment:  Appearance:   Appropriate   Eye Contact:   Good   Psychomotor Behavior: Normal   Attitude:   Cooperative  Pleasant  Orientation:   All  Speech   Rate / Production: Normal/ Responsive   Volume:  Normal   Mood:    Anxious   Affect:    Appropriate   Thought Content:  Clear   Thought Form:  Coherent   Insight:    Fair       Safety Issues and Plan for Safety and Risk  Management:   [place] Suicide Severity Rating Scale (Lifetime/Recent)No flowsheet data found.  Patient denies current fears or concerns for personal safety.  Patient denies current or recent suicidal ideation or behaviors.  Patient denies current or recent homicidal ideation or behaviors.  Patient denies current or recent self injurious behavior or ideation.  Patient denies other safety concerns.  Recommended that patient call 911 or go to the local ED should there be a change in any of these risk factors.  Patient reports there are no firearms in the house.     Diagnostic Criteria:  Generalized Anxiety Disorder  A. Excessive anxiety and worry about a number of events or activities (such as work or school performance).   B. The person finds it difficult to control the worry.   - Restlessness or feeling keyed up or on edge.    - Difficulty concentrating or mind going blank.    - Irritability.    - Sleep disturbance (difficulty falling or staying asleep, or restless unsatisfying sleep).   D. The focus of the anxiety and worry is not confined to features of an Axis I disorder.  E. The anxiety, worry, or physical symptoms cause clinically significant distress or impairment in social, occupational, or other important areas of functioning.   F. The disturbance is not due to the direct physiological effects of a substance (e.g., a drug of abuse, a medication) or a general medical condition (e.g., hyperthyroidism) and does not occur exclusively during a Mood Disorder, a Psychotic Disorder, or a Pervasive Developmental Disorder.    - The aformentioned symptoms began 35-40 year(s) ago and occurs 3 days per week and is experienced as moderate.      DSM5 Diagnoses: (Sustained by DSM5 Criteria Listed Above)  Diagnoses: 300.02 (F41.1) Generalized Anxiety Disorder  Psychosocial & Contextual Factors: Client is struggling at work and has in past relationships due to symptoms; Client is also currently recovering from being  sick  WHODAS 2.0 (12 item):   WHODAS 2.0 Total Score 12/2/2021   Total Score 42     Intervention:   Skills identification; Pattern recognition; Socratic Questioning; Empathy, Validation, Active listening, Clarifying questions, and other rapport building skills  Collateral Reports Completed:  Not Applicable      PLAN: (Homework, other):  1. Provider will continue Diagnostic Assessment.  Patient was given the following to do until next session: Client is to complete and return the ADHD Questionnaires and MMPI-2 by next session, if possible.    2. Provider recommended the following referrals: None at this time.      3.  Suicide Risk and Safety Concerns were assessed for Jaz Archibald.    Patient meets the following risk assessment and triage: No Risk Indicated      Pepe Ames  December 2, 2021

## 2021-12-03 ASSESSMENT — PATIENT HEALTH QUESTIONNAIRE - PHQ9: SUM OF ALL RESPONSES TO PHQ QUESTIONS 1-9: 21

## 2021-12-03 ASSESSMENT — ANXIETY QUESTIONNAIRES: GAD7 TOTAL SCORE: 13

## 2021-12-06 ENCOUNTER — OFFICE VISIT (OUTPATIENT)
Dept: NEUROLOGY | Facility: CLINIC | Age: 51
End: 2021-12-06
Attending: PSYCHIATRY & NEUROLOGY
Payer: COMMERCIAL

## 2021-12-06 VITALS
BODY MASS INDEX: 27 KG/M2 | WEIGHT: 168 LBS | SYSTOLIC BLOOD PRESSURE: 128 MMHG | OXYGEN SATURATION: 97 % | HEART RATE: 102 BPM | DIASTOLIC BLOOD PRESSURE: 84 MMHG | HEIGHT: 66 IN

## 2021-12-06 DIAGNOSIS — G89.29 CHRONIC LEFT-SIDED LOW BACK PAIN WITH LEFT-SIDED SCIATICA: ICD-10-CM

## 2021-12-06 DIAGNOSIS — I77.74 VERTEBRAL ARTERY DISSECTION (H): Primary | ICD-10-CM

## 2021-12-06 DIAGNOSIS — M54.42 CHRONIC LEFT-SIDED LOW BACK PAIN WITH LEFT-SIDED SCIATICA: ICD-10-CM

## 2021-12-06 PROCEDURE — G0463 HOSPITAL OUTPT CLINIC VISIT: HCPCS

## 2021-12-06 PROCEDURE — 99215 OFFICE O/P EST HI 40 MIN: CPT | Performed by: PSYCHIATRY & NEUROLOGY

## 2021-12-06 RX ORDER — CYCLOBENZAPRINE HCL 5 MG
5 TABLET ORAL 3 TIMES DAILY PRN
Qty: 60 TABLET | Refills: 2 | Status: SHIPPED | OUTPATIENT
Start: 2021-12-06 | End: 2021-12-06

## 2021-12-06 RX ORDER — CYCLOBENZAPRINE HCL 5 MG
5 TABLET ORAL 3 TIMES DAILY PRN
Qty: 60 TABLET | Refills: 3 | Status: SHIPPED | OUTPATIENT
Start: 2021-12-06 | End: 2022-03-23

## 2021-12-06 ASSESSMENT — MIFFLIN-ST. JEOR: SCORE: 1388.79

## 2021-12-06 NOTE — LETTER
12/6/2021         RE: Jaz Archibald  [address]  [facility] [address]        Dear Colleague,    Thank you for referring your patient, Jaz Archibald, to the [facility] [place]. Please see a copy of my visit note below.      _____________________________________________________________    [facility]    at [facility] [place] [phone]  _____________________________________________________________      Chief Complaint: follow up after dissection    History of Present Illness: Jaz Archibald is a 51 year old female with past history of anxiety and dyslipidemia who had left vertebral dissection in August 2021. She remembers the onset of symptoms. She was sitting talking to a friend. Line midline numb right side mark the face. Subsided in < 1 minute. Next day, recurrent episodes. Had a total of 5 spells. She had MRA showing left vert dissection in the neck. She had an MRI which did not show any acute pathology. She was put on dual antiplatelets with aspirin and plavix. She stayed on both of them until the most recent MRA which was done on 11/5/21 and showed resolution of the dissection. Patient denies any residual deficits. She is back to her normal functioning as before the dissection. There is no preceding illness or trauma. She did have Nissen fundoplication 2 weeks prior to the dissection. No family history of stroke in young. Used to be on birth control pills in her 40s, no hormonal therapies since. Parents have HTN, HLD and CAD. Family history of ApoE4.     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 Dissection means a tear in the inner lining of the artery. You did not have a stroke from that dissection. The most recent MRA shows resolution of that tear and now your left verbal artery is looking good.   Regarding your left sided lower back pain and sciatica, I think you need to do discuss with your primary regarding doing some PT.     Plan:   Switch to baby aspirin 81 mg once daily.   If you have any stomach upset on aspirin, give me a call and we can switch to clopidogrel (plavix).   Avoid activities that have aggressive head/neck movements.   Aerobic exercise is recommended. 150 minutes per week is the goal.   I will give you muscle relaxant for your sciatica but you should discuss further with your primary doctor.   Follow up with me in August 2022. Earlier if you need to.     Stroke Education provided.  She will call us with any questions.  For any acute neurologic deficits she was advised to  go directly to the hospital rather than call the clinic.    Gabriel Isabel MD, Msc, BEN, SAMANTHA   of Neurology  [facility]     12/06/2021 11:42 AM  To page me or covering stroke neurology team member, click here: AMCOM  Choose \"On Call\" tab at top, then search dropdown box for \"Neurology Adult\" & press Enter, look for Neuro ICU/Stroke    ___________________________________________________________________    Current Medications  Current Outpatient Medications   Medication Sig     aspirin (ASA) 325 MG EC tablet Take 1 tablet (325 mg) by mouth daily . Future refills by PCP Dr. aJcinda Banks with phone number [phone].     atorvastatin (LIPITOR) 80 MG tablet Take 1 tablet (80 mg) by mouth every evening . Future refills by PCP Dr. Jacinda Banks with phone number [phone].     azithromycin (ZITHROMAX) 250 MG tablet Two tablets first day, then one tablet daily for four " days.     clopidogrel (PLAVIX) 75 MG tablet Take 1 tablet (75 mg) by mouth daily . Future refills by PCP Dr. Jacinda Banks with phone number [phone].     EPINEPHrine (EPIPEN) 0.3 MG/0.3ML injection Inject 0.3 mLs into the muscle once as needed for anaphylaxis.     escitalopram (LEXAPRO) 20 MG tablet Take 1 tablet (20 mg) by mouth At Bedtime     lisinopril (ZESTRIL) 10 MG tablet Take 10 mg by mouth daily     metoclopramide (REGLAN) 5 MG tablet Take 1 tablet (5 mg) by mouth 3 times daily as needed (nausea) (Patient not taking: Reported on 10/20/2021)     olopatadine (PATANOL) 0.1 % ophthalmic solution Place 1-2 drops into both eyes 2 times daily as needed for allergies      ondansetron (ZOFRAN-ODT) 4 MG ODT tab Take 1 tablet (4 mg) by mouth every 8 hours as needed for nausea     polyethylene glycol (MIRALAX) 17 g packet Take 17 g by mouth 2 times daily     predniSONE (DELTASONE) 20 MG tablet Take 1 tablet (20 mg) by mouth daily     promethazine (PHENERGAN) 25 MG tablet Take 0.5-1 tablets (12.5-25 mg) by mouth every 6 hours as needed for nausea (Patient not taking: Reported on 10/20/2021)     sucralfate (CARAFATE) 1 GM tablet Take 1 g by mouth 4 times daily as needed      traZODone (DESYREL) 100 MG tablet Take 1 tablet (100 mg) by mouth At Bedtime     No current facility-administered medications for this visit.       Past Medical History  Past Medical History:   Diagnosis Date     ADD (attention deficit disorder)      Anxiety      Gastroesophageal reflux disease without esophagitis      Hypertension      Urethral hypermobility 06/26/2013       Social History  Social History     Tobacco Use     Smoking status: Never Smoker     Smokeless tobacco: Never Used   Substance Use Topics     Alcohol use: Not Currently     Drug use: No       Family History  Family History   Problem Relation Age of Onset     Lipids Mother         chol     Hypertension Mother      Lipids Father         chol     Hypertension Father      C.A.D.  "Father      Heart Disease Father        Physical Exam    /84   Pulse 102   Ht 1.676 m (5' 6\")   Wt 76.2 kg (168 lb)   LMP 07/26/2013   SpO2 97%   BMI 27.12 kg/m      General Exam:  GEN:  Awake, NAD  HEAD:  Atraumatic/Normocephalic  EYES:  Non-icterus, no conjunctivitis  EARS:  No otic discharge  NOSE:  Patent nares, no discharge  THROAT:  MMM, No oral lesions  CV:  RRR  PULM:  Breathing comfortably on room air, no tachypnea, not using accessory muscles of respiration  MSK:  No peripheral edema       NEUROLOGICAL EXAM:  Mental State:   Awake, Oriented to time, place, and persons. Attentive. Reactive affect.   LANGUAGE/SPEECH:    No dysphasia or paraphasic errors.    CN:   I:  Deferred  II:  VFF  III/IV/VI: EOMI, no nystagmus  V:  V1-V3 intact  VII:  Face was symmetric bilaterally  VIII:  Hearing was intact to conversational voice  IX/X:  Palatal raise was intact bilaterally  XI:  Shoulder shrug was strong bilaterally; Pt is able to rotate head left and right against resistance  XII:  Tongue midline; able to move it left and right without any difficulties  MOTOR:  Tone:  Normal in bilateral UE and LE  Dexterity and speed: Intact  Power: no focal weakness, 5/5 in all muscle groups  Sensory:   No hemihypesthesia  Coordination:  Intact finger to nose and heel to shin bilaterally; no dysmetria or decomposition of movement  Involuntary Movement:    None observed  Gait:    Normal gait, normal arm swing, normal tandem, negative Romberg  Meningeal signs:       Neuroimaging: as per HPI. I personally reviewed those images with the patient: MRI brain done in August 2021. MRA head and neck done in August 2021. MRA neck done in November 2021. Results as noted above.     Labs:      Recent Labs   Lab Test 08/15/21  1304 07/03/20  0859   CHOL 208* 264*   HDL 47* 57   * 160*   TRIG 236* 235*       Recent Labs   Lab Test 08/13/21  1547   A1C 5.0       Billing disclosure:    40 min spent on the date of the encounter " in chart review, patient visit, review of tests, documentation and/or discussion with other providers about the issues documented above.           Again, thank you for allowing me to participate in the care of your patient.        Sincerely,        Gabriel Isabel MD

## 2021-12-06 NOTE — NURSING NOTE
"Jaz Archibald is a 51 year old female who presents for:  Chief Complaint   Patient presents with     Follow Up     Hospital follow up        Initial Vitals:  /84   Pulse 102   Ht 1.676 m (5' 6\")   Wt 76.2 kg (168 lb)   LMP 07/26/2013   SpO2 97%   BMI 27.12 kg/m   Estimated body mass index is 27.12 kg/m  as calculated from the following:    Height as of this encounter: 1.676 m (5' 6\").    Weight as of this encounter: 76.2 kg (168 lb).. Body surface area is 1.88 meters squared. BP completed using cuff size: regular  Data Unavailable    Nursing Comments:     Дмитрий Elmore    "

## 2021-12-06 NOTE — PROGRESS NOTES
_____________________________________________________________    [facility]    at [facility] [phone]  _____________________________________________________________      Chief Complaint: follow up after dissection    History of Present Illness: Jaz Archibald is a 51 year old female with past history of anxiety and dyslipidemia who had left vertebral dissection in August 2021. She remembers the onset of symptoms. She was sitting talking to a friend. Line midline numb right side mark the face. Subsided in < 1 minute. Next day, recurrent episodes. Had a total of 5 spells. She had MRA showing left vert dissection in the neck. She had an MRI which did not show any acute pathology. She was put on dual antiplatelets with aspirin and plavix. She stayed on both of them until the most recent MRA which was done on 11/5/21 and showed resolution of the dissection. Patient denies any residual deficits. She is back to her normal functioning as before the dissection. There is no preceding illness or trauma. She did have Nissen fundoplication 2 weeks prior to the dissection. No family history of stroke in young. Used to be on birth control pills in her 40s, no hormonal therapies since. Parents have HTN, HLD and CAD. Family history of ApoE4.     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 Dissection means a tear in the inner lining of the artery. You did not have a stroke from that dissection. The most recent MRA shows resolution of that  "tear and now your left verbal artery is looking good.   Regarding your left sided lower back pain and sciatica, I think you need to do discuss with your primary regarding doing some PT.     Plan:   Switch to baby aspirin 81 mg once daily.   If you have any stomach upset on aspirin, give me a call and we can switch to clopidogrel (plavix).   Avoid activities that have aggressive head/neck movements.   Aerobic exercise is recommended. 150 minutes per week is the goal.   I will give you muscle relaxant for your sciatica but you should discuss further with your primary doctor.   Follow up with me in August 2022. Earlier if you need to.     Stroke Education provided.  She will call us with any questions.  For any acute neurologic deficits she was advised to  go directly to the hospital rather than call the clinic.    Gabriel Isabel MD, Msc, SAMANTHA CONTRERAS   of Neurology  [facility]     12/06/2021 11:42 AM  To page me or covering stroke neurology team member, click here: AMCOM  Choose \"On Call\" tab at top, then search dropdown box for \"Neurology Adult\" & press Enter, look for Neuro ICU/Stroke    ___________________________________________________________________    Current Medications  Current Outpatient Medications   Medication Sig     aspirin (ASA) 325 MG EC tablet Take 1 tablet (325 mg) by mouth daily . Future refills by PCP Dr. Jacinda Banks with phone number [phone].     atorvastatin (LIPITOR) 80 MG tablet Take 1 tablet (80 mg) by mouth every evening . Future refills by PCP Dr. Jacinda Banks with phone number [phone].     azithromycin (ZITHROMAX) 250 MG tablet Two tablets first day, then one tablet daily for four days.     clopidogrel (PLAVIX) 75 MG tablet Take 1 tablet (75 mg) by mouth daily . Future refills by PCP Dr. Jacinda Banks with phone number [phone].     EPINEPHrine (EPIPEN) 0.3 MG/0.3ML injection Inject 0.3 mLs into the muscle once as needed for " "anaphylaxis.     escitalopram (LEXAPRO) 20 MG tablet Take 1 tablet (20 mg) by mouth At Bedtime     lisinopril (ZESTRIL) 10 MG tablet Take 10 mg by mouth daily     metoclopramide (REGLAN) 5 MG tablet Take 1 tablet (5 mg) by mouth 3 times daily as needed (nausea) (Patient not taking: Reported on 10/20/2021)     olopatadine (PATANOL) 0.1 % ophthalmic solution Place 1-2 drops into both eyes 2 times daily as needed for allergies      ondansetron (ZOFRAN-ODT) 4 MG ODT tab Take 1 tablet (4 mg) by mouth every 8 hours as needed for nausea     polyethylene glycol (MIRALAX) 17 g packet Take 17 g by mouth 2 times daily     predniSONE (DELTASONE) 20 MG tablet Take 1 tablet (20 mg) by mouth daily     promethazine (PHENERGAN) 25 MG tablet Take 0.5-1 tablets (12.5-25 mg) by mouth every 6 hours as needed for nausea (Patient not taking: Reported on 10/20/2021)     sucralfate (CARAFATE) 1 GM tablet Take 1 g by mouth 4 times daily as needed      traZODone (DESYREL) 100 MG tablet Take 1 tablet (100 mg) by mouth At Bedtime     No current facility-administered medications for this visit.       Past Medical History  Past Medical History:   Diagnosis Date     ADD (attention deficit disorder)      Anxiety      Gastroesophageal reflux disease without esophagitis      Hypertension      Urethral hypermobility 06/26/2013       Social History  Social History     Tobacco Use     Smoking status: Never Smoker     Smokeless tobacco: Never Used   Substance Use Topics     Alcohol use: Not Currently     Drug use: No       Family History  Family History   Problem Relation Age of Onset     Lipids Mother         chol     Hypertension Mother      Lipids Father         chol     Hypertension Father      C.A.D. Father      Heart Disease Father        Physical Exam    /84   Pulse 102   Ht 1.676 m (5' 6\")   Wt 76.2 kg (168 lb)   LMP 07/26/2013   SpO2 97%   BMI 27.12 kg/m      General Exam:  GEN:  Awake, NAD  HEAD:  Atraumatic/Normocephalic  EYES:  " Non-icterus, no conjunctivitis  EARS:  No otic discharge  NOSE:  Patent nares, no discharge  THROAT:  MMM, No oral lesions  CV:  RRR  PULM:  Breathing comfortably on room air, no tachypnea, not using accessory muscles of respiration  MSK:  No peripheral edema       NEUROLOGICAL EXAM:  Mental State:   Awake, Oriented to time, place, and persons. Attentive. Reactive affect.   LANGUAGE/SPEECH:    No dysphasia or paraphasic errors.    CN:   I:  Deferred  II:  VFF  III/IV/VI: EOMI, no nystagmus  V:  V1-V3 intact  VII:  Face was symmetric bilaterally  VIII:  Hearing was intact to conversational voice  IX/X:  Palatal raise was intact bilaterally  XI:  Shoulder shrug was strong bilaterally; Pt is able to rotate head left and right against resistance  XII:  Tongue midline; able to move it left and right without any difficulties  MOTOR:  Tone:  Normal in bilateral UE and LE  Dexterity and speed: Intact  Power: no focal weakness, 5/5 in all muscle groups  Sensory:   No hemihypesthesia  Coordination:  Intact finger to nose and heel to shin bilaterally; no dysmetria or decomposition of movement  Involuntary Movement:    None observed  Gait:    Normal gait, normal arm swing, normal tandem, negative Romberg  Meningeal signs:       Neuroimaging: as per HPI. I personally reviewed those images with the patient: MRI brain done in August 2021. MRA head and neck done in August 2021. MRA neck done in November 2021. Results as noted above.     Labs:      Recent Labs   Lab Test 08/15/21  1304 07/03/20  0859   CHOL 208* 264*   HDL 47* 57   * 160*   TRIG 236* 235*       Recent Labs   Lab Test 08/13/21  1547   A1C 5.0       Billing disclosure:    40 min spent on the date of the encounter in chart review, patient visit, review of tests, documentation and/or discussion with other providers about the issues documented above.

## 2021-12-06 NOTE — PATIENT INSTRUCTIONS
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 Dissection means a tear in the inner lining of the artery. You did not have a stroke from that dissection. The most recent MRA shows resolution of that tear and now your left verbal artery is looking good.   Regarding your left sided lower back pain and sciatica, I think you need to do discuss with your primary regarding doing some PT.     Plan:   Switch to baby aspirin 81 mg once daily.   If you have any stomach upset on aspirin, give me a call and we can switch to clopidogrel (plavix).   Avoid activities that have aggressive head/neck movements.   Aerobic exercise is recommended. 150 minutes per week is the goal.   I will give you muscle relaxant for your sciatica but you should discuss further with your primary doctor.   Follow up with me in August 2022. Earlier if you need to.     Gabriel Isabel MD, Msc, FAHA, FAAN   of Neurology  [facility]     12/06/2021 2:15 PM

## 2021-12-09 ENCOUNTER — VIRTUAL VISIT (OUTPATIENT)
Dept: INTERNAL MEDICINE | Facility: CLINIC | Age: 51
End: 2021-12-09
Payer: COMMERCIAL

## 2021-12-09 ENCOUNTER — VIRTUAL VISIT (OUTPATIENT)
Dept: PSYCHOLOGY | Facility: CLINIC | Age: 51
End: 2021-12-09
Payer: COMMERCIAL

## 2021-12-09 DIAGNOSIS — R11.2 NAUSEA AND VOMITING, INTRACTABILITY OF VOMITING NOT SPECIFIED, UNSPECIFIED VOMITING TYPE: ICD-10-CM

## 2021-12-09 DIAGNOSIS — F41.1 GENERALIZED ANXIETY DISORDER: Primary | ICD-10-CM

## 2021-12-09 DIAGNOSIS — N91.2 AMENORRHEA: Primary | ICD-10-CM

## 2021-12-09 DIAGNOSIS — I77.74 DISSECTION, VERTEBRAL ARTERY (H): ICD-10-CM

## 2021-12-09 DIAGNOSIS — Z76.0 ENCOUNTER FOR MEDICATION REFILL: Primary | ICD-10-CM

## 2021-12-09 DIAGNOSIS — K21.9 GERD WITHOUT ESOPHAGITIS: ICD-10-CM

## 2021-12-09 DIAGNOSIS — Z91.030 ALLERGY TO BEE STING: ICD-10-CM

## 2021-12-09 DIAGNOSIS — F41.1 ANXIETY STATE: ICD-10-CM

## 2021-12-09 PROCEDURE — 99214 OFFICE O/P EST MOD 30 MIN: CPT | Mod: 95 | Performed by: NURSE PRACTITIONER

## 2021-12-09 PROCEDURE — 90791 PSYCH DIAGNOSTIC EVALUATION: CPT | Mod: 95 | Performed by: PSYCHOLOGIST

## 2021-12-09 RX ORDER — EPINEPHRINE 0.3 MG/.3ML
0.3 INJECTION SUBCUTANEOUS ONCE
Qty: 0.3 ML | Refills: 0 | Status: SHIPPED | OUTPATIENT
Start: 2021-12-09 | End: 2021-12-09

## 2021-12-09 RX ORDER — OLOPATADINE HYDROCHLORIDE 1 MG/ML
1-2 SOLUTION/ DROPS OPHTHALMIC 2 TIMES DAILY
Qty: 5 ML | Refills: 6 | Status: CANCELLED | OUTPATIENT
Start: 2021-12-09

## 2021-12-09 RX ORDER — ESCITALOPRAM OXALATE 20 MG/1
20 TABLET ORAL AT BEDTIME
Qty: 90 TABLET | Refills: 1 | Status: SHIPPED | OUTPATIENT
Start: 2021-12-09 | End: 2022-08-10

## 2021-12-09 RX ORDER — PROMETHAZINE HYDROCHLORIDE 25 MG/1
12.5-25 TABLET ORAL EVERY 6 HOURS PRN
Qty: 60 TABLET | Refills: 1 | Status: SHIPPED | OUTPATIENT
Start: 2021-12-09 | End: 2022-06-15

## 2021-12-09 RX ORDER — ATORVASTATIN CALCIUM 80 MG/1
80 TABLET, FILM COATED ORAL EVERY EVENING
Qty: 90 TABLET | Refills: 1 | Status: SHIPPED | OUTPATIENT
Start: 2021-12-09 | End: 2022-01-04

## 2021-12-09 RX ORDER — ONDANSETRON 4 MG/1
4 TABLET, ORALLY DISINTEGRATING ORAL EVERY 8 HOURS PRN
Qty: 20 TABLET | Refills: 1 | Status: CANCELLED | OUTPATIENT
Start: 2021-12-09

## 2021-12-09 NOTE — PROGRESS NOTES
Linsey is a 51 year old who is being evaluated via a billable video visit.      How would you like to obtain your AVS? MyChart  If the video visit is dropped, the invitation should be resent by: Text to cell phone: [phone]  Will anyone else be joining your video visit? No        Assessment & Plan   Problem List Items Addressed This Visit        Digestive    GERD without esophagitis    Nausea and vomiting, intractability of vomiting not specified, unspecified vomiting type    Relevant Medications    promethazine (PHENERGAN) 25 MG tablet       Circulatory    Dissection, vertebral artery (H)    Relevant Medications    atorvastatin (LIPITOR) 80 MG tablet       Other    Anxiety state    Relevant Medications    escitalopram (LEXAPRO) 20 MG tablet      Other Visit Diagnoses     Encounter for medication refill    -  Primary    Allergy to bee sting        Relevant Medications    EPINEPHrine (ANY BX GENERIC EQUIV) 0.3 MG/0.3ML injection 2-pack    promethazine (PHENERGAN) 25 MG tablet       Patient was seen by Neurology and MRA was completed and indicated resolution of vertebral artery dissection with recommendation to continue on aspirin and discontinue plavix.      She was seen by pelvic floor and states she has at home exercises and reports these are helping.  She does have some upcoming appointments which she plans on attending.    Patient was dx with covid on 11.24.21 and has completed quarantine and does indicate she has some sob and loss of taste and smell.      She is being assessed for ADD and anxiety and will be meeting with him today.      She indicates she is feeling better and not having reflux and reports occasional coughing spells and is taking phenergan daily to prevent nausea and finds this is going well.  She states her IBS has improved and continues on Miralax prn.      Increase in appetite and is taking the Trazodone though it is working well and will trial decreasing to 50mg at bedtime to see if her  "appetite reduces and still is having benefits of sleep with the trazodone. Also discussed reducing the phenergan to prn.        I spent a total of 31 minutes on the day of the visit.   Time spent doing chart review, history and exam, documentation and further activities per the note       BMI:   Estimated body mass index is 27.12 kg/m  as calculated from the following:    Height as of 12/6/21: 1.676 m (5' 6\").    Weight as of 12/6/21: 76.2 kg (168 lb).   Weight management plan: Discussed healthy diet and exercise guidelines        No follow-ups on file.    Jacinda Banks NP  [facility]  Recommend continuing plavix for the next month. If increase sob or chest pain         Subjective   Linsey is a 51 year old who presents for the following health issues         History of Present Illness       She eats 2-3 servings of fruits and vegetables daily.She consumes 0 sweetened beverage(s) daily.She exercises with enough effort to increase her heart rate 9 or less minutes per day.  She exercises with enough effort to increase her heart rate 3 or less days per week.   She is taking medications regularly.   discuss test results, weight concerns etc.           Review of Systems   Unremarkable other than listed above and below       Objective           Vitals:  No vitals were obtained today due to virtual visit.    Physical Exam   RESP: No audible wheeze, cough  PSYCH: Mentation appears normal, affect normal/bright, judgement and insight intact, normal speech and                Tele vist: 19 minutes   Answers for HPI/ROS submitted by the patient on 12/9/2021  How many servings of fruits and vegetables do you eat daily?: 2-3  On average, how many sweetened beverages do you drink each day (Examples: soda, juice, sweet tea, etc.  Do NOT count diet or artificially sweetened beverages)?: 0  How many minutes a day do you exercise enough to make your heart beat faster?: 9 or less  How many days a week do you " exercise enough to make your heart beat faster?: 3 or less  How many days per week do you miss taking your medication?: 0
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
## 2021-12-14 ENCOUNTER — THERAPY VISIT (OUTPATIENT)
Dept: PHYSICAL THERAPY | Facility: CLINIC | Age: 51
End: 2021-12-14
Payer: COMMERCIAL

## 2021-12-14 DIAGNOSIS — N81.9 PELVIC PROLAPSE: Primary | ICD-10-CM

## 2021-12-14 DIAGNOSIS — I10 BENIGN ESSENTIAL HYPERTENSION: Primary | ICD-10-CM

## 2021-12-14 DIAGNOSIS — R10.2 PELVIC PAIN IN FEMALE: ICD-10-CM

## 2021-12-14 PROCEDURE — 97112 NEUROMUSCULAR REEDUCATION: CPT | Mod: GP | Performed by: PHYSICAL THERAPIST

## 2021-12-14 PROCEDURE — 97530 THERAPEUTIC ACTIVITIES: CPT | Mod: GP | Performed by: PHYSICAL THERAPIST

## 2021-12-15 NOTE — TELEPHONE ENCOUNTER
Refill request via fax for lisinopril     Last seen by PCP on 09/22/2021    No future appts scheduled

## 2021-12-16 RX ORDER — LISINOPRIL 10 MG/1
10 TABLET ORAL DAILY
Qty: 90 TABLET | Refills: 1 | Status: SHIPPED | OUTPATIENT
Start: 2021-12-16 | End: 2022-01-27

## 2021-12-16 NOTE — TELEPHONE ENCOUNTER
"Routing refill request to provider for review/approval because:  Drug not active on patient's medication list    Last Written Prescription Date:  ???  Last Fill Quantity: ???,  # refills: ???   Last office visit provider:  12/9/21     Requested Prescriptions   Pending Prescriptions Disp Refills     lisinopril (ZESTRIL) 10 MG tablet       Sig: Take 1 tablet (10 mg) by mouth daily       ACE Inhibitors (Including Combos) Protocol Passed - 12/15/2021  3:19 PM        Passed - Blood pressure under 140/90 in past 12 months     BP Readings from Last 3 Encounters:   12/06/21 128/84   10/04/21 122/83   09/22/21 110/70                 Passed - Recent (12 mo) or future (30 days) visit within the authorizing provider's specialty     Patient has had an office visit with the authorizing provider or a provider within the authorizing providers department within the previous 12 mos or has a future within next 30 days. See \"Patient Info\" tab in inbasket, or \"Choose Columns\" in Meds & Orders section of the refill encounter.              Passed - Medication is active on med list        Passed - Patient is age 18 or older        Passed - No active pregnancy on record        Passed - Normal serum creatinine on file in past 12 months     Recent Labs   Lab Test 08/27/21  1716   CR 0.75       Ok to refill medication if creatinine is low          Passed - Normal serum potassium on file in past 12 months     Recent Labs   Lab Test 08/27/21  1716   POTASSIUM 3.9             Passed - No positive pregnancy test within past 12 months             Umang Otero RN 12/16/21 1:05 PM  "

## 2021-12-28 ENCOUNTER — TELEPHONE (OUTPATIENT)
Dept: SURGERY | Facility: CLINIC | Age: 51
End: 2021-12-28

## 2021-12-28 NOTE — TELEPHONE ENCOUNTER
RN spoke with patient re: 24 Week Plan expressed interest in [employer] product line.    Discussed with patient the 24 Week Healthy Lifestyle Program, including cost/payment, [facility] membership, schedule of visits, and healthy habits.    Discussed with patient the [employer] meal replacement plan ([id number] aliza), including cost and ordering - pt just wanted more information. Will send in MC.    Reviewed schedule/appointments.    24 Week HLP kit to be mailed to pt after provider visit.    Summarized with patient--Patient plan is 24 week HLp.  The patient graduation date is TBD.  The fee is $99. The fee will be applied to [employer] after initial Health  visit.    Reviewed contact information, address and e-mail address with pt: yes    Patient assisted by staff to make initial Health  visit and follow-up appointments for 24 week program.        Ilda Harrell RN on 12/28/2021 at 4:16 PM

## 2021-12-28 NOTE — TELEPHONE ENCOUNTER
Pt called and is wondering about when she will be billed for the 24 week program and also when she is going to get access to the web site.        [phone] - Confirmed

## 2021-12-28 NOTE — TELEPHONE ENCOUNTER
"Pt Requested Rescheduled for Today's HC Visit.    Pt answered yet r/s due to not seeing provider in the past year.. Confusion with scheduling is being clarified with team & scheduling team.    *Pt answered yet hasn't seen the provider since 9/4/20 so switched to HC Q&A.  *Pt Linsey is calling call ctr to see if she can extend her 1/5 visit with Rennye or r/s. If she has further questions about this scheduling, she will reach out to [facility]/tea nurses through [facility].  *Her recent wt gain seems a bit \"complicated\" per pt reporting, so I didn't want to put Rennye in a rushed situation.    Pt's focus until first HC visit:     1) EWB: Use 3-Part Mental Exercise to be present with experiences; listen to inner dialogue podcast.    *She is curious ~ intuitive eating concept    2) F/u with call ctr/team ~ her 1/5 provider visit & schedule rd visit.     PATIENT INFORMATION PROVIDED:  - 24 Week Healthy Lifestyle Plan Overview Handout:  o Explained the structure of the 24-week HLP  o Reviewed scheduling information  o Discussed option to do coaching sessions via phone or in person  - Explain the role of a HEALTH  and the FOUR Pillars of Health  - Discussed Telephonic Process - Rescheduling Process  - Discussed 'Wellness Plan' Concept  - Explained [employer] Billing Process   - Pt requested resources emailed; no phi in email.    HC Visit #1 1/31, #2 2/7, #3 2/14, #4 2/28, #5 3/14, #6 4/4  *Keeping her visit same time & day if possible: currently all M @ 5 pm.     24-wk HLP Provider/RD visits: Rennye 1/5 yet only for 1/2 hr., Pt scheduling RD visit.    Pillars of Health Discussion.  The four pillars of well-being may impact your ability to manage weight.    Initial Level of Satisfaction completed on 12/28/21 All rated on a scale of 1-10.        Sleep: 6    Movement: 2    Nutrition: 4    Emotional Health/Wellbeing: 3    Sleep: Would like to be at a 8-9 in 6 months.   Notes: She likes her bedroom ambiance. Notice opportunity " reduce electronic time before bed; generally get good quality & quantity. She does take mg & sleep aid.     Movement: Would like to be at a 9 in 6 months.   Notes: She would like to add back her wt trg program that she enjoyed for 5 yrs prior to angelique'l health condition. She enjoys Pilates, srinivasan/dance. Neurologist guided her on what she can & can't do: no jolting of the head, heavy wt lifting or plyometrics.Neurologist told her 130 minutes of moderate activity would be helpful to her brain.     Nutrition: Would like to be at a 10 in 6 months.   Notes: Noticing a big change her diet: craving high sugar foods & hungry frequently.     Emotional Health/Wellbeing: Would like to be at a 10 in 6 months.   Notes: She wants to wake up energized, enjoy movement in the morning to start her day off on a positive note, have more connection, and feel fit. She is excited about her new position and would like to say that she has made the impact she hopes to. She wants to integrating well-being into workplace.  The weight gain and her preoccupation about health and agility has her at 3; she feels the latter consumes her thoughts >>> inner critic.     -GN

## 2021-12-29 ENCOUNTER — DOCUMENTATION ONLY (OUTPATIENT)
Dept: SURGERY | Facility: CLINIC | Age: 51
End: 2021-12-29
Payer: COMMERCIAL

## 2021-12-29 ENCOUNTER — APPOINTMENT (OUTPATIENT)
Dept: INTERNAL MEDICINE | Facility: CLINIC | Age: 51
End: 2021-12-29
Payer: COMMERCIAL

## 2021-12-29 DIAGNOSIS — R05.3 PERSISTENT COUGH FOR 3 WEEKS OR LONGER: Primary | ICD-10-CM

## 2021-12-29 RX ORDER — DOXYCYCLINE 100 MG/1
100 CAPSULE ORAL 2 TIMES DAILY
Qty: 20 CAPSULE | Refills: 0 | Status: SHIPPED | OUTPATIENT
Start: 2021-12-29 | End: 2022-01-27

## 2021-12-29 RX ORDER — BENZONATATE 100 MG/1
100 CAPSULE ORAL 3 TIMES DAILY PRN
Qty: 30 CAPSULE | Refills: 0 | Status: SHIPPED | OUTPATIENT
Start: 2021-12-29 | End: 2022-01-27

## 2021-12-29 RX ORDER — PREDNISONE 20 MG/1
20 TABLET ORAL DAILY
Qty: 5 TABLET | Refills: 0 | Status: SHIPPED | OUTPATIENT
Start: 2021-12-29 | End: 2022-01-27

## 2021-12-29 NOTE — PROGRESS NOTES
Reached out to pt to follow up regarding her scheduling ?'s on 1/5.    Communication Notes:    - I see you and our clinic's triage nurse Ilda have connected regarding your provider visit with [facility].   - See your visits on [employer] if you need more information on the day and time this was scheduled.   - Also, see RN Ilda's on the program details she sent you yesterday.    As this time, I don't have any sooner health  visit openings prior to 1/31; however, you could schedule your dietitian visit sometime after visit with [facility] and prior to your first visit with me on 1/31.   - To find a time that works best for you, reach out to our Weight Management Scheduling team at [phone]. - This team is available weekdays from 7 am to 5 pm.    -KIAN

## 2022-01-03 ENCOUNTER — TELEPHONE (OUTPATIENT)
Dept: SURGERY | Facility: CLINIC | Age: 52
End: 2022-01-03
Payer: COMMERCIAL

## 2022-01-03 NOTE — TELEPHONE ENCOUNTER
Spoke to patient reminding them to fill out the new patient questionnaire before their appointment.    Misty MACKENZIE MA

## 2022-01-04 ENCOUNTER — VIRTUAL VISIT (OUTPATIENT)
Dept: SURGERY | Facility: CLINIC | Age: 52
End: 2022-01-04
Payer: COMMERCIAL

## 2022-01-04 VITALS — BODY MASS INDEX: 28.61 KG/M2 | HEIGHT: 66 IN | WEIGHT: 178 LBS

## 2022-01-04 DIAGNOSIS — E78.5 HYPERLIPIDEMIA LDL GOAL <130: ICD-10-CM

## 2022-01-04 DIAGNOSIS — E66.3 OVERWEIGHT WITH BODY MASS INDEX (BMI) OF 28 TO 28.9 IN ADULT: Primary | ICD-10-CM

## 2022-01-04 DIAGNOSIS — I10 ESSENTIAL HYPERTENSION: ICD-10-CM

## 2022-01-04 DIAGNOSIS — M54.50 LUMBAR PAIN: ICD-10-CM

## 2022-01-04 PROCEDURE — 99215 OFFICE O/P EST HI 40 MIN: CPT | Mod: 95 | Performed by: PHYSICIAN ASSISTANT

## 2022-01-04 ASSESSMENT — MIFFLIN-ST. JEOR: SCORE: 1434.15

## 2022-01-04 NOTE — PROGRESS NOTES
"Linsey is a 51 year old who is being evaluated via a billable video visit.      If the video visit is dropped, the invitation should be resent by: Text to cell phone: [phone]  Will anyone else be joining your video visit? No      Video-Visit Details    Type of service:  Video Visit    Video Start Time: 3:13    Video End Time: 3:52    46 minutes spent on the date of the encounter doing chart review, review of test results, patient visit and documentation       Originating Location (pt. Location): Home    Distant Location (provider location):  [facility]     Platform used for Video Visit: [email or web address] Medical Weight Management Consult        PATIENT:  Jaz Archibald  MRN:         [id number]  :         1970  FLASH:         2022        Dear Jacinda Banks NP,    I had the pleasure of seeing your patient, Jaz Archibald. Full intake/assessment was done to determine barriers to weight loss success and develop a treatment plan. Jaz Archibald is a 51 year old female interested in treatment of medical problems associated with excess weight. She has a height of 5' 6\"[pt reported[, a weight of 178 lbs 0 oz, and the calculated Body mass index is 28.73 kg/m .     Patient feels she is having hormone changes. She never used to think much about food but now she does all the time.  Also admits she is under a lot of stress      ASSESSMENT/PLAN:  Overweight BMI 28  High cholesterol  High blood pressure  bilat knee pain   Lumbar pain       PATIENT WILL GET HEIGHT, WEIGHT, BLOOD PRESSURE, AND PULSE CHECKED AT A [facility] TO DETERMINE ELIGIBILITY FOR THE PROGRAM. AFTER REVIEW CAN HAVE RN REACH OUT TO GET SIGNED UP FOR 24 WEEK PROGRAM AND WILL REACH OUT TO START MEDICATIONS      Goals:  Get sleep center  1-2 times cardio weekly      She is interested in the 24 week program.      We discussed the role of pharmacological agents in the treatment of obesity and the " "\"off-label\" use of medications in this practice. We discussed the risks and benefits of each. We discussed indications, contraindications, potential side effects, and estimated costs of each. Discussed that medications must always be used together with lifestyle changes such as improvements in diet choices, portion control and establishing and maintaining a regular exercise program.     Discussed GLP-1.  Can start GLP-1. Side effects discussed and patient information placed in [facility].  Will reach out to patient after review of vitals.        All of patients questions about the weight loss program were answered fully.        She has the following co-morbidities:       1/4/2022   I have the following health issues associated with obesity: High Blood Pressure, High Cholesterol, Stress Incontinence, Osteoarthritis (joint disease)   I have the following symptoms associated with obesity: Knee Pain, Depression, Back Pain, Fatigue, Hip Pain       Patient Goals 1/4/2022   I am interested in having a healthier weight to diminish current health problems: Yes   I am interested in having a healthier weight in order to prevent future health problems: Yes   I am interested in having a healthier weight in order to have a future surgery: No   If yes, please indicate which surgery? -       Referring Provider 1/4/2022   Please name the provider who referred you to Medical Weight Management.  If you do not know, please answer: \"I Don't Know\". I don't know       Weight History 1/4/2022   How concerned are you about your weight? Very Concerned   Would you describe your weight gain as gradual? Yes   I became overweight: As an Adult   The following factors have contributed to my weight gain:  Change in Schedule, Eating Wrong Types of Food, Eating Too Much, Lack of Exercise, Stress   I have tried the following methods to lose weight: Watching Portions or Calories, Exercise, Weight Watchers, Meal Replacements, Fasting   My lowest weight " since age 17 was: 130   My highest weight since age 17 was: 180   The most weight I have ever lost was: (lbs) 30   I have the following family history of obesity/being overweight:  I am the only one in my immediate family who is overweight   Has anyone in your family had weight loss surgery? No   How has your weight changed over the last year?  Gained   How many pounds? 40 lbs     Up at 6 am. Eats breakfast around 7:30 am.    Diet Recall Review with Patient 1/4/2022   Do you typically eat breakfast? Yes   If you do eat breakfast, what types of food do you eat? protein shake, cheese, meats. Today had cheese and crackers with a protein shake   Do you typically eat lunch? Yes   If you do eat lunch, what types of food do you typically eat?  cheeses, meats and I love salads. Today had turkey sandwich from [employer] - turkey club sandwich.    Do you typically eat supper? Yes   If you do eat supper, what types of food do you typically eat? chicken, vegetable, rice. Last night not sure had dinner   Do you typically eat snacks? Yes    3-4 times daily   If you do snack, what types of food do you typically eat? chips, cookies, apples. [place] cookies, oatmeal chocolate cookies   Do you like vegetables?  Yes   Do you drink water? Yes                           Drinks 64 oz water   How many glasses of juice do you drink in a typical day? 0   How many of glasses of milk do you drink in a typical day? 1   If you do drink milk, what type? 2%   How many 8oz glasses of sugar containing drinks such as Fer-Aid/sweet tea do you drink in a day? 0   How many cans/bottles of sugar pop/soda/tea/sports drinks do you drink in a day? 0   How many cans/bottles of diet pop/soda/tea or sports drink do you drink in a day? 1   How often do you have a drink of alcohol? 2-4 Times a Month   If you do drink, how many drinks might you have in a day? 1 or 2                    Drinks wine        Eating Habits 1/4/2022   Generally, my meals include foods  like these: bread, pasta, rice, potatoes, corn, crackers, sweet dessert, pop, or juice. A Few Times a Week   Generally, my meals include foods like these: fried meats, brats, burgers, french fries, pizza, cheese, chips, or ice cream. A Few Times a Week   Eat fast food (like McDonalds, [employer], Taco Bell). Once a Week   Eat at a buffet or sit-down restaurant. Once a Week   Eat most of my meals in front of the TV or computer. A Few Times a Week   Often skip meals, eat at random times, have no regular eating times. Almost Everyday   Rarely sit down for a meal but snack or graze throughout.  Almost Everyday   Eat extra snacks between meals. A Few Times a Week   Eat most of my food at the end of the day. Once a Week   Eat in the middle of the night or wake up at night to eat. A Few Times a Week   Eat extra snacks to prevent or correct low blood sugar. Never   Eat to prevent acid reflux or stomach pain. Less Than Weekly   Worry about not having enough food to eat. Never   Have you been to the food shelf at least a few times this year? No   I eat when I am depressed. Less Than Weekly   I eat when I am stressed. Once a Week   I eat when I am bored. Once a Week   I eat when I am anxious. Once a Week   I eat when I am happy or as a reward. Less Than Weekly   I feel hungry all the time even if I just have eaten. Everyday   Feeling full is important to me. Less Than Weekly   I finish all the food on my plate even if I am already full. Never   I can't resist eating delicious food or walk past the good food/smell. Less Than Weekly   I eat/snack without noticing that I am eating. Less Than Weekly   I eat when I am preparing the meal. A Few Times a Week   I eat more than usual when I see others eating. A Few Times a Week   I have trouble not eating sweets, ice cream, cookies, or chips if they are around the house. A Few Times a Week   I think about food all day. Almost Everyday   What foods, if any, do you crave? Chips/Crackers    Please list any other foods you crave? anything salty, or has cheese       Amount of Food 1/4/2022   I make myself vomit what I have eaten or use laxatives to get rid of food. Never   I eat a large amount of food, like a loaf of bread, a box of cookies, a pint/quart of ice cream, all at once. Never   I eat a large amount of food even when I am not hungry. Monthly   I eat rapidly. Never   I eat alone because I feel embarrassed and do not want others to see how much I have eaten. Never   I eat until I am uncomfortably full. Never   I feel bad, disgusted, or guilty after I overeat. Never   I make myself vomit what I have eaten or use laxatives to get rid of food. Never       Activity/Exercise History 1/4/2022   How much of a typical 12 hour day do you spend sitting? Most of the Day   How much of a typical 12 hour day do you spend lying down? Less Than Half the Day   How much of a typical day do you spend walking/standing? Less Than Half the Day   How many hours (not including work) do you spend on the TV/Video Games/Computer/Tablet/Phone? 2-3 Hours   How many times a week are you active for the purpose of exercise? Once a Week   What keeps you from being more active? Pain, Too tired   How many total minutes do you spend doing some activity for the purpose of exercising when you exercise? 15-30 Minutes       PAST MEDICAL HISTORY:  Past Medical History:   Diagnosis Date     ADD (attention deficit disorder)      Anxiety      Gastroesophageal reflux disease without esophagitis      Hypertension      Urethral hypermobility 06/26/2013       Work/Social History Reviewed With Patient 1/4/2022   My employment status is: Full-Time   My job is: [employer], Lead Preceptor   How much of your job is spent on the computer or phone? 100%   How many hours do you spend commuting to work daily?  45 min   What is your marital status? /In a Relationship   If in a relationship, is your significant  other overweight? Yes   Do you have children? Yes   If you have children, are they overweight? Yes   Who do you live with?  , Gideon   Are they supportive of your health goals? Yes   Who does the food shopping?         Mental Health History Reviewed With Patient 1/4/2022   Have you ever been physically or sexually abused? No   If yes, do you feel that the abuse is affecting your weight? N/A   If yes, would you like to talk to a counselor about the abuse? N/A   How often in the past 2 weeks have you felt little interest or pleasure in doing things? More Than Half the Days   Over the past 2 weeks how often have you felt down, depressed, or hopeless? More Than Half the Days       Sleep History Reviewed With Patient 1/4/2022   How many hours do you sleep at night? 8   Do you think that you snore loudly or has anybody ever heard you snore loudly (louder than talking or so loud it can be heard behind a shut door)? Yes   Has anyone seen or heard you stop breathing during your sleep? No   Do you often feel tired, fatigued, or sleepy during the day? Yes   Do you have a TV/Computer in your bedroom? Yes     ROS  General  Fatigue: No  Sleep Quality: Depends  Birth Control: Hysterectomy 2014  HEENT  Hx of glaucoma: No  Vision changes: No  Cardiovascular  Chest Pain with Exertion: No  Palpitations: No  Hx of heart disease: No - Had a vertebral artery dissection and takes ASA - had mini strokes as a result  Pulmonary  Shortness of breath at rest: No  Shortness of breath with exertion:  Yes  Snoring: Yes - Wakes up with headaches 2-3 times per week - A referral for [facility]   Gastrointestinal  Heartburn: No  Abdominal pain: No  History of pancreatitis: No  Severe constipation: on occasion - takes magnesium or stool softener  Hx of bowel obstruction: No  Gastrourinary  History of kidney stones: No  Psychiatric  Moods Stable: Yes  History of drug abuse: No  No history of eating disorder  Endocrine  Polydipsia: yes  Polyuria:  "No  Personal or family history of thyroid cancer: No  Neurologic:  Hx of seizures: No  Hx of paresthesias: No      MEDICATIONS:   Current Outpatient Medications   Medication Sig Dispense Refill     aspirin (ASA) 81 MG EC tablet Take 1 tablet (81 mg) by mouth daily 90 tablet 3     benzonatate (TESSALON) 100 MG capsule Take 1 capsule (100 mg) by mouth 3 times daily as needed for cough 30 capsule 0     cyclobenzaprine (FLEXERIL) 5 MG tablet Take 1 tablet (5 mg) by mouth 3 times daily as needed for muscle spasms 60 tablet 3     doxycycline hyclate (VIBRAMYCIN) 100 MG capsule Take 1 capsule (100 mg) by mouth 2 times daily 20 capsule 0     escitalopram (LEXAPRO) 20 MG tablet Take 1 tablet (20 mg) by mouth At Bedtime 90 tablet 1     lisinopril (ZESTRIL) 10 MG tablet Take 1 tablet (10 mg) by mouth daily 90 tablet 1     olopatadine (PATANOL) 0.1 % ophthalmic solution Place 1-2 drops into both eyes 2 times daily as needed for allergies   6     polyethylene glycol (MIRALAX) 17 g packet Take 17 g by mouth 2 times daily 7 packet 0     predniSONE (DELTASONE) 20 MG tablet Take 1 tablet (20 mg) by mouth daily 5 tablet 0     promethazine (PHENERGAN) 25 MG tablet Take 0.5-1 tablets (12.5-25 mg) by mouth every 6 hours as needed for nausea 60 tablet 1     traZODone (DESYREL) 100 MG tablet Take 1 tablet (100 mg) by mouth At Bedtime 90 tablet 1       ALLERGIES:   Allergies   Allergen Reactions     Bees Swelling     Disfiguring      Suture Swelling     local swelling and sutures didn't dissolve vyrcil   Suture        PHYSICAL EXAM:  Ht 5' 6\" (1.676 m)   Wt 178 lb (80.7 kg)   LMP 07/26/2013   BMI 28.73 kg/m    GENERAL: Healthy, alert and no distress  EYES: Eyes grossly normal to inspection.  No discharge or erythema, or obvious scleral/conjunctival abnormalities.  RESP: No audible wheeze, cough, or visible cyanosis.  No visible retractions or increased work of breathing.    SKIN: Visible skin clear. No significant rash, abnormal " pigmentation or lesions.  NEURO: Cranial nerves grossly intact.  Mentation and speech appropriate for age.  PSYCH: Mentation appears normal, affect normal/bright, judgement and insight intact, normal speech and appearance well-groomed.        FOLLOW-UP:   4-5 weeks after starting medication      Sincerely,    [facility] Diego PA-C

## 2022-01-04 NOTE — PATIENT INSTRUCTIONS
It was great talking with you today!        PLEASE GET HEIGHT, WEIGHT, BLOOD PRESSURE, AND PULSE CHECKED AT A [facility] TO DETERMINE ELIGIBILITY FOR THE PROGRAM. HAVE THEM FORWARD TO WEIGHT LOSS CLINIC      Goals:  Get sleep center  1-2 times cardio weekly          MEDICATION STARTED AT THIS APPOINTMENT    We are starting a GLP-1 (Glucagon-like Peptide-1) medication. Examples of this medication include Byetta, Bydureon, or Victoza, etc. The medication chosen will depend on insurance coverage, and can be changed to the medication that is covered under your plan. These medications work the same, in that they can help you lose weight and control your blood sugar. One of the ways it works is by slowing down the rate that food leaves your stomach. You feel kaiser and will eat less.  It also helps regulate hormones that can help improve your blood sugars.    Usually short acting insulins or oral agents are stopped or decreased by the doctor at the appointment. Low blood sugars are rare but can happen if patients are on insulin or other oral agents. If you notice consistent low sugars or high sugars, your medication may need to be adjusted after your appointment. If this is the case, please call RN and provide her your blood sugar record from the last 3-4 days. The RN will get in touch with the doctor and call you back/ObjectVideo message with recommendations. We tolerate high sugars for a bit, so if sugars are running 180-200, this is ok. As weight starts dropping the blood sugars should too. If readings are consistently over 200 for 1-2 weeks, then you should call the doctor/nurse.    Types of GLP-1 medications include:    Saxenda: Starting dose is 0.6 mg for a week, then 1.2 mg for a week, then 1.8 mg for 1 week, then 2.4 mg for 1 week and then 3 mg daily thereafter (if prescribed by doctor). This medication is given daily. Pen needles need to be ordered also.      Side effects of GLP- Medications include: The most  common side effects are all GI related and consist of: nausea, constipation, diarrhea, burping, or gassiness. Patients are advised to eat slowly and less, and nausea typically passes if people can stick it out.     The risk of pancreatitis (inflammation of the pancreas) has been associated with this type of medication, but is very rare.  If you have had pancreatitis in the past, this medication may not be for you. Please let us know about any past history of pancreas problems.      Symptoms of pancreatitis include: Pain in your upper stomach area which  may travel to your back and be worse after eating. Your stomach area may be tender to the touch.  You may have vomiting or nausea and/or have a fever. If you should develop any of these symptoms, stop the medication and contact your primary care doctor. They will do a blood test to check for pancreatitis.         There is a small chance you may have some low blood sugar after taking the medication.   The signs of low blood sugar are:  o Weakness  o Shaky   o Hungry  o Sweating  o Confusion      See below for ways to treat low blood sugar without adding in lots of extra calories.      Treating Low Blood Sugar    If you have symptoms of low blood sugar (sweating, shaking, dizzy, confused) eat 15 grams of carbs and wait 15 minutes:      Glucose Tabs are best for sugars under 70 -  Dex4 or BD Glucose tablets are good, you will need to take 3-4 of these to equal 15 grams.       One small box of raisins    4 oz fruit juice box or   cup fruit juice    1 small apple    1 small banana      cup canned fruit in water      English muffin or a slice of bread with jelly     1 low fat frozen waffle with sugar-free syrup      cup cottage cheese with   cup frozen or fresh blueberries    1 cup skim or low-fat milk      cup whole grain cereal    4-6 crackers such as Triscuits      This medication is usually covered by insurance for patients with a diagnosis of Type 2 Diabetes.  Depending on insurance coverage, the medication may be changed to a different formulary, but they all work in the same way. Sometimes a prior authorization is required, which may take up to 1-2 weeks for an insurance company to make a decision if they will cover the medication. Please be patient, you will be notified either way.     Call the nurse at [phone] if you have any questions or concerns. (Do not stop taking it if you don't think it's working. For some people it works without them knowing it.)     In order to get refills of this or any medication we prescribe you must be seen in the medical weight mgmt clinic every 2-4 months.                                       Protein Link:  Protein Sources for Weight Loss  [email or web address]

## 2022-01-05 ENCOUNTER — TELEPHONE (OUTPATIENT)
Dept: SURGERY | Facility: CLINIC | Age: 52
End: 2022-01-05

## 2022-01-05 NOTE — TELEPHONE ENCOUNTER
Pt called and left a VM re: schedule RN visit for wt, ht, vitals check.    Called pt back but no answer, left VM to call clinic back.    Ilda Harrell RN on 1/5/2022 at 12:54 PM

## 2022-01-10 NOTE — TELEPHONE ENCOUNTER
Called pt and helped schedule RN visit for ht wt and vitals check for 1/14.    Ilda Harrell RN on 1/10/2022 at 11:47 AM

## 2022-01-14 ENCOUNTER — ALLIED HEALTH/NURSE VISIT (OUTPATIENT)
Dept: SURGERY | Facility: CLINIC | Age: 52
End: 2022-01-14
Payer: COMMERCIAL

## 2022-01-14 VITALS
DIASTOLIC BLOOD PRESSURE: 87 MMHG | WEIGHT: 177.8 LBS | HEART RATE: 89 BPM | TEMPERATURE: 97.6 F | SYSTOLIC BLOOD PRESSURE: 141 MMHG | HEIGHT: 66 IN | BODY MASS INDEX: 28.57 KG/M2 | OXYGEN SATURATION: 96 % | RESPIRATION RATE: 16 BRPM

## 2022-01-14 DIAGNOSIS — E66.3 OVERWEIGHT WITH BODY MASS INDEX (BMI) OF 29 TO 29.9 IN ADULT: Primary | ICD-10-CM

## 2022-01-14 PROCEDURE — 99207 PR NO CHARGE NURSE ONLY: CPT

## 2022-01-14 ASSESSMENT — MIFFLIN-ST. JEOR: SCORE: 1425.31

## 2022-01-14 NOTE — PROGRESS NOTES
Pt in for vitals/ht/wit check.   See flowsheets.    Pt BMI 29.14 with comobidities HLD, HTN, GERD, lumbar pain    RN met with patient and patient signed up for 24 Week Plan and expressed interest in [employer] product line.    Discussed with patient the 24 Week Healthy Lifestyle Program, including cost/payment, [employer] membership, schedule of visits, and healthy habits.    Discussed with patient the Bariatrix meal replacement plan ([id number] aliza), including cost and ordering.    Reviewed schedule/appointments.    24 Week HLP kit given to patient.    Summarized with patient--Patient plan is 24 week Healthy Lifestyle Plan.  The patient graduation date is 7/18.  The fee is $99. The fee will be applied to [facility] [facility] after initial Health  visit.    Reviewed contact information, address and e-mail address with pt: yes    Patient assisted by staff to make initial Health  visit and follow-up appointments for 24 week program.    Ilda Harrell RN on 1/14/2022 at 2:56 PM

## 2022-01-14 NOTE — Clinical Note
See flowsheets for vitals/ht/wt  Per bmi + comorbidities she is eligible for the 24 week HLP. Please let me know if there is anything else I can do.  Thanks!  AW

## 2022-01-20 ENCOUNTER — VIRTUAL VISIT (OUTPATIENT)
Dept: SURGERY | Facility: CLINIC | Age: 52
End: 2022-01-20
Payer: COMMERCIAL

## 2022-01-20 VITALS — HEIGHT: 66 IN | BODY MASS INDEX: 28.57 KG/M2 | WEIGHT: 177.8 LBS

## 2022-01-20 DIAGNOSIS — E66.3 OVERWEIGHT WITH BODY MASS INDEX (BMI) OF 28 TO 28.9 IN ADULT: ICD-10-CM

## 2022-01-20 PROCEDURE — 97802 MEDICAL NUTRITION INDIV IN: CPT | Mod: GT | Performed by: DIETITIAN, REGISTERED

## 2022-01-20 ASSESSMENT — MIFFLIN-ST. JEOR: SCORE: 1425.31

## 2022-01-20 NOTE — PROGRESS NOTES
Linsey is a 51 year old who is being evaluated via a billable video visit.      How would you like to obtain your AVS? MyChart  If the video visit is dropped, the invitation should be resent by: Text to cell phone: [phone]  Will anyone else be joining your video visit? No      Video Start Time: 3:28pm      Video-Visit Details    Type of service:  Video Visit    Video End Time:4:16pm    Originating Location (pt. Location): Home    Distant Location (provider location): Provider Remote Setting    Platform used for Video Visit: [facility] WEIGHT LOSS INITIAL EVALUATION  DIAGNOSIS:  Overweight    NUTRITION HISTORY:  Diet recall per provider visit on 1/4/2022 as follows:  Up at 6 am. Eats breakfast around 7:30 am.    Diet Recall Review with Patient 1/4/2022   Do you typically eat breakfast? Yes   If you do eat breakfast, what types of food do you eat? protein shake, cheese, meats. Today had cheese and crackers with a protein shake   Do you typically eat lunch? Yes   If you do eat lunch, what types of food do you typically eat?  cheeses, meats and I love salads. Today had turkey sandwich from [employer] - turkey club sandwich.    Do you typically eat supper? Yes   If you do eat supper, what types of food do you typically eat? chicken, vegetable, rice. Last night not sure had dinner   Do you typically eat snacks? Yes    3-4 times daily   If you do snack, what types of food do you typically eat? chips, cookies, apples. [place] cookies, oatmeal chocolate cookies   Do you like vegetables?  Yes   Do you drink water? Yes                           Drinks 64 oz water   How many glasses of juice do you drink in a typical day? 0   How many of glasses of milk do you drink in a typical day? 1   If you do drink milk, what type? 2%   How many 8oz glasses of sugar containing drinks such as Fer-Aid/sweet tea do you drink in a day? 0   How many cans/bottles of sugar pop/soda/tea/sports drinks do you drink in a day? 0   How many  "cans/bottles of diet pop/soda/tea or sports drink do you drink in a day? 1   How often do you have a drink of alcohol? 2-4 Times a Month   If you do drink, how many drinks might you have in a day? 1 or 2                    Drinks wine        Other: Pt feels her biggest challenges are planning meals in advance as well as perimenopausal changes to metabolism and muscle mass. Had COVID in November and continues with taste/smell deficits, making it difficult to identify stopping point when eating. Pt with many appropriate questions today. Starting 24w HLP +/- meds (pending discussion with provider)2    ANTHROPOMETRICS:  Height: 66\"   Weight: 178 lbs   BMI:  28.7 kg/m2  NUTRITION DIAGNOSIS:   Overweight related to excess energy intake as evidence by BMI of  28.7 kg/m2  NUTRITION INTERVENTIONS  Nutrition Prescription:  Recommend modified energy- nutrient intake  Implementation:  Nutrition Education (Content):    Discussed portion sizes and plate method    Provided handouts: Protein Sources for Weight Loss, Fiber Content in Food, Plate Method    Nutrition Education (Application):     Patient to practice goals as stated below    Patient verbalizes understanding of diet by stating she will plan meals in advanceg    Anticipate good compliance    Goals:  Plan meals in advance  Strength train 2-3x/week  Take 20-30 minutes to eat each meal    FOLLOW UP AND MONITORING:    Other  - follow up in 4-8 weeks.     TIME SPENT WITH PATIENT:   48 minutes   Silvia Jacques RD, LD  Clinical Dietitian   "

## 2022-01-20 NOTE — PATIENT INSTRUCTIONS
"Wagner Stiles!      It was great meeting with you today! Here are some links to the handouts I referenced:        Protein Sources:  [email or web address]     Fiber Sources:  [email or web address]     My Plate:  https://www.choosemyplate.gov/        A helpful search term to type into Quitt.ch, MSDSonline.com, etc is \"myplate examples.\"     Key points from today:  Eat 3 meals/day at consistent intervals  Shoot for 60-90g protein (20-30g/meal)  Aim for 25-35g fiber (6-10g/meal)  Eat slowly: 20-30 minutes per meal     Here is a summary of the goals that we discussed:     1. Prepare/prep meals ahead of time  - On weekends, create a written plan for meals each day  - Prepare as much of your ingredients ahead of time, for example: roast vegetables, chop fresh vegetables, cook ingredients to be quickly reheated later in week, etc.   - Take a look at \"Mealime\" mando for meal planning     2. Add in strength training  - Aim to work your major muscle groups 2-3x/week  - This helps maintain muscle mass and bone density     3. Eat slowly  - Take 20-30 minutes to eat each meal  - In the absence of taste/smell, this is a good time to start tuning into your body's signals of fullness/hunger  - Use the 1-5 scale we talked about: stop your meal at about a 3 - physically satisfied and not overly full       Let's plan on following up in 1-2 months. This can be scheduled via our call center at . Of course, reach out sooner if you have any questions or concerns. Have a great week and welcome to the program!        Silvia Jacques, TATIANA, LD  Clinical Dietitian   "

## 2022-01-21 ENCOUNTER — TELEPHONE (OUTPATIENT)
Dept: SURGERY | Facility: CLINIC | Age: 52
End: 2022-01-21
Payer: COMMERCIAL

## 2022-01-21 DIAGNOSIS — E66.3 OVERWEIGHT WITH BODY MASS INDEX (BMI) OF 29 TO 29.9 IN ADULT: Primary | ICD-10-CM

## 2022-01-21 NOTE — TELEPHONE ENCOUNTER
Pt wanted Ilda to know that the SAXENDA is covered by insur, and she's going to pick it up shortly, and is available for Ilda's educational call later today.    [phone]  **OK to leave detailed Vm

## 2022-01-21 NOTE — TELEPHONE ENCOUNTER
----- Message from Silvia Jacques sent at 1/20/2022  4:27 PM CST -----  Regarding: med discussion  Linsey had her vitals done (in Epic) and is ready for a discussion on what medications would be appropriate, per your note on 1/4/2022.     Thanks!V

## 2022-01-21 NOTE — TELEPHONE ENCOUNTER
"Called patient after reviewing her vitals.  She is interested in the 24 week program.      We discussed the role of pharmacological agents in the treatment of obesity and the \"off-label\" use of medications in this practice. We discussed the risks and benefits of each. We discussed indications, contraindications, potential side effects, and estimated costs of each. Discussed that medications must always be used together with lifestyle changes such as improvements in diet choices, portion control and establishing and maintaining a regular exercise program.     Candidate for topamax or GLP-1.  Will hold off on phentermine.  RX for Saxenda sent to pharmacy. Discussed side effects and patient information placed in [employer]. Medication interaction check run.     All of patients questions about the weight loss program were answered fully.      Patient to follow up in 5 weeks.    "

## 2022-01-21 NOTE — TELEPHONE ENCOUNTER
Returned pt call.   We discuss medication administration. Pt reports she is familiar with pen injectables (her  has used) so she did not need a virtual pen teaching. She would like the instructional document sent over via . We discuss the 24 week HLP, confirmed 1st HC visit 1/31 and verified pt demographics.     Pt did not have any other questions or concerns.    Ilda Harrell RN on 1/21/2022 at 1:16 PM

## 2022-01-21 NOTE — PATIENT INSTRUCTIONS
MEDICATION STARTED AT THIS APPOINTMENT    We are starting a GLP-1 (Glucagon-like Peptide-1) medication called Saxenda. One of the ways it works is by slowing down the rate that food leaves your stomach. You feel kaiser and will eat less. It also helps regulate hormones that can help improve your blood sugars.    If you are a patient that checks blood sugars, continue to check as instructed by your doctor. Low blood sugars are rare but can happen if patients are on insulin or other oral agents. If you notice consistent low sugars or high sugars, your medication may need to be adjusted after your appointment. If this is the case, please call RN and provide her your blood sugar record from the last 3-4 days. The RN will get in touch with the doctor and call you back/[employer] message with recommendations. We tolerate high sugars for a bit, so if sugars are running 180-200, this is ok. As weight starts dropping the blood sugars should too. If readings are consistently over 200 for 1-2 weeks, then you should call the doctor/nurse.    Dosing for this medication:   Week 1- Inject 0.6 mg daily  Week 2- Inject 1.2 mg daily  Week 3- Inject 1.8 mg daily  Week 4- Inject 2.4 mg daily  Week 5 and thereafter- Inject 3.0 mg daily    Side effects of GLP- Medications include: The most common side effects are all GI related and consist of: nausea, constipation, diarrhea, burping, or gassiness. Patients are advised to eat slowly and less, and nausea typically passes if people can stick it out.     The risk of pancreatitis (inflammation of the pancreas) has been associated with this type of medication, but is very rare.  If you have had pancreatitis in the past, this medication may not be for you. Please let us know about any past history of pancreas problems.    Symptoms of pancreatitis include: Pain in your upper stomach area which may travel to your back and be worse after eating. Your stomach area may be tender to the touch.  You  may have vomiting or nausea and/or have a fever. If you should develop any of these symptoms, stop the medication and contact your primary care doctor. They will do a blood test to check for pancreatitis.         There is a small chance you may have some low blood sugar after taking the medication.   The signs of low blood sugar are:  o Weakness  o Shaky   o Hungry  o Sweating  o Confusion      See below for ways to treat low blood sugar without adding in lots of extra calories.      Treating Low Blood Sugar    If you have symptoms of low blood sugar (sweating, shaking, dizzy, confused) eat 15 grams of carbs and wait 15 minutes:      Glucose Tabs are best for sugars under 70 -  Dex4 or BD Glucose tablets are good, you will need to take 3-4 of these to equal 15 grams.       One small box of raisins    4 oz fruit juice box or   cup fruit juice    1 small apple    1 small banana      cup canned fruit in water      English muffin or a slice of bread with jelly     1 low fat frozen waffle with sugar-free syrup      cup cottage cheese with   cup frozen or fresh blueberries    1 cup skim or low-fat milk      cup whole grain cereal    4-6 crackers such as Triscuits      This medication is usually not covered by insurance and can be quite expensive. Sometimes a prior authorization is required, which may take up to 1-2 weeks for an insurance company to make a decision if they will cover the medication. Please be patient, you will be notified after a decision has been made.    Contact the nurse via [employer] or call [phone] if you have any questions or concerns. (Do not stop taking it if you don't think it's working. For some people it works without them knowing it.)     In order to get refills of this or any medication we prescribe you must be seen in the medical weight mgmt clinic every 2-4 months.

## 2022-01-27 ENCOUNTER — VIRTUAL VISIT (OUTPATIENT)
Dept: INTERNAL MEDICINE | Facility: CLINIC | Age: 52
End: 2022-01-27
Payer: COMMERCIAL

## 2022-01-27 DIAGNOSIS — E66.3 OVERWEIGHT WITH BODY MASS INDEX (BMI) 25.0-29.9: ICD-10-CM

## 2022-01-27 DIAGNOSIS — I10 BENIGN ESSENTIAL HYPERTENSION: Primary | ICD-10-CM

## 2022-01-27 DIAGNOSIS — R05.3 PERSISTENT COUGH FOR 3 WEEKS OR LONGER: ICD-10-CM

## 2022-01-27 PROCEDURE — 99213 OFFICE O/P EST LOW 20 MIN: CPT | Mod: 95 | Performed by: NURSE PRACTITIONER

## 2022-01-27 RX ORDER — EPINEPHRINE 0.3 MG/.3ML
INJECTION SUBCUTANEOUS
COMMUNITY
Start: 2021-12-14

## 2022-01-27 RX ORDER — AMLODIPINE BESYLATE 2.5 MG/1
2.5 TABLET ORAL DAILY
Qty: 90 TABLET | Refills: 0 | Status: SHIPPED | OUTPATIENT
Start: 2022-01-27 | End: 2022-02-06

## 2022-01-27 NOTE — PROGRESS NOTES
Linsey is a 51 year old who is being evaluated via a billable video visit.      How would you like to obtain your AVS? MyChart  If the video visit is dropped, the invitation should be resent by: Text to cell phone: [phone]   Will anyone else be joining your video visit? No        Assessment & Plan   Problem List Items Addressed This Visit    None     Visit Diagnoses     Benign essential hypertension    -  Primary    Overweight with body mass index (BMI) 25.0-29.9        Persistent cough for 3 weeks or longer           she will begin the sexenda, continue to exercise               No follow-ups on file.    Jacinda Banks, [facility]    Stop lisinopril to see if this is causing her persistent cough. She will be started on Norvasc 2.5mg daily.  Follow up 2 weeks.        Subjective   Linsey is a 51 year old who presents for the following health issues     She has persistent cough which has been treated in the past       She is working with nutritionist for 24 weeks and recommend saxenda.    HPI         Review of Systems   Unremarkable other than listed above and below         Objective           Vitals:  No vitals were obtained today due to virtual visit.    Physical Exam   RESP: No audible wheeze, cough  NEURO:   Mentation and speech appropriate for age.  PSYCH: judgement and insight intact, normal speech          Televisit: 15 minutes

## 2022-01-31 ENCOUNTER — VIRTUAL VISIT (OUTPATIENT)
Dept: SURGERY | Facility: CLINIC | Age: 52
End: 2022-01-31

## 2022-01-31 DIAGNOSIS — E66.3 OVERWEIGHT (BMI 25.0-29.9): Primary | ICD-10-CM

## 2022-01-31 PROCEDURE — 99207 PR MEDICAL WEIGHT MANAGEMENT PROGRAM EMPLOYEE ENROLLMENT: CPT | Performed by: COMMUNITY HEALTH WORKER

## 2022-01-31 PROCEDURE — 99207 PR MWM HEALTH COACH NO CHARGE: CPT | Performed by: COMMUNITY HEALTH WORKER

## 2022-01-31 NOTE — PROGRESS NOTES
BRIANNA CONRAD    Progress Notes    New Weight Management Health Coaching Note    Jaz Archibald          MRN: [id number]  : 1970  FLASH: 2022    Health  Visit #: 1  Visit Type:  24 week  Telephone Visit - Call: [phone]     Initial Start Date: 2022  Graduation Date: 2022    Provider on 22: GERARD Mendez per Provider Overweight (BMI 25 to 29.9).  Initial Height and Wt: 5' 6 , 178 lbs   BMI = 28.73 kg/m     ASSESSMENT:  Current Weight: 174 - 173 lbs (self-reported)  Average Daily Water: 64 oz/day  Weight Loss Medication: Rx Saxenda, pt started   Nutrition Plan: Other: Regular Diet, per RD    Additional Program Support.  Payment Dropped: 22    Confirmed 24-wk HLP Kit Rec by Pt on: 22  Confirmed Payment & [employer] forms signed: in person when weighed in at clinic    Pillar Assessment Completed on: 21 at  Q&A  Estore: n/a  Measurements: resent Measurement instructions  22  [employer] Access Begins/Ends: 22 - 22  Support Group Availability, MyCharted info: 22    INITIAL INTAKE:  What brought pt to the program?  She noticed a slow & consistent wt gained; she noticed increase in bp, chol, and artery tear >>> suspect her that healthy changes would benefit her health >>> lead conversation with MD.     Pt is interested in adopting a growth mindset ~ well-being    - Pt wants to learn how to improve all four of health pillars in a sustainable way.  - Appreciated the idea that interdisciplinary team & holistic approach.    Pillars of Health Discussion.  The four pillars of well-being may impact your ability to manage weight.    Initial Level of Satisfaction completed on 21 All rated on a scale of 1-10.         Sleep: 6    Movement: 2    Nutrition: 4    Emotional Health/Wellbeing: 3     Sleep: Would like to be at a 8-9 in 6 months.   Notes: She likes her bedroom ambiance. Notice opportunity reduce electronic time before bed;  generally get good quality & quantity. She does take mg & sleep aid.      Movement: Would like to be at a 9 in 6 months.   Notes: She would like to add back her wt trg program that she enjoyed for 5 yrs prior to angelique'l health condition. She enjoys Pilates, srinivasan/dance. Neurologist guided her on what she can & can't do: no jolting of the head, heavy wt lifting or plyometrics.Neurologist told her 130 minutes of moderate activity would be helpful to her brain.      Nutrition: Would like to be at a 10 in 6 months.   Notes: Noticing a big change her diet: craving high sugar foods & hungry frequently.      Emotional Health/Wellbeing: Would like to be at a 10 in 6 months.   Notes: She wants to wake up energized, enjoy movement in the morning to start her day off on a positive note, have more connection, and feel fit. She is excited about her new position and would like to say that she has made the impact she hopes to. She wants to integrating well-being into workplace.    Takeaways from initial Provider & RD Goals.    Action Steps at Today's HC Visit.  1) Nutrition.   - Continue to implement  RD recommendations.    2)  EWB.  - Reflect on 3-Part mental exercise.  *We will continue conversation taking her through exercise at f/u visit.    3) Other  - Implement weigh-in schedule.   - Do measurements     Next Visit:     PATIENT INFORMATION PROVIDED:  - 24 Week Healthy Lifestyle Plan Overview Handout:  o Explained the structure of the 24-week HLP & role of team members.  o Reviewed scheduling information.  o Discussed option to do coaching sessions via phone or [employer].  - Explain the role of a HEALTH  and the FOUR Pillars of Health.  - Discussed telephonic visit & rescheduling process.  - Discussed well-being plan concept.  - Explained Med[employer] billing process.  - Reminder to return 24-wk HLP Patient Form.   - Explained MyChart process.  - Scheduled addt'l HC visit + encouraged pt to record in phone/office  calendar.    - [employer] Discussion   - [email or web address] My Contact Info, Program Contact Info including Support Group & [employer] phone #.   - Weigh-in Schedule and Measurement Discussions.  - Contact info (HC & Program), sent via [employer].  - Support Group info, sent via [employer].  - Pt requested resources emailed. No PHI in email.    HC Visit #1 1/31, #2 2/7, #3 2/14, #4 2/28, #5 3/14, #6 4/4, #7 4/25  *Keeping her visit same time & day if possible: currently all M @ 5 pm    24-wk HLP Provider/RD visits: Provider [place] 1/4, 3/2;     FOLLOW-UP: Scheduled next health  visits during telephonic visit today, reminder to switch to phone or reschedule by calling [phone].     TIME: 60+ minutes spent with patient, care coordination, dropping payment, sending resources & charting.     SIGNATURE:  BRIANNA SALVADOR, Certified Health  on 1/31/2022 at 5:07 PM

## 2022-02-04 DIAGNOSIS — I10 BENIGN ESSENTIAL HYPERTENSION: ICD-10-CM

## 2022-02-04 DIAGNOSIS — I77.74 DISSECTION, VERTEBRAL ARTERY (H): Primary | ICD-10-CM

## 2022-02-06 RX ORDER — AMLODIPINE BESYLATE 2.5 MG/1
2.5 TABLET ORAL DAILY
Qty: 90 TABLET | Refills: 0 | Status: SHIPPED | OUTPATIENT
Start: 2022-02-06 | End: 2022-05-12

## 2022-02-07 ENCOUNTER — VIRTUAL VISIT (OUTPATIENT)
Dept: SURGERY | Facility: CLINIC | Age: 52
End: 2022-02-07

## 2022-02-07 DIAGNOSIS — G47.01 SLEEP DISORDER DUE TO A GENERAL MEDICAL CONDITION, INSOMNIA TYPE: ICD-10-CM

## 2022-02-07 DIAGNOSIS — E66.3 OVERWEIGHT WITH BODY MASS INDEX (BMI) 25.0-29.9: Primary | ICD-10-CM

## 2022-02-07 PROCEDURE — 99207 PR MWM HEALTH COACH NO CHARGE: CPT | Performed by: COMMUNITY HEALTH WORKER

## 2022-02-07 NOTE — TELEPHONE ENCOUNTER
Received fax from pharmacy requesting refill for Trazodone Rx.    Last refill: 08/06/2021  Patient last seen: 01/27/2022    Okay for refill?  Amina Roberts MA on 2/7/2022 at 8:33 AM

## 2022-02-07 NOTE — TELEPHONE ENCOUNTER
"Routing refill request to provider for review/approval because:  Medication dosage is different then one listed on Patients chart. PCP please review for refill.     Last Written Prescription Date:  12/6/2021  Last Fill Quantity: 90,  # refills: 3   Last office visit provider:  1/27/2022     Requested Prescriptions   Pending Prescriptions Disp Refills     aspirin (ASA) 325 MG EC tablet [Pharmacy Med Name: ASPIRIN 325MG TBEC] 30 tablet 1     Sig: TAKE ONE TABLET BY MOUTH ONCE DAILY       Analgesics (Non-Narcotic Tylenol and ASA Only) Passed - 2/4/2022 11:03 AM        Passed - Recent (12 mo) or future (30 days) visit within the authorizing provider's specialty     Patient has had an office visit with the authorizing provider or a provider within the authorizing providers department within the previous 12 mos or has a future within next 30 days. See \"Patient Info\" tab in inbasket, or \"Choose Columns\" in Meds & Orders section of the refill encounter.              Passed - Patient is age 20 years or older     If ASA is flagged for ages under 20 years old. Forward to provider for confirmation Ryes Syndrome is not a concern.              Passed - Medication is active on med list             Alissa Richardson RN 02/06/22 9:37 PM  "

## 2022-02-07 NOTE — PROGRESS NOTES
"BRIANNA CONRAD     Progress Notes    Return Weight Management Health Coaching Note    Jaz Archibald          MRN: [id number]  : 1970  FLASH: 2022    Health  Visit #: 2  Type of Visit: 24 week  Telephone Visit - Call: [phone]  Initial Start Date: 2022  Graduation Date: 2022     Provider on 22: GERARD Mendez per Provider Overweight (BMI 25 to 29.9).  Initial Height and Wt: 5' 6 , 178 lbs   BMI = 28.73 kg/m     ASSESSMENT:  HC Visit on : 174 - 173 lbs (self-reported)    Current Weight: ran out of time to capture  Average Daily Water: 64 oz/day  Weight Loss Medication: Rx Saxenda, pt started   Nutrition Plan: Other: Regular Diet, per RD    Additional Program Support.  (See 22 notes for addt'l info)  [employer] Access Begins/Ends: 22 - 22    Highlights from Past Week's and Today's Visit:  *She is noticing the ease with which she is eating her dinner meal earlier.  *She feels this is making it easier to fall asleep and may even be a factor with better sleep >>> at least she is noticing better quality since she has implemented this.   *She is appreciating all the information she is obtaining from being present with BET.   *This is leading to awareness on how her inner critic is impacting her dianne for/ reservation to participate in social activities.    Action Steps pt set until next HC Visit are:  1) EWB.  - She wants to continue to change her inner dialogue by using steps A & C of the 3-part mental exercise.   A) See  notes on step a.    B) Step C - she likes the idea of experimenting with the below >>>once she finds ones that resonate with her, reading them in AM to start her day off with a reminder with this self-compassion/empowering work.   Self-compassion & mantras that resonate with her:  \"You are doing enough. Over time, every small step you do will lead to significant, positive change.\"  \"You are a beautiful soul. Remember " "that.\"    I notice the sense of lightness that comes from being kind to myself.  I easily and effortlessly offer myself kindness each and every day.    I notice the freedom and contentment that comes from being my own best friend.    Next Visit: discuss implementing habit toolbox - capture weigh-in & water intake    PATIENT EDUCATION PROVIDED:  OEQ -Captured insights &/or awareness gained & other pt's perceived victories.  Assessed How Visit Today Would Best Serve Pt  Progress Review: Behavioral  Affirmations / Character Strength Reflection / Value Alignment  OEQ - on topics noted above  Pt requested resource emailed; no phi in email.    HC Visit #1 1/31, #2 2/7, #3 2/14, #4 2/28, #5 3/14, #6 4/4, #7 4/25  *Keeping her visit same time & day if possible: currently all M @ 5 pm     24-wk HLP Provider/RD visits: Provider [place] 1/4, 3/2;     FOLLOW-UP: Reminder to switch to phone or reschedule by calling [phone].    TIME: ~30+/- minutes spent with patient, care coordination, sending resources & charting.   SIGNATURE:  BRIANNA SALVADOR, Certified Health  on 2/7/2022 at 5:13 PM  "

## 2022-02-08 NOTE — TELEPHONE ENCOUNTER
"Routing refill request to provider for review/approval because:  Drug interaction warning    Last Written Prescription Date:  8/6/2021  Last Fill Quantity: 90,  # refills: 1   Last office visit provider:  1/24/2022 Jacinda Banks NP virtual visit     Requested Prescriptions   Pending Prescriptions Disp Refills     traZODone (DESYREL) 100 MG tablet 90 tablet 2     Sig: Take 1 tablet (100 mg) by mouth At Bedtime       Serotonin Modulators Passed - 2/7/2022  8:33 AM        Passed - Recent (12 mo) or future (30 days) visit within the authorizing provider's specialty     Patient has had an office visit with the authorizing provider or a provider within the authorizing providers department within the previous 12 mos or has a future within next 30 days. See \"Patient Info\" tab in inbasket, or \"Choose Columns\" in Meds & Orders section of the refill encounter.              Passed - Medication is active on med list        Passed - Patient is age 18 or older        Passed - No active pregnancy on record        Passed - No positive pregnancy test in past 12 months             Jeanna Walker RN 02/08/22 3:42 PM  "

## 2022-02-09 RX ORDER — ASPIRIN 325 MG
TABLET, DELAYED RELEASE (ENTERIC COATED) ORAL
Qty: 30 TABLET | Refills: 1 | Status: SHIPPED | OUTPATIENT
Start: 2022-02-09 | End: 2022-03-23

## 2022-02-09 RX ORDER — TRAZODONE HYDROCHLORIDE 100 MG/1
100 TABLET ORAL AT BEDTIME
Qty: 90 TABLET | Refills: 2 | Status: SHIPPED | OUTPATIENT
Start: 2022-02-09 | End: 2022-11-14

## 2022-02-28 ENCOUNTER — VIRTUAL VISIT (OUTPATIENT)
Dept: SURGERY | Facility: CLINIC | Age: 52
End: 2022-02-28

## 2022-02-28 DIAGNOSIS — E66.3 OVERWEIGHT WITH BODY MASS INDEX (BMI) 25.0-29.9: Primary | ICD-10-CM

## 2022-02-28 PROCEDURE — 99207 PR MWM HEALTH COACH NO CHARGE: CPT | Performed by: COMMUNITY HEALTH WORKER

## 2022-02-28 NOTE — PROGRESS NOTES
BRIANNA CONRAD     Progress Notes    Return Weight Management Health Coaching Note    Jaz Archibald          MRN: [id number]  : 1970  FLASH: 2022    Health  Visit #: 3  Type of Visit: 24 week  Telephone Visit - Call: [phone]    Initial Start Date: 2022  Graduation Date: 2022     Provider on 22: GERARD Mendez per Provider Overweight (BMI 25 to 29.9).  Initial Height and Wt: 5' 6 , 178 lbs   BMI = 28.73 kg/m     ASSESSMENT:  HC Visit on :  174 - 173 lbs (self-reported)    Current Weight: ran out of time to capture  Average Daily Water: 64 oz/day  Weight Loss Medication: Rx Saxenda, pt started   Nutrition Plan: Other: Regular Diet, per RD     Additional Program Support.  (See 22 notes for addt'l info)  [place] Access Begins/Ends: 22 - /    Highlights from Past Week's and Today's Visit:  *She is pleased with her tracking consistency.  *She applied 3's of experimentation to movement    Action Steps pt set until next HC Visit are:  1) Nutrition  - Tracking = accountability + accomplishment reflection for her.     2) EWB.  - Reading her S C & mantras more consistently by logging into track her meals.  She wants to take a screen shot of them so they are readily available.     3) Other  - F/u up with provider via MyChart ~ symptoms continue (injection site?)    Next Visit: capture weigh-in & water intake, schedule w/ RD in ?  If time allows: Review BMI & % Wt Loss Changes    PATIENT EDUCATION PROVIDED:  OEQ -Captured insights &/or awareness gained & other pt's perceived victories.  Assessed How Visit Today Would Best Serve Pt  Progress Review: Behavioral   Affirmations / Character Strength Reflection / Value Alignment  OEQ - on topics noted above  Pt requested resource emailed; no phi in email.  Reviewed appointment reminders (lack of) for hc visits.     HC Visit #1 , #2 , #3  > , #4 3/14, #5 , #6 , #7 , #8 , #  6/27  *Keeping her visit same time & day if possible: currently all M @ 5 pm     24-wk P Provider/RD visits: Provider [place] 1/4, 3/2; RD 1/20    FOLLOW-UP: Reminder to switch to phone or reschedule by calling [phone].    TIME: ~30+/- minutes spent with patient, care coordination, sending resources & charting.     SIGNATURE:  BRIANNA SALVADOR, Certified Health  on 2/28/2022 at 5:07 PM

## 2022-03-02 ENCOUNTER — VIRTUAL VISIT (OUTPATIENT)
Dept: SURGERY | Facility: CLINIC | Age: 52
End: 2022-03-02
Payer: COMMERCIAL

## 2022-03-02 VITALS — WEIGHT: 167 LBS | BODY MASS INDEX: 26.84 KG/M2 | HEIGHT: 66 IN

## 2022-03-02 DIAGNOSIS — I10 ESSENTIAL HYPERTENSION: ICD-10-CM

## 2022-03-02 DIAGNOSIS — E66.3 OVERWEIGHT WITH BODY MASS INDEX (BMI) OF 27 TO 27.9 IN ADULT: Primary | ICD-10-CM

## 2022-03-02 DIAGNOSIS — E78.5 HYPERLIPIDEMIA LDL GOAL <130: ICD-10-CM

## 2022-03-02 DIAGNOSIS — Z79.899 MEDICATION MANAGEMENT: ICD-10-CM

## 2022-03-02 PROCEDURE — 99214 OFFICE O/P EST MOD 30 MIN: CPT | Mod: 95 | Performed by: PHYSICIAN ASSISTANT

## 2022-03-02 NOTE — Clinical Note
Hi!  Please look over this patients note and let me know if you have any thoughts about having irritation at her injection sites since getting up in dose.     Also she will be following up with you in a couple of months. I am going on leave from May 7 - June 20. Returning June 21st to the office.  So I am hoping to utilize you to help see some of the patients in the interim.      Great to be working with you     Gardenia WAYNE

## 2022-03-02 NOTE — PATIENT INSTRUCTIONS
It was great talking with you today!  I have placed a lab for you to get drawn by your next visit in 3 months.  I will have her follow up with Lauren Bloch Pharm D last week of May/first week of June and then follow up with provider end of July/first week of August.       Gardenia WAYNE          Eat Better ? Move More ? Live Well    Eat 3 nutrient-rich meals each day     Don t skip meals--it will cause you to overeat later in the day!     Eating fiber (vegetables/fruits/whole grains) and protein with meals helps you stay full longer     Choose foods with less than 10 grams of sugar and 5 grams of fat per serving to prevent excess calories and weight re-gain   Eat around the same times each day to develop a routine eating schedule    Avoid snacking unless physically hungry.   Planned snacks: 1-2 times per day and no more than 150 calories    Eat protein first    Protein helps with healing, maintaining adequate muscle mass, reducing hunger and optimizing nutritional status    Aim for 60-80 grams of protein per day   Fill up on Fiber    Fiber comes from plants--fruits, veggies, whole grains, nuts/seeds and beans    Fiber is low in calories, high in phytonutrients and helps you stay full longer    Aim for 25-35 grams per day by eating fiber with meals and snacks  Eat S-[id number]    Take 20-30 minutes to eat each meal by taking small bites, chewing foods to applesauce consistency or 20-30 times before you swallow    Eating foods too fast can delay satiety/fullness signals and increase overeating   ? Slow down your eating by using toddler utensils, putting your fork/spoon down between bites and not watching TV or emailing during meals!   Keep a Journal          Writing down what you eat, how you feel and when you are active helps you identify new changes to work on from week to week          Look for ways to cut 100 calories from your current diet 2-3 times per day  Drink 64 ounces of 0-Calorie drinks between meals     Water    Zero calorie Propel  or Vitamin Water      So[employer] [place]  Zero Calories    Crystal Light , Sugar-Free Fer-Aid , and other sugar-free lemonade or flavored chow    Keep Caffeine to less than 300mg per day ie: 3-6oz cups coffee     Work up to 45-60 minutes of physical activity most days of the week    Helps with losing weight and prevent regaining those extra pounds!     Do a combo of cardio (walking/water exercises) and strength training (lifting weights/Vinyasa yoga)    Avoid Mindless Eating    Be present when you eat--take note of the smell, taste and quality of your food    Make a list of alternative activities you could do to prevent eating out of boredom/stress  ? Go for a walk, call a friend, chew gum, paint your nails, re-organize the garage, etc

## 2022-03-02 NOTE — PROGRESS NOTES
Addended by: CHAZ GARRISON on: 4/29/2020 09:02 AM     Modules accepted: Orders     Linsey is a 52 year old who is being evaluated via a billable video visit.      If the video visit is dropped, the invitation should be resent by: Text to cell phone: [phone]  Will anyone else be joining your video visit? No      Video-Visit Details    Type of service:  Video Visit    Video Start Time: 4:07    Video End Time: 4:31    35 minutes spent on the date of the encounter doing chart review, review of test results, patient visit and documentation       Originating Location (pt. Location): Home    Distant Location (provider location):  [facility]     Platform used for Video Visit: OpenNews          2022          Return Virtual Medical Weight Management Note         Jaz Archibald  MRN:  [id number]  :  1970        Dear Jacinda Banks,    I had the pleasure of doing a virtual visit with your patient Jaz Archibald.  She is a 52 year old female who I am continuing to see for treatment of obesity related to:       2022   I have the following health issues associated with obesity: High Blood Pressure, High Cholesterol, Stress Incontinence, Osteoarthritis (joint disease)   I have the following symptoms associated with obesity: Knee Pain, Depression, Back Pain, Fatigue, Hip Pain       INTERVAL HISTORY:  Patient was seen last month and started on liraglutide. Really likes how it evens out her appetite. Does not have peaks and valleys with cravings. Does not have a ton of cravings like she has had. Went up to 3.0 mg dose last week. Had NO issues at all with this medication other than infrequent mild nausea first thing in the AM. No vomiting No abdominal pain. No severe constipation or heartburn. Has noticed in the past week since going up on her dose that she gets some irritation at the injection site. 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 In the past week gets a small area of redness around the site that looks like a mosquito bite. Minimal itchiness and no  pain. Slowly fades over the next couple of days. Otherwise very happy with her weight loss!        CURRENT WEIGHT:   167 lbs 0 oz       Wt Readings from Last 4 Encounters:   03/02/22 167 lb (75.8 kg)   01/20/22 177 lb 12.8 oz (80.6 kg)   01/14/22 177 lb 12.8 oz (80.6 kg)   01/04/22 178 lb (80.7 kg)       BARIATRIC METRICS:  Current Weight: 167 lb (75.8 kg)  Body mass index is 27.37 kg/m .   Wt change since last visit (lbs): -10.8  Cumulative weight loss (lbs): -7      DIETARY HISTORY  Meals Per Day: 3  Food Diary:   B: protein shake and some fruit. [employer] iced vanilla latte  L: [place] salad with fruit on the side.  Water  D: Last night had peppers with cream cheese, turkey and a bit of bagel seasoning on it.  Water  Snacks Per Day: minimal if nauseated will eat first thing in the AM  Fluid Intake: 64 oz water, coffee 2 days per week. Had 1 glass of wine for dinner. And a can of coke zero 3 times weekly  Meals at Restaurant per week:1      Getting Adequate Protein: yes  Sleeping 7-8 hours/day  yes  Stress management going well      Exercise:  Walks on treadmill 2-3 times weekly for about 20-30 minutes.        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 a result  Pulmonary  Shortness of breath at rest: No  Shortness of breath with exertion:  Yes  Snoring: Yes - Wakes up with headaches 2-3 times per week - A referral for [facility]   Gastrointestinal  Heartburn: No  Abdominal pain: No  History of pancreatitis: No  Severe constipation: on occasion - takes magnesium or stool softener  Hx of bowel obstruction: No  Endocrine  Polydipsia: yes  Polyuria: No  Personal or family history of thyroid cancer: No      MEDICATIONS:  "  Current Outpatient Medications   Medication     amLODIPine (NORVASC) 2.5 MG tablet     aspirin (ASA) 325 MG EC tablet     aspirin (ASA) 81 MG EC tablet     ATORVASTATIN CALCIUM PO     cyclobenzaprine (FLEXERIL) 5 MG tablet     EPINEPHrine (ANY BX GENERIC EQUIV) 0.3 MG/0.3ML injection 2-pack     escitalopram (LEXAPRO) 20 MG tablet     insulin pen needle (31G X 5 MM) 31G X 5 MM miscellaneous     liraglutide - Weight Management (SAXENDA) 18 MG/3ML pen     olopatadine (PATANOL) 0.1 % ophthalmic solution     promethazine (PHENERGAN) 25 MG tablet     traZODone (DESYREL) 100 MG tablet     No current facility-administered medications for this visit.       PE:  Ht 5' 5.5\" (1.664 m)   Wt 167 lb (75.8 kg)   LMP 07/26/2013   BMI 27.37 kg/m    GENERAL: Healthy, alert and no distress  EYES: Eyes grossly normal to inspection.  No discharge or erythema, or obvious scleral/conjunctival abnormalities.  RESP: No audible wheeze, cough, or visible cyanosis.  No visible retractions or increased work of breathing.    SKIN: Visible skin clear. No significant rash, abnormal pigmentation or lesions.  NEURO: Cranial nerves grossly intact.  Mentation and speech appropriate for age.  PSYCH: Mentation appears normal, affect normal/bright, judgement and insight intact, normal speech and appearance well-groomed.        ASSESSMENT/PLAN:   Overweight BMI 27  High cholesterol  High blood pressure  bilat knee pain   Lumbar pain        Congratulated patient on her weight loss.  Asks that she let clinic know if the irritation at her injection sites get worse. Otherwise she will continue with Saxenda (liraglutide) 3.0 mg injections. Just picked up her refill but has not started taking it. Will send over refill request when ready.    Placed a lab for patient to get drawn by her next visit in 3 months. Will have her follow up with Lauren Bloch Pharm D last week of May/first week of June and then follow up with provider end of July/first week of " August.     Patient verbalized understanding.     Gardenia Diego MS, PA-C

## 2022-03-14 ENCOUNTER — TELEPHONE (OUTPATIENT)
Dept: SURGERY | Facility: CLINIC | Age: 52
End: 2022-03-14
Payer: COMMERCIAL

## 2022-03-14 NOTE — TELEPHONE ENCOUNTER
MTM referral from: [facility] visit (referral by provider)    MTM referral outreach attempt #2 on March 14, 2022 at 9:59 AM      Outcome: Patient not reachable after several attempts, will route to MTM Pharmacist/Provider as an FYI.  MT scheduling number is [phone].  Thank you for the referral.    Romario Conley, MTM coordinator

## 2022-03-20 DIAGNOSIS — G45.9 TIA (TRANSIENT ISCHEMIC ATTACK): Primary | ICD-10-CM

## 2022-03-22 RX ORDER — ATORVASTATIN CALCIUM 80 MG/1
80 TABLET, FILM COATED ORAL DAILY
Qty: 90 TABLET | Refills: 3 | Status: SHIPPED | OUTPATIENT
Start: 2022-03-22 | End: 2023-07-11

## 2022-03-22 NOTE — TELEPHONE ENCOUNTER
"Outpatient Medication Detail     Disp Refills Start End RANJAN   ATORVASTATIN CALCIUM PO     --   Sig - Route: Take 80 mg by mouth daily  - Oral   Class: Historical   Order: [id number]     Routing refill request to provider for review/approval because:  Medication is reported/historical    Last office visit provider:  1/27/22     Requested Prescriptions   Pending Prescriptions Disp Refills     atorvastatin (LIPITOR) 80 MG tablet       Sig: Take 1 tablet (80 mg) by mouth daily       Statins Protocol Passed - 3/22/2022  7:49 AM        Passed - LDL on file in past 12 months     Recent Labs   Lab Test 08/15/21  1304   *             Passed - No abnormal creatine kinase in past 12 months     No lab results found.             Passed - Recent (12 mo) or future (30 days) visit within the authorizing provider's specialty     Patient has had an office visit with the authorizing provider or a provider within the authorizing providers department within the previous 12 mos or has a future within next 30 days. See \"Patient Info\" tab in inbasket, or \"Choose Columns\" in Meds & Orders section of the refill encounter.              Passed - Medication is active on med list        Passed - Patient is age 18 or older        Passed - No active pregnancy on record        Passed - No positive pregnancy test in past 12 months             Umang Otero RN 03/22/22 7:49 AM  "

## 2022-03-23 ENCOUNTER — E-VISIT (OUTPATIENT)
Dept: INTERNAL MEDICINE | Facility: CLINIC | Age: 52
End: 2022-03-23
Payer: COMMERCIAL

## 2022-03-23 ENCOUNTER — TRANSFERRED RECORDS (OUTPATIENT)
Dept: INTERNAL MEDICINE | Facility: CLINIC | Age: 52
End: 2022-03-23

## 2022-03-23 DIAGNOSIS — N39.0 ACUTE UTI (URINARY TRACT INFECTION): Primary | ICD-10-CM

## 2022-03-23 PROCEDURE — 99421 OL DIG E/M SVC 5-10 MIN: CPT | Performed by: NURSE PRACTITIONER

## 2022-03-23 RX ORDER — NITROFURANTOIN 25; 75 MG/1; MG/1
100 CAPSULE ORAL 2 TIMES DAILY
Qty: 10 CAPSULE | Refills: 0 | Status: SHIPPED | OUTPATIENT
Start: 2022-03-23 | End: 2022-03-28

## 2022-03-23 NOTE — PATIENT INSTRUCTIONS
Dear Jaz Archibald    After reviewing your responses, I've been able to diagnose you with a urinary tract infection, which is a common infection of the bladder with bacteria.  This is not a sexually transmitted infection, though urinating immediately after intercourse can help prevent infections.  Drinking lots of fluids is also helpful to clear your current infection and prevent the next one.      I have sent a prescription for antibiotics to your pharmacy to treat this infection.    It is important that you take all of your prescribed medication even if your symptoms are improving after a few doses.  Taking all of your medicine helps prevent the symptoms from returning.     If your symptoms worsen, you develop pain in your back or stomach, develop fevers, or are not improving in 5 days, please contact your primary care provider for an appointment or visit any of our convenient Walk-in or Urgent Care Centers to be seen, which can be found on our website here.    Thanks again for choosing us as your health care partner,    Jacinda Banks NP

## 2022-03-25 DIAGNOSIS — E66.3 OVERWEIGHT WITH BODY MASS INDEX (BMI) OF 27 TO 27.9 IN ADULT: Primary | ICD-10-CM

## 2022-04-04 ENCOUNTER — TELEPHONE (OUTPATIENT)
Dept: SURGERY | Facility: CLINIC | Age: 52
End: 2022-04-04
Payer: COMMERCIAL

## 2022-04-04 NOTE — TELEPHONE ENCOUNTER
Pt Requested Rescheduled for Today's HC Visit.    Pt answered yet requested to r/s.    - R/s to 4/8 at 11 am.  This was a r/s from 3/14 & today, 4/4 .    - Given GD, extended visit by chance pt wants to use 2 of her visits.  Graduation Date: July 18, 2022     -GDP

## 2022-04-15 ENCOUNTER — VIRTUAL VISIT (OUTPATIENT)
Dept: SURGERY | Facility: CLINIC | Age: 52
End: 2022-04-15

## 2022-04-15 DIAGNOSIS — E66.3 OVERWEIGHT (BMI 25.0-29.9): Primary | ICD-10-CM

## 2022-04-15 PROCEDURE — 99207 PR MWM HEALTH COACH NO CHARGE: CPT | Performed by: COMMUNITY HEALTH WORKER

## 2022-04-15 NOTE — PROGRESS NOTES
BRIANNA CONRAD     Progress Notes    Return Weight Management Health Coaching Note    Jaz Archibald          MRN: [id number]  : 1970  FLASH: April 15, 2022    Health  Visit #: 4 & 5  Type of Visit: 24 week  Telephone Visit - Call: [phone]     Initial Start Date: 2022  Graduation Date: 2022     Provider on 22: GERARD Mendez per Provider Overweight (BMI 25 to 29.9).  Initial Height and Wt: 5' 6 , 178 lbs   BMI = 28.73 kg/m     ASSESSMENT:  HC Visit on :  174 - 173 lbs (self-reported)    Current Weight: 161 lbs (self-reported), BMI = 26.0 kg/m , 9.6 % wt loss  Average Daily Water: 64 oz/day  Weight Loss Medication: Rx Saxenda, pt started; 4/15/22 pt is still taking   Nutrition Plan: Other: Regular Diet, per RD    Additional Program Support.  (See 22 notes for addt'l info)  [place] Access Begins/Ends: 22 - *22 - *correction, caught error in previous visit note    Highlights from Past Week's and Today's Visit:  *She is pleased with her changed eating habits.   *At first she felt like her wt loss wasn't significant. Reviewing BMI & % wt loss >>> recognize her success & reassurance that she is on the right track.  *She was ill last week.   *She is noticing a challenge with keeping in movement and stress associated with work. >>> More discussion here, she realizing she wants to move toward better work/life balance. This is impacting her motivation to add in movement (& her general quality of life/time for connection with others).    Action Steps pt set until next HC Visit are:    She wants to:  1) EWB  - [employer] with setting & ending her day with intention by reading a combo of statements & reflective questions.    - Listen to a podcast that speaks to assertiveness & owning her space.   The author also has an article that she is curious to read ~ adopting changing your mindset through being present w/your inner dialogue, preframe situations, etc..      2) Movement/EWB  - Add in a stress buster using movement to provide her a brief reset/reflection time during her busy workday plus build the habit of adding in movement during the day.  When she goes down to the breakroom with her colleague to get her flavored water, she wants to take the steps back up.    Next Visit: capture water intake    PATIENT EDUCATION PROVIDED:  OEQ -Captured insights &/or awareness gained & other pt's perceived victories.  Assessed How Visit Today Would Best Serve Pt  Progress Review: Behavioral / Capture Wt   Affirmations / Character Strength Reflection / Value Alignment  OEQ - on topics noted above  Review BMI & % Wt Loss Changes  Pt requested resource emailed; no phi in email.    HC Visit #1 1/31, #2 2/7, #3 2/14 > 2/28, #4 3/14, #5 4/4, #6 4/25 > 4/20, #7 5/16 > 5/13, #8 6/6, #9 6/27, 310 & 11 7/13     24-wk HLP Provider/RD visits: Provider [place] 1/4, 3/2; RD 1/20    FOLLOW-UP: Reminder to switch to phone or reschedule by calling [phone].    TIME: ~30+/- minutes spent with patient, care coordination, sending resources & charting.     SIGNATURE:  BRIANNA SALVADOR, Certified Health  on 4/15/2022 at 8:06 AM

## 2022-05-11 DIAGNOSIS — I10 BENIGN ESSENTIAL HYPERTENSION: ICD-10-CM

## 2022-05-11 NOTE — TELEPHONE ENCOUNTER
Refill request from pharmacy for amlodipine      Jhonny Valenzuela Jr., CMA on 5/11/2022 at 12:24 PM

## 2022-05-12 RX ORDER — AMLODIPINE BESYLATE 2.5 MG/1
2.5 TABLET ORAL DAILY
Qty: 90 TABLET | Refills: 0 | Status: SHIPPED | OUTPATIENT
Start: 2022-05-12 | End: 2022-09-12

## 2022-05-13 ENCOUNTER — TELEPHONE (OUTPATIENT)
Dept: SURGERY | Facility: CLINIC | Age: 52
End: 2022-05-13

## 2022-05-13 NOTE — TELEPHONE ENCOUNTER
Pt Missed Scheduled HC Visit.    Attempted to call pt twice for her hc visit today. Call wouldn't go through as if pt was perhaps declining the call on her end? Unsure. Sent [employer].   - Confirmed next hc visit & invited pt to reschedule by calling the Weight Management scheduling team if wishes to connect prior to visit on 6/6.   - Provided pt our clinic's scheduling phone number.      -GDP

## 2022-05-25 ENCOUNTER — TRANSFERRED RECORDS (OUTPATIENT)
Dept: HEALTH INFORMATION MANAGEMENT | Facility: CLINIC | Age: 52
End: 2022-05-25
Payer: COMMERCIAL

## 2022-06-01 ENCOUNTER — VIRTUAL VISIT (OUTPATIENT)
Dept: PHARMACY | Facility: CLINIC | Age: 52
End: 2022-06-01
Payer: COMMERCIAL

## 2022-06-01 DIAGNOSIS — G47.01 SLEEP DISORDER DUE TO A GENERAL MEDICAL CONDITION, INSOMNIA TYPE: ICD-10-CM

## 2022-06-01 DIAGNOSIS — E66.3 OVERWEIGHT WITH BODY MASS INDEX (BMI) OF 27 TO 27.9 IN ADULT: Primary | ICD-10-CM

## 2022-06-01 DIAGNOSIS — R11.2 NAUSEA AND VOMITING, INTRACTABILITY OF VOMITING NOT SPECIFIED, UNSPECIFIED VOMITING TYPE: ICD-10-CM

## 2022-06-01 DIAGNOSIS — I77.74 DISSECTION, VERTEBRAL ARTERY (H): ICD-10-CM

## 2022-06-01 DIAGNOSIS — F32.A DEPRESSION, UNSPECIFIED DEPRESSION TYPE: ICD-10-CM

## 2022-06-01 DIAGNOSIS — I10 ESSENTIAL HYPERTENSION: ICD-10-CM

## 2022-06-01 DIAGNOSIS — F41.1 ANXIETY STATE: ICD-10-CM

## 2022-06-01 PROCEDURE — 99607 MTMS BY PHARM ADDL 15 MIN: CPT | Performed by: PHARMACIST

## 2022-06-01 PROCEDURE — 99605 MTMS BY PHARM NP 15 MIN: CPT | Performed by: PHARMACIST

## 2022-06-01 NOTE — Clinical Note
Started topiramte for synergistic therapy with Saxenda will see how this goes. I will check in with her in 1 month. Jesica SO

## 2022-06-01 NOTE — PROGRESS NOTES
Medication Therapy Management (MTM) Encounter    ASSESSMENT:                            Medication Adherence/Access: No issues identified    BMI 27-29.9: May benefit from adjunct therapy with topiramate.     Insomnia: Stable.     Vertebral artery dissection: Stable.     Hypertension: Would benefit from blood pressure repeat as last blood pressure not at goal.     Depression/Anxiety: Needs improvement. Discussed potential next steps: 1) increase escitalopram 30 mg daily, 2) consider alternative selective serotonin reuptake inhibitor, 3) start bupropion  mg daily. Patient prefers to trial augmentation agent, bupropion. Therefore, May benefit from augmentation with bupropion start.    Nausea: Stable.     PLAN:                            1. Start topiramate. Start Topiramate: 25 mg nightly for 1 week, 50 mg nightly for 1 week, then 75 mg nightly thereafter. CPA with Gardenia iDego PA-C.     2. Pharmacist will reach out to primary care provider about potential for bupropion  mg daily start.     3. Recheck your blood pressure. How to take blood pressure at home appropriately:   -Use a blood pressure monitor that goes around the upper arm. Try not to use the one that goes around the wrist, those are less accurate.   -Ensure you are in a seated position resting for 5-10 minutes prior to taking blood pressure  -Make sure feet are firmly planted on the ground and legs are uncrossed  -Have arm that blood pressure is being test on is lying flat on table/counter (at heart level).   -Do not talk when machine is taking blood pressure.     Follow-up: 1 month     SUBJECTIVE/OBJECTIVE:                          Jaz Archibald is a 52 year old female called for an initial visit. She was referred to me from Gardenia Diego PA-C.      Reason for visit: [facility] start follow up. Has felt more depressed lately. Would like to discuss more.     Allergies/ADRs: Reviewed in chart  Past Medical History: Reviewed in chart  Tobacco: She  "reports that she has never smoked. She has never used smokeless tobacco.  Alcohol: 1-3 beverages / week or less    Medication Adherence/Access: no issues reported    BMI 27-29.9:   Saxenda 3 mg daily    Followed by Gardenia Diego PA-C, seen 3/2/2022 for Return Medical Weight Management. Patient is experiencing the follow side effects: None. Reports injection issues with thigh. No issues on abdomen. She reports that she has been under greater stress and causes her to have somewhat emotional eating. Reports she wonders if feeling satiated between meals has worn off. Now is having sugar cravings. Initially stopped and now back. No history of kidney stone. No glaucoma.     Wt Readings from Last 4 Encounters:   03/02/22 167 lb (75.8 kg)   01/20/22 177 lb 12.8 oz (80.6 kg)   01/14/22 177 lb 12.8 oz (80.6 kg)   01/04/22 178 lb (80.7 kg)     Estimated body mass index is 27.37 kg/m  as calculated from the following:    Height as of 3/2/22: 5' 5.5\" (1.664 m).    Weight as of 3/2/22: 167 lb (75.8 kg).    Creatinine   Date Value Ref Range Status   08/27/2021 0.75 0.52 - 1.04 mg/dL Final   07/03/2020 0.76 0.52 - 1.04 mg/dL Final     Potassium   Date Value Ref Range Status   08/27/2021 3.9 3.4 - 5.3 mmol/L Final   07/03/2020 3.9 3.4 - 5.3 mmol/L Final     Insomnia:   Trazodone 100 mg bedtime    Sleeps well. No medication side effects.     Vertebral artery dissection:   Aspirin 81 mg daily  Atorvastatin 80 mg daily     No medication side effects. No issues of bleeding or bruising..   Recent Labs   Lab Test 08/15/21  1304 07/03/20  0859 05/20/16  0754 05/28/15  0840   CHOL 208* 264*   < > 312*   HDL 47* 57   < > 76   * 160*   < > 202*   TRIG 236* 235*   < > 171*   CHOLHDLRATIO  --   --   --  4.1    < > = values in this interval not displayed.     Hypertension:   Amlodipine 2.5 mg daily    Patient does self-monitor blood pressure. Hasn't checked recently. Patient reports no current medication side effects. Reports when checked " "a couple months ago remembers being \"normal\".   BP Readings from Last 3 Encounters:   01/14/22 (!) 141/87   12/06/21 128/84   10/04/21 122/83     Depression/Anxiety:   Escitalopram 20 mg daily     She has been feeling somewhat more depressed, fatigued. This has been ongoing the last months. Reports she has tried 1-2 other medication before, can't remember one of them. Reports that anxiety is present but not overtly bothersome compared to greater depressed feelings.   Medication fails: prozac - night sweats.     PHQ 8/18/2021 9/22/2021 12/2/2021   PHQ-9 Total Score 4 15 21   Q9: Thoughts of better off dead/self-harm past 2 weeks Not at all Not at all Not at all     ALBERTO-7 SCORE 8/18/2021 9/22/2021 12/2/2021   Total Score - - -   Total Score 14 (moderate anxiety) 16 (severe anxiety) 13 (moderate anxiety)   Total Score 14 16 13     Nausea:   Promethazine 25 mg as needed    Reports she randomly has issues of nausea, doesn't use often.   ----------------    I spent 30 minutes with this patient today. All changes were made via collaborative practice agreement with Gardenia Diego PA-C. A copy of the visit note was provided to the patient's provider(s).    The patient was sent via [employer] a summary of these recommendations.     Lauren Bloch, PharmD, BCACP   Medication Therapy Management Pharmacist   [facility]      Telemedicine Visit Details  Type of service:  Telephone visit  Start Time: 3:00 PM  End Time: 3:30 PM  Originating Location (patient location): Home  Distant Location (provider location):  [facility]     Medication Therapy Recommendations  Depression, unspecified depression type    Current Medication: escitalopram (LEXAPRO) 20 MG tablet   Rationale: Synergistic therapy - Needs additional medication therapy - Indication   Recommendation: Start Medication - buPROPion 150 MG 24 hr tablet   Status: Contact Provider - Awaiting Response    "      Essential hypertension    Current Medication: amLODIPine (NORVASC) 2.5 MG tablet   Rationale: Medication requires monitoring - Needs additional monitoring   Recommendation: Continue to Monitor   Status: Patient Agreed - Adherence/Education         Overweight with body mass index (BMI) of 27 to 27.9 in adult    Current Medication: liraglutide - Weight Management (SAXENDA) 18 MG/3ML pen   Rationale: Synergistic therapy - Needs additional medication therapy - Indication   Recommendation: Start Medication - topiramate 25 MG tablet   Status: Accepted per CPA

## 2022-06-06 ENCOUNTER — VIRTUAL VISIT (OUTPATIENT)
Dept: SURGERY | Facility: CLINIC | Age: 52
End: 2022-06-06

## 2022-06-06 DIAGNOSIS — E66.3 OVERWEIGHT (BMI 25.0-29.9): Primary | ICD-10-CM

## 2022-06-06 PROCEDURE — 99207 PR MWM HEALTH COACH NO CHARGE: CPT | Performed by: COMMUNITY HEALTH WORKER

## 2022-06-06 NOTE — PROGRESS NOTES
GINA SALVADOR, BRIANNA BYRNES     Progress Notes    Return Weight Management Health Coaching Note    Jaz Archibald          MRN: [id number]  : 1970  FLASH: 2022    Health  Visit #: 7 - addendum, correction  Type of Visit: 24 week  Telephone Visit - Call: [phone]     Initial Start Date: 2022  Graduation Date: 2022     Provider on 22: GERARD Mendez per Provider Overweight (BMI 25 to 29.9).  Initial Height and Wt: 5' 6 , 178 lbs   BMI = 28.73 kg/m     ... on 4/15: 161 lbs (self-reported), BMI = 26.0 kg/m , 9.6 % wt loss    Notes.  She 5 pm visit - she had extra time today to add in a previous missed visit so extended her hc visit today.    TIME: ~30+/- minutes spent with patient, care coordination, sending resources & charting.     SIGNATURE:  BRIANNA SALVADOR, Certified Health  on 2022 at 5:10 PM

## 2022-06-06 NOTE — PROGRESS NOTES
BRIANNA CONRAD     Progress Notes    Return Weight Management Health Coaching Note    Jaz Archibald          MRN: [id number]  : 1970  FLASH: 2022    Health  Visit #: 6  Type of Visit: 24 week  Telephone Visit - Call: [phone]     Initial Start Date: 2022  Graduation Date: 2022     Provider on 22: GERARD Mendez per Provider Overweight (BMI 25 to 29.9).  Initial Height and Wt: 5' 6 , 178 lbs   BMI = 28.73 kg/m     ASSESSMENT:  HC Visit on :  174 - 173 lbs (self-reported)  ... on 4/15: 161 lbs (self-reported), BMI = 26.0 kg/m , 9.6 % wt loss    Current Weight: ran out of time to capture  Average Daily Water: 64 oz/day  Weight Loss Medication: Rx Saxenda, pt started; 4/15/22 pt is still taking   Nutrition Plan: Other: Regular Diet, per RD     Additional Program Support.  (See 22 notes for addt'l info)  Lolabox Access Begins/Ends: 22 - *22 - *correction, caught error in previous visit note    Highlights from Past Week's and Today's Visit:  *The effectiveness of her wt loss med seemed to wane >>> which lead her to proactively connect with MTM Pharm Jesica.  *Work has slowed down & feels she has more energy & focus fpr her well-being.     Action Steps pt set until next HC Visit are:    She wants to:  1) EWB  - Cont to end  her day with meditations using Calm mando.  - Cont to start her day with statements & questioning to help her keep her well-being.    - FOCUS: she has a few podcast that interest her, with the 1st & latter of most interest: Assertiveness - Time Mastery & Sleep Podcast    2) Nutrition  - She likes the idea of creating a recipe library. Way to capture tbd.  She has a few meal p/p resources to utilize to add in new dinner recipes as needed.    - Cont to work with MTM Pharm & CWMP regardin wt loss med adjustments as needed    Next Visit: capture weigh-in & water intake - scheduling with CWMP & MT recommendations?    PATIENT  EDUCATION PROVIDED:  OEQ -Captured insights &/or awareness gained & other pt's perceived victories.  Assessed How Visit Today Would Best Serve Pt  Progress Review: Behavioral   Affirmations / Character Strength Reflection / Value Alignment  OEQ - on topics noted above  Pt requested resource emailed; no phi in email.    HC Visits: #6 & 7 6/6, #8 6/17, #9 6/27, #10 &11 7/13    24-wk HLP Provider/RD visits: Provider Gardenia 1/4, 3/2; RD 1/20; MTRONNY Mooney 6/1    FOLLOW-UP: Reminder to switch to phone or reschedule by calling [phone].    TIME: ~30+/- minutes spent with patient, care coordination, sending resources & charting.     SIGNATURE:  BRIANNA SALVADOR, Certified Health  on 6/6/2022 at 5:10 PM

## 2022-06-08 RX ORDER — TOPIRAMATE 25 MG/1
TABLET, FILM COATED ORAL
Qty: 90 TABLET | Refills: 2 | Status: SHIPPED | OUTPATIENT
Start: 2022-06-08 | End: 2022-12-15

## 2022-06-09 RX ORDER — BUPROPION HYDROCHLORIDE 150 MG/1
150 TABLET ORAL EVERY MORNING
Qty: 30 TABLET | Refills: 3 | Status: SHIPPED | OUTPATIENT
Start: 2022-06-09 | End: 2022-09-16

## 2022-06-09 NOTE — PATIENT INSTRUCTIONS
"Recommendations from today's MTM visit:                                                    MTM (medication therapy management) is a service provided by a clinical pharmacist designed to help you get the most of out of your medicines.   Today we reviewed what your medicines are for, how to know if they are working, that your medicines are safe and how to make your medicine regimen as easy as possible.      1. Start topiramate. Start Topiramate: 25 mg nightly for 1 week, 50 mg nightly for 1 week, then 75 mg nightly thereafter.     2. Pharmacist will reach out to primary care provider about potential for bupropion  mg daily start.     3. Recheck your blood pressure. How to take blood pressure at home appropriately:   -Use a blood pressure monitor that goes around the upper arm. Try not to use the one that goes around the wrist, those are less accurate.   -Ensure you are in a seated position resting for 5-10 minutes prior to taking blood pressure  -Make sure feet are firmly planted on the ground and legs are uncrossed  -Have arm that blood pressure is being test on is lying flat on table/counter (at heart level).   -Do not talk when machine is taking blood pressure.     Follow-up: 1 month     It was great speaking with you today.  I value your experience and would be very thankful for your time in providing feedback in our clinic survey. In the next few days, you may receive an email or text message from [employer] with a link to a survey related to your  clinical pharmacist.\"     My Clinical Pharmacist's contact information:                                                      Please feel free to contact me with any questions or concerns you have.      Lauren Bloch, Hayde  Medication Therapy Management Pharmacist   [facility] [place]             "

## 2022-06-09 NOTE — PROGRESS NOTES
Bupropion start authorized by primary care provider. Called patient to inform. Will also send mychart information.     Lauren Bloch, PharmD, BCACP   Medication Therapy Management Pharmacist   [facility] [place]

## 2022-06-14 DIAGNOSIS — E66.3 OVERWEIGHT WITH BODY MASS INDEX (BMI) OF 27 TO 27.9 IN ADULT: ICD-10-CM

## 2022-06-14 DIAGNOSIS — R11.2 NAUSEA AND VOMITING, INTRACTABILITY OF VOMITING NOT SPECIFIED, UNSPECIFIED VOMITING TYPE: ICD-10-CM

## 2022-06-15 NOTE — TELEPHONE ENCOUNTER
[facility]       Jhonny Valenzuela Jr., CMA on 6/15/2022 at 12:28 PM

## 2022-06-15 NOTE — TELEPHONE ENCOUNTER
Saxenda came from Gardenia Diego PA-C, not primary care provider. Therefore will refill per Gardenia Diego PA-C. [facility] with Gardenia Diego PA-C.     Lauren Bloch, PharmD, BCACP   Medication Therapy Management Pharmacist   [facility] [place]

## 2022-06-15 NOTE — TELEPHONE ENCOUNTER
"Routing refill request to provider for review/approval because:  System reports patient not taking medication.    Last Written Prescription Date:  12/9/21  Last Fill Quantity: 60,  # refills: 1   Last office visit provider:  9/22/21     Requested Prescriptions   Pending Prescriptions Disp Refills     promethazine (PHENERGAN) 25 MG tablet 60 tablet 1     Sig: Take 0.5-1 tablets (12.5-25 mg) by mouth every 6 hours as needed for nausea        Antivertigo/Antiemetic Agents Passed - 6/15/2022 11:09 AM        Passed - Recent (12 mo) or future (30 days) visit within the authorizing provider's specialty     Patient has had an office visit with the authorizing provider or a provider within the authorizing providers department within the previous 12 mos or has a future within next 30 days. See \"Patient Info\" tab in inbasket, or \"Choose Columns\" in Meds & Orders section of the refill encounter.              Passed - Medication is active on med list        Passed - Patient is 18 years of age or older             Umang Otero RN 06/15/22 11:10 AM  "

## 2022-06-19 RX ORDER — PROMETHAZINE HYDROCHLORIDE 25 MG/1
12.5-25 TABLET ORAL EVERY 6 HOURS PRN
Qty: 60 TABLET | Refills: 0 | Status: SHIPPED | OUTPATIENT
Start: 2022-06-19 | End: 2022-12-15

## 2022-06-22 ENCOUNTER — VIRTUAL VISIT (OUTPATIENT)
Dept: SURGERY | Facility: CLINIC | Age: 52
End: 2022-06-22
Payer: COMMERCIAL

## 2022-06-22 VITALS — BODY MASS INDEX: 27.48 KG/M2 | HEIGHT: 66 IN | WEIGHT: 171 LBS

## 2022-06-22 DIAGNOSIS — E66.3 OVERWEIGHT WITH BODY MASS INDEX (BMI) OF 28 TO 28.9 IN ADULT: Primary | ICD-10-CM

## 2022-06-22 DIAGNOSIS — I10 ESSENTIAL HYPERTENSION: ICD-10-CM

## 2022-06-22 DIAGNOSIS — M54.50 LUMBAR PAIN: ICD-10-CM

## 2022-06-22 PROCEDURE — 99214 OFFICE O/P EST MOD 30 MIN: CPT | Mod: 95 | Performed by: PHYSICIAN ASSISTANT

## 2022-06-22 NOTE — PROGRESS NOTES
Linsey is a 52 year old who is being evaluated via a billable video visit.      If the video visit is dropped, the invitation should be resent by: Text to cell phone: [phone]  Will anyone else be joining your video visit? No      Video-Visit Details    Type of service:  Video Visit    Video Start Time: 3:36    Video End Time: 4:01    35 minutes spent on the date of the encounter doing chart review, review of test results, patient visit and documentation       Originating Location (pt. Location): Home    Distant Location (provider location):  [facility]     Platform used for Video Visit: 20lines            2022              Return Virtual Medical Weight Management Note         Jaz Archibald  MRN:  [id number]  :  1970        Dear Jacinda Banks,    I had the pleasure of doing a virtual visit with your patient Jaz Archibald.  She is a 52 year old female who I am continuing to see for treatment of obesity related to:       2022   I have the following health issues associated with obesity: High Blood Pressure, High Cholesterol, Stress Incontinence, Osteoarthritis (joint disease)   I have the following symptoms associated with obesity: Knee Pain, Depression, Back Pain, Fatigue, Hip Pain       INTERVAL HISTORY:  Patient was seen in March and continued on liraglutide.  She had a follow up with Lauren Bloch PharmD on 2022 and started on topamax. About 2 weeks ago went out of town. Thought she was keeping the liraglutide chilled but did not. Stopped taking thinking they weren't viable. Has not restarted. Lauren Bloch PharmD prescribed topamax but patient just got in the mail today. During the same conversation with Jesica it was mentioned she talk with primary about starting buproprion.  She did and just got this in the mail today as well. Does notice she is hungrier since she stopped the liraglutide.  Was taking 3.0 mg daily. Had no side effects    She is  very stressed at work and has not been exercising. Knows this could be playing a role in her weight loss as well.         CURRENT WEIGHT:   171 lbs 0 oz       Wt Readings from Last 4 Encounters:   06/22/22 171 lb (77.6 kg)   03/02/22 167 lb (75.8 kg)   01/20/22 177 lb 12.8 oz (80.6 kg)   01/14/22 177 lb 12.8 oz (80.6 kg)       BARIATRIC METRICS:  Current Weight: 171 lb (77.6 kg) (pt reported)  Body mass index is 28.02 kg/m .   Wt change since last visit (lbs): 4  Cumulative weight loss (lbs): -11        DIETARY HISTORY  Meals Per Day: 3  Food Diary:   B: [employer] cheese and fruit box. Coffee  L: kashif with cucumbers and chick pea  D: burrito with added chicken, cheese and lettuce. Madelyn De Byers Sauce.  2 donuts  Snacks Per Day: minimal if nauseated will eat first thing in the AM  Fluid Intake: 64 oz water, coffee 2 days per week. Had 1 glass of wine for dinner. And a can of coke zero 3 times weekly  Meals at Restaurant per week:1      Getting Adequate Protein: yes  Sleeping 7-8 hours/day  yes  Stress management going well      Exercise:  Has not been exercising.  Did get a bike last week.        ROS: 6/22/2022  General  Fatigue: yes  Sleep Quality: Depends  Birth Control: Hysterectomy 2014  HEENT  Hx of glaucoma: No  Vision changes: No  Cardiovascular  Chest Pain with Exertion: No  Palpitations: No  Hx of heart disease: No - Had a vertebral artery dissection and takes ASA - had mini strokes as a result  Pulmonary  Shortness of breath at rest: No  Shortness of breath with exertion:  Yes  Snoring: Yes - Wakes up with headaches 2-3 times per week - A referral for [facility]   Gastrointestinal  Heartburn: No  Abdominal pain: No  History of pancreatitis: No  Severe constipation: on occasion - takes magnesium or stool softener  Hx of bowel obstruction: No  No kidney stone or kidney disease  Endocrine  Polydipsia: yes  Polyuria: No  Personal or family history of thyroid cancer: No      MEDICATIONS:   Current Outpatient  "Medications   Medication     amLODIPine (NORVASC) 2.5 MG tablet     aspirin (ASA) 81 MG EC tablet     atorvastatin (LIPITOR) 80 MG tablet     buPROPion (WELLBUTRIN XL) 150 MG 24 hr tablet     EPINEPHrine (ANY BX GENERIC EQUIV) 0.3 MG/0.3ML injection 2-pack     escitalopram (LEXAPRO) 20 MG tablet     insulin pen needle (31G X 5 MM) 31G X 5 MM miscellaneous     liraglutide - Weight Management (SAXENDA) 18 MG/3ML pen     olopatadine (PATANOL) 0.1 % ophthalmic solution     promethazine (PHENERGAN) 25 MG tablet     topiramate (TOPAMAX) 25 MG tablet     traZODone (DESYREL) 100 MG tablet     No current facility-administered medications for this visit.       PE:  Ht 5' 5.5\" (1.664 m)   Wt 171 lb (77.6 kg)   LMP 07/26/2013   BMI 28.02 kg/m    GENERAL: Healthy, alert and no distress  EYES: Eyes grossly normal to inspection.  No discharge or erythema, or obvious scleral/conjunctival abnormalities.  RESP: No audible wheeze, cough, or visible cyanosis.  No visible retractions or increased work of breathing.    SKIN: Visible skin clear. No significant rash, abnormal pigmentation or lesions.  NEURO: Cranial nerves grossly intact.  Mentation and speech appropriate for age.  PSYCH: Mentation appears normal, affect normal/bright, judgement and insight intact, normal speech and appearance well-groomed.        ASSESSMENT/PLAN:   Overweight BMI 28  High cholesterol  High blood pressure  bilat knee pain   Lumbar pain        Discussed how stress can impact any significant weight loss. Mentioned finding ways to \"take a break\" from work. This could be taking a 10 minute walk at lunch or at least after work.  She is also planning on making an appointment with her primary for an annual.    Reviewed the side effects of topamax and wellbutrin. Patient will take as prescribed.     Will get lab drawn that was ordered a few months ago.  Will also schedule with Lauren Bloch PharmD or this provider for a medication check in 6 weeks.    She " verbalizes understanding and is in agreement with this plan.       Gardenia Diego MS, PA-C

## 2022-06-23 NOTE — PATIENT INSTRUCTIONS
It was good talking with you today Linsey.  Please schedule a return visit in about 6 weeks.      Have a great afternoon    Gardenia WAYNE      Eat Better ? Move More ? Live Well    Eat 3 nutrient-rich meals each day    Don t skip meals--it will cause you to overeat later in the day!    Eating fiber (vegetables/fruits/whole grains) and protein with meals helps you stay full longer    Choose foods with less than 10 grams of sugar and 5 grams of fat per serving to prevent excess calories and weight re-gain  Eat around the same times each day to develop a routine eating schedule   Avoid snacking unless physically hungry.   Planned snacks: 1-2 times per day and no more than 150 calories    Eat protein first   Protein helps with healing, maintaining adequate muscle mass, reducing hunger and optimizing nutritional status   Aim for 60-80 grams of protein per day   Fill up on Fiber   Fiber comes from plants--fruits, veggies, whole grains, nuts/seeds and beans   Fiber is low in calories, high in phytonutrients and helps you stay full longer   Aim for 25-35 grams per day by eating fiber with meals and snacks  Eat [id number]   Take 20-30 minutes to eat each meal by taking small bites, chewing foods to applesauce consistency or 20-30 times before you swallow   Eating foods too fast can delay satiety/fullness signals and increase overeating   Slow down your eating by using toddler utensils, putting your fork/spoon down between bites and not watching TV or emailing during meals!   Keep a Journal         Writing down what you eat, how you feel and when you are active helps you identify new changes to work on from week to week         Look for ways to cut 100 calories from your current diet 2-3 times per day  Drink 64 ounces of 0-Calorie drinks between meals   Water   Zero calorie Propel  or Vitamin Water     So[employer] Lif[employer]ater  Zero Calories   Crystal Light , Sugar-Free Fer-Aid , and other sugar-free lemonade or flavored chow   Keep  Caffeine to less than 300mg per day ie: 3-6oz cups coffee     Work up to 45-60 minutes of physical activity most days of the week   Helps with losing weight and prevent regaining those extra pounds!    Do a combo of cardio (walking/water exercises) and strength training (lifting weights/Vinyasa yoga)    Avoid Mindless Eating   Be present when you eat--take note of the smell, taste and quality of your food   Make a list of alternative activities you could do to prevent eating out of boredom/stress  Go for a walk, call a friend, chew gum, paint your nails, re-organize the garage, etc

## 2022-07-06 NOTE — PROGRESS NOTES
"Linsey is a 52 year old who is being evaluated via a billable video visit.      How would you like to obtain your AVS? MyChart  If the video visit is dropped, the invitation should be resent by: Text to cell phone: [phone]  Will anyone else be joining your video visit? No          Assessment & Plan   Problem List Items Addressed This Visit        Other    Anxiety state    Relevant Medications    hydrOXYzine (VISTARIL) 25 MG capsule      Other Visit Diagnoses     Benign essential hypertension    -  Primary    Depression, unspecified depression type        Relevant Medications    hydrOXYzine (VISTARIL) 25 MG capsule    Encounter for screening and preventative care        Relevant Orders    Lipid Profile (Chol, Trig, HDL, LDL calc)    Comprehensive metabolic panel    CBC with platelets and differential    Follicle stimulating hormone    Testosterone, total    Vitamin D deficiency screening    UA Macro with Reflex to Micro and Culture - lab collect    Vitamin B12    Ferritin    Iron and iron binding capacity    Hemoglobin A1c    Luteinizing Hormone, Adult         Patient states it is early when starting the Wellbutrin but feels hopeful with this medication. Patient states she has resigned from her job and has found some relief with her decision. Patient states she is feeling flat and is not finding dianne. She states she has had \"panic attacks\" in the past and has found this has reoccurred. Her employment does require on call and has found this to be stressful. Her blood pressure has noted to be elevated at work 170/110.         I spent a total of 34 minutes on the day of the visit.   Time spent doing chart review, history and exam, documentation and further activities per the note       BMI:   Estimated body mass index is 28.02 kg/m  as calculated from the following:    Height as of 6/22/22: 1.664 m (5' 5.5\").    Weight as of 6/22/22: 77.6 kg (171 lb).   Weight management plan: Patient referred to endocrine and/or " weight management specialty Discussed healthy diet and exercise guidelines    Depression Screening Follow Up    PHQ 7/7/2022   PHQ-9 Total Score 20   Q9: Thoughts of better off dead/self-harm past 2 weeks Not at all     Last PHQ-9 7/7/2022   1.  Little interest or pleasure in doing things 3   2.  Feeling down, depressed, or hopeless 3   3.  Trouble falling or staying asleep, or sleeping too much 2   4.  Feeling tired or having little energy 3   5.  Poor appetite or overeating 0   6.  Feeling bad about yourself 3   7.  Trouble concentrating 3   8.  Moving slowly or restless 3   Q9: Thoughts of better off dead/self-harm past 2 weeks 0   PHQ-9 Total Score 20       Follow Up Actions Taken  Crisis resource information provided in After Visit Summary  Patient has experienced a recent significant life event.  Patient counseled, no additional follow up at this time.  Follow up recommended: 1 month     MEDICATIONS:   Orders Placed This Encounter   Medications     hydrOXYzine (VISTARIL) 25 MG capsule     Sig: Take 1 capsule (25 mg) by mouth 3 times daily as needed for anxiety     Dispense:  30 capsule     Refill:  0          - Continue other medications without change  FUTURE LABS:       - Schedule a fasting blood draw prior to next visit  Work on weight loss  Regular exercise    No follow-ups on file.    Jacinda Banks NP  [facility] STEFANIE Stiles is a 52 year old, presenting for the following health issues:  Anxiety (Requesting in person appointment for physical ) and Depression      History of Present Illness       Mental Health Follow-up:  Patient presents to follow-up on Depression & Anxiety.Patient's depression since last visit has been:  Worse  The patient is not having other symptoms associated with depression.  Patient's anxiety since last visit has been:  Worse  The patient is not having other symptoms associated with anxiety.  Any significant life events: job concerns and financial  concerns  Patient is feeling anxious or having panic attacks.  Patient has no concerns about alcohol or drug use.    She eats 2-3 servings of fruits and vegetables daily.She consumes 0 sweetened beverage(s) daily.She exercises with enough effort to increase her heart rate 9 or less minutes per day.  She exercises with enough effort to increase her heart rate 3 or less days per week.   She is taking medications regularly.    Today's PHQ-9         PHQ-9 Total Score: 20    PHQ-9 Q9 Thoughts of better off dead/self-harm past 2 weeks :   Not at all    How difficult have these problems made it for you to do your work, take care of things at home, or get along with other people: Very difficult  Today's ALBERTO-7 Score: 21       Abnormal Mood Symptoms  Onset/Duration: Anxiety and Depression x 3-4 months   Description: Anxiety and depression   Depression (if yes, do PHQ-9): YES  Anxiety (if yes, do ALBERTO-7): YES  Accompanying Signs & Symptoms:  Still participating in activities that you used to enjoy: No  Fatigue: YES  Irritability: YES  Difficulty concentrating: YES  Changes in appetite: No  Problems with sleep: YES  Heart racing/beating fast: YES  Abnormally elevated, expansive, or irritable mood: YES  Persistently increased activity or energy: No  Thoughts of hurting yourself or others: No  History:  Recent stress or major life event: YES  Prior depression or anxiety: Yes   Family history of depression or anxiety: YES  Alcohol/drug use: No  Difficulty sleeping: YES  Precipitating or alleviating factors: No  Therapies tried and outcome: none and individual therapy  PHQ 9/22/2021 12/2/2021 7/7/2022   PHQ-9 Total Score 15 21 20   Q9: Thoughts of better off dead/self-harm past 2 weeks Not at all Not at all Not at all     ALBERTO-7 SCORE 9/22/2021 12/2/2021 7/7/2022   Total Score - - -   Total Score 16 (severe anxiety) 13 (moderate anxiety) 21 (severe anxiety)   Total Score 16 13 21         Review of Systems   Unremarkable other than  "listed above and below       patient indicates she has no gerd, no \"retching\" nausea has been significantly reduced.    Objective           Vitals:  No vitals were obtained today due to virtual visit.    Physical Exam   GENERAL: Healthy, alert and no distress  EYES: Eyes grossly normal to inspection.  No discharge or erythema, or obvious scleral/conjunctival abnormalities.  RESP: No audible wheeze, cough, or visible cyanosis.  No visible retractions or increased work of breathing.    SKIN: Visible skin clear. No significant rash, abnormal pigmentation or lesions.  NEURO: Cranial nerves grossly intact.  Mentation and speech appropriate for age.  PSYCH: Mentation appears normal, affect normal/bright, judgement and insight intact, normal speech and appearance well-groomed.                Video-Visit Details    Video Start Time: 4:10 pm    Type of service:  Video Visit    Video End Time:4:42 PM    Originating Location (pt. Location): Home    Distant Location (provider location):  [facility]     Platform used for Video Visit: Vivian    .  ..  "

## 2022-07-07 ENCOUNTER — VIRTUAL VISIT (OUTPATIENT)
Dept: INTERNAL MEDICINE | Facility: CLINIC | Age: 52
End: 2022-07-07
Payer: COMMERCIAL

## 2022-07-07 VITALS — DIASTOLIC BLOOD PRESSURE: 90 MMHG | SYSTOLIC BLOOD PRESSURE: 130 MMHG

## 2022-07-07 DIAGNOSIS — F32.A DEPRESSION, UNSPECIFIED DEPRESSION TYPE: ICD-10-CM

## 2022-07-07 DIAGNOSIS — I10 BENIGN ESSENTIAL HYPERTENSION: Primary | ICD-10-CM

## 2022-07-07 DIAGNOSIS — F41.1 ANXIETY STATE: ICD-10-CM

## 2022-07-07 DIAGNOSIS — Z00.00 ENCOUNTER FOR SCREENING AND PREVENTATIVE CARE: ICD-10-CM

## 2022-07-07 PROCEDURE — 96127 BRIEF EMOTIONAL/BEHAV ASSMT: CPT | Mod: GT | Performed by: NURSE PRACTITIONER

## 2022-07-07 PROCEDURE — 99214 OFFICE O/P EST MOD 30 MIN: CPT | Mod: GT | Performed by: NURSE PRACTITIONER

## 2022-07-07 RX ORDER — HYDROXYZINE PAMOATE 25 MG/1
25 CAPSULE ORAL 3 TIMES DAILY PRN
Qty: 30 CAPSULE | Refills: 0 | Status: SHIPPED | OUTPATIENT
Start: 2022-07-07 | End: 2023-02-13

## 2022-07-07 ASSESSMENT — ANXIETY QUESTIONNAIRES
2. NOT BEING ABLE TO STOP OR CONTROL WORRYING: NEARLY EVERY DAY
3. WORRYING TOO MUCH ABOUT DIFFERENT THINGS: NEARLY EVERY DAY
GAD7 TOTAL SCORE: 21
7. FEELING AFRAID AS IF SOMETHING AWFUL MIGHT HAPPEN: NEARLY EVERY DAY
GAD7 TOTAL SCORE: 21
1. FEELING NERVOUS, ANXIOUS, OR ON EDGE: NEARLY EVERY DAY
8. IF YOU CHECKED OFF ANY PROBLEMS, HOW DIFFICULT HAVE THESE MADE IT FOR YOU TO DO YOUR WORK, TAKE CARE OF THINGS AT HOME, OR GET ALONG WITH OTHER PEOPLE?: VERY DIFFICULT
6. BECOMING EASILY ANNOYED OR IRRITABLE: NEARLY EVERY DAY
4. TROUBLE RELAXING: NEARLY EVERY DAY
7. FEELING AFRAID AS IF SOMETHING AWFUL MIGHT HAPPEN: NEARLY EVERY DAY
5. BEING SO RESTLESS THAT IT IS HARD TO SIT STILL: NEARLY EVERY DAY
GAD7 TOTAL SCORE: 21

## 2022-07-07 ASSESSMENT — PATIENT HEALTH QUESTIONNAIRE - PHQ9
SUM OF ALL RESPONSES TO PHQ QUESTIONS 1-9: 20
SUM OF ALL RESPONSES TO PHQ QUESTIONS 1-9: 20
10. IF YOU CHECKED OFF ANY PROBLEMS, HOW DIFFICULT HAVE THESE PROBLEMS MADE IT FOR YOU TO DO YOUR WORK, TAKE CARE OF THINGS AT HOME, OR GET ALONG WITH OTHER PEOPLE: VERY DIFFICULT

## 2022-07-21 ENCOUNTER — TRANSFERRED RECORDS (OUTPATIENT)
Dept: HEALTH INFORMATION MANAGEMENT | Facility: CLINIC | Age: 52
End: 2022-07-21

## 2022-08-02 ENCOUNTER — LAB (OUTPATIENT)
Dept: LAB | Facility: CLINIC | Age: 52
End: 2022-08-02
Payer: COMMERCIAL

## 2022-08-02 DIAGNOSIS — Z00.00 ENCOUNTER FOR SCREENING AND PREVENTATIVE CARE: ICD-10-CM

## 2022-08-02 LAB
ALBUMIN SERPL-MCNC: 3.6 G/DL (ref 3.4–5)
ALP SERPL-CCNC: 64 U/L (ref 40–150)
ALT SERPL W P-5'-P-CCNC: 41 U/L (ref 0–50)
ANION GAP SERPL CALCULATED.3IONS-SCNC: 11 MMOL/L (ref 3–14)
AST SERPL W P-5'-P-CCNC: 21 U/L (ref 0–45)
BASOPHILS # BLD AUTO: 0 10E3/UL (ref 0–0.2)
BASOPHILS NFR BLD AUTO: 1 %
BILIRUB SERPL-MCNC: 0.4 MG/DL (ref 0.2–1.3)
BUN SERPL-MCNC: 13 MG/DL (ref 7–30)
CALCIUM SERPL-MCNC: 9 MG/DL (ref 8.5–10.1)
CHLORIDE BLD-SCNC: 112 MMOL/L (ref 94–109)
CHOLEST SERPL-MCNC: 92 MG/DL
CO2 SERPL-SCNC: 19 MMOL/L (ref 20–32)
CREAT SERPL-MCNC: 0.78 MG/DL (ref 0.52–1.04)
DEPRECATED CALCIDIOL+CALCIFEROL SERPL-MC: 39 UG/L (ref 20–75)
EOSINOPHIL # BLD AUTO: 0.1 10E3/UL (ref 0–0.7)
EOSINOPHIL NFR BLD AUTO: 2 %
ERYTHROCYTE [DISTWIDTH] IN BLOOD BY AUTOMATED COUNT: 14 % (ref 10–15)
FASTING STATUS PATIENT QL REPORTED: YES
FERRITIN SERPL-MCNC: 68 NG/ML (ref 8–252)
FSH SERPL IRP2-ACNC: 3.7 MIU/ML
GFR SERPL CREATININE-BSD FRML MDRD: 90 ML/MIN/1.73M2
GLUCOSE BLD-MCNC: 90 MG/DL (ref 70–99)
HBA1C MFR BLD: 5.1 % (ref 0–5.6)
HCT VFR BLD AUTO: 43.4 % (ref 35–47)
HDLC SERPL-MCNC: 55 MG/DL
HGB BLD-MCNC: 14.7 G/DL (ref 11.7–15.7)
IRON SATN MFR SERPL: 30 % (ref 15–46)
IRON SERPL-MCNC: 88 UG/DL (ref 35–180)
LDLC SERPL CALC-MCNC: 24 MG/DL
LH SERPL-ACNC: 14 MIU/ML
LYMPHOCYTES # BLD AUTO: 2.3 10E3/UL (ref 0.8–5.3)
LYMPHOCYTES NFR BLD AUTO: 39 %
MCH RBC QN AUTO: 30 PG (ref 26.5–33)
MCHC RBC AUTO-ENTMCNC: 33.9 G/DL (ref 31.5–36.5)
MCV RBC AUTO: 89 FL (ref 78–100)
MONOCYTES # BLD AUTO: 0.6 10E3/UL (ref 0–1.3)
MONOCYTES NFR BLD AUTO: 10 %
NEUTROPHILS # BLD AUTO: 2.9 10E3/UL (ref 1.6–8.3)
NEUTROPHILS NFR BLD AUTO: 49 %
NONHDLC SERPL-MCNC: 37 MG/DL
PLATELET # BLD AUTO: 302 10E3/UL (ref 150–450)
POTASSIUM BLD-SCNC: 4.7 MMOL/L (ref 3.4–5.3)
PROT SERPL-MCNC: 6.9 G/DL (ref 6.8–8.8)
RBC # BLD AUTO: 4.9 10E6/UL (ref 3.8–5.2)
SODIUM SERPL-SCNC: 142 MMOL/L (ref 133–144)
TIBC SERPL-MCNC: 291 UG/DL (ref 240–430)
TRIGL SERPL-MCNC: 67 MG/DL
VIT B12 SERPL-MCNC: 473 PG/ML (ref 232–1245)
WBC # BLD AUTO: 6 10E3/UL (ref 4–11)

## 2022-08-02 PROCEDURE — 80061 LIPID PANEL: CPT

## 2022-08-02 PROCEDURE — 82306 VITAMIN D 25 HYDROXY: CPT

## 2022-08-02 PROCEDURE — 83550 IRON BINDING TEST: CPT

## 2022-08-02 PROCEDURE — 83001 ASSAY OF GONADOTROPIN (FSH): CPT

## 2022-08-02 PROCEDURE — 83036 HEMOGLOBIN GLYCOSYLATED A1C: CPT

## 2022-08-02 PROCEDURE — 82728 ASSAY OF FERRITIN: CPT

## 2022-08-02 PROCEDURE — 36415 COLL VENOUS BLD VENIPUNCTURE: CPT

## 2022-08-02 PROCEDURE — 82607 VITAMIN B-12: CPT

## 2022-08-02 PROCEDURE — 83002 ASSAY OF GONADOTROPIN (LH): CPT

## 2022-08-02 PROCEDURE — 84403 ASSAY OF TOTAL TESTOSTERONE: CPT

## 2022-08-02 PROCEDURE — 85025 COMPLETE CBC W/AUTO DIFF WBC: CPT

## 2022-08-02 PROCEDURE — 80053 COMPREHEN METABOLIC PANEL: CPT

## 2022-08-04 LAB — TESTOST SERPL-MCNC: 12 NG/DL (ref 8–60)

## 2022-08-10 ENCOUNTER — OFFICE VISIT (OUTPATIENT)
Dept: INTERNAL MEDICINE | Facility: CLINIC | Age: 52
End: 2022-08-10
Payer: COMMERCIAL

## 2022-08-10 VITALS
WEIGHT: 158.5 LBS | HEART RATE: 85 BPM | DIASTOLIC BLOOD PRESSURE: 89 MMHG | TEMPERATURE: 97.7 F | RESPIRATION RATE: 16 BRPM | HEIGHT: 66 IN | SYSTOLIC BLOOD PRESSURE: 119 MMHG | BODY MASS INDEX: 25.47 KG/M2

## 2022-08-10 DIAGNOSIS — F32.A DEPRESSION, UNSPECIFIED DEPRESSION TYPE: ICD-10-CM

## 2022-08-10 DIAGNOSIS — I10 BENIGN ESSENTIAL HYPERTENSION: ICD-10-CM

## 2022-08-10 DIAGNOSIS — Z12.31 ENCOUNTER FOR SCREENING MAMMOGRAM FOR BREAST CANCER: ICD-10-CM

## 2022-08-10 DIAGNOSIS — Z00.00 ENCOUNTER FOR SCREENING AND PREVENTATIVE CARE: ICD-10-CM

## 2022-08-10 DIAGNOSIS — F98.8 ATTENTION DEFICIT DISORDER, UNSPECIFIED HYPERACTIVITY PRESENCE: ICD-10-CM

## 2022-08-10 DIAGNOSIS — E78.5 HYPERLIPIDEMIA LDL GOAL <130: ICD-10-CM

## 2022-08-10 DIAGNOSIS — F41.1 ANXIETY STATE: ICD-10-CM

## 2022-08-10 DIAGNOSIS — Z85.828 HISTORY OF BASAL CELL CARCINOMA: ICD-10-CM

## 2022-08-10 DIAGNOSIS — Z00.00 ANNUAL PHYSICAL EXAM: Primary | ICD-10-CM

## 2022-08-10 DIAGNOSIS — G45.9 TIA (TRANSIENT ISCHEMIC ATTACK): ICD-10-CM

## 2022-08-10 DIAGNOSIS — E55.9 VITAMIN D DEFICIENCY: ICD-10-CM

## 2022-08-10 DIAGNOSIS — R11.2 NAUSEA AND VOMITING, INTRACTABILITY OF VOMITING NOT SPECIFIED, UNSPECIFIED VOMITING TYPE: ICD-10-CM

## 2022-08-10 LAB
ALBUMIN UR-MCNC: NEGATIVE MG/DL
APPEARANCE UR: CLEAR
BILIRUB UR QL STRIP: NEGATIVE
COLOR UR AUTO: YELLOW
GLUCOSE UR STRIP-MCNC: NEGATIVE MG/DL
HGB UR QL STRIP: NEGATIVE
KETONES UR STRIP-MCNC: ABNORMAL MG/DL
LEUKOCYTE ESTERASE UR QL STRIP: NEGATIVE
NITRATE UR QL: NEGATIVE
PH UR STRIP: 5.5 [PH] (ref 5–8)
SP GR UR STRIP: 1.01 (ref 1–1.03)
UROBILINOGEN UR STRIP-ACNC: 0.2 E.U./DL

## 2022-08-10 PROCEDURE — 99396 PREV VISIT EST AGE 40-64: CPT | Performed by: NURSE PRACTITIONER

## 2022-08-10 PROCEDURE — 81003 URINALYSIS AUTO W/O SCOPE: CPT | Performed by: NURSE PRACTITIONER

## 2022-08-10 RX ORDER — ESCITALOPRAM OXALATE 10 MG/1
10 TABLET ORAL AT BEDTIME
Qty: 90 TABLET | Refills: 0 | Status: SHIPPED | OUTPATIENT
Start: 2022-08-10 | End: 2022-09-12

## 2022-08-10 ASSESSMENT — ENCOUNTER SYMPTOMS
EYE PAIN: 0
PALPITATIONS: 0
FREQUENCY: 1
MYALGIAS: 1
DIZZINESS: 0
FEVER: 0
HEADACHES: 0
BREAST MASS: 0
HEMATURIA: 0
CONSTIPATION: 0
DIARRHEA: 1
HEMATOCHEZIA: 0
COUGH: 0
NAUSEA: 0
JOINT SWELLING: 0
PARESTHESIAS: 1
HEARTBURN: 0
ARTHRALGIAS: 1
DYSURIA: 0
ABDOMINAL PAIN: 0
CHILLS: 0
WEAKNESS: 1
NERVOUS/ANXIOUS: 1
SHORTNESS OF BREATH: 0

## 2022-08-10 ASSESSMENT — PAIN SCALES - GENERAL: PAINLEVEL: NO PAIN (0)

## 2022-08-10 ASSESSMENT — PATIENT HEALTH QUESTIONNAIRE - PHQ9
SUM OF ALL RESPONSES TO PHQ QUESTIONS 1-9: 18
SUM OF ALL RESPONSES TO PHQ QUESTIONS 1-9: 18
10. IF YOU CHECKED OFF ANY PROBLEMS, HOW DIFFICULT HAVE THESE PROBLEMS MADE IT FOR YOU TO DO YOUR WORK, TAKE CARE OF THINGS AT HOME, OR GET ALONG WITH OTHER PEOPLE: EXTREMELY DIFFICULT

## 2022-08-10 NOTE — PROGRESS NOTES
SUBJECTIVE:   CC: Jaz Archibald is an 52 year old woman who presents for preventive health visit.       Patient has been advised of split billing requirements and indicates understanding: Yes  Healthy Habits:     Getting at least 3 servings of Calcium per day:  Yes    Bi-annual eye exam:  Yes    Dental care twice a year:  Yes    Sleep apnea or symptoms of sleep apnea:  None    Diet:  Carbohydrate counting    Frequency of exercise:  1 day/week    Duration of exercise:  15-30 minutes    Taking medications regularly:  Yes    PHQ-2 Total Score: 5    Additional concerns today:  Yes            Today's PHQ-2 Score:   PHQ-2 ( 1999 [employer]) 8/10/2022   Q1: Little interest or pleasure in doing things 2   Q2: Feeling down, depressed or hopeless 2   PHQ-2 Score 4   PHQ-2 Total Score (12-17 Years)- Positive if 3 or more points; Administer PHQ-A if positive -   Q1: Little interest or pleasure in doing things More than half the days   Q2: Feeling down, depressed or hopeless More than half the days   PHQ-2 Score 4       Abuse: Current or Past (Physical, Sexual or Emotional) - No  Do you feel safe in your environment? Yes        Social History     Tobacco Use     Smoking status: Never Smoker     Smokeless tobacco: Never Used   Substance Use Topics     Alcohol use: Yes     Alcohol/week: 1.0 standard drink     Types: 1 Glasses of wine per week     If you drink alcohol do you typically have >3 drinks per day or >7 drinks per week? No    Alcohol Use 8/10/2022   Prescreen: >3 drinks/day or >7 drinks/week? No   Prescreen: >3 drinks/day or >7 drinks/week? -       Reviewed orders with patient.  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, insomnia type     Hypothyroidism     Health Care Home     Dyspareunia     Hyperlipidemia LDL goal <130     Essential hypertension     QT prolongation     Persons encountering health services in other specified circumstances     History of basal cell carcinoma     Environmental allergies     Encounter for examination for normal comparison and control in clinical research program     Elevated LDL cholesterol level     Vitamin D deficiency     Overweight with body mass index (BMI) of 27 to 27.9 in adult     GERD without esophagitis     Dissection, vertebral artery (H)     Nausea and vomiting, intractability of vomiting not specified, unspecified vomiting type     Diaphragmatic hernia     Duodenitis     Lumbar pain     Past Surgical History:   Procedure Laterality Date     EGD      7/2020     GYN SURGERY  07/12/2012    Novasure     HYSTERECTOMY, PAP NO LONGER INDICATED       LAPAROSCOPIC HERNIORRHAPHY HIATAL N/A 07/26/2021    Procedure: HERNIORRHAPHY, HIATAL, LAPAROSCOPIC;  Surgeon: Mady Potts MD;  Location:  OR     LAPAROSCOPIC HYSTERECTOMY TOTAL  08/12/2013    Procedure: LAPAROSCOPIC HYSTERECTOMY TOTAL;  Laparoscopic Total Hysterectomy,Bilateral Salpingectomy with Sparing of the Ovaries,Uterosacral Suspension,Transvaginal Taping,Perineoplasty;  Surgeon: Sy Castro MD;  Location: [facility] OR     LAPAROSCOPIC NISSEN FUNDOPLICATION N/A 07/26/2021    Procedure: FUNDOPLICATION, NISSEN, LAPAROSCOPIC;  Surgeon: Mady Potts MD;  Location:  OR     mohs surgery      remove skin cancer     SLING TRANSVAGINAL  08/12/2013    Procedure: SLING TRANSVAGINAL;;  Surgeon: Sy Castro MD;  Location: [facility] OR       Social History     Tobacco Use     Smoking status: Never Smoker     Smokeless tobacco: Never Used   Substance Use Topics     Alcohol use: Yes     Alcohol/week:  1.0 standard drink     Types: 1 Glasses of wine per week     Family History   Problem Relation Age of Onset     Lipids Mother         chol     Hypertension Mother      Lipids Father         chol     Hypertension Father      C.A.D. Father      Heart Disease Father          Current Outpatient Medications   Medication Sig Dispense Refill     amLODIPine (NORVASC) 2.5 MG tablet Take 1 tablet (2.5 mg) by mouth daily 90 tablet 0     aspirin (ASA) 81 MG EC tablet Take 1 tablet (81 mg) by mouth daily 90 tablet 3     atorvastatin (LIPITOR) 80 MG tablet Take 1 tablet (80 mg) by mouth daily 90 tablet 3     buPROPion (WELLBUTRIN XL) 150 MG 24 hr tablet Take 1 tablet (150 mg) by mouth every morning 30 tablet 3     EPINEPHrine (ANY BX GENERIC EQUIV) 0.3 MG/0.3ML injection 2-pack        escitalopram (LEXAPRO) 10 MG tablet Take 1 tablet (10 mg) by mouth At Bedtime 90 tablet 0     hydrOXYzine (VISTARIL) 25 MG capsule Take 1 capsule (25 mg) by mouth 3 times daily as needed for anxiety 30 capsule 0     insulin pen needle (31G X 5 MM) 31G X 5 MM miscellaneous Use 1 pen needles daily or as directed. 100 each 1     liraglutide - Weight Management (SAXENDA) 18 MG/3ML pen Inject 3 mg Subcutaneous daily 45 mL 0     olopatadine (PATANOL) 0.1 % ophthalmic solution Place 1-2 drops into both eyes 2 times daily as needed for allergies   6     promethazine (PHENERGAN) 25 MG tablet Take 0.5-1 tablets (12.5-25 mg) by mouth every 6 hours as needed for nausea 60 tablet 0     topiramate (TOPAMAX) 25 MG tablet Take 1 tablet by mouth once daily for 1 week, then 2 tablets once daily for 1 week, then 3 tablets once daily thereafter. Take at bedtime. 90 tablet 2     traZODone (DESYREL) 100 MG tablet Take 1 tablet (100 mg) by mouth At Bedtime 90 tablet 2     Allergies   Allergen Reactions     Bees Swelling     Disfiguring      Allevyn Adhesive [Dome-Paste Bandage]      Suture Swelling     local swelling and sutures didn't dissolve vyrcil   Suture         Breast Cancer Screening:    FHS-7:   Breast CA Risk Assessment (FHS-7) 9/20/2021 8/10/2022   Did any of your first-degree relatives have breast or ovarian cancer? Yes Yes   Did any of your relatives have bilateral breast cancer? No No   Did any man in your family have breast cancer? No -   Did any woman in your family have breast and ovarian cancer? No -   Did any woman in your family have breast cancer before age 50 y? No -   Do you have 2 or more relatives with breast and/or ovarian cancer? No -   Do you have 2 or more relatives with breast and/or bowel cancer? No -       Mammogram Screening: Recommended annual mammography  Pertinent mammograms are reviewed under the imaging tab.    History of abnormal Pap smear:   Last 3 Pap and HPV Results:   PAP / HPV 6/26/2013   PAP (Historical) NIL     PAP / HPV 6/26/2013   PAP (Historical) NIL     Reviewed and updated as needed this visit by clinical staff   Tobacco  Allergies  Meds     Fam Hx  Soc Hx          Reviewed and updated as needed this visit by Provider                   Past Medical History:   Diagnosis Date     ADD (attention deficit disorder)      Anxiety      Gastroesophageal reflux disease without esophagitis      Hypertension      Urethral hypermobility 06/26/2013      Past Surgical History:   Procedure Laterality Date     EGD      7/2020     GYN SURGERY  07/12/2012    Novasure     HYSTERECTOMY, PAP NO LONGER INDICATED       LAPAROSCOPIC HERNIORRHAPHY HIATAL N/A 07/26/2021    Procedure: HERNIORRHAPHY, HIATAL, LAPAROSCOPIC;  Surgeon: Mady Potts MD;  Location:  OR     LAPAROSCOPIC HYSTERECTOMY TOTAL  08/12/2013    Procedure: LAPAROSCOPIC HYSTERECTOMY TOTAL;  Laparoscopic Total Hysterectomy,Bilateral Salpingectomy with Sparing of the Ovaries,Uterosacral Suspension,Transvaginal Taping,Perineoplasty;  Surgeon: Sy Castro MD;  Location: [facility]     LAPAROSCOPIC NISSEN FUNDOPLICATION N/A 07/26/2021    Procedure: FUNDOPLICATION,  "NISSEN, LAPAROSCOPIC;  Surgeon: Mady Potts MD;  Location: [facility] OR     mohs surgery      remove skin cancer     SLING TRANSVAGINAL  2013    Procedure: SLING TRANSVAGINAL;;  Surgeon: Sy Castro MD;  Location: [facility] OR     OB History    Para Term  AB Living   4 2 2 0 2 2   SAB IAB Ectopic Multiple Live Births   1 1 0 0 0      # Outcome Date GA Lbr Frantz/2nd Weight Sex Delivery Anes PTL Lv   4 IAB            3 SAB            2 Term            1 Term                Review of Systems   Constitutional: Negative for chills and fever.   HENT: Negative for congestion, ear pain and hearing loss.    Eyes: Positive for visual disturbance. Negative for pain.   Respiratory: Negative for cough and shortness of breath.    Cardiovascular: Negative for chest pain and palpitations.   Gastrointestinal: Positive for diarrhea. Negative for abdominal pain, constipation, heartburn, hematochezia and nausea.   Breasts:  Positive for tenderness. Negative for breast mass and discharge.   Genitourinary: Positive for frequency, pelvic pain and urgency. Negative for dysuria, genital sores, hematuria, vaginal bleeding and vaginal discharge.   Musculoskeletal: Positive for arthralgias and myalgias. Negative for joint swelling.   Skin: Negative for rash.   Neurological: Positive for weakness and paresthesias. Negative for dizziness and headaches.   Psychiatric/Behavioral: The patient is nervous/anxious.             Wt Readings from Last 5 Encounters:   08/10/22 71.9 kg (158 lb 8 oz)   22 77.6 kg (171 lb)   22 75.8 kg (167 lb)   22 80.6 kg (177 lb 12.8 oz)   22 80.6 kg (177 lb 12.8 oz)       OBJECTIVE:   /89 (BP Location: Right arm, Patient Position: Sitting, Cuff Size: Adult Regular)   Pulse 85   Temp 97.7  F (36.5  C) (Oral)   Resp 16   Ht 1.676 m (5' 6\")   Wt 71.9 kg (158 lb 8 oz)   LMP 2013   BMI 25.58 kg/m    Physical Exam  GENERAL: healthy, alert and no " distress  EYES: Eyes grossly normal to inspection, PERRL and conjunctivae and sclerae normal  HENT: ear canals and TM's normal, nose and mouth without ulcers or lesions  NECK: no adenopathy  RESP: lungs clear to auscultation - no rales, rhonchi or wheezes  CV: regular rate and rhythm, normal S1 S2,, no peripheral edema and peripheral pulses strong  ABDOMEN: soft, nontender, no hepatosplenomegaly, no masses and bowel sounds normal  MS: no gross musculoskeletal defects noted, no edema  SKIN: no suspicious lesions or rashes  NEURO: Normal strength and tone, mentation intact and speech normal  PSYCH: mentation appears normal, affect normal/bright  LYMPH: no cervical, supraclavicular,adenopathy  Lab on 08/02/2022   Component Date Value Ref Range Status     Cholesterol 08/02/2022 92  <200 mg/dL Final     Triglycerides 08/02/2022 67  <150 mg/dL Final     Direct Measure HDL 08/02/2022 55  >=50 mg/dL Final     LDL Cholesterol Calculated 08/02/2022 24  <=100 mg/dL Final     Non HDL Cholesterol 08/02/2022 37  <130 mg/dL Final     Patient Fasting > 8hrs? 08/02/2022 Yes   Final     Sodium 08/02/2022 142  133 - 144 mmol/L Final     Potassium 08/02/2022 4.7  3.4 - 5.3 mmol/L Final     Chloride 08/02/2022 112 (A) 94 - 109 mmol/L Final     Carbon Dioxide (CO2) 08/02/2022 19 (A) 20 - 32 mmol/L Final     Anion Gap 08/02/2022 11  3 - 14 mmol/L Final     Urea Nitrogen 08/02/2022 13  7 - 30 mg/dL Final     Creatinine 08/02/2022 0.78  0.52 - 1.04 mg/dL Final     Calcium 08/02/2022 9.0  8.5 - 10.1 mg/dL Final     Glucose 08/02/2022 90  70 - 99 mg/dL Final     Alkaline Phosphatase 08/02/2022 64  40 - 150 U/L Final     AST 08/02/2022 21  0 - 45 U/L Final     ALT 08/02/2022 41  0 - 50 U/L Final     Protein Total 08/02/2022 6.9  6.8 - 8.8 g/dL Final     Albumin 08/02/2022 3.6  3.4 - 5.0 g/dL Final     Bilirubin Total 08/02/2022 0.4  0.2 - 1.3 mg/dL Final     GFR Estimate 08/02/2022 90  >60 mL/min/1.73m2 Final    Effective December 21, 2021  eGFRcr in adults is calculated using the 2021 CKD-EPI creatinine equation which includes age and gender (Wolf cochran al., [facility], DOI: 10.1056/BBHSsc9065973)     FSH 08/02/2022 3.7  mIU/mL Final    19 years and older:   Follicular phase: 3.5-12.5 mIU/mL   Ovulation phase: 4.7-21.5 mIU/mL   Luteal phase: 1.7-7.7 mIU/mL   Postmenopause: 25.8-134.8 mIU/mL        Testosterone Total 08/02/2022 12  8 - 60 ng/dL Final     Vitamin D, Total (25-Hydroxy) 08/02/2022 39  20 - 75 ug/L Final     Vitamin B12 08/02/2022 473  232 - 1,245 pg/mL Final     Ferritin 08/02/2022 68  8 - 252 ng/mL Final     Iron 08/02/2022 88  35 - 180 ug/dL Final     Iron Binding Capacity 08/02/2022 291  240 - 430 ug/dL Final     Iron Sat Index 08/02/2022 30  15 - 46 % Final     Hemoglobin A1C 08/02/2022 5.1  0.0 - 5.6 % Final    Normal <5.7%   Prediabetes 5.7-6.4%    Diabetes 6.5% or higher     Note: Adopted from ADA consensus guidelines.     Lutropin 08/02/2022 14.0  mIU/mL Final    FEMALE:  Age  0 - 6 mo:  <0.1-8.2 mIU/mL  6 mo - 11 years: <0.1-1.3 mIU/mL   11 - 14 years: <0.1-10 mIU/mL   14 - 19 years: 0.4-25 mIU/mL     19 years and older:   Follicular Phase: 2.4-12.6 mIU/mL  Ovulation Phase: 14.0-95.6 mIU/mL  Luteal Phase: 1.0-11.4  mIU/mL  Postmenopausal: 7.7-58.5 mIU/mL       WBC Count 08/02/2022 6.0  4.0 - 11.0 10e3/uL Final     RBC Count 08/02/2022 4.90  3.80 - 5.20 10e6/uL Final     Hemoglobin 08/02/2022 14.7  11.7 - 15.7 g/dL Final     Hematocrit 08/02/2022 43.4  35.0 - 47.0 % Final     MCV 08/02/2022 89  78 - 100 fL Final     MCH 08/02/2022 30.0  26.5 - 33.0 pg Final     MCHC 08/02/2022 33.9  31.5 - 36.5 g/dL Final     RDW 08/02/2022 14.0  10.0 - 15.0 % Final     Platelet Count 08/02/2022 302  150 - 450 10e3/uL Final     % Neutrophils 08/02/2022 49  % Final     % Lymphocytes 08/02/2022 39  % Final     % Monocytes 08/02/2022 10  % Final     % Eosinophils 08/02/2022 2  % Final     % Basophils 08/02/2022 1  % Final     Absolute Neutrophils 08/02/2022  "2.9  1.6 - 8.3 10e3/uL Final     Absolute Lymphocytes 08/02/2022 2.3  0.8 - 5.3 10e3/uL Final     Absolute Monocytes 08/02/2022 0.6  0.0 - 1.3 10e3/uL Final     Absolute Eosinophils 08/02/2022 0.1  0.0 - 0.7 10e3/uL Final     Absolute Basophils 08/02/2022 0.0  0.0 - 0.2 10e3/uL Final           ASSESSMENT/PLAN:     Problem List Items Addressed This Visit        Other    Anxiety state    Relevant Medications    escitalopram (LEXAPRO) 10 MG tablet      Other Visit Diagnoses     Annual physical exam    -  Primary    Benign essential hypertension            Loss of taste and smell post covid an extended period of time         COUNSELING:  Reviewed preventive health counseling, as reflected in patient instructions       Regular exercise       Healthy diet/nutrition    Estimated body mass index is 25.58 kg/m  as calculated from the following:    Height as of this encounter: 1.676 m (5' 6\").    Weight as of this encounter: 71.9 kg (158 lb 8 oz).  Patient is congratulated on her continued lifestyle changes improve diet increasing physical activity and weight loss.      She reports that she has never smoked. She has never used smokeless tobacco.      Counseling Resources:  ATP IV Guidelines  Pooled Cohorts Equation Calculator  Breast Cancer Risk Calculator  BRCA-Related Cancer Risk Assessment: FHS-7 Tool  FRAX Risk Assessment  ICSI Preventive Guidelines  Dietary Guidelines for Americans, 2010  USDA's MyPlate  ASA Prophylaxis  Lung CA Screening    Jacinda Banks NP  [facility]  Answers for HPI/ROS submitted by the patient on 8/10/2022  If you checked off any problems, how difficult have these problems made it for you to do your work, take care of things at home, or get along with other people?: Extremely difficult  PHQ9 TOTAL SCORE: 18      "

## 2022-09-07 DIAGNOSIS — E66.3 OVERWEIGHT WITH BODY MASS INDEX (BMI) OF 27 TO 27.9 IN ADULT: ICD-10-CM

## 2022-09-08 RX ORDER — TOPIRAMATE 25 MG/1
TABLET, FILM COATED ORAL
Qty: 90 TABLET | Refills: 2 | OUTPATIENT
Start: 2022-09-08

## 2022-09-12 ENCOUNTER — LAB REQUISITION (OUTPATIENT)
Dept: LAB | Facility: CLINIC | Age: 52
End: 2022-09-12

## 2022-09-12 LAB
HBV SURFACE AB SERPL IA-ACNC: 18.17 M[IU]/ML
HBV SURFACE AB SERPL IA-ACNC: REACTIVE M[IU]/ML

## 2022-09-12 PROCEDURE — 86481 TB AG RESPONSE T-CELL SUSP: CPT | Performed by: INTERNAL MEDICINE

## 2022-09-12 PROCEDURE — 86706 HEP B SURFACE ANTIBODY: CPT | Performed by: INTERNAL MEDICINE

## 2022-09-12 NOTE — PROGRESS NOTES
Please refer to Initial evaluation, flowsheet, progress notes, and PTRx for information regarding patient diagnosis, plan of care, and outcomes.  Due to self-discharge, patient was lost to follow up and no additional information can be documented at this time.  Patient discharged at this time.  Deann Ross, PT #[id number]

## 2022-09-13 LAB
QUANTIFERON MITOGEN: 10 IU/ML
QUANTIFERON NIL TUBE: 0.03 IU/ML
QUANTIFERON TB1 TUBE: 0.01 IU/ML
QUANTIFERON TB2 TUBE: 0.02

## 2022-09-14 LAB
GAMMA INTERFERON BACKGROUND BLD IA-ACNC: 0.03 IU/ML
M TB IFN-G BLD-IMP: NEGATIVE
M TB IFN-G CD4+ BCKGRND COR BLD-ACNC: 9.97 IU/ML
MITOGEN IGNF BCKGRD COR BLD-ACNC: -0.01 IU/ML
MITOGEN IGNF BCKGRD COR BLD-ACNC: -0.02 IU/ML

## 2022-09-16 ENCOUNTER — VIRTUAL VISIT (OUTPATIENT)
Dept: INTERNAL MEDICINE | Facility: CLINIC | Age: 52
End: 2022-09-16
Payer: COMMERCIAL

## 2022-09-16 DIAGNOSIS — M25.559 HIP PAIN: ICD-10-CM

## 2022-09-16 DIAGNOSIS — F32.A DEPRESSION, UNSPECIFIED DEPRESSION TYPE: ICD-10-CM

## 2022-09-16 DIAGNOSIS — F41.1 ANXIETY STATE: Primary | ICD-10-CM

## 2022-09-16 DIAGNOSIS — M54.41 ACUTE BILATERAL LOW BACK PAIN WITH RIGHT-SIDED SCIATICA: ICD-10-CM

## 2022-09-16 PROCEDURE — 99214 OFFICE O/P EST MOD 30 MIN: CPT | Mod: 95 | Performed by: NURSE PRACTITIONER

## 2022-09-16 RX ORDER — BUPROPION HYDROCHLORIDE 300 MG/1
300 TABLET ORAL EVERY MORNING
Qty: 90 TABLET | Refills: 1 | Status: SHIPPED | OUTPATIENT
Start: 2022-09-16 | End: 2023-04-02

## 2022-09-16 ASSESSMENT — ANXIETY QUESTIONNAIRES
GAD7 TOTAL SCORE: 12
GAD7 TOTAL SCORE: 12
6. BECOMING EASILY ANNOYED OR IRRITABLE: SEVERAL DAYS
7. FEELING AFRAID AS IF SOMETHING AWFUL MIGHT HAPPEN: NEARLY EVERY DAY
7. FEELING AFRAID AS IF SOMETHING AWFUL MIGHT HAPPEN: NEARLY EVERY DAY
2. NOT BEING ABLE TO STOP OR CONTROL WORRYING: MORE THAN HALF THE DAYS
3. WORRYING TOO MUCH ABOUT DIFFERENT THINGS: MORE THAN HALF THE DAYS
8. IF YOU CHECKED OFF ANY PROBLEMS, HOW DIFFICULT HAVE THESE MADE IT FOR YOU TO DO YOUR WORK, TAKE CARE OF THINGS AT HOME, OR GET ALONG WITH OTHER PEOPLE?: VERY DIFFICULT
4. TROUBLE RELAXING: MORE THAN HALF THE DAYS
1. FEELING NERVOUS, ANXIOUS, OR ON EDGE: MORE THAN HALF THE DAYS
GAD7 TOTAL SCORE: 12
5. BEING SO RESTLESS THAT IT IS HARD TO SIT STILL: NOT AT ALL
IF YOU CHECKED OFF ANY PROBLEMS ON THIS QUESTIONNAIRE, HOW DIFFICULT HAVE THESE PROBLEMS MADE IT FOR YOU TO DO YOUR WORK, TAKE CARE OF THINGS AT HOME, OR GET ALONG WITH OTHER PEOPLE: VERY DIFFICULT

## 2022-09-16 ASSESSMENT — PATIENT HEALTH QUESTIONNAIRE - PHQ9
SUM OF ALL RESPONSES TO PHQ QUESTIONS 1-9: 16
10. IF YOU CHECKED OFF ANY PROBLEMS, HOW DIFFICULT HAVE THESE PROBLEMS MADE IT FOR YOU TO DO YOUR WORK, TAKE CARE OF THINGS AT HOME, OR GET ALONG WITH OTHER PEOPLE: SOMEWHAT DIFFICULT
SUM OF ALL RESPONSES TO PHQ QUESTIONS 1-9: 16

## 2022-09-16 NOTE — PROGRESS NOTES
Linsey is a 52 year old who is being evaluated via a billable video visit.      How would you like to obtain your AVS? MyChart  If the video visit is dropped, the invitation should be resent by: Text to cell phone: [phone]  Will anyone else be joining your video visit? No          Assessment & Plan   Problem List Items Addressed This Visit        Other    Anxiety state - Primary    Relevant Medications    buPROPion (WELLBUTRIN XL) 300 MG 24 hr tablet      Other Visit Diagnoses     Depression, unspecified depression type        Relevant Medications    buPROPion (WELLBUTRIN XL) 300 MG 24 hr tablet    Acute bilateral low back pain with right-sided sciatica        Relevant Medications    buPROPion (WELLBUTRIN XL) 300 MG 24 hr tablet    Other Relevant Orders    Physical Therapy Referral    Hip pain        Relevant Orders    Physical Therapy Referral       patient states she had a rough time while on vacation and reduced from 10mg of lexapro and reports she found a pharmacy on vacation and was able to get a few days of medication and reports she is feeling a little better and does have some reports hopelessness. She states she feels like she wasn't present, feeling of dread and was able to talk to herself and calm.      Increase Wellbutrin to 300mg and continue lexapro 10mg daily.    Patient reports she had been on airplane to visit son and did fly for an extended period of time.  She reports hx of bilateral hip pain right worse than left and some low back pain and did get massage and this did help. She states it has been a month and is now limping which has improved but not resolved.               Depression Screening Follow Up    PHQ 9/16/2022   PHQ-9 Total Score 16   Q9: Thoughts of better off dead/self-harm past 2 weeks More than half the days   F/U: Thoughts of suicide or self-harm No   F/U: Safety concerns No                 Follow Up    Follow Up Actions Taken  Crisis resource information provided in the After  Visit Summary  Clinic follow up scheduled     Discussed the following ways the patient can remain in a safe environment:    Depression Screening Follow Up    PHQ 9/16/2022   PHQ-9 Total Score 16   Q9: Thoughts of better off dead/self-harm past 2 weeks More than half the days   F/U: Thoughts of suicide or self-harm No   F/U: Safety concerns No         Follow Up Actions Taken           No follow-ups on file.    Jacinda Banks NP  [facility] STEFANIE Stiles is a 52 year old, presenting for the following health issues:  f/u anxiety  (Hip pain- restricting to do some walks. )      HPI       Anxiety Follow-Up    How are you doing with your anxiety since your last visit? Worsened    Are you having other symptoms that might be associated with anxiety? Yes:  fidgety and worrying     Have you had a significant life event? No     Are you feeling depressed? Yes:  med changes     Do you have any concerns with your use of alcohol or other drugs? No    Social History     Tobacco Use     Smoking status: Never Smoker     Smokeless tobacco: Never Used   Vaping Use     Vaping Use: Never used   Substance Use Topics     Alcohol use: Yes     Alcohol/week: 1.0 standard drink     Types: 1 Glasses of wine per week     Drug use: No     ALBERTO-7 SCORE 12/2/2021 7/7/2022 9/16/2022   Total Score - - -   Total Score 13 (moderate anxiety) 21 (severe anxiety) 12 (moderate anxiety)   Total Score 13 21 12     PHQ 8/10/2022 9/16/2022 9/16/2022   PHQ-9 Total Score 18 14 16   Q9: Thoughts of better off dead/self-harm past 2 weeks Not at all Not at all More than half the days   F/U: Thoughts of suicide or self-harm - - No   F/U: Safety concerns - - No           How many servings of fruits and vegetables do you eat daily?  2-3    On average, how many sweetened beverages do you drink each day (Examples: soda, juice, sweet tea, etc.  Do NOT count diet or artificially sweetened beverages)?   1    How many days per week do you  "exercise enough to make your heart beat faster? 3 or less    How many minutes a day do you exercise enough to make your heart beat faster? 9 or less    How many days per week do you miss taking your medication? 0      Review of Systems   Constitutional, HEENT, cardiovascular, pulmonary, GI, , musculoskeletal, neuro, skin, endocrine and psych systems are negative, except as otherwise noted.      Objective    Vitals - Patient Reported  Weight (Patient Reported): 72.6 kg (160 lb)  Height (Patient Reported): 167.6 cm (5' 6\")  BMI (Based on Pt Reported Ht/Wt): 25.82      Vitals:  No vitals were obtained today due to virtual visit.    Physical Exam   RESP: No audible wheeze, cough,  PSYCH:  judgement and insight intact, normal speech     Lab Requisition on 09/12/2022   Component Date Value Ref Range Status     Hepatitis B Surface Antibody Instr* 09/12/2022 18.17  <8.00 m[IU]/mL Final     Hepatitis B Surface Antibody 09/12/2022 Reactive   Final    Patient is considered to be immune to infection with hepatitis B when the value is greater than or equal to 12.00 mIU/mL.     Quantiferon Nil Tube 09/12/2022 0.03  IU/mL Final     Quantiferon TB1 Tube 09/12/2022 0.01  IU/mL Final     Quantiferon TB2 Tube 09/12/2022 0.02   Final     Quantiferon Mitogen 09/12/2022 10.00  IU/mL Final     Quantiferon-TB Gold Plus 09/12/2022 Negative  Negative Final    No interferon gamma response to M.tuberculosis antigens was detected. Infection with M.tuberculosis is unlikely, however a single negative result does not exclude infection. In patients at high risk for infection, a second test should be considered in accordance with the 2017 ATS/IDSA/CDC Clinical Pract  ice Guidelines for Diagnosis of Tuberculosis in Adults and Children      TB1 Ag minus Nil Value 09/12/2022 -0.02  IU/mL Final     TB2 Ag minus Nil Value 09/12/2022 -0.01  IU/mL Final     Mitogen minus Nil Result 09/12/2022 9.97  IU/mL Final     Nil Result 09/12/2022 0.03  IU/mL Final "               Televisit: 12 minutes. Changed to Televisit

## 2022-09-29 ENCOUNTER — HOSPITAL ENCOUNTER (EMERGENCY)
Facility: CLINIC | Age: 52
Discharge: HOME | End: 2022-09-29
Attending: PHYSICIAN ASSISTANT | Admitting: PHYSICIAN ASSISTANT
Payer: COMMERCIAL

## 2022-09-29 ENCOUNTER — APPOINTMENT (OUTPATIENT)
Dept: GENERAL RADIOLOGY | Facility: CLINIC | Age: 52
End: 2022-09-29
Attending: EMERGENCY MEDICINE
Payer: COMMERCIAL

## 2022-09-29 VITALS
TEMPERATURE: 97 F | RESPIRATION RATE: 18 BRPM | HEART RATE: 100 BPM | OXYGEN SATURATION: 99 % | SYSTOLIC BLOOD PRESSURE: 133 MMHG | DIASTOLIC BLOOD PRESSURE: 92 MMHG

## 2022-09-29 DIAGNOSIS — M25.551 HIP PAIN, RIGHT: ICD-10-CM

## 2022-09-29 DIAGNOSIS — M54.9 BACK PAIN, UNSPECIFIED BACK LOCATION, UNSPECIFIED BACK PAIN LATERALITY, UNSPECIFIED CHRONICITY: ICD-10-CM

## 2022-09-29 PROCEDURE — 99284 EMERGENCY DEPT VISIT MOD MDM: CPT

## 2022-09-29 PROCEDURE — 250N000013 HC RX MED GY IP 250 OP 250 PS 637: Performed by: PHYSICIAN ASSISTANT

## 2022-09-29 PROCEDURE — 72100 X-RAY EXAM L-S SPINE 2/3 VWS: CPT

## 2022-09-29 PROCEDURE — 73502 X-RAY EXAM HIP UNI 2-3 VIEWS: CPT

## 2022-09-29 RX ORDER — ACETAMINOPHEN 500 MG
1000 TABLET ORAL ONCE
Status: COMPLETED | OUTPATIENT
Start: 2022-09-29 | End: 2022-09-29

## 2022-09-29 RX ORDER — IBUPROFEN 600 MG/1
600 TABLET, FILM COATED ORAL ONCE
Status: COMPLETED | OUTPATIENT
Start: 2022-09-29 | End: 2022-09-29

## 2022-09-29 RX ADMIN — IBUPROFEN 600 MG: 600 TABLET ORAL at 20:04

## 2022-09-29 RX ADMIN — ACETAMINOPHEN 1000 MG: 500 TABLET ORAL at 20:03

## 2022-09-29 ASSESSMENT — ENCOUNTER SYMPTOMS
ARTHRALGIAS: 1
BACK PAIN: 1

## 2022-09-29 NOTE — ED TRIAGE NOTES
Pt states she had right hip pain earlier today and then her hip/leg gave out and her hip 'dislocated' but then went back into place but states it is still painful and she cannot walk on it      Triage Assessment     Row Name 09/29/22 1800       Triage Assessment (Adult)    Airway WDL WDL       Respiratory WDL    Respiratory WDL WDL       Cardiac WDL    Cardiac WDL WDL       Cognitive/Neuro/Behavioral WDL    Cognitive/Neuro/Behavioral WDL WDL

## 2022-09-29 NOTE — ED TRIAGE NOTES
Pt having hip pain, felt like today it popped out of place.  Has had pain to hip area and low back for past couple of weeks.  Pt was walking and turning when this happened.     Triage Assessment     Row Name 09/29/22 1807       Triage Assessment (Adult)    Airway WDL WDL       Respiratory WDL    Respiratory WDL WDL       Skin Circulation/Temperature WDL    Skin Circulation/Temperature WDL WDL       Cardiac WDL    Cardiac WDL WDL       Peripheral/Neurovascular WDL    Peripheral Neurovascular WDL WDL       Cognitive/Neuro/Behavioral WDL    Cognitive/Neuro/Behavioral WDL WDL    Row Name 09/29/22 1800       Triage Assessment (Adult)    Airway WDL WDL       Respiratory WDL    Respiratory WDL WDL       Cardiac WDL    Cardiac WDL WDL       Cognitive/Neuro/Behavioral WDL    Cognitive/Neuro/Behavioral WDL WDL

## 2022-09-29 NOTE — ED NOTES
"Rapid Assessment Note    History:   Jaz Archibald is a 52 year old female who presents for evaluation of right hip pain that became acutely worse today while walking it \"gave out\", patient stating that it \"went out of socket\" but then went back in.  She been having troubles for several weeks, and primary has referred her to physical therapy though she has not had any imaging.  No prior hip surgeries.  She has been reluctant to consider using a cane lately.    Exam:   General:  Alert, interactive  Cardiovascular:  Well perfused  Lungs:  No respiratory distress, no accessory muscle use  Neuro: Normal mental status   skin:  Warm, dry  Psych:  Normal affect    Plan of Care:   I evaluated the patient and developed an initial plan of care. I discussed this plan and explained that I, or one of my partners, would be returning to complete the evaluation.     9/29/2022  [facility]       Con Haile MD  09/29/22 8507    "

## 2022-09-30 NOTE — ED PROVIDER NOTES
History   Chief Complaint:  Hip Pain       The history is provided by the patient and the spouse.      Jaz Archibald is a 52 year old female with history of hypertension, hyperlipidemia, and vertebral artery dissection who presents with right hip pain. Patient states that today while she was walking her right leg gave out and she states that it felt like it popped out and then back in. Currently it hurts a lot and is unable to put any weight on it. The hip pain has been chronic but over the past 2 weeks it has gotten worse, and before it felt like popped out she states that she has been limping and has not been able to ambulate normal for her. She states that she also has arthritis in her low back and sciatic pain.     Review of Systems   Musculoskeletal: Positive for arthralgias (right hip) and back pain (low back).   All other systems reviewed and are negative.      Allergies:  Bees  Allevyn Adhesive [Dome-Paste Bandage]  Suture    Medications:  Norvasc  Aspirin 81 mg  Lipitor  Wellbutrin   Lexapro  Vistaril   Phenergan  Topamax  Desyrel    Past Medical History:     ADD  Dyspareunia  Hyperlipidemia  QT prolongation  Basal cell carcinoma  Vitamin D deficiency  GERD without esophagitis  Dissection vertebral artery  Diaphragmatic hernia  Duodenitis   Hypertension  Anxiety  Vertebral artery dissection    Past Surgical History:    EGD  Novasure  Hysterectomy  Laparoscopic herniorrhaphy hiatal  Laparoscopic nissen fundoplication  MOHS surgery   Sling transvaginal      Family History:    Lipids  Hypertension  CAD    Social History:  PCP: Jacinda Banks   Patient came from home.  Patient is accompanied in the ED with .    Physical Exam     Patient Vitals for the past 24 hrs:   BP Temp Pulse Resp SpO2   09/29/22 1808 (!) 133/92 -- 100 -- --   09/29/22 1807 -- 97  F (36.1  C) -- 18 99 %       Physical Exam  General: Well appearing, pleasant female, resting on exam bed  HEENT: No evidence of trauma.  Conjunctive  are clear.  Extraocular eye movements intact.  Neck range of motion intact.  Nose and throat clear.  Respiratory: Good breath sounds bilaterally  Cardiovascular: Normal rate and rhythm   Gastrointestinal: Soft, nontender.   Musculoskeletal: Atraumatic.  Minimal spinal tenderness.  Skin: Exposed skin clear.  Neurologic: Alert.  Psych:  Patient is cooperative, with normal affect.  Right hip: She has tenderness throughout.  Active range of motion is intact although painful.  Passive range of motion is intact although less painful.  Ranging her hip precipitates the pain.  She has good sensation and pulses intact distally.    Emergency Department Course   Imaging:  Lumbar spine XR, 2-3 views   Final Result   IMPRESSION: 6 lumbar type vertebral bodies. No fracture. Grade 1, 8 mm, degenerative anterolisthesis of L5 on L6. Otherwise unremarkable vertebral heights and alignment. Mild to moderate degenerative interbody height loss at L6 S1. Otherwise unremarkable    disc spaces and facets for age. Normal extraspinal structures.      XR Pelvis and Hip Right 1 View   Final Result   IMPRESSION: Both hips negative for fracture. Mild degenerative change right hip joint. Pelvis negative for fracture. Calcification adjacent to the left ischial tuberosity represents old injury or chronic enthesitis at the hamstring tendon attachment.        Report per radiology    Emergency Department Course:           Reviewed:  I reviewed nursing notes, vitals, past medical history and Care Everywhere    Assessments:  1944 I obtained history and examined the patient as noted above.    I rechecked the patient and explained findings.     Disposition:  The patient was discharged to home.     Impression & Plan   Medical Decision Making:  Jaz Archibald is a 52 year old female with a history of vertebral dissection, presents to the ER for evaluation of acute on chronic right hip and back pain that worsened after she thought that her hip gave out or went  out of place when she was walking.  See HPI.  Her vitals are unremarkable.  Her pain is elicited when ranging her right hip.  It is worse with active range of motion.  She is neurovascularly intact.  I doubt that her symptoms are entirely radiating from her low back of which she complains of chronic sciatic type pain.  She has no true weakness on exam.  There is no numbness or bowel or bladder changes.  Her x-rays are unremarkable for any acute abnormalities thankfully.  Chronic changes as above.  We will manage her with rest, ibuprofen, Tylenol, and close follow-up with primary care or orthopedics.  She has plans to make an appointment with physical therapy.  She is to return with new or worsening symptoms.  No further questions agrees with plan.    Diagnosis:    ICD-10-CM    1. Hip pain, right  M25.551    2. Back pain, unspecified back location, unspecified back pain laterality, unspecified chronicity  M54.9        Discharge Medications:  Discharge Medication List as of 9/29/2022  8:04 PM          Scribe Disclosure:  I, Ad Diggs, am serving as a scribe at 7:28 PM on 9/29/2022 to document services personally performed by Moses Kong PA-C based on my observations and the provider's statements to me.            Moses Kong PA-C  09/29/22 2119

## 2022-10-04 ENCOUNTER — TRANSFERRED RECORDS (OUTPATIENT)
Dept: HEALTH INFORMATION MANAGEMENT | Facility: CLINIC | Age: 52
End: 2022-10-04

## 2022-10-14 ENCOUNTER — VIRTUAL VISIT (OUTPATIENT)
Dept: INTERNAL MEDICINE | Facility: CLINIC | Age: 52
End: 2022-10-14
Payer: COMMERCIAL

## 2022-10-14 DIAGNOSIS — F41.1 ANXIETY STATE: ICD-10-CM

## 2022-10-14 DIAGNOSIS — M25.559 HIP PAIN: Primary | ICD-10-CM

## 2022-10-14 DIAGNOSIS — F32.A DEPRESSION, UNSPECIFIED DEPRESSION TYPE: ICD-10-CM

## 2022-10-14 PROCEDURE — 99213 OFFICE O/P EST LOW 20 MIN: CPT | Mod: 95 | Performed by: NURSE PRACTITIONER

## 2022-10-14 ASSESSMENT — PATIENT HEALTH QUESTIONNAIRE - PHQ9
5. POOR APPETITE OR OVEREATING: NEARLY EVERY DAY
SUM OF ALL RESPONSES TO PHQ QUESTIONS 1-9: 14

## 2022-10-14 ASSESSMENT — ANXIETY QUESTIONNAIRES
6. BECOMING EASILY ANNOYED OR IRRITABLE: NOT AT ALL
5. BEING SO RESTLESS THAT IT IS HARD TO SIT STILL: SEVERAL DAYS
GAD7 TOTAL SCORE: 13
7. FEELING AFRAID AS IF SOMETHING AWFUL MIGHT HAPPEN: MORE THAN HALF THE DAYS
3. WORRYING TOO MUCH ABOUT DIFFERENT THINGS: NEARLY EVERY DAY
IF YOU CHECKED OFF ANY PROBLEMS ON THIS QUESTIONNAIRE, HOW DIFFICULT HAVE THESE PROBLEMS MADE IT FOR YOU TO DO YOUR WORK, TAKE CARE OF THINGS AT HOME, OR GET ALONG WITH OTHER PEOPLE: SOMEWHAT DIFFICULT
GAD7 TOTAL SCORE: 13
1. FEELING NERVOUS, ANXIOUS, OR ON EDGE: SEVERAL DAYS
2. NOT BEING ABLE TO STOP OR CONTROL WORRYING: NEARLY EVERY DAY

## 2022-10-14 NOTE — PROGRESS NOTES
Linsey is a 52 year old who is being evaluated via a billable video visit.      How would you like to obtain your AVS? MyChart  If the video visit is dropped, the invitation should be resent by: Text to cell phone: [phone]  Will anyone else be joining your video visit? No          Assessment & Plan   Problem List Items Addressed This Visit    None  Visit Diagnoses     Hip pain    -  Primary       patient states she feels as though her right hip is dislocating. She describes aching, weakness and trouble lifting the leg when getting in and out of the car.  Patient was seen at [facility] MRI and xray completed with the recommendation for injection in the low back and concern for possible labral tear. Patient was placed on steroids and patient did note this did not help the hip and groin pain.              Depression Screening Follow Up    PHQ 10/14/2022   PHQ-9 Total Score 14   Q9: Thoughts of better off dead/self-harm past 2 weeks Not at all   F/U: Thoughts of suicide or self-harm -   F/U: Safety concerns -     Follow Up Actions Taken       MEDICATIONS:   No orders of the defined types were placed in this encounter.    There are no discontinued medications.       - Continue other medications without change  CONSULTATION/REFERRAL to Orthopedic follow up for injection and possible physical therapy.       Discussed possible increase of lexapro to 20mg daily, patient declines at this time.        No follow-ups on file.    Jacinda Banks NP  [facility]   Linsey is a 52 year old, presenting for the following health issues:  Depression and Musculoskeletal Problem      Musculoskeletal Problem       PHQ 9/16/2022 9/16/2022 10/14/2022   PHQ-9 Total Score 14 16 14   Q9: Thoughts of better off dead/self-harm past 2 weeks Not at all More than half the days Not at all   F/U: Thoughts of suicide or self-harm - No -   F/U: Safety concerns - No -     ALBERTO-7 SCORE 7/7/2022 9/16/2022 10/14/2022   Total  Score - - -   Total Score 21 (severe anxiety) 12 (moderate anxiety) -   Total Score 21 12 13           --depression follow up   --right hip pain, was mentioned at last visit and she states its just getting worse; did take prednisone that was rx'd by another provider, it helped a little but not enough  --no ROM in right hip; started August 2nd; had no prev injuries  Review of Systems   Unremarkable other than listed above and below         Objective    Vitals - Patient Reported  Pain Score: Severe Pain (7)      Vitals:  No vitals were obtained today due to virtual visit.    Physical Exam   RESP: No audible wheeze, cough  PSYCH:  judgement and insight intact, normal speech     Lab Requisition on 09/12/2022   Component Date Value Ref Range Status     Hepatitis B Surface Antibody Instr* 09/12/2022 18.17  <8.00 m[IU]/mL Final     Hepatitis B Surface Antibody 09/12/2022 Reactive   Final    Patient is considered to be immune to infection with hepatitis B when the value is greater than or equal to 12.00 mIU/mL.     Quantiferon Nil Tube 09/12/2022 0.03  IU/mL Final     Quantiferon TB1 Tube 09/12/2022 0.01  IU/mL Final     Quantiferon TB2 Tube 09/12/2022 0.02   Final     Quantiferon Mitogen 09/12/2022 10.00  IU/mL Final     Quantiferon-TB Gold Plus 09/12/2022 Negative  Negative Final    No interferon gamma response to M.tuberculosis antigens was detected. Infection with M.tuberculosis is unlikely, however a single negative result does not exclude infection. In patients at high risk for infection, a second test should be considered in accordance with the 2017 ATS/IDSA/CDC Clinical Pract  ice Guidelines for Diagnosis of Tuberculosis in Adults and Children      TB1 Ag minus Nil Value 09/12/2022 -0.02  IU/mL Final     TB2 Ag minus Nil Value 09/12/2022 -0.01  IU/mL Final     Mitogen minus Nil Result 09/12/2022 9.97  IU/mL Final     Nil Result 09/12/2022 0.03  IU/mL Final               Transition to phone visit per patient: 12

## 2022-11-02 ENCOUNTER — HOSPITAL ENCOUNTER (OUTPATIENT)
Dept: MAMMOGRAPHY | Facility: CLINIC | Age: 52
Discharge: HOME | End: 2022-11-02
Attending: NURSE PRACTITIONER | Admitting: NURSE PRACTITIONER
Payer: COMMERCIAL

## 2022-11-02 DIAGNOSIS — Z12.31 ENCOUNTER FOR SCREENING MAMMOGRAM FOR BREAST CANCER: ICD-10-CM

## 2022-11-02 PROCEDURE — 77063 BREAST TOMOSYNTHESIS BI: CPT

## 2022-11-08 ENCOUNTER — OFFICE VISIT (OUTPATIENT)
Dept: SURGERY | Facility: CLINIC | Age: 52
End: 2022-11-08
Payer: COMMERCIAL

## 2022-11-08 VITALS
HEART RATE: 92 BPM | SYSTOLIC BLOOD PRESSURE: 131 MMHG | HEIGHT: 66 IN | DIASTOLIC BLOOD PRESSURE: 94 MMHG | WEIGHT: 164 LBS | BODY MASS INDEX: 26.36 KG/M2 | OXYGEN SATURATION: 96 %

## 2022-11-08 DIAGNOSIS — E66.3 OVERWEIGHT WITH BODY MASS INDEX (BMI) OF 27 TO 27.9 IN ADULT: ICD-10-CM

## 2022-11-08 DIAGNOSIS — E66.3 OVERWEIGHT WITH BODY MASS INDEX (BMI) OF 26 TO 26.9 IN ADULT: Primary | ICD-10-CM

## 2022-11-08 DIAGNOSIS — M54.50 LUMBAR PAIN: ICD-10-CM

## 2022-11-08 PROCEDURE — 99213 OFFICE O/P EST LOW 20 MIN: CPT | Performed by: PHYSICIAN ASSISTANT

## 2022-11-08 NOTE — PROGRESS NOTES
2022          Return Medical Weight Management Note         Jaz Archibald  MRN:  [id number]  :  1970        Dear Jacinda Banks,    I had the pleasure of doing a visit with your patient Jaz Archibald.  She is a 52 year old female who I am continuing to see for treatment of obesity related to:       2022   I have the following health issues associated with obesity: High Blood Pressure, High Cholesterol, Stress Incontinence, Osteoarthritis (joint disease)   I have the following symptoms associated with obesity: Knee Pain, Depression, Back Pain, Fatigue, Hip Pain       INTERVAL HISTORY:  Patient was last seen 2022. Has continued to lose weight.   Has pain in her hip. Thinks this is coming from her back and will be having an injection soon. So less mobile.    Was given topamax by Lauren Bloch PharmD that she took for 2 months before stopping. Thought it might have given her headaches. Got up to 50 mg daily. Did feel like it helped her appetite.    Ran out of the Saxenda last month.  Did notice an increase in her appetite when she stopped. Is interested in restarting.       CURRENT WEIGHT:   164 lbs 0 oz       Wt Readings from Last 4 Encounters:   22 164 lb (74.4 kg)   08/10/22 158 lb 8 oz (71.9 kg)   22 171 lb (77.6 kg)   22 167 lb (75.8 kg)       BARIATRIC METRICS:  Current Weight: 164 lb (74.4 kg)  Body mass index is 26.47 kg/m .   Wt change since last visit (lbs): -7  Cumulative weight loss (lbs): -4        DIETARY HISTORY  Fluid Intake:       Exercise:  Has not been exercising.  Has hip pain       ROS: 2022  General  Fatigue: yes  Sleep Quality: Depends  Birth Control: Hysterectomy   HEENT  Hx of glaucoma: No  Vision changes: No  Cardiovascular  Chest Pain with Exertion: No  Palpitations: No  Hx of heart disease: No - Had a vertebral artery dissection and takes ASA - had mini strokes as a result  Pulmonary  Shortness of breath at rest: No  Shortness of  "breath with exertion:  Yes  Snoring: Yes - Wakes up with headaches 2-3 times per week - A referral for [facility]   Gastrointestinal  Heartburn: No  Abdominal pain: No  History of pancreatitis: No  Severe constipation: on occasion - takes magnesium or stool softener  Hx of bowel obstruction: No  No kidney stone or kidney disease  Endocrine  Polydipsia: yes  Polyuria: No  Personal or family history of thyroid cancer: No        MEDICATIONS:   Current Outpatient Medications   Medication     amLODIPine (NORVASC) 2.5 MG tablet     aspirin (ASA) 81 MG EC tablet     atorvastatin (LIPITOR) 80 MG tablet     buPROPion (WELLBUTRIN XL) 300 MG 24 hr tablet     EPINEPHrine (ANY BX GENERIC EQUIV) 0.3 MG/0.3ML injection 2-pack     escitalopram (LEXAPRO) 10 MG tablet     hydrOXYzine (VISTARIL) 25 MG capsule     insulin pen needle (31G X 5 MM) 31G X 5 MM miscellaneous     liraglutide - Weight Management (SAXENDA) 18 MG/3ML pen     olopatadine (PATANOL) 0.1 % ophthalmic solution     promethazine (PHENERGAN) 25 MG tablet     topiramate (TOPAMAX) 25 MG tablet     traZODone (DESYREL) 100 MG tablet     No current facility-administered medications for this visit.       PE:  BP (!) 131/94   Pulse 92   Ht 5' 6\" (1.676 m)   Wt 164 lb (74.4 kg)   LMP 07/26/2013   SpO2 96%   BMI 26.47 kg/m    GENERAL: Healthy, alert and no distress  EYES: Eyes grossly normal to inspection.  No discharge or erythema, or obvious scleral/conjunctival abnormalities.  RESP: No audible wheeze, cough, or visible cyanosis.  No visible retractions or increased work of breathing.    SKIN: Visible skin clear. No significant rash, abnormal pigmentation or lesions.  NEURO: Cranial nerves grossly intact.  Mentation and speech appropriate for age.  PSYCH: Mentation appears normal, affect normal/bright, judgement and insight intact, normal speech and appearance well-groomed.        ASSESSMENT/PLAN:   Overweight BMI 26  High cholesterol  High blood pressure  bilat knee pain " "  Lumbar pain          We discussed the role of pharmacological agents in the treatment of obesity and the \"off-label\" use of medications in this practice. We discussed the risks and benefits of each. We discussed indications, contraindications, potential side effects, and estimated costs of each. Discussed that medications must always be used together with lifestyle changes such as improvements in diet choices, portion control and establishing and maintaining a regular exercise program.      Congratulated patient on her overall weight loss and emphasized the importance of diet and lifestyle for long term success.    Discussed restarting Saxenda. Will send over a 3 month supply. With her weight loss now putting her BMI below 27 this will most likely be her last prescription for now.     She verbalizes understanding and is in agreement with this plan. Recent lab reviewed.     Follow up in 3 months or prn       Gardenia Diego MS, PADee DeeC    20 minutes spent on the date of the encounter doing chart review, review of test results, patient visit and documentation       "

## 2022-11-11 DIAGNOSIS — G47.01 SLEEP DISORDER DUE TO A GENERAL MEDICAL CONDITION, INSOMNIA TYPE: ICD-10-CM

## 2022-11-13 NOTE — TELEPHONE ENCOUNTER
"Drug interaction warning    Last Written Prescription Date:  2/9/22  Last Fill Quantity: 90,  # refills: 2   Last office visit provider:  10/14/22     Requested Prescriptions   Pending Prescriptions Disp Refills     traZODone (DESYREL) 100 MG tablet [Pharmacy Med Name: TRAZODONE HCL 100MG TABS] 90 tablet 2     Sig: TAKE ONE TABLET BY MOUTH ONCE DAILY AT BEDTIME       Serotonin Modulators Passed - 11/11/2022  2:09 PM        Passed - Recent (12 mo) or future (30 days) visit within the authorizing provider's specialty     Patient has had an office visit with the authorizing provider or a provider within the authorizing providers department within the previous 12 mos or has a future within next 30 days. See \"Patient Info\" tab in inbasket, or \"Choose Columns\" in Meds & Orders section of the refill encounter.              Passed - Medication is active on med list        Passed - Patient is age 18 or older        Passed - No active pregnancy on record        Passed - No positive pregnancy test in past 12 months             Deann Xie RN 11/13/22 10:34 AM  "

## 2022-11-14 RX ORDER — TRAZODONE HYDROCHLORIDE 100 MG/1
TABLET ORAL
Qty: 90 TABLET | Refills: 3 | Status: SHIPPED | OUTPATIENT
Start: 2022-11-14 | End: 2023-02-08

## 2022-11-14 RX ORDER — TRAZODONE HYDROCHLORIDE 100 MG/1
100 TABLET ORAL AT BEDTIME
Qty: 90 TABLET | Refills: 2 | Status: SHIPPED | OUTPATIENT
Start: 2022-11-14 | End: 2023-10-10

## 2022-12-08 ENCOUNTER — TRANSFERRED RECORDS (OUTPATIENT)
Dept: HEALTH INFORMATION MANAGEMENT | Facility: CLINIC | Age: 52
End: 2022-12-08

## 2022-12-14 ENCOUNTER — MYC MEDICAL ADVICE (OUTPATIENT)
Dept: INTERNAL MEDICINE | Facility: CLINIC | Age: 52
End: 2022-12-14

## 2022-12-15 ENCOUNTER — VIRTUAL VISIT (OUTPATIENT)
Dept: URGENT CARE | Facility: CLINIC | Age: 52
End: 2022-12-15
Payer: COMMERCIAL

## 2022-12-15 DIAGNOSIS — U07.1 INFECTION DUE TO 2019 NOVEL CORONAVIRUS: Primary | ICD-10-CM

## 2022-12-15 PROCEDURE — 99214 OFFICE O/P EST MOD 30 MIN: CPT | Mod: CS

## 2022-12-15 NOTE — PROGRESS NOTES
"Assessment & Plan     Infection due to 2019 novel coronavirus  - nirmatrelvir and ritonavir (PAXLOVID) therapy pack; Take 3 tablets by mouth 2 times daily for 5 days      Return in about 1 week (around 12/22/2022) for visit with primary care provider if not improving.     COVID-19 positive patient.  Encounter for consideration of medication intervention. Patient does qualify for a prescription. Full discussion with patient including medication options, risks and benefits. Potential drug interactions reviewed with patient.     30 minutes spent on the date of the encounter doing chart review, patient visit, and documentation     Treatment Planned: Paxlovid  Temporary change to home medications: hold lipitor, take 1/2 trazodone, aware of the decreased effectiveness of Wellbutrin     Estimated body mass index is 26.47 kg/m  as calculated from the following:    Height as of 11/8/22: 1.676 m (5' 6\").    Weight as of 11/8/22: 74.4 kg (164 lb).  GFR Estimate   Date Value Ref Range Status   08/02/2022 90 >60 mL/min/1.73m2 Final     Comment:     Effective December 21, 2021 eGFRcr in adults is calculated using the 2021 CKD-EPI creatinine equation which includes age and gender (Wolf et al., NEJM, DOI: 10.1056/KTASzc4043992)   07/03/2020 >90 >60 mL/min/[1.73_m2] Final     Comment:     Non  GFR Calc  Starting 12/18/2018, serum creatinine based estimated GFR (eGFR) will be   calculated using the Chronic Kidney Disease Epidemiology Collaboration   (CKD-EPI) equation.       Lab Results   Component Value Date    [id number] Positive (A) 11/24/2021       Harriet Napier PA-C  [facility]    Subjective   Jaz Archibald is a 52 year old who presents for the following health issues    HPI    Linsey presents via video after testing positive for COVID-19.  Symptoms first began in the early morning hours on Tuesday, 2 days ago.  Been admitted for testing with negative COVID but symptoms worsen if she " tested positive later that day.  She has sudden onset of fatigue fever body aches sore throat ear pain nose cough chest congestion.  Fever has gotten up to 102F and has been difficult to control with Tylenol and ibuprofen.  Her oxygen saturation last night was 90 but now is between 95 and 97.    Review of Systems   ROS negative except as stated above.      Objective    Physical Exam   GENERAL: Healthy, alert and no distress  EYES: Eyes grossly normal to inspection.  No discharge or erythema, or obvious scleral/conjunctival abnormalities.  HENT: Normal cephalic/atraumatic.  External ears, nose and mouth without ulcers or lesions.  No nasal drainage visible.  RESP: No audible cough wheeze or visible cyanosis.  No visible retractions or increased work of breathing.    SKIN: Visible skin clear. No significant rash, abnormal pigmentation or lesions.  NEURO: Cranial nerves grossly intact.  Mentation and speech appropriate for age.  PSYCH: Mentation appears normal, affect normal/bright, judgement and insight intact, normal speech and appearance well-groomed.    Type of service:  Video Visit  Video Start Time: 8:32AM  Video End Time: 8:52AM  Originating Location (pt. Location): Home  Distant Location (provider location):  [facility]- offsite at [facility]  Platform used for Video Visit: [employer]

## 2022-12-15 NOTE — PATIENT INSTRUCTIONS
Symptoms began December 13  Stay home and away from others through December 18  You may be around others wearing a well fitting mask on December 19-23  Your isolation restrictions are over on December 24 as long as your symptoms are improving and you have been fever free for 24 hours, even if you still test positive for COVID.  However, if you test negative twice, at least 48 hours apart between days 6-10, you can stop wearing your mask earlier    Visit the CDC websites for more information and most up to date guidelines:  www.cdc.gov/coronavirus/2019-ncov/your-health/isolation.html  www.cdc.gov/coronavirus/2019-ncov/hcp/duration-isolation.html    Hold atorvastatin, restart the day after you finish Paxlovid  Take 1/2 Trazodone, restart full tab the day after you finish Paxlovid  Effectiveness of Wellbutrin may be decreased while taking Paxlovid    Check O2 levels. If your number stays at 90 or below at rest, go to the emergency room.

## 2022-12-19 ENCOUNTER — APPOINTMENT (OUTPATIENT)
Dept: MRI IMAGING | Facility: CLINIC | Age: 52
End: 2022-12-19
Attending: EMERGENCY MEDICINE
Payer: COMMERCIAL

## 2022-12-19 ENCOUNTER — HOSPITAL ENCOUNTER (EMERGENCY)
Facility: CLINIC | Age: 52
Discharge: HOME | End: 2022-12-19
Attending: EMERGENCY MEDICINE | Admitting: EMERGENCY MEDICINE
Payer: COMMERCIAL

## 2022-12-19 ENCOUNTER — NURSE TRIAGE (OUTPATIENT)
Dept: NURSING | Facility: CLINIC | Age: 52
End: 2022-12-19

## 2022-12-19 VITALS
HEART RATE: 77 BPM | HEIGHT: 66 IN | SYSTOLIC BLOOD PRESSURE: 114 MMHG | RESPIRATION RATE: 13 BRPM | DIASTOLIC BLOOD PRESSURE: 82 MMHG | BODY MASS INDEX: 24.91 KG/M2 | WEIGHT: 155 LBS | OXYGEN SATURATION: 90 % | TEMPERATURE: 97.9 F

## 2022-12-19 DIAGNOSIS — U07.1 INFECTION DUE TO 2019 NOVEL CORONAVIRUS: ICD-10-CM

## 2022-12-19 DIAGNOSIS — R42 VERTIGO: ICD-10-CM

## 2022-12-19 LAB
ALBUMIN SERPL-MCNC: 3.6 G/DL (ref 3.4–5)
ALP SERPL-CCNC: 49 U/L (ref 40–150)
ALT SERPL W P-5'-P-CCNC: 25 U/L (ref 0–50)
ANION GAP SERPL CALCULATED.3IONS-SCNC: 7 MMOL/L (ref 3–14)
AST SERPL W P-5'-P-CCNC: 11 U/L (ref 0–45)
ATRIAL RATE - MUSE: 76 BPM
BASOPHILS # BLD AUTO: 0 10E3/UL (ref 0–0.2)
BASOPHILS NFR BLD AUTO: 1 %
BILIRUB SERPL-MCNC: 0.4 MG/DL (ref 0.2–1.3)
BUN SERPL-MCNC: 13 MG/DL (ref 7–30)
CALCIUM SERPL-MCNC: 8.3 MG/DL (ref 8.5–10.1)
CHLORIDE BLD-SCNC: 106 MMOL/L (ref 94–109)
CO2 SERPL-SCNC: 22 MMOL/L (ref 20–32)
CREAT SERPL-MCNC: 0.57 MG/DL (ref 0.52–1.04)
DIASTOLIC BLOOD PRESSURE - MUSE: NORMAL MMHG
EOSINOPHIL # BLD AUTO: 0.2 10E3/UL (ref 0–0.7)
EOSINOPHIL NFR BLD AUTO: 3 %
ERYTHROCYTE [DISTWIDTH] IN BLOOD BY AUTOMATED COUNT: 12.3 % (ref 10–15)
GFR SERPL CREATININE-BSD FRML MDRD: >90 ML/MIN/1.73M2
GLUCOSE BLD-MCNC: 93 MG/DL (ref 70–99)
HCT VFR BLD AUTO: 38.4 % (ref 35–47)
HGB BLD-MCNC: 13.1 G/DL (ref 11.7–15.7)
HOLD SPECIMEN: NORMAL
IMM GRANULOCYTES # BLD: 0 10E3/UL
IMM GRANULOCYTES NFR BLD: 0 %
INTERPRETATION ECG - MUSE: NORMAL
LYMPHOCYTES # BLD AUTO: 1.9 10E3/UL (ref 0.8–5.3)
LYMPHOCYTES NFR BLD AUTO: 29 %
MCH RBC QN AUTO: 29.6 PG (ref 26.5–33)
MCHC RBC AUTO-ENTMCNC: 34.1 G/DL (ref 31.5–36.5)
MCV RBC AUTO: 87 FL (ref 78–100)
MONOCYTES # BLD AUTO: 0.5 10E3/UL (ref 0–1.3)
MONOCYTES NFR BLD AUTO: 7 %
NEUTROPHILS # BLD AUTO: 4 10E3/UL (ref 1.6–8.3)
NEUTROPHILS NFR BLD AUTO: 60 %
NRBC # BLD AUTO: 0 10E3/UL
NRBC BLD AUTO-RTO: 0 /100
P AXIS - MUSE: 22 DEGREES
PLATELET # BLD AUTO: 240 10E3/UL (ref 150–450)
POTASSIUM BLD-SCNC: 3.7 MMOL/L (ref 3.4–5.3)
PR INTERVAL - MUSE: 158 MS
PROT SERPL-MCNC: 6.9 G/DL (ref 6.8–8.8)
QRS DURATION - MUSE: 74 MS
QT - MUSE: 442 MS
QTC - MUSE: 497 MS
R AXIS - MUSE: 3 DEGREES
RBC # BLD AUTO: 4.43 10E6/UL (ref 3.8–5.2)
SODIUM SERPL-SCNC: 135 MMOL/L (ref 133–144)
SYSTOLIC BLOOD PRESSURE - MUSE: NORMAL MMHG
T AXIS - MUSE: 31 DEGREES
TROPONIN I SERPL HS-MCNC: 3 NG/L
VENTRICULAR RATE- MUSE: 76 BPM
WBC # BLD AUTO: 6.6 10E3/UL (ref 4–11)

## 2022-12-19 PROCEDURE — 258N000003 HC RX IP 258 OP 636: Performed by: EMERGENCY MEDICINE

## 2022-12-19 PROCEDURE — 96375 TX/PRO/DX INJ NEW DRUG ADDON: CPT

## 2022-12-19 PROCEDURE — 96374 THER/PROPH/DIAG INJ IV PUSH: CPT | Mod: 59

## 2022-12-19 PROCEDURE — 250N000011 HC RX IP 250 OP 636: Performed by: EMERGENCY MEDICINE

## 2022-12-19 PROCEDURE — A9585 GADOBUTROL INJECTION: HCPCS | Performed by: EMERGENCY MEDICINE

## 2022-12-19 PROCEDURE — 250N000013 HC RX MED GY IP 250 OP 250 PS 637: Performed by: EMERGENCY MEDICINE

## 2022-12-19 PROCEDURE — 70553 MRI BRAIN STEM W/O & W/DYE: CPT

## 2022-12-19 PROCEDURE — 93005 ELECTROCARDIOGRAM TRACING: CPT

## 2022-12-19 PROCEDURE — 99285 EMERGENCY DEPT VISIT HI MDM: CPT | Mod: 25

## 2022-12-19 PROCEDURE — 36415 COLL VENOUS BLD VENIPUNCTURE: CPT | Performed by: EMERGENCY MEDICINE

## 2022-12-19 PROCEDURE — 70549 MR ANGIOGRAPH NECK W/O&W/DYE: CPT

## 2022-12-19 PROCEDURE — 70544 MR ANGIOGRAPHY HEAD W/O DYE: CPT

## 2022-12-19 PROCEDURE — 96361 HYDRATE IV INFUSION ADD-ON: CPT

## 2022-12-19 PROCEDURE — 255N000002 HC RX 255 OP 636: Performed by: EMERGENCY MEDICINE

## 2022-12-19 PROCEDURE — 84484 ASSAY OF TROPONIN QUANT: CPT | Performed by: EMERGENCY MEDICINE

## 2022-12-19 PROCEDURE — 85048 AUTOMATED LEUKOCYTE COUNT: CPT | Performed by: EMERGENCY MEDICINE

## 2022-12-19 PROCEDURE — 80053 COMPREHEN METABOLIC PANEL: CPT | Performed by: EMERGENCY MEDICINE

## 2022-12-19 RX ORDER — MECLIZINE HYDROCHLORIDE 25 MG/1
25 TABLET ORAL EVERY 6 HOURS PRN
Qty: 20 TABLET | Refills: 0 | Status: SHIPPED | OUTPATIENT
Start: 2022-12-19 | End: 2023-02-08

## 2022-12-19 RX ORDER — ONDANSETRON 4 MG/1
4 TABLET, ORALLY DISINTEGRATING ORAL EVERY 6 HOURS PRN
Qty: 10 TABLET | Refills: 0 | Status: SHIPPED | OUTPATIENT
Start: 2022-12-19 | End: 2024-04-30

## 2022-12-19 RX ORDER — LORAZEPAM 2 MG/ML
1 INJECTION INTRAMUSCULAR ONCE
Status: COMPLETED | OUTPATIENT
Start: 2022-12-19 | End: 2022-12-19

## 2022-12-19 RX ORDER — MECLIZINE HYDROCHLORIDE 25 MG/1
25 TABLET ORAL ONCE
Status: COMPLETED | OUTPATIENT
Start: 2022-12-19 | End: 2022-12-19

## 2022-12-19 RX ORDER — ONDANSETRON 2 MG/ML
4 INJECTION INTRAMUSCULAR; INTRAVENOUS ONCE
Status: COMPLETED | OUTPATIENT
Start: 2022-12-19 | End: 2022-12-19

## 2022-12-19 RX ORDER — GADOBUTROL 604.72 MG/ML
10 INJECTION INTRAVENOUS ONCE
Status: COMPLETED | OUTPATIENT
Start: 2022-12-19 | End: 2022-12-19

## 2022-12-19 RX ORDER — ACETAMINOPHEN 500 MG
1000 TABLET ORAL ONCE
Status: COMPLETED | OUTPATIENT
Start: 2022-12-19 | End: 2022-12-19

## 2022-12-19 RX ADMIN — LORAZEPAM 1 MG: 2 INJECTION INTRAMUSCULAR; INTRAVENOUS at 09:36

## 2022-12-19 RX ADMIN — ONDANSETRON 4 MG: 2 INJECTION INTRAMUSCULAR; INTRAVENOUS at 09:03

## 2022-12-19 RX ADMIN — SODIUM CHLORIDE 1000 ML: 9 INJECTION, SOLUTION INTRAVENOUS at 09:03

## 2022-12-19 RX ADMIN — GADOBUTROL 10 ML: 604.72 INJECTION INTRAVENOUS at 10:32

## 2022-12-19 RX ADMIN — MECLIZINE HYDROCHLORIDE 25 MG: 25 TABLET ORAL at 09:36

## 2022-12-19 RX ADMIN — ACETAMINOPHEN 1000 MG: 500 TABLET ORAL at 09:36

## 2022-12-19 ASSESSMENT — ENCOUNTER SYMPTOMS
HEADACHES: 1
DIAPHORESIS: 1
VOMITING: 1
NAUSEA: 1
DIZZINESS: 1

## 2022-12-19 ASSESSMENT — ACTIVITIES OF DAILY LIVING (ADL)
ADLS_ACUITY_SCORE: 35
ADLS_ACUITY_SCORE: 35

## 2022-12-19 NOTE — ED NOTES
Bed: ED30  Expected date:   Expected time:   Means of arrival:   Comments:  [facility] [place] [facility] - 52 F dizzy vomiting eta 0802

## 2022-12-19 NOTE — DISCHARGE INSTRUCTIONS
No signs of vertebral dissection or stroke today.     Ok to continue paxlovid. Your vertigo is more likely due to covid-19 than the medication.     Reminder of the instructions at time of prescribing paxlovid: Temporary change to home medications: hold lipitor, take 1/2 trazodone, aware of the decreased effectiveness of Wellbutrin.    Follow-up with your doctor in 2-3 days for recheck of symptoms. Virtual visit ok.     Discharge Instructions  Vertigo  You have been diagnosed with vertigo.  This is a dizzy feeling often described as spinning or that the room is moving around you. You will often have nausea (sick to your stomach), vomiting (throwing up), and balance problems with it.  Vertigo is usually caused by a problem in the inner ear which helps control your balance.  Many things can cause vertigo, including calcium collections in the inner ear, a virus infection of the inner ear, concussion, migraine, and some medicines.  Luckily, these causes are not life threatening and will eventually go away.  However, sometimes there is a serious problem that does not show up right away.  Generally, every Emergency Department visit should have a follow-up clinic visit with either a primary or a specialty clinic/provider. Please follow-up as instructed by your emergency provider today.  Return to the Emergency Department if you have:  New or severe headache.  Double vision (seeing two of things).  Trouble speaking or hearing.  Weakness or trouble moving/using one side of your body.  Passing out.  Numbness or tingling.  Chest pain.  Vomiting that will not stop.    Treatment:  There are several commonly prescribed medications:  Antihistamines such as meclizine (Antivert ), dimenhydrinate (Dramamine ), or diphenhydramine (Benadryl ).  Prescription anti-nausea medicines, such as promethazine (Phenergan ), metoclopramide (Reglan ), or ondansetron (Zofran ).  Prescription sedative medicines, such as diazepam (Valium ), lorazepam  (Ativan ), or clonazepam (Klonopin ).  Most of these medicines make you sleepy, and you should not take them before you work or drive. You should only take prescription medicines to treat severe vertigo symptoms, and you should stop the medicine when your symptoms improve.    Follow Up:  If you have vertigo longer than three days, it is important that you follow up either with your primary provider or an Ear, Nose, and Throat (ENT) specialist.  You may need further testing to evaluate your vertigo and you may also need  vestibular  therapy which is a special form of physical therapy to make the vertigo go away.    If you were given a prescription for medicine here today, be sure to read all of the information (including the package insert) that comes with your prescription.  This will include important information about the medicine, its side effects, and any warnings that you need to know about.  The pharmacist who fills the prescription can provide more information and answer questions you may have about the medicine.  If you have questions or concerns that the pharmacist cannot address, please call or return to the Emergency Department.     Remember that you can always come back to the Emergency Department if you are not able to see your regular provider in the amount of time listed above, if you get any new symptoms, or if there is anything that worries you.

## 2022-12-19 NOTE — ED PROVIDER NOTES
History   Chief Complaint:  Dizziness and Nausea & Vomiting     The history is provided by the patient.      Jaz Archibald is a 52 year old female with history of vertebral artery dissection, hypertension, and hyperlipidemia who presents with dizziness, nausea, and vomiting. The patient is Covid-positive, and recounts the beginning of her symptoms seven days ago. She is currently on Paxlovid, and three days ago started to see improvement in her fever and other symptoms, which continued into the next day. Yesterday, however, she started to feel worse again, which continued into today, prompting her visit to the ED. She says that her symptoms feel like vertigo, describing nausea, vomiting, and dizziness that makes it very difficult to walk, all of which are exacerbated by moving/changing positions. She also notes a headache, diaphoresis last night, and feeling generally very dry.     Review of Systems   Constitutional: Positive for diaphoresis.   Gastrointestinal: Positive for nausea and vomiting.   Neurological: Positive for dizziness and headaches.   All other systems reviewed and are negative.    Allergies:  Bees  Allevyn Adhesive    Medications:  Norvasc  Aspirin 81 mg   Lipitor  Wellbutrin  Epinephrine  Lexapro  Vistaril  Saxenda  Paxlovid  Desyrel    Past Medical History:     ADHD  Anxiety  Insomnia  hypothyroidism  Hyperlipidemia  Hypertension  QT prolongation  Duodenitis  Diaphragmatic hernia  Vertebral artery dissection  GERD  Vitamin D deficiency  BCC  IBS    Past Surgical History:    EGD  Novasure  Laparoscopic herniorrhaphy hiatal  Laparoscopic total hysterectomy   Sling transvaginal  Mohs for skin cancer removal   Laparoscopic nissen fundoplication   Tonsillectomy   Breast augmentation  [place] teeth extraction  Lasik surgery   Endometrial ablation     Family History:    Mother: hypercholesterolemia, hypertensin  Father: hypercholesterolemia, hypertension, CAD    Social History:  PCP: Jacinda Banks  "  Presents to the ED alone.     Physical Exam     Patient Vitals for the past 24 hrs:   BP Temp Temp src Pulse Resp SpO2 Height Weight   12/19/22 1155 -- -- -- 77 13 90 % -- --   12/19/22 1145 114/82 -- -- 81 15 97 % -- --   12/19/22 1026 -- -- -- 73 13 96 % -- --   12/19/22 1016 117/85 -- -- 77 15 97 % -- --   12/19/22 1001 (!) 118/92 -- -- 92 18 94 % -- --   12/19/22 0946 -- -- -- 74 11 97 % -- --   12/19/22 0901 122/88 -- -- -- -- -- -- --   12/19/22 0853 (!) 132/91 97.9  F (36.6  C) Oral 87 20 95 % 1.676 m (5' 6\") 70.3 kg (155 lb)       Physical Exam  Nursing note and vitals reviewed.  HENT:   Mouth/Throat: Moist mucous membranes.   Eyes: nonicteric sclera  Cardiovascular: Normal rate, regular rhythm, no murmurs, rubs, or gallops  Pulmonary/Chest: Effort normal and breath sounds normal. No respiratory distress. No wheezes. No rales.   Abdominal: Soft. Nontender, nondistended, no guarding or rigidity.   Musculoskeletal: Normal range of motion.   Neurological: Alert. Moves all extremities spontaneously.     CN's II-XII intact. 5/5 BUE and BLE strength. PERRL    EOMI without nystagmus.      Sensation intact to light touch. Negative pronator drift.    Finger to nose intact.   Skin: Skin is warm and dry. No rash noted.   Psychiatric: Normal mood and affect.     Emergency Department Course   ECG  ECG taken at 0904, ECG read at 1029.  Normal sinus rhythm. Low voltage QRS. Nonspecific T wave abnormality. Prolonged QT.    No changes compared to prior, dated 8/27/21.  Rate 76 bpm. NJ interval 158 ms. QRS duration 74 ms. QT/QTc 442/497 ms. P-R-T axes 22 3 31.     Imaging:  MR Brain w/o & w Contrast   Preliminary Result   IMPRESSION:    1.  Unremarkable appearance to the brain with no acute intracranial   abnormality.      2.  Paranasal sinus disease as above.      MRA Angiogram Neck w/o & w Contrast   Preliminary Result   IMPRESSION:    No significant stenosis of the neck vessels by NASCET criteria. No   finding for " dissection.      MR Head w/o Contrast Angiogram   Preliminary Result   IMPRESSION:    No significant intracranial stenosis. No aneurysm and no high flow   vascular malformation.        Report per radiology    Laboratory:  Labs Ordered and Resulted from Time of ED Arrival to Time of ED Departure   COMPREHENSIVE METABOLIC PANEL - Abnormal       Result Value    Sodium 135      Potassium 3.7      Chloride 106      Carbon Dioxide (CO2) 22      Anion Gap 7      Urea Nitrogen 13      Creatinine 0.57      Calcium 8.3 (*)     Glucose 93      Alkaline Phosphatase 49      AST 11      ALT 25      Protein Total 6.9      Albumin 3.6      Bilirubin Total 0.4      GFR Estimate >90     TROPONIN I - Normal    Troponin I High Sensitivity 3     CBC WITH PLATELETS AND DIFFERENTIAL    WBC Count 6.6      RBC Count 4.43      Hemoglobin 13.1      Hematocrit 38.4      MCV 87      MCH 29.6      MCHC 34.1      RDW 12.3      Platelet Count 240      % Neutrophils 60      % Lymphocytes 29      % Monocytes 7      % Eosinophils 3      % Basophils 1      % Immature Granulocytes 0      NRBCs per 100 WBC 0      Absolute Neutrophils 4.0      Absolute Lymphocytes 1.9      Absolute Monocytes 0.5      Absolute Eosinophils 0.2      Absolute Basophils 0.0      Absolute Immature Granulocytes 0.0      Absolute NRBCs 0.0       Emergency Department Course:     Reviewed:  I reviewed nursing notes, vitals, past medical history and Care Everywhere    Assessments:  0910 I obtained history and examined the patient as noted above.   1220 I rechecked the patient and explained findings. Prepared for discharge.      Consults:  0915 I spoke Mary Alexander for Dr. Isabel, Stroke neurology, regarding the patient.  0920 I spoke with stroke neurology team again, who recommended MRI.     Interventions:  0903 NS 1 L IV  0903 Zofran 4 mg IV   0936 Antivert 25 mg PO   0936 Ativan 1 mg IV   0936 Tylenol 1,000 mg PO     Disposition:  The patient was discharged to home.     Impression  & Plan     Medical Decision Making:  Pt presents with CC dizziness/vertigo with associated nausea vomiting. Pt with history of vertebral dissection. Pt also on paxlovid for treatment of covid-19. Certainly, concern for repeat dissection, so discussed with stroke neurology and obtained MRI/MRA of brain/head/neck. Fortunately, no evidence of dissection or acute etiology. With negative MRI, emergent pathology has been ruled out. Suspect symptoms are due to labyrinthitis from covid-19 or possibly side effect from paxlovid, but this is less likely. Pt only has 2 more doses of paxlovid, so I believe it's safe to finish. She is symptomatically improved here. Rx zofran/meclizine for home. She is in stable condition at the time of discharge, indications for return to the ED were discussed as well as follow up. All questions were answered and she is in agreement with the plan.      Diagnosis:    ICD-10-CM    1. Infection due to 2019 novel coronavirus  U07.1       2. Vertigo  R42           Discharge Medications:  New Prescriptions    MECLIZINE (ANTIVERT) 25 MG TABLET    Take 1 tablet (25 mg) by mouth every 6 hours as needed for dizziness    ONDANSETRON (ZOFRAN ODT) 4 MG ODT TAB    Take 1 tablet (4 mg) by mouth every 6 hours as needed for nausea or vomiting       Scribe Disclosure:  VIRGEN, Delmar Villar, am serving as a scribe at 9:07 AM on 12/19/2022 to document services personally performed by Parviz Jamison MD based on my observations and the provider's statements to me.              Parviz Jamison MD  12/20/22 8214

## 2022-12-19 NOTE — ED TRIAGE NOTES
Pt presents by [facility] [place] [facility] from home with complaints of dizziness, nausea and vomiting. Pt states she was diagnosed with covid on Tuesday and started Paxlovid on Thursday- reports she was feeling improved and then yesterday began to feel sick with dizziness and n/v. Pt reports unable to keep anything down due to the vomiting. Pt does report she has hx of L vertebral artery dissection 1 years ago so she was concerned with the dizziness.  Pt given 4 mg zofran and 500 ml NS by EMS      Triage Assessment     Row Name 12/19/22 0853       Triage Assessment (Adult)    Airway WDL WDL       Respiratory WDL    Respiratory WDL WDL       Skin Circulation/Temperature WDL    Skin Circulation/Temperature WDL WDL       Cardiac WDL    Cardiac WDL WDL       Peripheral/Neurovascular WDL    Peripheral Neurovascular WDL WDL       Cognitive/Neuro/Behavioral WDL    Cognitive/Neuro/Behavioral WDL X  HA    Orientation oriented x 4       Amauri Coma Scale    Best Eye Response 4-->(E4) spontaneous    Best Motor Response 6-->(M6) obeys commands    Best Verbal Response 5-->(V5) oriented    Amauri Coma Scale Score 15

## 2022-12-19 NOTE — TELEPHONE ENCOUNTER
"Pt calls while currently on day six of covid illness episode.  Currently taking Paxlovid; two doses remaining.  Last dose taken 24 hours ago.    New nausea x 24 hours.  \"Unable to vomit due to past Enio Fundoplication.\"  Therefore had \"dry heaves all night.\"  \"Mouth is so dry.\"  Last urinated \"a little bit, one hour ago.\"  \"Up all night, slowly sipping Gatorade and water.\"    \"Horrible headache.\"  Currently 7/10; \"had been 10/10 for awhile.\"    Dizziness -> \"so unusual.\"  \"Was able to walk slowly to the bathroom.\"    History of vertebral artery dissection last year.    \"Nausea and vertigo together are causing me to not really move.\"  Feeling \"sweaty.\"    Advised calling 911 now.  Pt agrees to plan.  Ambulance is on the way; pt has unlocked front door; pt is currently safe and awaiting ambulance promptly.    Ciera JEFFERSON Health Nurse Advisor     Reason for Disposition    [1] Dizziness (vertigo) present now AND [2] one or more STROKE RISK FACTORS (i.e., hypertension, diabetes, prior stroke/TIA, heart attack)  (Exception: prior physician evaluation for this AND no different/worse than usual)    Shock suspected (e.g., cold/pale/clammy skin, too weak to stand, low BP, rapid pulse)    Additional Information    Negative: [1] Weakness (i.e., paralysis, loss of muscle strength) of the face, arm or leg on one side of the body AND [2] sudden onset AND [3] present now    Negative: [1] Numbness (i.e., loss of sensation) of the face, arm or leg on one side of the body AND [2] sudden onset AND [3] present now    Negative: [1] Loss of speech or garbled speech AND [2] sudden onset AND [3] present now    Negative: Difficult to awaken or acting confused (e.g., disoriented, slurred speech)    Negative: Sounds like a life-threatening emergency to the triager    Negative: Followed a head injury    Negative: Followed an ear injury    Negative: Localized weakness or numbness is main symptom    Negative: Dizziness relates to riding in " a car, going to an amusement park, etc.    Negative: [1] Dizziness is main symptom AND [2] NO spinning sensation (i.e., vertigo)    Negative: SEVERE dizziness (vertigo) (e.g., unable to walk without assistance)    Negative: Severe headache (e.g., excruciating)  (Exception: similar to previous migraines)    Negative: SEVERE difficulty breathing (e.g., struggling for each breath, speaks in single words)    Negative: Difficult to awaken or acting confused (e.g., disoriented, slurred speech)    Negative: Bluish (or gray) lips or face now    [facility] Specific Disposition - [facility] Specific Patient Instructions  COVID 19 Nurse Triage Plan/Patient Instructions    Please be aware that novel coronavirus (COVID-19) may be circulating in the community. If you develop symptoms such as fever, cough, or SOB or if you have concerns about the presence of another infection including coronavirus (COVID-19), please contact your health care provider or visit [email or web address]      Call to EMS/911 recommended. Follow protocol based instructions.     Bring Your Own Device:  Please also bring your smart device(s) (smart phones, tablets, laptops) and their charging cables for your personal use and to communicate with your care team during your visit.    Thank you for taking steps to prevent the spread of this virus.  Limit your contact with others.  Wear a simple mask to cover your cough.  Wash your hands well and often.    Resources  [facility]: About COVID-19: [email or web address]  CDC: What to Do If You're Sick: www.cdc.gov/coronavirus/2019-ncov/about/steps-when-sick.html  CDC: Ending Home Isolation: www.cdc.gov/coronavirus/2019-ncov/hcp/disposition-in-home-patients.html   CDC: Caring for Someone: www.cdc.gov/coronavirus/2019-ncov/if-you-are-sick/care-for-someone.html   MD: Interim Guidance for Hospital Discharge to Home:  www.health.[email or web address].[place]/diseases/coronavirus/hcp/hospdischarge.pdf  [facility] clinical trials (COVID-19 research studies): [email or web address]/n-clinical-trials   Below are the COVID-19 hotlines at the [facility] ([facility]). Interpreters are available.   For health questions: Call 291-596-7813 or 1-270.774.1434 (7 a.m. to 7 p.m.)  For questions about schools and childcare: Call 883-993-1677 or 1-254.798.9671 (7 a.m. to 7 p.m.)    Protocols used: DIZZINESS - VERTIGO-A-AH, CORONAVIRUS (COVID-19) DIAGNOSED OR [id number]-AL 1.18.2022

## 2022-12-23 DIAGNOSIS — E66.3 OVERWEIGHT WITH BODY MASS INDEX (BMI) OF 27 TO 27.9 IN ADULT: ICD-10-CM

## 2022-12-23 DIAGNOSIS — F41.1 ANXIETY STATE: ICD-10-CM

## 2022-12-25 RX ORDER — ESCITALOPRAM OXALATE 10 MG/1
TABLET ORAL
Qty: 90 TABLET | Refills: 0 | Status: SHIPPED | OUTPATIENT
Start: 2022-12-25 | End: 2023-04-01

## 2022-12-25 NOTE — TELEPHONE ENCOUNTER
"Last Written Prescription Date:  9/13/2022  Last Fill Quantity: 90,  # refills: 0   Last office visit provider:  10/14/2022     Requested Prescriptions   Pending Prescriptions Disp Refills     escitalopram (LEXAPRO) 10 MG tablet [Pharmacy Med Name: ESCITALOPRAM OXALATE 10MG TABS] 90 tablet 0     Sig: TAKE ONE TABLET BY MOUTH EVERY NIGHT AT BEDTIME       SSRIs Protocol Passed - 12/23/2022 10:35 PM        Passed - Recent (12 mo) or future (30 days) visit within the authorizing provider's specialty     Patient has had an office visit with the authorizing provider or a provider within the authorizing providers department within the previous 12 mos or has a future within next 30 days. See \"Patient Info\" tab in inbasket, or \"Choose Columns\" in Meds & Orders section of the refill encounter.              Passed - Medication is active on med list        Passed - Patient is age 18 or older        Passed - No active pregnancy on record        Passed - No positive pregnancy test in last 12 months             Erlinda Villegas RN 12/25/22 4:04 PM  "

## 2022-12-26 NOTE — TELEPHONE ENCOUNTER
Called pt re: needle refill  No answer  Left VM to call clinic, number provided    Ilda Harrell RN on 12/26/2022 at 11:05 AM

## 2022-12-26 NOTE — TELEPHONE ENCOUNTER
Sent  msg to pt re: pen needle refill  Pt was prescribed 100 pen needles 11/9/22    Ilda Harrell RN

## 2022-12-28 RX ORDER — FLURBIPROFEN SODIUM 0.3 MG/ML
SOLUTION/ DROPS OPHTHALMIC
Qty: 100 EACH | Refills: 1 | Status: SHIPPED | OUTPATIENT
Start: 2022-12-28

## 2022-12-29 ENCOUNTER — TRANSFERRED RECORDS (OUTPATIENT)
Dept: HEALTH INFORMATION MANAGEMENT | Facility: CLINIC | Age: 52
End: 2022-12-29

## 2023-02-02 ENCOUNTER — TELEPHONE (OUTPATIENT)
Dept: OTHER | Facility: CLINIC | Age: 53
End: 2023-02-02

## 2023-02-02 NOTE — TELEPHONE ENCOUNTER
[facility]    Who is the name of the provider?:  Geovanna    What is the location you see this provider at/preferred location?: [place]  Person calling / Facility: Self  Phone number:  [phone]   Nurse call back needed:  YES     Reason for call:   Requesting writer expedite earlier my chart message re discontinuing low dose aspirin, having surgery 2/17.    Pharmacy location:  NA  Outside Imaging: Not Applicable   Can we leave a detailed message on this number?  Not Applicable

## 2023-02-07 ENCOUNTER — TRANSFERRED RECORDS (OUTPATIENT)
Dept: HEALTH INFORMATION MANAGEMENT | Facility: CLINIC | Age: 53
End: 2023-02-07

## 2023-02-08 ENCOUNTER — OFFICE VISIT (OUTPATIENT)
Dept: INTERNAL MEDICINE | Facility: CLINIC | Age: 53
End: 2023-02-08
Payer: COMMERCIAL

## 2023-02-08 VITALS
DIASTOLIC BLOOD PRESSURE: 62 MMHG | BODY MASS INDEX: 25.84 KG/M2 | HEART RATE: 87 BPM | OXYGEN SATURATION: 97 % | WEIGHT: 160.1 LBS | SYSTOLIC BLOOD PRESSURE: 116 MMHG | TEMPERATURE: 97.8 F

## 2023-02-08 DIAGNOSIS — I10 BENIGN ESSENTIAL HYPERTENSION: ICD-10-CM

## 2023-02-08 DIAGNOSIS — E78.5 HYPERLIPIDEMIA LDL GOAL <130: ICD-10-CM

## 2023-02-08 DIAGNOSIS — G45.9 TIA (TRANSIENT ISCHEMIC ATTACK): ICD-10-CM

## 2023-02-08 DIAGNOSIS — M16.11 PRIMARY OSTEOARTHRITIS OF RIGHT HIP: ICD-10-CM

## 2023-02-08 DIAGNOSIS — Z01.818 PREOPERATIVE EXAMINATION: Primary | ICD-10-CM

## 2023-02-08 DIAGNOSIS — E55.9 VITAMIN D DEFICIENCY: ICD-10-CM

## 2023-02-08 DIAGNOSIS — M25.559 HIP PAIN: ICD-10-CM

## 2023-02-08 DIAGNOSIS — E03.8 OTHER SPECIFIED HYPOTHYROIDISM: ICD-10-CM

## 2023-02-08 DIAGNOSIS — F41.1 ANXIETY STATE: ICD-10-CM

## 2023-02-08 DIAGNOSIS — K21.9 GERD WITHOUT ESOPHAGITIS: ICD-10-CM

## 2023-02-08 DIAGNOSIS — F98.8 ATTENTION DEFICIT DISORDER, UNSPECIFIED HYPERACTIVITY PRESENCE: ICD-10-CM

## 2023-02-08 LAB
ALBUMIN SERPL BCG-MCNC: 4.3 G/DL (ref 3.5–5.2)
ALBUMIN UR-MCNC: NEGATIVE MG/DL
ALP SERPL-CCNC: 65 U/L (ref 35–104)
ALT SERPL W P-5'-P-CCNC: 23 U/L (ref 10–35)
ANION GAP SERPL CALCULATED.3IONS-SCNC: 11 MMOL/L (ref 7–15)
APPEARANCE UR: CLEAR
AST SERPL W P-5'-P-CCNC: 28 U/L (ref 10–35)
ATRIAL RATE - MUSE: 71 BPM
BACTERIA #/AREA URNS HPF: ABNORMAL /HPF
BASOPHILS # BLD AUTO: 0.1 10E3/UL (ref 0–0.2)
BASOPHILS NFR BLD AUTO: 1 %
BILIRUB SERPL-MCNC: 0.2 MG/DL
BILIRUB UR QL STRIP: NEGATIVE
BUN SERPL-MCNC: 21.6 MG/DL (ref 6–20)
CALCIUM SERPL-MCNC: 9.2 MG/DL (ref 8.6–10)
CHLORIDE SERPL-SCNC: 103 MMOL/L (ref 98–107)
COLOR UR AUTO: YELLOW
CREAT SERPL-MCNC: 0.82 MG/DL (ref 0.51–0.95)
DEPRECATED HCO3 PLAS-SCNC: 23 MMOL/L (ref 22–29)
DIASTOLIC BLOOD PRESSURE - MUSE: NORMAL MMHG
EOSINOPHIL # BLD AUTO: 0.2 10E3/UL (ref 0–0.7)
EOSINOPHIL NFR BLD AUTO: 3 %
ERYTHROCYTE [DISTWIDTH] IN BLOOD BY AUTOMATED COUNT: 12.7 % (ref 10–15)
GFR SERPL CREATININE-BSD FRML MDRD: 85 ML/MIN/1.73M2
GLUCOSE SERPL-MCNC: 77 MG/DL (ref 70–99)
GLUCOSE UR STRIP-MCNC: NEGATIVE MG/DL
HCT VFR BLD AUTO: 40.8 % (ref 35–47)
HGB BLD-MCNC: 13.5 G/DL (ref 11.7–15.7)
HGB UR QL STRIP: NEGATIVE
IMM GRANULOCYTES # BLD: 0 10E3/UL
IMM GRANULOCYTES NFR BLD: 0 %
INTERPRETATION ECG - MUSE: NORMAL
KETONES UR STRIP-MCNC: NEGATIVE MG/DL
LEUKOCYTE ESTERASE UR QL STRIP: ABNORMAL
LYMPHOCYTES # BLD AUTO: 3.8 10E3/UL (ref 0.8–5.3)
LYMPHOCYTES NFR BLD AUTO: 48 %
MCH RBC QN AUTO: 29.9 PG (ref 26.5–33)
MCHC RBC AUTO-ENTMCNC: 33.1 G/DL (ref 31.5–36.5)
MCV RBC AUTO: 91 FL (ref 78–100)
MONOCYTES # BLD AUTO: 0.6 10E3/UL (ref 0–1.3)
MONOCYTES NFR BLD AUTO: 8 %
NEUTROPHILS # BLD AUTO: 3.3 10E3/UL (ref 1.6–8.3)
NEUTROPHILS NFR BLD AUTO: 41 %
NITRATE UR QL: NEGATIVE
P AXIS - MUSE: 16 DEGREES
PH UR STRIP: 5 [PH] (ref 5–8)
PLATELET # BLD AUTO: 299 10E3/UL (ref 150–450)
POTASSIUM SERPL-SCNC: 4 MMOL/L (ref 3.4–5.3)
PR INTERVAL - MUSE: 152 MS
PROT SERPL-MCNC: 6.8 G/DL (ref 6.4–8.3)
QRS DURATION - MUSE: 74 MS
QT - MUSE: 408 MS
QTC - MUSE: 443 MS
R AXIS - MUSE: -11 DEGREES
RBC # BLD AUTO: 4.51 10E6/UL (ref 3.8–5.2)
RBC #/AREA URNS AUTO: ABNORMAL /HPF
SODIUM SERPL-SCNC: 137 MMOL/L (ref 136–145)
SP GR UR STRIP: 1.01 (ref 1–1.03)
SQUAMOUS #/AREA URNS AUTO: ABNORMAL /LPF
SYSTOLIC BLOOD PRESSURE - MUSE: NORMAL MMHG
T AXIS - MUSE: -5 DEGREES
UROBILINOGEN UR STRIP-ACNC: 0.2 E.U./DL
VENTRICULAR RATE- MUSE: 71 BPM
WBC # BLD AUTO: 8 10E3/UL (ref 4–11)
WBC #/AREA URNS AUTO: ABNORMAL /HPF

## 2023-02-08 PROCEDURE — 90750 HZV VACC RECOMBINANT IM: CPT | Performed by: NURSE PRACTITIONER

## 2023-02-08 PROCEDURE — 90471 IMMUNIZATION ADMIN: CPT | Performed by: NURSE PRACTITIONER

## 2023-02-08 PROCEDURE — 80053 COMPREHEN METABOLIC PANEL: CPT | Performed by: NURSE PRACTITIONER

## 2023-02-08 PROCEDURE — 93005 ELECTROCARDIOGRAM TRACING: CPT | Performed by: NURSE PRACTITIONER

## 2023-02-08 PROCEDURE — 81001 URINALYSIS AUTO W/SCOPE: CPT | Performed by: NURSE PRACTITIONER

## 2023-02-08 PROCEDURE — 99214 OFFICE O/P EST MOD 30 MIN: CPT | Mod: 25 | Performed by: NURSE PRACTITIONER

## 2023-02-08 PROCEDURE — 85025 COMPLETE CBC W/AUTO DIFF WBC: CPT | Performed by: NURSE PRACTITIONER

## 2023-02-08 PROCEDURE — 36415 COLL VENOUS BLD VENIPUNCTURE: CPT | Performed by: NURSE PRACTITIONER

## 2023-02-08 PROCEDURE — 93010 ELECTROCARDIOGRAM REPORT: CPT | Performed by: GENERAL ACUTE CARE HOSPITAL

## 2023-02-08 RX ORDER — CELECOXIB 200 MG/1
CAPSULE ORAL
COMMUNITY
Start: 2023-02-07 | End: 2023-12-20

## 2023-02-08 RX ORDER — OXYCODONE HYDROCHLORIDE 5 MG/1
TABLET ORAL
COMMUNITY
Start: 2023-02-02 | End: 2023-10-25

## 2023-02-08 RX ORDER — FLUOROURACIL 50 MG/G
CREAM TOPICAL
COMMUNITY
Start: 2022-12-29 | End: 2023-12-20

## 2023-02-08 RX ORDER — MUPIROCIN 20 MG/G
OINTMENT TOPICAL
COMMUNITY
Start: 2023-02-02 | End: 2023-12-20

## 2023-02-08 RX ORDER — VALACYCLOVIR HYDROCHLORIDE 500 MG/1
TABLET, FILM COATED ORAL
COMMUNITY
Start: 2023-02-02 | End: 2024-09-25

## 2023-02-08 RX ORDER — GABAPENTIN 300 MG/1
CAPSULE ORAL
COMMUNITY
Start: 2022-10-09 | End: 2023-12-20

## 2023-02-08 NOTE — PROGRESS NOTES
[address] [address]  Phone: [phone]  Fax: [phone]  Primary Provider: Humble Banks  Pre-op Performing Provider: HUMBLE BANKS      PREOPERATIVE EVALUATION:  Today's date: 2/8/2023    Jaz Archibald is a 53 year old female who presents for a preoperative evaluation.    Surgical Information:  Surgery/Procedure: Laser- face lift   Surgery Location: [facility]   Surgeon: Dr. Juan Berry  Surgery Date: 2.17.2023  Time of Surgery: TBD  Where patient plans to recover: At home with family  Fax number for surgical facility: [phone] & [phone]    Type of Anesthesia Anticipated: General    Assessment & Plan     The proposed surgical procedure is considered HIGH risk.    Problem List Items Addressed This Visit        Digestive    Vitamin D deficiency    GERD without esophagitis       Endocrine    Hypothyroidism    Hyperlipidemia LDL goal <130       Behavioral    Attention deficit disorder       Other    Anxiety state    Relevant Medications    gabapentin (NEURONTIN) 300 MG capsule    hydrOXYzine (VISTARIL) 25 MG capsule   Other Visit Diagnoses     Preoperative examination    -  Primary    Relevant Orders    EKG 12-lead, tracing only (Completed)    CBC with platelets and differential (Completed)    Comprehensive metabolic panel (Completed)    UA Macro with Reflex to Micro and Culture - lab collect (Completed)    Urine Microscopic (Completed)    Primary osteoarthritis of right hip        Relevant Medications    celecoxib (CELEBREX) 200 MG capsule    oxyCODONE (ROXICODONE) 5 MG tablet    Hip pain        Relevant Medications    celecoxib (CELEBREX) 200 MG capsule    oxyCODONE (ROXICODONE) 5 MG tablet    TIA (transient ischemic attack)        Benign essential hypertension                     Risks and Recommendations:  The patient has the following additional risks and recommendations for perioperative complications:   - No  identified additional risk factors other than previously addressed    Medication Instructions:   - aspirin: Discontinue aspirin 7-10 days prior to procedure to reduce bleeding risk. It should be resumed postoperatively.    - Calcium Channel Blockers: May be continued on the day of surgery.   - pregabalin, gabapentin: Continue without modification.   - SSRIs, SNRIs, TCAs, Antipsychotics: Continue without modification.     RECOMMENDATION:  APPROVAL GIVEN to proceed with proposed procedure, without further diagnostic evaluation.            Subjective     HPI related to upcoming procedure: CO2 laser over nose and under eyes, and lower facelift     Preop Questions 2/8/2023   1. Have you ever had a heart attack or stroke? No   2. Have you ever had surgery on your heart or blood vessels, such as a stent placement, a coronary artery bypass, or surgery on an artery in your head, neck, heart, or legs? No   3. Do you have chest pain with activity? No   4. Do you have a history of  heart failure? No   5. Do you currently have a cold, bronchitis or symptoms of other infection? No   6. Do you have a cough, shortness of breath, or wheezing? No   7. Do you or anyone in your family have previous history of blood clots? No   8. Do you or does anyone in your family have a serious bleeding problem such as prolonged bleeding following surgeries or cuts? No   9. Have you ever had problems with anemia or been told to take iron pills? YES -    10. Have you had any abnormal blood loss such as black, tarry or bloody stools, or abnormal vaginal bleeding? No   11. Have you ever had a blood transfusion? No   12. Are you willing to have a blood transfusion if it is medically needed before, during, or after your surgery? Yes   13. Have you or any of your relatives ever had problems with anesthesia? No   14. Do you have sleep apnea, excessive snoring or daytime drowsiness? No   15. Do you have any artifical heart valves or other implanted medical  devices like a pacemaker, defibrillator, or continuous glucose monitor? No   16. Do you have artificial joints? No   17. Are you allergic to latex? No   18. Is there any chance that you may be pregnant? No       Health Care Directive:  Patient does not have a Health Care Directive or Living Will: Patient states has Advance Directive and will bring in a copy to clinic.    Preoperative Review of :   reviewed - no record of controlled substances prescribed.      Status of Chronic Conditions:  See problem list for active medical problems.  Problems all longstanding and stable, except as noted/documented.  See ROS for pertinent symptoms related to these conditions.      Review of Systems  Constitutional, neuro, ENT, endocrine, pulmonary, cardiac, gastrointestinal, genitourinary, musculoskeletal, integument and psychiatric systems are negative, except as otherwise noted.    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     Priority: Medium     GERD without esophagitis 05/28/2021     Priority: Medium     Added automatically from request for surgery [id number]       Overweight with body mass index (BMI) of 26 to 26.9 in adult 09/03/2020     Priority: Medium     Diaphragmatic hernia 07/30/2020     Priority: Medium     Duodenitis 07/30/2020     Priority: Medium     Encounter for examination for normal comparison and control in clinical research program 08/19/2019     Priority: Medium     History of basal cell carcinoma 05/09/2018     Priority: Medium     BCC, central chest and right breast treated with Aldara 11/2017.  BCC, central chest and right breast treated with Aldara 11/2017.       Elevated LDL cholesterol level 04/23/2018     Priority: Medium     Essential hypertension 11/14/2016      Priority: Medium     Hyperlipidemia LDL goal <130 05/28/2015     Priority: Medium     QT prolongation 08/18/2013     Priority: Medium     8/13 - EKG for pre-op.  New finding.  8/13 - EKG for pre-op.  New finding.       Dyspareunia 06/26/2013     Priority: Medium     Persons encountering health services in other specified circumstances 06/04/2013     Priority: Medium     Formatting of this note might be different from the original.  EMERGENCY CARE PLAN  June 4, 2013: No current Care Coordination follow up planned. Please refer if Care Coordination services are needed.    Presenting Problem Signs and Symptoms Treatment Plan   Questions or concerns   during clinic hours   I will call my clinic directly:  [address] [address]  [phone].    Questions or concerns outside clinic hours   I will call the 24 hour nurse line at   [phone] or [facility].   Need to schedule an appointment   I will call the 24 hour scheduling team at [phone] or my clinic directly at [phone].    Same day treatment     I will call my clinic first, nurse line if after hours, urgent care and express care if needed.   Clinic care coordination services (regular clinic hours)     I will call a clinic care coordinator directly:     Jesus Ivy RN  Mon, Tues, Fri - [phone]  Wed, urs - [phone]    Madison Samano :    [phone]    Or call my clinic at [phone] and ask to speak with care coordination.   Crisis Services: Behavioral or Mental Health  [facility] Connection 24 Hour Phone Line  [phone]    [facility] 24 Hour Crisis Services  [phone]    [facility] (Behavioral Health Providers) Network [phone]    [facility]   [phone]    Emergency treatment -- Immediately    CAll 911       Environmental allergies 07/11/2012     Priority: Medium     Vitamin D deficiency 07/11/2012     Priority: Medium     Hypothyroidism 03/27/2012     Priority: Medium      Attention deficit disorder 10/25/2006     Priority: Medium     Tested year ago - psychologist   Rx from PCP  Problem list name updated by automated process. Provider to review       Anxiety state 10/25/2006     Priority: Medium     Problem list name updated by automated process. 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 Provider to review       Health Care Home 06/04/2013     Priority: Low     EMERGENCY CARE PLAN  June 4, 2013: No current Care Coordination follow up planned. Please refer if Care Coordination services are needed.    Presenting Problem Signs and Symptoms Treatment Plan   Questions or concerns   during clinic hours   I will call my clinic directly:  [address] [address]  [phone].    Questions or concerns outside clinic hours   I will call the 24 hour nurse line at   [phone] or [facility].   Need to schedule an appointment   I will call the 24 hour scheduling team at [phone] or my clinic directly at [phone].    Same day treatment     I will call my clinic first, nurse line if after hours, urgent care and express care if needed.   Clinic care coordination services (regular clinic hours)     I will call a clinic care coordinator directly:     Jesus Ivy RN  Mon, es, Fri - [phone]  Wed, Thurs - [phone]    Madison Samano :    [phone]    Or call my clinic at [phone] and ask to speak with care coordination.   Crisis Services: Behavioral or Mental Health  [facility]is Connection 24 Hour Phone Line  [phone]    [facility] 24 Hour Crisis Services  [phone]    [facility] (Behavioral Health Providers) Network [phone]    [facility]   [phone]       Emergency treatment -- Immediately    CAll 391           Past Medical History:    Diagnosis Date     ADD (attention deficit disorder)      Anxiety      Gastroesophageal reflux disease without esophagitis      Hypertension      Urethral hypermobility 06/26/2013     Past Surgical History:   Procedure Laterality Date     EGD      7/2020     GYN SURGERY  07/12/2012    Novasure     HYSTERECTOMY, PAP NO LONGER INDICATED       LAPAROSCOPIC HERNIORRHAPHY HIATAL N/A 07/26/2021    Procedure: HERNIORRHAPHY, HIATAL, LAPAROSCOPIC;  Surgeon: Mady Potts MD;  Location: [facility] OR     LAPAROSCOPIC HYSTERECTOMY TOTAL  08/12/2013    Procedure: LAPAROSCOPIC HYSTERECTOMY TOTAL;  Laparoscopic Total Hysterectomy,Bilateral Salpingectomy with Sparing of the Ovaries,Uterosacral Suspension,Transvaginal Taping,Perineoplasty;  Surgeon: Sy Castro MD;  Location: [facility] OR     LAPAROSCOPIC NISSEN FUNDOPLICATION N/A 07/26/2021    Procedure: FUNDOPLICATION, NISSEN, LAPAROSCOPIC;  Surgeon: Mady Potts MD;  Location: [facility] OR     mohs surgery      remove skin cancer     OH BREAST AUGMENTATION Bilateral      SLING TRANSVAGINAL  08/12/2013    Procedure: SLING TRANSVAGINAL;;  Surgeon: Sy Castro MD;  Location: [facility] OR     Current Outpatient Medications   Medication Sig Dispense Refill     amLODIPine (NORVASC) 2.5 MG tablet Take 1 tablet (2.5 mg) by mouth daily 90 tablet 3     atorvastatin (LIPITOR) 80 MG tablet Take 1 tablet (80 mg) by mouth daily 90 tablet 3     buPROPion (WELLBUTRIN XL) 300 MG 24 hr tablet Take 1 tablet (300 mg) by mouth every morning 90 tablet 1     celecoxib (CELEBREX) 200 MG capsule        cephALEXin (KEFLEX) 250 MG capsule        EPINEPHrine (ANY BX GENERIC EQUIV) 0.3 MG/0.3ML injection 2-pack        escitalopram (LEXAPRO) 10 MG tablet TAKE ONE TABLET BY MOUTH EVERY NIGHT AT BEDTIME 90 tablet 0     fluorouracil (EFUDEX) 5 % external cream APPLY TO TO THE AFFECTED AREA AS DIRECTED TWICE DAILY FOR 3 WEEKS       gabapentin (NEURONTIN) 300 MG capsule TAKE 1 CAPSULE BY MOUTH EVERY  6 HOURS AS NEEDED FOR PAIN. MAY TAKE 2 CAPSULES FOR BEDTIME DOSE       hydrOXYzine (VISTARIL) 25 MG capsule Take 1 capsule (25 mg) by mouth 3 times daily as needed for anxiety 30 capsule 0     insulin pen needle (B-D U/F) 31G X 5 MM miscellaneous USE 1 PEN NEEDLE DAILY OR AS DIRECTED. 100 each 1     liraglutide - Weight Management (SAXENDA) 18 MG/3ML pen Inject 3 mg Subcutaneous daily 45 mL 0     mupirocin (BACTROBAN) 2 % external ointment        olopatadine (PATANOL) 0.1 % ophthalmic solution Place 1-2 drops into both eyes 2 times daily as needed for allergies   6     oxyCODONE (ROXICODONE) 5 MG tablet        traZODone (DESYREL) 100 MG tablet Take 1 tablet (100 mg) by mouth At Bedtime 90 tablet 2     valACYclovir (VALTREX) 500 MG tablet        aspirin (ASA) 81 MG EC tablet Take 1 tablet (81 mg) by mouth daily 90 tablet 3       Allergies   Allergen Reactions     Bees Swelling     Disfiguring      Allevyn Adhesive [Dome-Paste Bandage]      Suture Swelling     local swelling and sutures didn't dissolve vyrcil   Suture         Social History     Tobacco Use     Smoking status: Never     Passive exposure: Never     Smokeless tobacco: Never   Substance Use Topics     Alcohol use: Yes     Alcohol/week: 1.0 standard drink     Types: 1 Glasses of wine per week     Family History   Problem Relation Age of Onset     Lipids Mother         chol     Hypertension Mother      Lipids Father         chol     Hypertension Father      C.A.D. Father      Heart Disease Father      History   Drug Use No         Objective     /62 (BP Location: Right arm, Patient Position: Sitting, Cuff Size: Adult Regular)   Pulse 87   Temp 97.8  F (36.6  C) (Oral)   Wt 72.6 kg (160 lb 1.6 oz)   LMP 07/26/2013   SpO2 97%   BMI 25.84 kg/m      Physical Exam    GENERAL APPEARANCE: healthy, alert and no distress     EYES: EOMI, PERRL     HENT: ear canals and TM's normal and nose and mouth without ulcers or lesions     NECK: no adenopathy      RESP: lungs clear to auscultation - no rales, rhonchi or wheezes     CV: regular rates and rhythm, normal S1 S2,     ABDOMEN:  soft, nontender, no HSM or masses and bowel sounds normal     MS: extremities normal- no gross deformities noted, no evidence of inflammation in joints, FROM in all extremities.     SKIN: no suspicious lesions or rashes     NEURO: Normal strength and tone, sensory exam grossly normal, mentation intact and speech normal     PSYCH: mentation appears normal. and affect normal/bright     LYMPHATICS: No cervical adenopathy    Recent Labs   Lab Test 12/19/22  0858 08/02/22  1007 08/15/21  1304 08/13/21  1547   HGB 13.1 14.7   < > 14.1    302   < > 441    142   < >  --    POTASSIUM 3.7 4.7   < >  --    CR 0.57 0.78   < >  --    A1C  --  5.1  --  5.0    < > = values in this interval not displayed.        Diagnostics:  Labs pending at this time.  Results will be reviewed when available.  No results found for this or any previous visit (from the past 24 hour(s)).       Revised Cardiac Risk Index (RCRI):  The patient has the following serious cardiovascular risks for perioperative complications:   - Cerebrovascular Disease (TIA or CVA) = 1 point     RCRI Interpretation: 1 point: Class II (low risk - 0.9% complication rate)           Signed Electronically by: Jacinda Banks NP  Copy of this evaluation report is provided to requesting physician.

## 2023-02-08 NOTE — PROGRESS NOTES
[address] [address]  Phone: [phone]  Fax: [phone]  Primary Provider: Humble Banks  Pre-op Performing Provider: HUMBLE BANKS      PREOPERATIVE EVALUATION:  Today's date: 2/8/2023    Jaz Archibald is a 53 year old female who presents for a preoperative evaluation.    Surgical Information:  Surgery/Procedure: Right Hip replacement   Surgery Location: [facility]   Surgeon: Dr. Correa  Surgery Date: 3.7.2023  Time of Surgery: TBD  Where patient plans to recover: At home with family  Fax number for surgical facility: [phone]    Type of Anesthesia Anticipated: Choice    Assessment & Plan     The proposed surgical procedure is considered INTERMEDIATE risk.    Problem List Items Addressed This Visit        Digestive    Vitamin D deficiency    GERD without esophagitis       Endocrine    Hypothyroidism    Hyperlipidemia LDL goal <130       Behavioral    Attention deficit disorder       Other    Anxiety state    Relevant Medications    gabapentin (NEURONTIN) 300 MG capsule    hydrOXYzine (VISTARIL) 25 MG capsule   Other Visit Diagnoses     Preoperative examination    -  Primary    Relevant Orders    EKG 12-lead, tracing only (Completed)    CBC with platelets and differential (Completed)    Comprehensive metabolic panel (Completed)    UA Macro with Reflex to Micro and Culture - lab collect (Completed)    Urine Microscopic (Completed)    Primary osteoarthritis of right hip        Relevant Medications    celecoxib (CELEBREX) 200 MG capsule    oxyCODONE (ROXICODONE) 5 MG tablet    Hip pain        Relevant Medications    celecoxib (CELEBREX) 200 MG capsule    oxyCODONE (ROXICODONE) 5 MG tablet    TIA (transient ischemic attack)        Benign essential hypertension                     Risks and Recommendations:  The patient has the following additional risks and recommendations for perioperative complications:   - No  identified additional risk factors other than previously addressed    Medication Instructions:   - aspirin: Discontinue aspirin 7-10 days prior to procedure to reduce bleeding risk. It should be resumed postoperatively.    - Calcium Channel Blockers: May be continued on the day of surgery.   - pregabalin, gabapentin: Continue without modification.   - SSRIs, SNRIs, TCAs, Antipsychotics: Continue without modification.     RECOMMENDATION:  APPROVAL GIVEN to proceed with proposed procedure, without further diagnostic evaluation.            Subjective     HPI related to upcoming procedure: Patient has arthritis of the right hip with the inability to stand up straight with increase pain and discomfort with ambulation and weight bearing.  She has undergone steroid injection with minimal relief.      Preop Questions 2/8/2023   1. Have you ever had a heart attack or stroke? No   2. Have you ever had surgery on your heart or blood vessels, such as a stent placement, a coronary artery bypass, or surgery on an artery in your head, neck, heart, or legs? No   3. Do you have chest pain with activity? No   4. Do you have a history of  heart failure? No   5. Do you currently have a cold, bronchitis or symptoms of other infection? No   6. Do you have a cough, shortness of breath, or wheezing? No   7. Do you or anyone in your family have previous history of blood clots? No   8. Do you or does anyone in your family have a serious bleeding problem such as prolonged bleeding following surgeries or cuts? No   9. Have you ever had problems with anemia or been told to take iron pills? YES -    10. Have you had any abnormal blood loss such as black, tarry or bloody stools, or abnormal vaginal bleeding? No   11. Have you ever had a blood transfusion? No   12. Are you willing to have a blood transfusion if it is medically needed before, during, or after your surgery? Yes   13. Have you or any of your relatives ever had problems with  anesthesia? No   14. Do you have sleep apnea, excessive snoring or daytime drowsiness? No   15. Do you have any artifical heart valves or other implanted medical devices like a pacemaker, defibrillator, or continuous glucose monitor? No   16. Do you have artificial joints? No   17. Are you allergic to latex? No   18. Is there any chance that you may be pregnant? No       Health Care Directive:  Patient does not have a Health Care Directive or Living Will: Patient states has Advance Directive and will bring in a copy to clinic.    Preoperative Review of :   reviewed - no record of controlled substances prescribed.      Status of Chronic Conditions:  See problem list for active medical problems.  Problems all longstanding and stable, except as noted/documented.  See ROS for pertinent symptoms related to these conditions.      Review of Systems  Constitutional, neuro, ENT, endocrine, pulmonary, cardiac, gastrointestinal, genitourinary, musculoskeletal, integument and psychiatric systems are negative, except as otherwise noted.    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     Priority: Medium     GERD without esophagitis 05/28/2021     Priority: Medium     Added automatically from request for surgery [id number]       Overweight with body mass index (BMI) of 26 to 26.9 in adult 09/03/2020     Priority: Medium     Diaphragmatic hernia 07/30/2020     Priority: Medium     Duodenitis 07/30/2020     Priority: Medium     Encounter for examination for normal comparison and control in clinical research program 08/19/2019     Priority: Medium     History of basal cell carcinoma 05/09/2018     Priority: Medium     BCC, central chest and right breast treated with Aldara 11/2017.  BCC, central chest and  right breast treated with Aldara 11/2017.       Elevated LDL cholesterol level 04/23/2018     Priority: Medium     Essential hypertension 11/14/2016     Priority: Medium     Hyperlipidemia LDL goal <130 05/28/2015     Priority: Medium     QT prolongation 08/18/2013     Priority: Medium     8/13 - EKG for pre-op.  New finding.  8/13 - EKG for pre-op.  New finding.       Dyspareunia 06/26/2013     Priority: Medium     Persons encountering health services in other specified circumstances 06/04/2013     Priority: Medium     Formatting of this note might be different from the original.  EMERGENCY CARE PLAN  June 4, 2013: No current Care Coordination follow up planned. Please refer if Care Coordination services are needed.    Presenting Problem Signs and Symptoms Treatment Plan   Questions or concerns   during clinic hours   I will call my clinic directly:  [address] [address]  [phone].    Questions or concerns outside clinic hours   I will call the 24 hour nurse line at   [phone] or [facility].   Need to schedule an appointment   I will call the 24 hour scheduling team at [phone] or my clinic directly at [phone].    Same day treatment     I will call my clinic first, nurse line if after hours, urgent care and express care if needed.   Clinic care coordination services (regular clinic hours)     I will call a clinic care coordinator directly:     Jesus Ivy RN  Mon, Tues, Fri - [phone]  Wed, Thurs - [phone]    Madison Samano :    [phone]    Or call my clinic at [phone] and ask to speak with care coordination.   Crisis Services: Behavioral or Mental Health  [facility] 24 Hour Phone Line  [phone]    [facility] 24 Hour Crisis Services  [phone]    [facility] (Behavioral Health Providers) Network [phone]    [facility]   [phone]    Emergency treatment -- Immediately    CAll 911       Environmental  allergies 07/11/2012     Priority: Medium     Vitamin D deficiency 07/11/2012     Priority: Medium     Hypothyroidism 03/27/2012     Priority: Medium     Attention deficit disorder 10/25/2006     Priority: Medium     Tested year ago - psychologist   Rx from PCP  Problem list name updated by automated process. Provider to review       Anxiety state 10/25/2006     Priority: Medium     Problem list name updated by automated process. 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 Provider to review       Health Care Home 06/04/2013     Priority: Low     EMERGENCY CARE PLAN  June 4, 2013: No current Care Coordination follow up planned. Please refer if Care Coordination services are needed.    Presenting Problem Signs and Symptoms Treatment Plan   Questions or concerns   during clinic hours   I will call my clinic directly:  [address] [address]  [phone].    Questions or concerns outside clinic hours   I will call the 24 hour nurse line at   [phone] or [address] [place] [address].   Need to schedule an appointment   I will call the 24 hour scheduling team at [phone] or my clinic directly at [phone].    Same day treatment     I will call my clinic first, nurse line if after hours, urgent care and express care if needed.   Clinic care coordination services (regular clinic hours)     I will call a clinic care coordinator directly:     Jesus Ivy RN  Mon, Tues, Fri - [phone]  Wed, Thurs - [phone]    Madison Samano :    [phone]    Or call my clinic at [phone] and ask to speak with care coordination.   Crisis Services: Behavioral or Mental Health  [facility] 24 Hour Phone Line  [phone]    [facility] 24 Hour Crisis Services  [phone]    [facility] (Behavioral Health Providers)  [facility] [phone]    [facility]   [phone]       Emergency treatment -- Immediately    CAll 911           Past Medical History:   Diagnosis Date     ADD (attention deficit disorder)      Anxiety      Gastroesophageal reflux disease without esophagitis      Hypertension      Urethral hypermobility 06/26/2013     Past Surgical History:   Procedure Laterality Date     EGD      7/2020     GYN SURGERY  07/12/2012    Novasure     HYSTERECTOMY, PAP NO LONGER INDICATED       LAPAROSCOPIC HERNIORRHAPHY HIATAL N/A 07/26/2021    Procedure: HERNIORRHAPHY, HIATAL, LAPAROSCOPIC;  Surgeon: Mady Potts MD;  Location: [facility] OR     LAPAROSCOPIC HYSTERECTOMY TOTAL  08/12/2013    Procedure: LAPAROSCOPIC HYSTERECTOMY TOTAL;  Laparoscopic Total Hysterectomy,Bilateral Salpingectomy with Sparing of the Ovaries,Uterosacral Suspension,Transvaginal Taping,Perineoplasty;  Surgeon: Sy Castro MD;  Location: [facility] OR     LAPAROSCOPIC NISSEN FUNDOPLICATION N/A 07/26/2021    Procedure: FUNDOPLICATION, NISSEN, LAPAROSCOPIC;  Surgeon: Mady Potts MD;  Location: [facility] OR     mohs surgery      remove skin cancer     NM BREAST AUGMENTATION Bilateral      SLING TRANSVAGINAL  08/12/2013    Procedure: SLING TRANSVAGINAL;;  Surgeon: Sy Castro MD;  Location: [facility] OR     Current Outpatient Medications   Medication Sig Dispense Refill     amLODIPine (NORVASC) 2.5 MG tablet Take 1 tablet (2.5 mg) by mouth daily 90 tablet 3     atorvastatin (LIPITOR) 80 MG tablet Take 1 tablet (80 mg) by mouth daily 90 tablet 3     buPROPion (WELLBUTRIN XL) 300 MG 24 hr tablet Take 1 tablet (300 mg) by mouth every morning 90 tablet 1     celecoxib (CELEBREX) 200 MG capsule        cephALEXin (KEFLEX) 250 MG capsule        EPINEPHrine (ANY BX GENERIC EQUIV) 0.3 MG/0.3ML injection 2-pack        escitalopram (LEXAPRO) 10 MG tablet TAKE ONE TABLET BY MOUTH EVERY NIGHT AT BEDTIME 90 tablet 0     fluorouracil (EFUDEX) 5 %  external cream APPLY TO TO THE AFFECTED AREA AS DIRECTED TWICE DAILY FOR 3 WEEKS       gabapentin (NEURONTIN) 300 MG capsule TAKE 1 CAPSULE BY MOUTH EVERY 6 HOURS AS NEEDED FOR PAIN. MAY TAKE 2 CAPSULES FOR BEDTIME DOSE       hydrOXYzine (VISTARIL) 25 MG capsule Take 1 capsule (25 mg) by mouth 3 times daily as needed for anxiety 30 capsule 0     insulin pen needle (B-D U/F) 31G X 5 MM miscellaneous USE 1 PEN NEEDLE DAILY OR AS DIRECTED. 100 each 1     liraglutide - Weight Management (SAXENDA) 18 MG/3ML pen Inject 3 mg Subcutaneous daily 45 mL 0     mupirocin (BACTROBAN) 2 % external ointment        olopatadine (PATANOL) 0.1 % ophthalmic solution Place 1-2 drops into both eyes 2 times daily as needed for allergies   6     oxyCODONE (ROXICODONE) 5 MG tablet        traZODone (DESYREL) 100 MG tablet Take 1 tablet (100 mg) by mouth At Bedtime 90 tablet 2     valACYclovir (VALTREX) 500 MG tablet        aspirin (ASA) 81 MG EC tablet Take 1 tablet (81 mg) by mouth daily 90 tablet 3       Allergies   Allergen Reactions     Bees Swelling     Disfiguring      Allevyn Adhesive [Dome-Paste Bandage]      Suture Swelling     local swelling and sutures didn't dissolve vyrcil   Suture         Social History     Tobacco Use     Smoking status: Never     Passive exposure: Never     Smokeless tobacco: Never   Substance Use Topics     Alcohol use: Yes     Alcohol/week: 1.0 standard drink     Types: 1 Glasses of wine per week     Family History   Problem Relation Age of Onset     Lipids Mother         chol     Hypertension Mother      Lipids Father         chol     Hypertension Father      C.A.D. Father      Heart Disease Father      History   Drug Use No         Objective     /62 (BP Location: Right arm, Patient Position: Sitting, Cuff Size: Adult Regular)   Pulse 87   Temp 97.8  F (36.6  C) (Oral)   Wt 72.6 kg (160 lb 1.6 oz)   LMP 07/26/2013   SpO2 97%   BMI 25.84 kg/m      Physical Exam    GENERAL APPEARANCE: healthy,  alert and no distress     EYES: EOMI, PERRL     HENT: ear canals and TM's normal and nose and mouth without ulcers or lesions     NECK: no adenopathy     RESP: lungs clear to auscultation - no rales, rhonchi or wheezes     CV: regular rates and rhythm, normal S1 S2     MS: extremities normal- no gross deformities noted, no evidence of inflammation in joints, FROM in all extremities.     SKIN: no suspicious lesions or rashes     NEURO: Normal strength and tone, sensory exam grossly normal, mentation intact and speech normal     PSYCH: mentation appears normal. and affect normal/bright     LYMPHATICS: No cervical adenopathy    Recent Labs   Lab Test 12/19/22  0858 08/02/22  1007 08/15/21  1304 08/13/21  1547   HGB 13.1 14.7   < > 14.1    302   < > 441    142   < >  --    POTASSIUM 3.7 4.7   < >  --    CR 0.57 0.78   < >  --    A1C  --  5.1  --  5.0    < > = values in this interval not displayed.        Diagnostics:  Labs pending at this time.  Results will be reviewed when available.  No results found for this or any previous visit (from the past 24 hour(s)).       Revised Cardiac Risk Index (RCRI):  The patient has the following serious cardiovascular risks for perioperative complications:   - Cerebrovascular Disease (TIA or CVA) = 1 point     RCRI Interpretation: 1 point: Class II (low risk - 0.9% complication rate)           Signed Electronically by: Jacinda Banks NP  Copy of this evaluation report is provided to requesting physician.

## 2023-02-13 RX ORDER — HYDROXYZINE PAMOATE 25 MG/1
25 CAPSULE ORAL 3 TIMES DAILY PRN
Qty: 30 CAPSULE | Refills: 0 | Status: SHIPPED | OUTPATIENT
Start: 2023-02-13 | End: 2023-12-20

## 2023-02-13 NOTE — NURSING NOTE
Preop notes faxed via [facility] to Tank Valenzuela Jr., SILVERIO on 2/13/2023 at 10:30 AM

## 2023-03-07 ENCOUNTER — TRANSFERRED RECORDS (OUTPATIENT)
Dept: HEALTH INFORMATION MANAGEMENT | Facility: CLINIC | Age: 53
End: 2023-03-07

## 2023-03-17 ENCOUNTER — TRANSFERRED RECORDS (OUTPATIENT)
Dept: HEALTH INFORMATION MANAGEMENT | Facility: CLINIC | Age: 53
End: 2023-03-17

## 2023-03-21 ENCOUNTER — TRANSFERRED RECORDS (OUTPATIENT)
Dept: HEALTH INFORMATION MANAGEMENT | Facility: CLINIC | Age: 53
End: 2023-03-21

## 2023-04-01 DIAGNOSIS — F32.A DEPRESSION, UNSPECIFIED DEPRESSION TYPE: ICD-10-CM

## 2023-04-01 DIAGNOSIS — F41.1 ANXIETY STATE: ICD-10-CM

## 2023-04-02 RX ORDER — ESCITALOPRAM OXALATE 10 MG/1
10 TABLET ORAL AT BEDTIME
Qty: 90 TABLET | Refills: 0 | Status: SHIPPED | OUTPATIENT
Start: 2023-04-02 | End: 2023-07-21

## 2023-04-02 RX ORDER — BUPROPION HYDROCHLORIDE 300 MG/1
300 TABLET ORAL EVERY MORNING
Qty: 90 TABLET | Refills: 1 | Status: SHIPPED | OUTPATIENT
Start: 2023-04-02 | End: 2023-10-10

## 2023-04-02 NOTE — TELEPHONE ENCOUNTER
"Routing refill request to provider for review/approval because:  Needs PHQ9     bupropion  Last Written Prescription Date:  9/16/22  Last Fill Quantity: 90,  # refills: 1   Last office visit provider:  2/8/23     lexapro  Last Written Prescription Date:  12/25/22  Last Fill Quantity: 90,  # refills: 0   Last office visit provider:  2/8/23     Requested Prescriptions   Pending Prescriptions Disp Refills     buPROPion (WELLBUTRIN XL) 300 MG 24 hr tablet 90 tablet 1     Sig: Take 1 tablet (300 mg) by mouth every morning       SSRIs Protocol Failed - 4/2/2023  5:34 AM        Failed - PHQ-9 score less than 5 in past 6 months     Please review last PHQ-9 score.           Passed - Medication is Bupropion     If the medication is Bupropion (Wellbutrin), and the patient is taking for smoking cessation; OK to refill.          Passed - Medication is active on med list        Passed - Patient is age 18 or older        Passed - No active pregnancy on record        Passed - No positive pregnancy test in last 12 months        Passed - Recent (6 mo) or future (30 days) visit within the authorizing provider's specialty     Patient had office visit in the last 6 months or has a visit in the next 30 days with authorizing provider or within the authorizing provider's specialty.  See \"Patient Info\" tab in inbasket, or \"Choose Columns\" in Meds & Orders section of the refill encounter.               escitalopram (LEXAPRO) 10 MG tablet 90 tablet 0     Sig: Take 1 tablet (10 mg) by mouth At Bedtime       SSRIs Protocol Failed - 4/2/2023  5:34 AM        Failed - PHQ-9 score less than 5 in past 6 months     Please review last PHQ-9 score.           Passed - Medication is Bupropion     If the medication is Bupropion (Wellbutrin), and the patient is taking for smoking cessation; OK to refill.          Passed - Medication is active on med list        Passed - Patient is age 18 or older        Passed - No active pregnancy on record        Passed " "- No positive pregnancy test in last 12 months        Passed - Recent (6 mo) or future (30 days) visit within the authorizing provider's specialty     Patient had office visit in the last 6 months or has a visit in the next 30 days with authorizing provider or within the authorizing provider's specialty.  See \"Patient Info\" tab in inbasket, or \"Choose Columns\" in Meds & Orders section of the refill encounter.                 Vickie Fay RN 04/02/23 5:35 AM  "

## 2023-04-18 ENCOUNTER — TRANSFERRED RECORDS (OUTPATIENT)
Dept: HEALTH INFORMATION MANAGEMENT | Facility: CLINIC | Age: 53
End: 2023-04-18

## 2023-05-18 ENCOUNTER — TELEPHONE (OUTPATIENT)
Dept: SURGERY | Facility: CLINIC | Age: 53
End: 2023-05-18

## 2023-05-18 NOTE — TELEPHONE ENCOUNTER
[facility]    Fax: [phone]    Spec: [phone]    [place]: [phone]

## 2023-05-22 NOTE — TELEPHONE ENCOUNTER
Central Prior Authorization Team   Phone: [phone]      PA Initiation    Medication: SAXENDA 18 MG/3ML SC SOPN  Insurance Company: [employer] - Phone [phone] Fax [phone]  Pharmacy Filling the Rx: [facility] - [place], [place] - [facility] [address]  Filling Pharmacy Phone: [phone]  Filling Pharmacy Fax:    Start Date: 5/22/2023         Outreach call placed, verified  and Zip code;  Call to check in on patient as she returned to work last week part time; She reports she did lose her voice a little the first day, but has been doing well since; Working four hours and is not having difficulty with SOB as she was before; No problems with return to previous activity level, only a little more tired, she reports; Otherwise she reports doing well.   Will continue to follow up in another week for return of any symptoms;

## 2023-05-24 NOTE — TELEPHONE ENCOUNTER
Prior Authorization Approval    Medication: SAXENDA 18 MG/3ML SC SOPN  Authorization Effective Date: 5/23/2023  Authorization Expiration Date: 5/22/2024  Approved Dose/Quantity:   Reference #:     Insurance Company: [employer] Phone [phone] Fax [phone]  Expected CoPay:       CoPay Card Available:      Financial Assistance Needed:   Which Pharmacy is filling the prescription: [facility] - [place] [address] [address]  Pharmacy Notified: Yes  Patient Notified: No

## 2023-06-22 ENCOUNTER — TRANSFERRED RECORDS (OUTPATIENT)
Dept: HEALTH INFORMATION MANAGEMENT | Facility: CLINIC | Age: 53
End: 2023-06-22

## 2023-07-11 ENCOUNTER — PATIENT OUTREACH (OUTPATIENT)
Dept: CARE COORDINATION | Facility: CLINIC | Age: 53
End: 2023-07-11

## 2023-07-11 DIAGNOSIS — G45.9 TIA (TRANSIENT ISCHEMIC ATTACK): ICD-10-CM

## 2023-07-11 RX ORDER — ATORVASTATIN CALCIUM 80 MG/1
80 TABLET, FILM COATED ORAL DAILY
Qty: 90 TABLET | Refills: 0 | Status: SHIPPED | OUTPATIENT
Start: 2023-07-11 | End: 2023-10-10

## 2023-07-21 DIAGNOSIS — F41.1 ANXIETY STATE: ICD-10-CM

## 2023-07-21 RX ORDER — ESCITALOPRAM OXALATE 10 MG/1
10 TABLET ORAL AT BEDTIME
Qty: 90 TABLET | Refills: 0 | Status: SHIPPED | OUTPATIENT
Start: 2023-07-21 | End: 2023-10-10

## 2023-07-25 ENCOUNTER — PATIENT OUTREACH (OUTPATIENT)
Dept: CARE COORDINATION | Facility: CLINIC | Age: 53
End: 2023-07-25

## 2023-10-03 ENCOUNTER — PATIENT OUTREACH (OUTPATIENT)
Dept: CARE COORDINATION | Facility: CLINIC | Age: 53
End: 2023-10-03

## 2023-10-10 ENCOUNTER — TRANSFERRED RECORDS (OUTPATIENT)
Dept: HEALTH INFORMATION MANAGEMENT | Facility: CLINIC | Age: 53
End: 2023-10-10

## 2023-10-10 DIAGNOSIS — F41.1 ANXIETY STATE: ICD-10-CM

## 2023-10-10 DIAGNOSIS — G45.9 TIA (TRANSIENT ISCHEMIC ATTACK): ICD-10-CM

## 2023-10-10 DIAGNOSIS — G47.01 SLEEP DISORDER DUE TO A GENERAL MEDICAL CONDITION, INSOMNIA TYPE: ICD-10-CM

## 2023-10-10 DIAGNOSIS — F32.A DEPRESSION, UNSPECIFIED DEPRESSION TYPE: ICD-10-CM

## 2023-10-10 DIAGNOSIS — I10 BENIGN ESSENTIAL HYPERTENSION: ICD-10-CM

## 2023-10-10 RX ORDER — BUPROPION HYDROCHLORIDE 300 MG/1
300 TABLET ORAL EVERY MORNING
Qty: 90 TABLET | Refills: 0 | Status: SHIPPED | OUTPATIENT
Start: 2023-10-10 | End: 2023-12-20

## 2023-10-10 RX ORDER — AMLODIPINE BESYLATE 2.5 MG/1
2.5 TABLET ORAL DAILY
Qty: 90 TABLET | Refills: 0 | Status: SHIPPED | OUTPATIENT
Start: 2023-10-10 | End: 2023-10-17

## 2023-10-10 RX ORDER — ATORVASTATIN CALCIUM 80 MG/1
80 TABLET, FILM COATED ORAL DAILY
Qty: 90 TABLET | Refills: 0 | Status: SHIPPED | OUTPATIENT
Start: 2023-10-10 | End: 2023-10-17

## 2023-10-10 RX ORDER — ESCITALOPRAM OXALATE 10 MG/1
10 TABLET ORAL AT BEDTIME
Qty: 90 TABLET | Refills: 0 | Status: SHIPPED | OUTPATIENT
Start: 2023-10-10 | End: 2023-12-20

## 2023-10-10 RX ORDER — TRAZODONE HYDROCHLORIDE 100 MG/1
100 TABLET ORAL AT BEDTIME
Qty: 90 TABLET | Refills: 0 | Status: SHIPPED | OUTPATIENT
Start: 2023-10-10 | End: 2023-12-20

## 2023-10-10 NOTE — TELEPHONE ENCOUNTER
Received fax from pharmacy requesting refill for sharlene'd up meds    Patient last seen: 02/08/2023

## 2023-10-12 ENCOUNTER — TELEPHONE (OUTPATIENT)
Dept: INTERNAL MEDICINE | Facility: CLINIC | Age: 53
End: 2023-10-12

## 2023-10-12 NOTE — TELEPHONE ENCOUNTER
Reason for Call:  Other prescription    Detailed comments: Patient tested positive for Covid today via Home Test.  She is in [facility] due to family emergency her Mom is in the hospital.    Requesting RX for PAXLOVID    [facility]  [facility]  - [phone]    Phone Number Patient can be reached at: Cell number on file:    Telephone Information:   Mobile [phone]       Best Time: any    Can we leave a detailed message on this number? YES    Call taken on 10/12/2023 at 3:07 PM by Deann Tavares

## 2023-10-16 NOTE — TELEPHONE ENCOUNTER
Called pt to discuss tx with Paxlovid. Pt had called their PCP on Friday 10/13/2023 and discussed treatment. Pt decided that they did not want to pursue RX of Paxlovid for tx. No red flag symptoms and no questions at this time.     Jj Bo RN

## 2023-10-20 DIAGNOSIS — G45.9 TIA (TRANSIENT ISCHEMIC ATTACK): ICD-10-CM

## 2023-10-20 DIAGNOSIS — I10 BENIGN ESSENTIAL HYPERTENSION: ICD-10-CM

## 2023-10-20 RX ORDER — ATORVASTATIN CALCIUM 80 MG/1
80 TABLET, FILM COATED ORAL DAILY
Qty: 90 TABLET | Refills: 0 | Status: CANCELLED | OUTPATIENT
Start: 2023-10-20

## 2023-10-20 RX ORDER — AMLODIPINE BESYLATE 2.5 MG/1
2.5 TABLET ORAL DAILY
Qty: 90 TABLET | Refills: 0 | Status: CANCELLED | OUTPATIENT
Start: 2023-10-20

## 2023-10-20 NOTE — TELEPHONE ENCOUNTER
Patient calling to get a medication refill on medication(s) attached    Patient is OUT    Patient calling to see if they can get a bridge refill for this medication due to she just got the earliest appt with PCP for 10/26/2023 7 AM VV    Patient wanting to use       [id number] - [place], [place] - [address] [phone]     Will go to [facility] after appt with PCP     Call Back    Contact Information  [phone] (Mobile)

## 2023-10-20 NOTE — TELEPHONE ENCOUNTER
Called patient.    PCP sent in atorvastatin and amlodipine to [facility] on 10/18. Patient unaware of this. She will  prescriptions. Has visit with PCP next week and will start sending prescriptions to [facility] after that. Patient thankful for call.

## 2023-10-25 ENCOUNTER — VIRTUAL VISIT (OUTPATIENT)
Dept: INTERNAL MEDICINE | Facility: CLINIC | Age: 53
End: 2023-10-25
Payer: COMMERCIAL

## 2023-10-25 DIAGNOSIS — E78.5 HYPERLIPIDEMIA LDL GOAL <130: ICD-10-CM

## 2023-10-25 DIAGNOSIS — E66.3 OVERWEIGHT WITH BODY MASS INDEX (BMI) 25.0-29.9: ICD-10-CM

## 2023-10-25 DIAGNOSIS — G47.01 SLEEP DISORDER DUE TO A GENERAL MEDICAL CONDITION, INSOMNIA TYPE: ICD-10-CM

## 2023-10-25 DIAGNOSIS — I10 BENIGN ESSENTIAL HYPERTENSION: Primary | ICD-10-CM

## 2023-10-25 DIAGNOSIS — F32.A DEPRESSION, UNSPECIFIED DEPRESSION TYPE: ICD-10-CM

## 2023-10-25 PROCEDURE — 99442 PR PHYSICIAN TELEPHONE EVALUATION 11-20 MIN: CPT | Mod: 93 | Performed by: NURSE PRACTITIONER

## 2023-10-25 NOTE — PROGRESS NOTES
"Linsey is a 53 year old who is being evaluated via a billable telephone visit.      What phone number would you like to be contacted at? home  How would you like to obtain your AVS? 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         BMI:   Estimated body mass index is 25.84 kg/m  as calculated from the following:    Height as of 12/19/22: 1.676 m (5' 6\").    Weight as of 2/8/23: 72.6 kg (160 lb 1.6 oz).   Weight management plan: Discussed healthy diet and exercise guidelines    MEDICATIONS:   No orders of the defined types were placed in this encounter.    Medications Discontinued During This Encounter   Medication Reason     cephALEXin (KEFLEX) 250 MG capsule      oxyCODONE (ROXICODONE) 5 MG tablet           - Continue other medications without change    Jacinda Banks NP  [facility]    Subjective   Linsey is a 53 year old, presenting for the following health issues:  Follow Up and Refill Request      10/25/2023     7:11 AM   Additional Questions   Roomed by Amina CARDOZA CMA   Accompanied by N/A       HPI             Review of Systems   Constitutional, HEENT, cardiovascular, pulmonary, GI, , musculoskeletal, neuro, skin, endocrine and psych systems are negative, except as otherwise noted.      Objective           Vitals:  No vitals were obtained today due to virtual visit.    Physical Exam   healthy, alert and no distress  PSYCH: Alert and oriented times 3; coherent " speech, normal   rate and volume, able to articulate logical thoughts, able   to abstract reason, no tangential thoughts, no hallucinations   or delusions  Her affect is normal  RESP: No cough, no audible wheezing, able to talk in full sentences  Remainder of exam unable to be completed due to telephone visits    Office Visit on 02/08/2023   Component Date Value Ref Range Status     Ventricular Rate 02/08/2023 71  BPM Final     Atrial Rate 02/08/2023 71  BPM Final     OR Interval 02/08/2023 152  ms Final     QRS Duration 02/08/2023 74  ms Final     QT 02/08/2023 408  ms Final     QTc 02/08/2023 443  ms Final     P Axis 02/08/2023 16  degrees Final     R AXIS 02/08/2023 -11  degrees Final     T Axis 02/08/2023 -5  degrees Final     Interpretation ECG 02/08/2023    Final                    Value:Sinus rhythm  Nonspecific T wave abnormality  Abnormal ECG  When compared with ECG of 19-DEC-2022 09:04,  No significant change was found  Confirmed by MANSOOR PEREYRA MD LOC: ([id number]) on 2/8/2023 5:47:03 PM       Sodium 02/08/2023 137  136 - 145 mmol/L Final     Potassium 02/08/2023 4.0  3.4 - 5.3 mmol/L Final     Chloride 02/08/2023 103  98 - 107 mmol/L Final     Carbon Dioxide (CO2) 02/08/2023 23  22 - 29 mmol/L Final     Anion Gap 02/08/2023 11  7 - 15 mmol/L Final     Urea Nitrogen 02/08/2023 21.6 (H)  6.0 - 20.0 mg/dL Final     Creatinine 02/08/2023 0.82  0.51 - 0.95 mg/dL Final     Calcium 02/08/2023 9.2  8.6 - 10.0 mg/dL Final     Glucose 02/08/2023 77  70 - 99 mg/dL Final     Alkaline Phosphatase 02/08/2023 65  35 - 104 U/L Final     AST 02/08/2023 28  10 - 35 U/L Final     ALT 02/08/2023 23  10 - 35 U/L Final     Protein Total 02/08/2023 6.8  6.4 - 8.3 g/dL Final     Albumin 02/08/2023 4.3  3.5 - 5.2 g/dL Final     Bilirubin Total 02/08/2023 0.2  <=1.2 mg/dL Final     GFR Estimate 02/08/2023 85  >60 mL/min/1.73m2 Final    eGFR calculated using 2021 CKD-EPI equation.     Color Urine 02/08/2023 Yellow  Colorless, Straw,  Light Yellow, Yellow Final     Appearance Urine 02/08/2023 Clear  Clear Final     Glucose Urine 02/08/2023 Negative  Negative mg/dL Final     Bilirubin Urine 02/08/2023 Negative  Negative Final     Ketones Urine 02/08/2023 Negative  Negative mg/dL Final     Specific Gravity Urine 02/08/2023 1.010  1.005 - 1.030 Final     Blood Urine 02/08/2023 Negative  Negative Final     pH Urine 02/08/2023 5.0  5.0 - 8.0 Final     Protein Albumin Urine 02/08/2023 Negative  Negative mg/dL Final     Urobilinogen Urine 02/08/2023 0.2  0.2, 1.0 E.U./dL Final     Nitrite Urine 02/08/2023 Negative  Negative Final     Leukocyte Esterase Urine 02/08/2023 Trace (A)  Negative Final     WBC Count 02/08/2023 8.0  4.0 - 11.0 10e3/uL Final     RBC Count 02/08/2023 4.51  3.80 - 5.20 10e6/uL Final     Hemoglobin 02/08/2023 13.5  11.7 - 15.7 g/dL Final     Hematocrit 02/08/2023 40.8  35.0 - 47.0 % Final     MCV 02/08/2023 91  78 - 100 fL Final     MCH 02/08/2023 29.9  26.5 - 33.0 pg Final     MCHC 02/08/2023 33.1  31.5 - 36.5 g/dL Final     RDW 02/08/2023 12.7  10.0 - 15.0 % Final     Platelet Count 02/08/2023 299  150 - 450 10e3/uL Final     % Neutrophils 02/08/2023 41  % Final     % Lymphocytes 02/08/2023 48  % Final     % Monocytes 02/08/2023 8  % Final     % Eosinophils 02/08/2023 3  % Final     % Basophils 02/08/2023 1  % Final     % Immature Granulocytes 02/08/2023 0  % Final     Absolute Neutrophils 02/08/2023 3.3  1.6 - 8.3 10e3/uL Final     Absolute Lymphocytes 02/08/2023 3.8  0.8 - 5.3 10e3/uL Final     Absolute Monocytes 02/08/2023 0.6  0.0 - 1.3 10e3/uL Final     Absolute Eosinophils 02/08/2023 0.2  0.0 - 0.7 10e3/uL Final     Absolute Basophils 02/08/2023 0.1  0.0 - 0.2 10e3/uL Final     Absolute Immature Granulocytes 02/08/2023 0.0  <=0.4 10e3/uL Final     Bacteria Urine 02/08/2023 Few (A)  None Seen /HPF Final     RBC Urine 02/08/2023 None Seen  0-2 /HPF /HPF Final     WBC Urine 02/08/2023 0-5  0-5 /HPF /HPF Final     Squamous  Epithelials Urine 02/08/2023 Few (A)  None Seen /LPF Final               Current Outpatient Medications:      amLODIPine (NORVASC) 2.5 MG tablet, Take 1 tablet (2.5 mg) by mouth daily, Disp: 90 tablet, Rfl: 0     aspirin (ASA) 81 MG EC tablet, Take 1 tablet (81 mg) by mouth daily, Disp: 90 tablet, Rfl: 3     atorvastatin (LIPITOR) 80 MG tablet, Take 1 tablet (80 mg) by mouth daily, Disp: 90 tablet, Rfl: 0     buPROPion (WELLBUTRIN XL) 300 MG 24 hr tablet, Take 1 tablet (300 mg) by mouth every morning, Disp: 90 tablet, Rfl: 0     celecoxib (CELEBREX) 200 MG capsule, , Disp: , Rfl:      EPINEPHrine (ANY BX GENERIC EQUIV) 0.3 MG/0.3ML injection 2-pack, , Disp: , Rfl:      escitalopram (LEXAPRO) 10 MG tablet, Take 1 tablet (10 mg) by mouth at bedtime, Disp: 90 tablet, Rfl: 0     fluorouracil (EFUDEX) 5 % external cream, APPLY TO TO THE AFFECTED AREA AS DIRECTED TWICE DAILY FOR 3 WEEKS, Disp: , Rfl:      gabapentin (NEURONTIN) 300 MG capsule, TAKE 1 CAPSULE BY MOUTH EVERY 6 HOURS AS NEEDED FOR PAIN. MAY TAKE 2 CAPSULES FOR BEDTIME DOSE, Disp: , Rfl:      hydrOXYzine (VISTARIL) 25 MG capsule, Take 1 capsule (25 mg) by mouth 3 times daily as needed for anxiety, Disp: 30 capsule, Rfl: 0     insulin pen needle (B-D U/F) 31G X 5 MM miscellaneous, USE 1 PEN NEEDLE DAILY OR AS DIRECTED., Disp: 100 each, Rfl: 1     liraglutide - Weight Management (SAXENDA) 18 MG/3ML pen, Inject 3 mg Subcutaneous daily, Disp: 45 mL, Rfl: 0     mupirocin (BACTROBAN) 2 % external ointment, , Disp: , Rfl:      olopatadine (PATANOL) 0.1 % ophthalmic solution, Place 1-2 drops into both eyes 2 times daily as needed for allergies , Disp: , Rfl: 6     traZODone (DESYREL) 100 MG tablet, Take 1 tablet (100 mg) by mouth at bedtime, Disp: 90 tablet, Rfl: 0     valACYclovir (VALTREX) 500 MG tablet, , Disp: , Rfl:   Phone call duration: 20 minutes

## 2023-10-29 ENCOUNTER — HEALTH MAINTENANCE LETTER (OUTPATIENT)
Age: 53
End: 2023-10-29

## 2023-10-31 ENCOUNTER — PATIENT OUTREACH (OUTPATIENT)
Dept: CARE COORDINATION | Facility: CLINIC | Age: 53
End: 2023-10-31

## 2023-11-09 DIAGNOSIS — E55.9 VITAMIN D DEFICIENCY: ICD-10-CM

## 2023-11-09 DIAGNOSIS — I10 BENIGN ESSENTIAL HYPERTENSION: ICD-10-CM

## 2023-11-09 DIAGNOSIS — E78.5 HYPERLIPIDEMIA LDL GOAL <130: Primary | ICD-10-CM

## 2023-11-22 ENCOUNTER — LAB (OUTPATIENT)
Dept: LAB | Facility: CLINIC | Age: 53
End: 2023-11-22
Payer: COMMERCIAL

## 2023-11-22 DIAGNOSIS — I10 BENIGN ESSENTIAL HYPERTENSION: ICD-10-CM

## 2023-11-22 DIAGNOSIS — E55.9 VITAMIN D DEFICIENCY: ICD-10-CM

## 2023-11-22 DIAGNOSIS — E78.5 HYPERLIPIDEMIA LDL GOAL <130: ICD-10-CM

## 2023-11-22 LAB
ALBUMIN SERPL BCG-MCNC: 4.3 G/DL (ref 3.5–5.2)
ALBUMIN UR-MCNC: NEGATIVE MG/DL
ALP SERPL-CCNC: 63 U/L (ref 40–150)
ALT SERPL W P-5'-P-CCNC: 22 U/L (ref 0–50)
ANION GAP SERPL CALCULATED.3IONS-SCNC: 11 MMOL/L (ref 7–15)
APPEARANCE UR: CLEAR
AST SERPL W P-5'-P-CCNC: 19 U/L (ref 0–45)
BASOPHILS # BLD AUTO: 0 10E3/UL (ref 0–0.2)
BASOPHILS NFR BLD AUTO: 0 %
BILIRUB SERPL-MCNC: 0.3 MG/DL
BILIRUB UR QL STRIP: NEGATIVE
BUN SERPL-MCNC: 12.3 MG/DL (ref 6–20)
CALCIUM SERPL-MCNC: 9 MG/DL (ref 8.6–10)
CHLORIDE SERPL-SCNC: 105 MMOL/L (ref 98–107)
CHOLEST SERPL-MCNC: 153 MG/DL
COLOR UR AUTO: YELLOW
CREAT SERPL-MCNC: 0.88 MG/DL (ref 0.51–0.95)
DEPRECATED HCO3 PLAS-SCNC: 22 MMOL/L (ref 22–29)
EGFRCR SERPLBLD CKD-EPI 2021: 78 ML/MIN/1.73M2
EOSINOPHIL # BLD AUTO: 0.1 10E3/UL (ref 0–0.7)
EOSINOPHIL NFR BLD AUTO: 2 %
ERYTHROCYTE [DISTWIDTH] IN BLOOD BY AUTOMATED COUNT: 13 % (ref 10–15)
FERRITIN SERPL-MCNC: 60 NG/ML (ref 11–328)
GLUCOSE SERPL-MCNC: 86 MG/DL (ref 70–99)
GLUCOSE UR STRIP-MCNC: NEGATIVE MG/DL
HCT VFR BLD AUTO: 46.1 % (ref 35–47)
HDLC SERPL-MCNC: 66 MG/DL
HGB BLD-MCNC: 14.9 G/DL (ref 11.7–15.7)
HGB UR QL STRIP: NEGATIVE
IMM GRANULOCYTES # BLD: 0 10E3/UL
IMM GRANULOCYTES NFR BLD: 0 %
IRON BINDING CAPACITY (ROCHE): 297 UG/DL (ref 240–430)
IRON SATN MFR SERPL: 27 % (ref 15–46)
IRON SERPL-MCNC: 79 UG/DL (ref 37–145)
KETONES UR STRIP-MCNC: NEGATIVE MG/DL
LDLC SERPL CALC-MCNC: 63 MG/DL
LEUKOCYTE ESTERASE UR QL STRIP: NEGATIVE
LYMPHOCYTES # BLD AUTO: 3.2 10E3/UL (ref 0.8–5.3)
LYMPHOCYTES NFR BLD AUTO: 46 %
MCH RBC QN AUTO: 30 PG (ref 26.5–33)
MCHC RBC AUTO-ENTMCNC: 32.3 G/DL (ref 31.5–36.5)
MCV RBC AUTO: 93 FL (ref 78–100)
MONOCYTES # BLD AUTO: 0.5 10E3/UL (ref 0–1.3)
MONOCYTES NFR BLD AUTO: 7 %
NEUTROPHILS # BLD AUTO: 3.1 10E3/UL (ref 1.6–8.3)
NEUTROPHILS NFR BLD AUTO: 45 %
NITRATE UR QL: NEGATIVE
NONHDLC SERPL-MCNC: 87 MG/DL
PH UR STRIP: 6 [PH] (ref 5–7)
PLATELET # BLD AUTO: 331 10E3/UL (ref 150–450)
POTASSIUM SERPL-SCNC: 4.5 MMOL/L (ref 3.4–5.3)
PROT SERPL-MCNC: 7.1 G/DL (ref 6.4–8.3)
RBC # BLD AUTO: 4.97 10E6/UL (ref 3.8–5.2)
SODIUM SERPL-SCNC: 138 MMOL/L (ref 135–145)
SP GR UR STRIP: >=1.03 (ref 1–1.03)
TRIGL SERPL-MCNC: 120 MG/DL
TSH SERPL DL<=0.005 MIU/L-ACNC: 1.59 UIU/ML (ref 0.3–4.2)
UROBILINOGEN UR STRIP-ACNC: 0.2 E.U./DL
VIT B12 SERPL-MCNC: 620 PG/ML (ref 232–1245)
VIT D+METAB SERPL-MCNC: 40 NG/ML (ref 20–50)
WBC # BLD AUTO: 7 10E3/UL (ref 4–11)

## 2023-11-22 PROCEDURE — 36415 COLL VENOUS BLD VENIPUNCTURE: CPT

## 2023-11-22 PROCEDURE — 83540 ASSAY OF IRON: CPT

## 2023-11-22 PROCEDURE — 82728 ASSAY OF FERRITIN: CPT

## 2023-11-22 PROCEDURE — 85025 COMPLETE CBC W/AUTO DIFF WBC: CPT

## 2023-11-22 PROCEDURE — 83550 IRON BINDING TEST: CPT

## 2023-11-22 PROCEDURE — 82607 VITAMIN B-12: CPT

## 2023-11-22 PROCEDURE — 82306 VITAMIN D 25 HYDROXY: CPT

## 2023-11-22 PROCEDURE — 81003 URINALYSIS AUTO W/O SCOPE: CPT

## 2023-11-22 PROCEDURE — 80061 LIPID PANEL: CPT

## 2023-11-22 PROCEDURE — 80053 COMPREHEN METABOLIC PANEL: CPT

## 2023-11-22 PROCEDURE — 84443 ASSAY THYROID STIM HORMONE: CPT

## 2023-12-20 ENCOUNTER — OFFICE VISIT (OUTPATIENT)
Dept: INTERNAL MEDICINE | Facility: CLINIC | Age: 53
End: 2023-12-20
Payer: COMMERCIAL

## 2023-12-20 VITALS
RESPIRATION RATE: 16 BRPM | OXYGEN SATURATION: 98 % | HEIGHT: 66 IN | DIASTOLIC BLOOD PRESSURE: 70 MMHG | WEIGHT: 157.9 LBS | BODY MASS INDEX: 25.38 KG/M2 | HEART RATE: 84 BPM | SYSTOLIC BLOOD PRESSURE: 110 MMHG

## 2023-12-20 DIAGNOSIS — F98.8 ATTENTION DEFICIT DISORDER, UNSPECIFIED HYPERACTIVITY PRESENCE: ICD-10-CM

## 2023-12-20 DIAGNOSIS — G47.01 SLEEP DISORDER DUE TO A GENERAL MEDICAL CONDITION, INSOMNIA TYPE: ICD-10-CM

## 2023-12-20 DIAGNOSIS — Z00.00 ANNUAL PHYSICAL EXAM: ICD-10-CM

## 2023-12-20 DIAGNOSIS — I10 BENIGN ESSENTIAL HYPERTENSION: Primary | ICD-10-CM

## 2023-12-20 DIAGNOSIS — I77.74 DISSECTION, VERTEBRAL ARTERY (H): ICD-10-CM

## 2023-12-20 DIAGNOSIS — M54.41 ACUTE BILATERAL LOW BACK PAIN WITH RIGHT-SIDED SCIATICA: ICD-10-CM

## 2023-12-20 DIAGNOSIS — E66.3 OVERWEIGHT WITH BODY MASS INDEX (BMI) 25.0-29.9: ICD-10-CM

## 2023-12-20 DIAGNOSIS — R05.3 PERSISTENT COUGH FOR 3 WEEKS OR LONGER: ICD-10-CM

## 2023-12-20 DIAGNOSIS — F41.1 ANXIETY STATE: ICD-10-CM

## 2023-12-20 DIAGNOSIS — F32.A DEPRESSION, UNSPECIFIED DEPRESSION TYPE: ICD-10-CM

## 2023-12-20 DIAGNOSIS — E78.5 HYPERLIPIDEMIA LDL GOAL <130: ICD-10-CM

## 2023-12-20 DIAGNOSIS — Z12.31 VISIT FOR SCREENING MAMMOGRAM: ICD-10-CM

## 2023-12-20 DIAGNOSIS — G45.9 TIA (TRANSIENT ISCHEMIC ATTACK): ICD-10-CM

## 2023-12-20 DIAGNOSIS — Z85.828 HISTORY OF BASAL CELL CARCINOMA: ICD-10-CM

## 2023-12-20 DIAGNOSIS — E55.9 VITAMIN D DEFICIENCY: ICD-10-CM

## 2023-12-20 DIAGNOSIS — E03.8 OTHER SPECIFIED HYPOTHYROIDISM: ICD-10-CM

## 2023-12-20 PROCEDURE — 99213 OFFICE O/P EST LOW 20 MIN: CPT | Mod: 25 | Performed by: NURSE PRACTITIONER

## 2023-12-20 PROCEDURE — 99396 PREV VISIT EST AGE 40-64: CPT | Mod: 25 | Performed by: NURSE PRACTITIONER

## 2023-12-20 PROCEDURE — 90471 IMMUNIZATION ADMIN: CPT | Performed by: NURSE PRACTITIONER

## 2023-12-20 PROCEDURE — 90715 TDAP VACCINE 7 YRS/> IM: CPT | Performed by: NURSE PRACTITIONER

## 2023-12-20 RX ORDER — TRAZODONE HYDROCHLORIDE 100 MG/1
100 TABLET ORAL AT BEDTIME
Qty: 90 TABLET | Refills: 3 | Status: SHIPPED | OUTPATIENT
Start: 2023-12-20

## 2023-12-20 RX ORDER — AMLODIPINE BESYLATE 2.5 MG/1
2.5 TABLET ORAL DAILY
Qty: 90 TABLET | Refills: 3 | Status: SHIPPED | OUTPATIENT
Start: 2023-12-20

## 2023-12-20 RX ORDER — BUPROPION HYDROCHLORIDE 300 MG/1
300 TABLET ORAL EVERY MORNING
Qty: 90 TABLET | Refills: 3 | Status: SHIPPED | OUTPATIENT
Start: 2023-12-20

## 2023-12-20 RX ORDER — AZITHROMYCIN 250 MG/1
TABLET, FILM COATED ORAL
Qty: 6 TABLET | Refills: 0 | Status: SHIPPED | OUTPATIENT
Start: 2023-12-20 | End: 2023-12-25

## 2023-12-20 RX ORDER — GABAPENTIN 300 MG/1
CAPSULE ORAL
Qty: 360 CAPSULE | Refills: 3 | Status: SHIPPED | OUTPATIENT
Start: 2023-12-20 | End: 2023-12-27

## 2023-12-20 RX ORDER — ATORVASTATIN CALCIUM 80 MG/1
80 TABLET, FILM COATED ORAL DAILY
Qty: 90 TABLET | Refills: 3 | Status: SHIPPED | OUTPATIENT
Start: 2023-12-20

## 2023-12-20 ASSESSMENT — ENCOUNTER SYMPTOMS
NERVOUS/ANXIOUS: 1
HEADACHES: 0
HEMATURIA: 0
JOINT SWELLING: 0
EYE PAIN: 0
WEAKNESS: 0
FREQUENCY: 0
MYALGIAS: 0
ABDOMINAL PAIN: 0
DYSURIA: 0
PALPITATIONS: 0
NAUSEA: 0
CONSTIPATION: 1
SORE THROAT: 0
ARTHRALGIAS: 1
CHILLS: 0
SHORTNESS OF BREATH: 0
HEMATOCHEZIA: 0
FEVER: 0
PARESTHESIAS: 1
HEARTBURN: 0
BREAST MASS: 0
DIARRHEA: 1
COUGH: 1

## 2023-12-20 ASSESSMENT — ANXIETY QUESTIONNAIRES
GAD7 TOTAL SCORE: 18
7. FEELING AFRAID AS IF SOMETHING AWFUL MIGHT HAPPEN: NEARLY EVERY DAY
GAD7 TOTAL SCORE: 18
1. FEELING NERVOUS, ANXIOUS, OR ON EDGE: NEARLY EVERY DAY
5. BEING SO RESTLESS THAT IT IS HARD TO SIT STILL: NEARLY EVERY DAY
2. NOT BEING ABLE TO STOP OR CONTROL WORRYING: MORE THAN HALF THE DAYS
IF YOU CHECKED OFF ANY PROBLEMS ON THIS QUESTIONNAIRE, HOW DIFFICULT HAVE THESE PROBLEMS MADE IT FOR YOU TO DO YOUR WORK, TAKE CARE OF THINGS AT HOME, OR GET ALONG WITH OTHER PEOPLE: VERY DIFFICULT
6. BECOMING EASILY ANNOYED OR IRRITABLE: MORE THAN HALF THE DAYS
3. WORRYING TOO MUCH ABOUT DIFFERENT THINGS: MORE THAN HALF THE DAYS

## 2023-12-20 ASSESSMENT — PATIENT HEALTH QUESTIONNAIRE - PHQ9
SUM OF ALL RESPONSES TO PHQ QUESTIONS 1-9: 17
5. POOR APPETITE OR OVEREATING: NEARLY EVERY DAY

## 2023-12-20 ASSESSMENT — PAIN SCALES - GENERAL: PAINLEVEL: NO PAIN (0)

## 2023-12-20 NOTE — COMMUNITY RESOURCES LIST (ENGLISH)
12/20/2023   [facility] - Outpatient Clinics  N/A  For additional resource needs, please contact your health insurance member services or your primary care team.  Phone: [phone]   Email: N/A   Address: [facility] [place] [address]   Hours: N/A        Financial Stability       Utility payment assistance  1  [place] [facility] - Energy and Utilities Distance: 10.05 miles      In-Person, Phone/Virtual   [address] [place] [address]  Language: English  Hours: Mon - Fri 8:30 AM - 4:30 PM  Fees: Free   Phone: (634) 150-7487 Website: [place]://[place]/WiMi5/energy/consumer-assistance/energy-assistance-program/     2  [place] [employer] [facility] - [place]'s Telephone Assistance Plan (TAP) and [facility] Lifeline and Affordable Connectivity Program (ACP) Distance: 16.49 miles      Phone/Virtual   [address] [place] [address]  Language: English  Fees: Free   Phone: (240) 240-7060 Email: [email or web address]@[email or web address].mn. Website: https://[place]/puc/consumers/telephone/          Important Numbers & Websites       [facility]   211 211unitedway.org  Poison Control   (751) 766-8990 [place]po[place]  Suicide and Crisis Lifeline   [address] [place], [place] [address]line.org  Childhelp [place] Child Abuse Hotline   662.533.9047 Childhelphotline.org  National Sexual Assault Hotline   (596) 671-3936 (HOPE) Rainn.org  National Runaway Safeline   (300) 737-8754 (RUNAWAY) 1800runaway.org  Pregnancy & Postpartum Support [place]   Call/text 532-104-7951 Ppsupportmn.org  Substance Abuse National Helpline ([facility]   715-685-HELP (2151) Findtreatment.gov  Emergency Services   911

## 2023-12-20 NOTE — COMMUNITY RESOURCES LIST (ENGLISH)
12/20/2023   [facility] [employer]  N/A  For questions about this resource list or additional care needs, please contact your primary care clinic or care manager.  Phone: [phone]   Email: N/A   Address: [address] [place] [address]   Hours: N/A        Financial Stability       Utility payment assistance  1  [facility] - [facility] - Utility payment assistance Distance: 1.22 miles      In-Person, Phone/Virtual   [address] [place] [address]  Language: English  Hours: Mon - Thu 9:00 AM - 3:00 PM  Fees: Free   Phone: (576) 646-7687 Email: Religion@[employer].org Website: http://www.[employer].org/care-ministries/     2  [place] [place] [facility] Distance: 1.78 miles      In-Person   [address] [place] [address]  Language: English  Hours: Mon - Fri 8:00 AM - 3:00 PM  Fees: Free   Phone: (412) 198-6413 Ext.14 Email: [email or web address] Website: http://www.[email or web address].org          Important Numbers & Websites       Emergency Services   911  City Services   311  Poison Control   (523) 708-9325  Suicide Prevention Lifeline   (128) 656-4758 (TALK)  Child Abuse Hotline   (197) 317-6890 (4-A-Child)  Sexual Assault Hotline   (483) 899-2496 (HOPE)  National Runaway Safeline   (112) 746-5444 (RUNAWAY)  All-Options Talkline   (423) 911-1739  Substance Abuse Referral   (748) 317-8600 (HELP)

## 2023-12-20 NOTE — PROGRESS NOTES
SUBJECTIVE:   Linsey is a 53 year old, presenting for the following:  No chief complaint on file.        Healthy Habits:     Getting at least 3 servings of Calcium per day:  Yes    Bi-annual eye exam:  Yes    Dental care twice a year:  Yes    Sleep apnea or symptoms of sleep apnea:  Excessive snoring    Diet:  Carbohydrate counting    Frequency of exercise:  1 day/week    Duration of exercise:  15-30 minutes    Taking medications regularly:  Yes    Medication side effects:  Other        Social History     Tobacco Use    Smoking status: Never     Passive exposure: Never    Smokeless tobacco: Never   Substance Use Topics    Alcohol use: Yes     Alcohol/week: 1.0 standard drink of alcohol     Types: 1 Glasses of wine per week             12/20/2023     3:19 PM   Alcohol Use   Prescreen: >3 drinks/day or >7 drinks/week? No     Reviewed orders with patient.  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, insomnia type    Hypothyroidism    Health Care Home    Dyspareunia    Hyperlipidemia LDL goal <130    Essential hypertension    QT prolongation    Persons encountering health services in other specified circumstances    History of basal cell carcinoma    Environmental allergies    Encounter for examination for normal comparison and control in clinical research program    Elevated LDL cholesterol level    Vitamin D deficiency    Overweight with body mass index (BMI) of 26 to 26.9 in adult    GERD without esophagitis    Dissection, vertebral artery (H24)    Nausea and vomiting, intractability of vomiting not specified,  unspecified vomiting type    Diaphragmatic hernia    Duodenitis    Lumbar pain     Past Surgical History:   Procedure Laterality Date    EGD      7/2020    GYN SURGERY  07/12/2012    [facility]    HYSTERECTOMY, PAP NO LONGER INDICATED      LAPAROSCOPIC HERNIORRHAPHY HIATAL N/A 07/26/2021    Procedure: HERNIORRHAPHY, HIATAL, LAPAROSCOPIC;  Surgeon: Mady Potts MD;  [facility]    LAPAROSCOPIC HYSTERECTOMY TOTAL  08/12/2013    Procedure: LAPAROSCOPIC HYSTERECTOMY TOTAL;  Laparoscopic Total Hysterectomy,Bilateral Salpingectomy with Sparing of the Ovaries,Uterosacral Suspension,Transvaginal Taping,Perineoplasty;  Surgeon: Sy Castro MD;  [facility]    LAPAROSCOPIC NISSEN FUNDOPLICATION N/A 07/26/2021    Procedure: FUNDOPLICATION, NISSEN, LAPAROSCOPIC;  Surgeon: Mady Potts MD;  [facility]    mohs surgery      remove skin cancer    MI BREAST AUGMENTATION Bilateral     SLING TRANSVAGINAL  08/12/2013    Procedure: SLING TRANSVAGINAL;;  Surgeon: Sy Castro MD;  Location: [facility]       Social History     Tobacco Use    Smoking status: Never     Passive exposure: Never    Smokeless tobacco: Never   Substance Use Topics    Alcohol use: Yes     Alcohol/week: 1.0 standard drink of alcohol     Types: 1 Glasses of wine per week     Family History   Problem Relation Age of Onset    Lipids Mother         chol    Hypertension Mother     Lipids Father         chol    Hypertension Father     C.A.D. Father     Heart Disease Father          Current Outpatient Medications   Medication Sig Dispense Refill    amLODIPine (NORVASC) 2.5 MG tablet Take 1 tablet (2.5 mg) by mouth daily 90 tablet 3    aspirin (ASA) 81 MG EC tablet Take 1 tablet (81 mg) by mouth daily 90 tablet 3    atorvastatin (LIPITOR) 80 MG tablet Take 1 tablet (80 mg) by mouth daily 90 tablet 3    azithromycin (ZITHROMAX) 250 MG tablet Take 2 tablets (500 mg) by mouth daily for 1 day, THEN 1 tablet (250 mg) daily for 4  days. 6 tablet 0    buPROPion (WELLBUTRIN XL) 300 MG 24 hr tablet Take 1 tablet (300 mg) by mouth every morning 90 tablet 3    EPINEPHrine (ANY BX GENERIC EQUIV) 0.3 MG/0.3ML injection 2-pack       gabapentin (NEURONTIN) 300 MG capsule TAKE 1 CAPSULE BY MOUTH EVERY 6 HOURS AS NEEDED FOR PAIN. MAY TAKE 2 CAPSULES FOR BEDTIME DOSE 360 capsule 3    insulin pen needle (B-D U/F) 31G X 5 MM miscellaneous USE 1 PEN NEEDLE DAILY OR AS DIRECTED. 100 each 1    liraglutide - Weight Management (SAXENDA) 18 MG/3ML pen Inject 3 mg Subcutaneous daily 45 mL 0    olopatadine (PATANOL) 0.1 % ophthalmic solution Place 1-2 drops into both eyes 2 times daily as needed for allergies   6    traZODone (DESYREL) 100 MG tablet Take 1 tablet (100 mg) by mouth at bedtime 90 tablet 3    valACYclovir (VALTREX) 500 MG tablet       vortioxetine (TRINTELLIX) 5 MG tablet Take 1 tablet (5 mg) by mouth daily for 90 days 90 tablet 3     Allergies   Allergen Reactions    Bees Swelling     Disfiguring     Allevyn Adhesive [Wound Dressings]     Suture Swelling     local swelling and sutures didn't dissolve vyrcil   Suture      Recent Labs   Lab Test 23  0755 23  1614 22  0858 22  1007 21  1716 08/15/21  1304 21  1547 21  1612 20  0859   A1C  --   --   --  5.1  --   --  5.0  --   --    LDL 63  --   --  24  --  114*  --   --  160*   HDL 66  --   --  55  --  47*  --   --  57   TRIG 120  --   --  67  --  236*  --   --  235*   ALT 22 23 25 41   < >  --   --    < > 21   CR 0.88 0.82 0.57 0.78   < > 0.63  --    < > 0.76   GFRESTIMATED 78 85 >90 90   < > >90  --    < > >90   GFRESTBLACK  --   --   --   --   --   --   --   --  >90   POTASSIUM 4.5 4.0 3.7 4.7   < > 3.6  --    < > 3.9   TSH 1.59  --   --   --   --  0.68  --   --  2.22    < > = values in this interval not displayed.        Breast Cancer Screenin/10/2022    12:01 PM 2023     2:57 PM 2023     3:20 PM   Breast CA Risk Assessment  (FHS-7)   Do you have a family history of breast, colon, or ovarian cancer? Yes Yes No / Unknown         Mammogram Screening: Recommended annual mammography  Pertinent mammograms are reviewed under the imaging tab.    History of abnormal Pap smear: Last 3 Pap and HPV Results:       2013    12:00 AM   PAP / HPV   PAP (Historical) NIL          2013    12:00 AM   PAP / HPV   PAP (Historical) NIL      Reviewed and updated as needed this visit by clinical staff   Tobacco  Allergies  Meds              Reviewed and updated as needed this visit by Provider                 Past Medical History:   Diagnosis Date    ADD (attention deficit disorder)     Anxiety     Gastroesophageal reflux disease without esophagitis     Hypertension     Urethral hypermobility 2013      Past Surgical History:   Procedure Laterality Date    EGD      2020    GYN SURGERY  2012    [facility]    HYSTERECTOMY, PAP NO LONGER INDICATED      LAPAROSCOPIC HERNIORRHAPHY HIATAL N/A 2021    Procedure: HERNIORRHAPHY, HIATAL, LAPAROSCOPIC;  Surgeon: Mady Potts MD;  [facility]    LAPAROSCOPIC HYSTERECTOMY TOTAL  2013    Procedure: LAPAROSCOPIC HYSTERECTOMY TOTAL;  Laparoscopic Total Hysterectomy,Bilateral Salpingectomy with Sparing of the Ovaries,Uterosacral Suspension,Transvaginal Taping,Perineoplasty;  Surgeon: Sy Castro MD;  [facility]    LAPAROSCOPIC NISSEN FUNDOPLICATION N/A 2021    Procedure: FUNDOPLICATION, NISSEN, LAPAROSCOPIC;  Surgeon: Mady Potts MD;  [facility]    mohs surgery      remove skin cancer    MS BREAST AUGMENTATION Bilateral     SLING TRANSVAGINAL  2013    Procedure: SLING TRANSVAGINAL;;  Surgeon: Sy Castro MD;  Location[facility]     OB History    Para Term  AB Living   4 2 2 0 2 2   SAB IAB Ectopic Multiple Live Births   1 1 0 0 0      # Outcome Date GA Lbr Frantz/2nd Weight Sex Delivery Anes PTL Lv   4 IAB           "  3 SAB            2 Term            1 Term                Review of Systems   Constitutional:  Negative for chills and fever.   HENT:  Positive for congestion and ear pain. Negative for hearing loss and sore throat.    Eyes:  Negative for pain and visual disturbance.   Respiratory:  Positive for cough. Negative for shortness of breath.    Cardiovascular:  Positive for chest pain. Negative for palpitations and peripheral edema.   Gastrointestinal:  Positive for constipation and diarrhea. Negative for abdominal pain, heartburn, hematochezia and nausea.   Breasts:  Negative for tenderness, breast mass and discharge.   Genitourinary:  Positive for pelvic pain. Negative for dysuria, frequency, genital sores, hematuria, urgency, vaginal bleeding and vaginal discharge.   Musculoskeletal:  Positive for arthralgias. Negative for joint swelling and myalgias.   Skin:  Negative for rash.   Neurological:  Positive for paresthesias. Negative for weakness and headaches.   Psychiatric/Behavioral:  Positive for mood changes. The patient is nervous/anxious.           OBJECTIVE:   /70 (BP Location: Right arm, Patient Position: Sitting, Cuff Size: Adult Regular)   Pulse 84   Resp 16   Ht 1.664 m (5' 5.5\")   Wt 71.6 kg (157 lb 14.4 oz)   LMP 07/26/2013   SpO2 98%   Breastfeeding No   BMI 25.88 kg/m    Physical Exam  GENERAL: healthy, alert and no distress  EYES: Eyes grossly normal to inspection, PERRL and conjunctivae and sclerae normal  HENT: ear canals and TM's normal, nose and mouth without ulcers or lesions  NECK: no adenopathy  RESP: lungs clear to auscultation - no rales, rhonchi or wheezes  BREAST: normal without masses, tenderness or nipple discharge and no palpable axillary masses or adenopathy  CV: regular rate and rhythm, normal S1 S2, no S3 or S4, no murmur, click or rub, no peripheral edema and peripheral pulses strong  ABDOMEN: soft, nontender, no hepatosplenomegaly, no masses and bowel sounds normal  MS: " no gross musculoskeletal defects noted, no edema  SKIN: no suspicious lesions or rashes  NEURO: Normal strength and tone, mentation intact and speech normal  PSYCH: mentation appears normal, affect normal/bright        ASSESSMENT/PLAN:     Problem List Items Addressed This Visit          Digestive    Vitamin D deficiency       Endocrine    Hypothyroidism    Hyperlipidemia LDL goal <130    Relevant Medications    atorvastatin (LIPITOR) 80 MG tablet       Circulatory    Dissection, vertebral artery (H24)       Musculoskeletal and Integumentary    History of basal cell carcinoma       Behavioral    Attention deficit disorder       Other    Anxiety state    Relevant Medications    gabapentin (NEURONTIN) 300 MG capsule    buPROPion (WELLBUTRIN XL) 300 MG 24 hr tablet    traZODone (DESYREL) 100 MG tablet    vortioxetine (TRINTELLIX) 5 MG tablet    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, insomnia type    Relevant Medications    traZODone (DESYREL) 100 MG tablet     Other Visit Diagnoses       Benign essential hypertension    -  Primary    Relevant Medications    amLODIPine (NORVASC) 2.5 MG tablet    TIA (transient ischemic attack)        Relevant Medications    atorvastatin (LIPITOR) 80 MG tablet    Depression, unspecified depression type        Relevant Medications    buPROPion (WELLBUTRIN XL) 300 MG 24 hr tablet    traZODone (DESYREL) 100 MG tablet    vortioxetine (TRINTELLIX) 5 MG tablet    Overweight with body mass index (BMI) 25.0-29.9        Annual physical exam        Persistent cough for 3 weeks or longer        Relevant Medications    azithromycin (ZITHROMAX) 250 MG tablet    Visit for screening mammogram        Acute bilateral low back pain with right-sided sciatica        Relevant Medications    gabapentin (NEURONTIN) 300 MG capsule          Describes agitated fee, feeling restless occasional cramping. Recommend holding statin for a couple weeks and determine if this is the cause.  Recommend starting  325mg ferrous sulfate every other day, b12 supplement daily. If persist recommend starting low dose mirapex.      Patient discontinued lexapro due to sexual side effects.  Symptoms have returned, recommend continuing wellbutrin 300mg, trazodone at bedtime and start trintellix 5mg daily.  Certainly could also consider trying buspar.         Wt Readings from Last 5 Encounters:   12/20/23 71.6 kg (157 lb 14.4 oz)   02/08/23 72.6 kg (160 lb 1.6 oz)   12/19/22 70.3 kg (155 lb)   11/08/22 74.4 kg (164 lb)   08/10/22 71.9 kg (158 lb 8 oz)         COUNSELING:  Reviewed preventive health counseling, as reflected in patient instructions        She reports that she has never smoked. She has never been exposed to tobacco smoke. She has never used smokeless tobacco.          Jacinda Banks NP  [facility]

## 2023-12-20 NOTE — PATIENT INSTRUCTIONS
325mg Ferrous Sulfate every other day    B12 supplement daily    Hold Statin    Antibiotics at pharmacy       Breath sounds clear and equal bilaterally.

## 2023-12-20 NOTE — COMMUNITY RESOURCES LIST (ENGLISH)
12/20/2023   [facility] [employer]  N/A  For questions about this resource list or additional care needs, please contact your primary care clinic or care manager.  Phone: [phone]   Email: N/A   Address: [address] [place] [address]   Hours: N/A        Financial Stability       Utility payment assistance  1  [place] [facility] - Utility payment assistance Distance: 1.22 miles      In-Person, Phone/Virtual   [address] [place] [address]  Language: English  Hours: Mon - Thu 9:00 AM - 3:00 PM  Fees: Free   Phone: (246) 244-6712 Email: [email or web address] Website: [email or web address]/care-[email or web address]     2  [facility] [facility] Distance: 1.78 miles      In-Person   [address] [place] [address]  Language: English  Hours: Mon - Fri 8:00 AM - 3:00 PM  Fees: Free   Phone: (812) [phone] Ext.14 Email: [email or web address] Website: [email or web address]          Important Numbers & Websites       Emergency Services   911  City Services   311  Poison Control   (268) 817-1878  Suicide Prevention Lifeline   (859) 305-9077 (TALK)  Child Abuse Hotline   (671) 567-7296 (4-A-Child)  Sexual Assault Hotline   (622) 660-7294 (HOPE)  National Runaway Safeline   (145) 145-3350 (RUNAWAY)  All-Options Talkline   (831) 607-2681  Substance Abuse Referral   (797) 504-6096 (HELP)

## 2023-12-27 ENCOUNTER — TELEPHONE (OUTPATIENT)
Dept: INTERNAL MEDICINE | Facility: CLINIC | Age: 53
End: 2023-12-27

## 2023-12-27 DIAGNOSIS — M54.41 ACUTE BILATERAL LOW BACK PAIN WITH RIGHT-SIDED SCIATICA: ICD-10-CM

## 2023-12-27 DIAGNOSIS — F32.A DEPRESSION, UNSPECIFIED DEPRESSION TYPE: ICD-10-CM

## 2023-12-27 RX ORDER — GABAPENTIN 300 MG/1
CAPSULE ORAL
Qty: 75 CAPSULE | Refills: 3 | Status: SHIPPED | OUTPATIENT
Start: 2023-12-27

## 2023-12-27 NOTE — TELEPHONE ENCOUNTER
Prior authorization please or advise patient that she certainly may utilize the discount from the medication website

## 2023-12-27 NOTE — TELEPHONE ENCOUNTER
[facility] calling, stating that the vortioxetine (TRINTELLIX) 5 MG tablet medication is not cover by pts insurance. They gave a list of alternative medications that is covered by insurance.     Fluoxetine hcl capsules    Paroxetine hlc     Duloxetine hcl dr capsules    Escitalopram Tablets    Sertraline hcl tablets     Citalopram Hydrobromide tablets     Per [facility], may Escribe or call new medication in at [phone], Reference number is [id number]

## 2023-12-27 NOTE — TELEPHONE ENCOUNTER
Patient is seeking partial fill on medication until [facility] is received.  Patient states she may have to pay out of pocket for  vortioxetine (TRINTELLIX) 5 MG tablet  until the prior authorization and appeals process is completed.  Shares that this was discussed at her appointment with PCP on 12/20/23.    Requesting partial feel for vortioxetine (TRINTELLIX) 5 MG tablet  and for gabapentin (NEURONTIN) 300 MG capsule     Would like RX to be sent to [facility] [address]     Forwarding to Care Team

## 2023-12-28 ENCOUNTER — TELEPHONE (OUTPATIENT)
Dept: INTERNAL MEDICINE | Facility: CLINIC | Age: 53
End: 2023-12-28
Payer: COMMERCIAL

## 2023-12-28 NOTE — TELEPHONE ENCOUNTER
Reason for Call:  Other prescription    Detailed comments: Patient is unable to  RX at [facility].  The pharmacy faxed form to PCP at  yesterday.  Form needs to be completed and returned so patient can  the RX for vortioxetine (TRINTELLIX) 5 MG tablet     Phone Number Patient can be reached at: Cell number on file:    Telephone Information:   Mobile [phone]       Best Time: any     Can we leave a detailed message on this number? YES    Call taken on 12/28/2023 at 11:55 AM by Deann Tavares

## 2023-12-28 NOTE — TELEPHONE ENCOUNTER
With goodRx coupon, still > $450      Will send to PA team to initiate PA on vortioxetine (TRINTELLIX) 5 MG tablet

## 2023-12-28 NOTE — TELEPHONE ENCOUNTER
Central Prior Authorization Team   Phone: [phone]    PA Initiation    Medication: TRINTELLIX 5 MG PO TABS  Insurance Company: [facility] Non-Specialty PA's - Phone [phone] Fax [phone]  Pharmacy Filling the Rx: [facility] - [place], [place] - [address] [place] [address]  Filling Pharmacy Phone: [phone]  Filling Pharmacy Fax:    Start Date: 12/28/2023

## 2023-12-29 NOTE — TELEPHONE ENCOUNTER
Prior Authorization Approval    Medication: TRINTELLIX 5 MG PO TABS  Authorization Effective Date: 11/28/2023  Authorization Expiration Date: 12/27/2024  Approved Dose/Quantity:   Reference #:     Insurance Company: [facility] Non-Specialty PA's - Phone [phone] Fax [phone]  Expected CoPay: $  30 for 90 day supply  CoPay Card Available:      Financial Assistance Needed:   Which Pharmacy is filling the prescription:   Patient Notified: **Instructed pharmacy to notify patient when script is ready to /ship.**

## 2024-01-17 ENCOUNTER — TELEPHONE (OUTPATIENT)
Dept: INTERNAL MEDICINE | Facility: CLINIC | Age: 54
End: 2024-01-17
Payer: COMMERCIAL

## 2024-01-17 NOTE — TELEPHONE ENCOUNTER
Please initiate prior auth for gabapentin       Jhonny Valenzuela Jr., CMA on 1/17/2024 at 3:11 PM

## 2024-01-18 ENCOUNTER — MYC MEDICAL ADVICE (OUTPATIENT)
Dept: INTERNAL MEDICINE | Facility: CLINIC | Age: 54
End: 2024-01-18
Payer: COMMERCIAL

## 2024-01-18 ENCOUNTER — TRANSFERRED RECORDS (OUTPATIENT)
Dept: HEALTH INFORMATION MANAGEMENT | Facility: CLINIC | Age: 54
End: 2024-01-18
Payer: COMMERCIAL

## 2024-01-18 DIAGNOSIS — E78.5 HYPERLIPIDEMIA LDL GOAL <130: Primary | ICD-10-CM

## 2024-01-19 ASSESSMENT — PATIENT HEALTH QUESTIONNAIRE - PHQ9
SUM OF ALL RESPONSES TO PHQ QUESTIONS 1-9: 19
SUM OF ALL RESPONSES TO PHQ QUESTIONS 1-9: 19
10. IF YOU CHECKED OFF ANY PROBLEMS, HOW DIFFICULT HAVE THESE PROBLEMS MADE IT FOR YOU TO DO YOUR WORK, TAKE CARE OF THINGS AT HOME, OR GET ALONG WITH OTHER PEOPLE: VERY DIFFICULT

## 2024-01-24 RX ORDER — ROSUVASTATIN CALCIUM 10 MG/1
10 TABLET, COATED ORAL DAILY
Qty: 90 TABLET | Refills: 3 | Status: SHIPPED | OUTPATIENT
Start: 2024-01-24

## 2024-01-27 NOTE — TELEPHONE ENCOUNTER
Retail Pharmacy Prior Authorization Team   Phone: [phone]    PA Initiation    Medication: GABAPENTIN 300 MG PO CAPS  Insurance Company: [employer] Non-Specialty PA's - Phone [phone] Fax [phone]  Pharmacy Filling the Rx: [facility] - [place], [place] - [address] [place] [address]  Filling Pharmacy Phone: [phone]  Filling Pharmacy Fax:    Start Date: 1/27/2024

## 2024-02-01 NOTE — TELEPHONE ENCOUNTER
Prior Authorization Not Needed per Insurance-I spoke to Anna at [employer]. She states this is covered and does not need a PA, nor quantity limit exception. She ran a test claim and this paid.    Medication: GABAPENTIN 300 MG PO CAPS  Insurance Company: [employer] Non-Specialty PA's - Phone [phone] Fax [phone]  Expected CoPay: $    Pharmacy Filling the Rx: [facility] - [address]  Pharmacy Notified: Yes  Patient Notified:

## 2024-02-06 ENCOUNTER — OFFICE VISIT (OUTPATIENT)
Dept: FAMILY MEDICINE | Facility: CLINIC | Age: 54
End: 2024-02-06
Payer: COMMERCIAL

## 2024-02-06 VITALS
TEMPERATURE: 97.9 F | SYSTOLIC BLOOD PRESSURE: 125 MMHG | BODY MASS INDEX: 24.43 KG/M2 | RESPIRATION RATE: 19 BRPM | HEART RATE: 77 BPM | WEIGHT: 152 LBS | DIASTOLIC BLOOD PRESSURE: 87 MMHG | HEIGHT: 66 IN | OXYGEN SATURATION: 98 %

## 2024-02-06 DIAGNOSIS — H92.02 LEFT EAR PAIN: Primary | ICD-10-CM

## 2024-02-06 DIAGNOSIS — F41.1 ANXIETY STATE: ICD-10-CM

## 2024-02-06 PROCEDURE — 99214 OFFICE O/P EST MOD 30 MIN: CPT | Performed by: PHYSICIAN ASSISTANT

## 2024-02-06 RX ORDER — METHYLPREDNISOLONE 4 MG
TABLET, DOSE PACK ORAL
Qty: 21 TABLET | Refills: 0 | Status: SHIPPED | OUTPATIENT
Start: 2024-02-06

## 2024-02-06 ASSESSMENT — PATIENT HEALTH QUESTIONNAIRE - PHQ9
SUM OF ALL RESPONSES TO PHQ QUESTIONS 1-9: 17
10. IF YOU CHECKED OFF ANY PROBLEMS, HOW DIFFICULT HAVE THESE PROBLEMS MADE IT FOR YOU TO DO YOUR WORK, TAKE CARE OF THINGS AT HOME, OR GET ALONG WITH OTHER PEOPLE: VERY DIFFICULT
SUM OF ALL RESPONSES TO PHQ QUESTIONS 1-9: 17

## 2024-02-06 NOTE — PROGRESS NOTES
"  Assessment & Plan     Left ear pain  Neuritis vs mass vs ETD  Start with a steroid and referral to ENT.  If unable to get into ENT in a reasonable time frame and symptoms do not resolve consider imaging.    - methylPREDNISolone (MEDROL DOSEPAK) 4 MG tablet therapy pack; Follow Package Directions  - Adult ENT  Referral; Future    Anxiety state  Managed by her primary.              Depression Screening Follow Up        2/6/2024     3:22 PM   PHQ   PHQ-9 Total Score 17   Q9: Thoughts of better off dead/self-harm past 2 weeks Not at all         Follow Up Actions Taken  Referred patient back to PCP           Subjective   Linsey is a 54 year old, presenting for the following health issues:  Pain (Ear )        2/6/2024     4:22 PM   Additional Questions   Roomed by Linda   Accompanied by self     History of Present Illness       Reason for visit:  Ear pain  Symptom onset:  More than a month  Symptoms include:  Inner ear pain  Symptom intensity:  Moderate  Symptom progression:  Worsening  Had these symptoms before:  Yes  Has tried/received treatment for these symptoms:  No    She eats 2-3 servings of fruits and vegetables daily.She consumes 1 sweetened beverage(s) daily.She exercises with enough effort to increase her heart rate 10 to 19 minutes per day.  She exercises with enough effort to increase her heart rate 5 days per week.   She is taking medications regularly.     Ear has been bothersome since Oct and getting worse.  Seemed to happen after having covid.  Zpack didn't help.  No jaw pain.  Feels behind ear.  Does get popping sensation.  No congestion and Afrin did not help.  Feels dizzy -feeling off and room is moving a little.   No headaches.  Fullness.  Ne new meds.  No rash.  No significant ringing in ears or hearing loss.                      Objective    /87   Pulse 77   Temp 97.9  F (36.6  C) (Oral)   Resp 19   Ht 1.664 m (5' 5.5\")   Wt 68.9 kg (152 lb)   LMP 07/26/2013   SpO2 98%   BMI " 24.91 kg/m    Body mass index is 24.91 kg/m .  Physical Exam  Constitutional:       General: She is not in acute distress.  HENT:      Head:      Jaw: No tenderness or pain on movement.      Right Ear: Tympanic membrane, ear canal and external ear normal.      Left Ear: Tympanic membrane, ear canal and external ear normal. Tenderness (on exam) present.      Mouth/Throat:      Mouth: Mucous membranes are moist.      Pharynx: No oropharyngeal exudate or posterior oropharyngeal erythema.   Eyes:      Extraocular Movements: Extraocular movements intact.      Pupils: Pupils are equal, round, and reactive to light.   Cardiovascular:      Rate and Rhythm: Normal rate and regular rhythm.   Pulmonary:      Effort: Pulmonary effort is normal.      Breath sounds: Normal breath sounds.   Lymphadenopathy:      Cervical: No cervical adenopathy.   Neurological:      Mental Status: She is alert.   Psychiatric:         Mood and Affect: Mood normal.                    Signed Electronically by: Angella Bar PA-C

## 2024-04-29 ENCOUNTER — MYC MEDICAL ADVICE (OUTPATIENT)
Dept: INTERNAL MEDICINE | Facility: CLINIC | Age: 54
End: 2024-04-29
Payer: COMMERCIAL

## 2024-04-29 DIAGNOSIS — K21.9 GERD WITHOUT ESOPHAGITIS: Primary | ICD-10-CM

## 2024-04-30 RX ORDER — ONDANSETRON 4 MG/1
4 TABLET, ORALLY DISINTEGRATING ORAL EVERY 6 HOURS PRN
Qty: 10 TABLET | Refills: 0 | Status: SHIPPED | OUTPATIENT
Start: 2024-04-30

## 2024-05-01 ENCOUNTER — PATIENT OUTREACH (OUTPATIENT)
Dept: SURGERY | Facility: CLINIC | Age: 54
End: 2024-05-01
Payer: COMMERCIAL

## 2024-05-01 DIAGNOSIS — K21.9 GASTROESOPHAGEAL REFLUX DISEASE: Primary | ICD-10-CM

## 2024-05-01 DIAGNOSIS — Z98.890 S/P NISSEN FUNDOPLICATION (WITHOUT GASTROSTOMY TUBE) PROCEDURE: ICD-10-CM

## 2024-05-01 NOTE — TELEPHONE ENCOUNTER
[facility]: Thoracic Surgery                                                                                          Patient transferred to writer from scheduling department. Patient called to make an appointment with Dr Boland.   Patient s/p Nissen in July 2021. Reports that she has been experiencing pain, tightness and fullness in chest. Informed writer the she did go to [facility] ED and had full cardiac work-up that came back normal and patient was instructed to follow up with Dr Boland.  Patient reports that since being sick with a GI illness 2 weeks ago, at which time she reports experiencing a large amount of dry heaving, she has been experiencing acid reflux again.   Patient scheduled to see Dr Boland in clinic on 5/15/2024.  Informed patient that Dr Boland would like her to have an Esophagram done prior to that visit and that scheduling will contact her to schedule. Reviewed what an Esophagram is with patient. Answered questions as able. Patient verbalized her understanding and appreciated writer's assistance.    Epic message sent to scheduling.    Magdalene Martin RN, BSN  Thoracic Surgery RN Care Coordinator  ]

## 2024-05-09 ENCOUNTER — TRANSFERRED RECORDS (OUTPATIENT)
Dept: HEALTH INFORMATION MANAGEMENT | Facility: CLINIC | Age: 54
End: 2024-05-09
Payer: COMMERCIAL

## 2024-05-15 ENCOUNTER — ANCILLARY PROCEDURE (OUTPATIENT)
Dept: GENERAL RADIOLOGY | Facility: CLINIC | Age: 54
End: 2024-05-15
Attending: CLINICAL NURSE SPECIALIST
Payer: COMMERCIAL

## 2024-05-15 ENCOUNTER — ONCOLOGY VISIT (OUTPATIENT)
Dept: SURGERY | Facility: CLINIC | Age: 54
End: 2024-05-15
Attending: THORACIC SURGERY (CARDIOTHORACIC VASCULAR SURGERY)
Payer: COMMERCIAL

## 2024-05-15 VITALS
BODY MASS INDEX: 25.12 KG/M2 | OXYGEN SATURATION: 95 % | HEART RATE: 90 BPM | WEIGHT: 156.3 LBS | HEIGHT: 66 IN | TEMPERATURE: 97.8 F | RESPIRATION RATE: 16 BRPM | SYSTOLIC BLOOD PRESSURE: 116 MMHG | DIASTOLIC BLOOD PRESSURE: 84 MMHG

## 2024-05-15 DIAGNOSIS — K21.9 GASTROESOPHAGEAL REFLUX DISEASE: ICD-10-CM

## 2024-05-15 DIAGNOSIS — Z98.890 S/P NISSEN FUNDOPLICATION (WITHOUT GASTROSTOMY TUBE) PROCEDURE: ICD-10-CM

## 2024-05-15 DIAGNOSIS — K21.9 GERD WITHOUT ESOPHAGITIS: Primary | ICD-10-CM

## 2024-05-15 PROCEDURE — 99213 OFFICE O/P EST LOW 20 MIN: CPT | Performed by: THORACIC SURGERY (CARDIOTHORACIC VASCULAR SURGERY)

## 2024-05-15 PROCEDURE — 74221 X-RAY XM ESOPHAGUS 2CNTRST: CPT | Mod: GC | Performed by: RADIOLOGY

## 2024-05-15 PROCEDURE — 99211 OFF/OP EST MAY X REQ PHY/QHP: CPT | Performed by: THORACIC SURGERY (CARDIOTHORACIC VASCULAR SURGERY)

## 2024-05-15 ASSESSMENT — PAIN SCALES - GENERAL: PAINLEVEL: NO PAIN (0)

## 2024-05-15 NOTE — LETTER
5/15/2024         RE: Jaz Archibald  [address]  [facility] [address]        Dear Colleague,    Thank you for referring your patient, Jaz Archibald, to [facility]. Please see a copy of my visit note below.    THORACIC SURGERY FOLLOW UP VISIT    I saw Ms. Jaz Archibald in follow-up today. The clinical summary follows:      DIAGNOSIS   Hiatal hernia, gastroesophageal reflux disease, gastric polyps     PROCEDURE   7/26/2021 Laparoscopic hiatal hernia repair with Nissen fundoplication.      HISTOPATHOLOGY   Benign fibroadipose tissue consistent with hernia sac     COMPLICATIONS  None    INTERVAL STUDIES  Esophagram 5/15/24: Postoperative changes of Nissen fundoplication. No gastroesophageal reflux is elicited. There are no mucosal abnormalities. There are no strictures or filling defects. Esophageal motility is intact.       Past Medical History:   Diagnosis Date    ADD (attention deficit disorder)     Anxiety     Gastroesophageal reflux disease without esophagitis     Hypertension     Urethral hypermobility 06/26/2013     Past Surgical History:   Procedure Laterality Date    EGD      7/2020    GYN SURGERY  07/12/2012    Novasure    HYSTERECTOMY, PAP NO LONGER INDICATED      LAPAROSCOPIC HERNIORRHAPHY HIATAL N/A 07/26/2021    Procedure: HERNIORRHAPHY, HIATAL, LAPAROSCOPIC;  Surgeon: Mady Potts MD;  Location: [facility]    LAPAROSCOPIC HYSTERECTOMY TOTAL  08/12/2013    Procedure: LAPAROSCOPIC HYSTERECTOMY TOTAL;  Laparoscopic Total Hysterectomy,Bilateral Salpingectomy with Sparing of the Ovaries,Uterosacral Suspension,Transvaginal Taping,Perineoplasty;  Surgeon: Sy Castro MD;  Location: [facility] OR    LAPAROSCOPIC NISSEN FUNDOPLICATION N/A 07/26/2021    Procedure: FUNDOPLICATION, NISSEN, LAPAROSCOPIC;  Surgeon: Mady Potts MD;  Location: [facility] OR    mohs surgery      remove skin cancer    OR BREAST AUGMENTATION Bilateral     SLING TRANSVAGINAL   08/12/2013    Procedure: SLING TRANSVAGINAL;;  Surgeon: Sy Castro MD;  Location: [facility] OR        Allergies   Allergen Reactions    Bees Swelling     Disfiguring     Allevyn Adhesive [Wound Dressings]     Suture Swelling     local swelling and sutures didn't dissolve vyrcil   Suture      Current Outpatient Medications   Medication Sig Dispense Refill    amLODIPine (NORVASC) 2.5 MG tablet Take 1 tablet (2.5 mg) by mouth daily 90 tablet 3    aspirin (ASA) 81 MG EC tablet Take 1 tablet (81 mg) by mouth daily (Patient not taking: Reported on 2/6/2024) 90 tablet 3    atorvastatin (LIPITOR) 80 MG tablet Take 1 tablet (80 mg) by mouth daily 90 tablet 3    buPROPion (WELLBUTRIN XL) 300 MG 24 hr tablet Take 1 tablet (300 mg) by mouth every morning 90 tablet 3    EPINEPHrine (ANY BX GENERIC EQUIV) 0.3 MG/0.3ML injection 2-pack       gabapentin (NEURONTIN) 300 MG capsule TAKE 1 CAPSULE BY MOUTH EVERY 6 HOURS AS NEEDED FOR PAIN. MAY TAKE 2 CAPSULES FOR BEDTIME DOSE 75 capsule 3    insulin pen needle (B-D U/F) 31G X 5 MM miscellaneous USE 1 PEN NEEDLE DAILY OR AS DIRECTED. 100 each 1    liraglutide - Weight Management (SAXENDA) 18 MG/3ML pen Inject 3 mg Subcutaneous daily 45 mL 0    methylPREDNISolone (MEDROL DOSEPAK) 4 MG tablet therapy pack Follow Package Directions 21 tablet 0    olopatadine (PATANOL) 0.1 % ophthalmic solution Place 1-2 drops into both eyes 2 times daily as needed for allergies   6    ondansetron (ZOFRAN ODT) 4 MG ODT tab Take 1 tablet (4 mg) by mouth every 6 hours as needed for nausea or vomiting 10 tablet 0    rosuvastatin (CRESTOR) 10 MG tablet Take 1 tablet (10 mg) by mouth daily 90 tablet 3    traZODone (DESYREL) 100 MG tablet Take 1 tablet (100 mg) by mouth at bedtime 90 tablet 3    valACYclovir (VALTREX) 500 MG tablet  (Patient not taking: Reported on 2/6/2024)      vortioxetine (TRINTELLIX) 5 MG tablet Take 1 tablet (5 mg) by mouth daily 90 tablet 3     No current facility-administered  medications for this visit.     Social History     Tobacco Use    Smoking status: Never     Passive exposure: Never    Smokeless tobacco: Never   Vaping Use    Vaping status: Never Used   Substance Use Topics    Alcohol use: Yes     Alcohol/week: 1.0 standard drink of alcohol     Types: 1 Glasses of wine per week    Drug use: No       Exam   Alert and oriented NAD.   Abd ND NT.     SUBJECTIVE  Linsey comes to clinic for a follow up because she had a GI infection causing severe retching and nausea. She started having some heartburn and postprandial pain and wanted to be seen for evaluation.     From a personal perspective, she comes to clinic alone.     IMPRESSION  51 year old female 3.5 years after laparoscopic hiatal hernia repair with Nissen fundoplication.     PLAN  I spent 30 min on the date of the encounter in chart review, patient visit, review of tests, documentation and/or discussion with other providers about the issues documented above. I reviewed the plan as follows:  I discussed with Linsey the results of her esophagram. I reassured her that there is no evidence of hiatal hernia recurrence or failure of the fundoplication. I encouraged her to avoid carbonated beverages since this may exacerbate her postprandial pain that I think it might be related to gastric distension.   Follow up with thoracic surgery PRN.  All questions were answered and the patient and present family were in agreement with the plan.  I appreciate the opportunity to participate in the care of your patient and will keep you updated.  Sincerely,  Mady Archer MD

## 2024-05-15 NOTE — NURSING NOTE
"Oncology Rooming Note    May 15, 2024 4:57 PM   Jaz Archibald is a 54 year old female who presents for:    Chief Complaint   Patient presents with    Oncology Clinic Visit     RTN for Gastroesophageal reflux disease     Initial Vitals: /84 (BP Location: Right arm, Patient Position: Sitting, Cuff Size: Adult Regular)   Pulse 90   Temp 97.8  F (36.6  C) (Oral)   Resp 16   Ht 1.665 m (5' 5.55\")   Wt 70.9 kg (156 lb 4.8 oz)   LMP 07/26/2013   SpO2 95%   BMI 25.57 kg/m   Estimated body mass index is 25.57 kg/m  as calculated from the following:    Height as of this encounter: 1.665 m (5' 5.55\").    Weight as of this encounter: 70.9 kg (156 lb 4.8 oz). Body surface area is 1.81 meters squared.  No Pain (0) Comment: Data Unavailable   Patient's last menstrual period was 07/26/2013.  Allergies reviewed: Yes  Medications reviewed: Yes    Medications: Medication refills not needed today.  Pharmacy name entered into EPIC:    [facility]  [id number] - [place], [place] - [address] [facility] [place], [place] - [address] [id number] - [place], [place] [address] [facility] [address] [id number] - [place], [place] - [address] [facility] [address]  [facility]IV[facility] [place] - [facility] - [place], [place] - [address] [place] [address].    Frailty Screening:   Is the patient here for a new oncology consult visit in cancer care? 2. No      Clinical concerns:  None      Artemio Britton              "

## 2024-05-15 NOTE — PROGRESS NOTES
THORACIC SURGERY FOLLOW UP VISIT    I saw Ms. Jaz Archibald in follow-up today. The clinical summary follows:      DIAGNOSIS   Hiatal hernia, gastroesophageal reflux disease, gastric polyps     PROCEDURE   7/26/2021 Laparoscopic hiatal hernia repair with Nissen fundoplication.      HISTOPATHOLOGY   Benign fibroadipose tissue consistent with hernia sac     COMPLICATIONS  None    INTERVAL STUDIES  Esophagram 5/15/24: Postoperative changes of Nissen fundoplication. No gastroesophageal reflux is elicited. There are no mucosal abnormalities. There are no strictures or filling defects. Esophageal motility is intact.       Past Medical History:   Diagnosis Date    ADD (attention deficit disorder)     Anxiety     Gastroesophageal reflux disease without esophagitis     Hypertension     Urethral hypermobility 06/26/2013     Past Surgical History:   Procedure Laterality Date    EGD      7/2020    GYN SURGERY  07/12/2012    Novasure    HYSTERECTOMY, PAP NO LONGER INDICATED      LAPAROSCOPIC HERNIORRHAPHY HIATAL N/A 07/26/2021    Procedure: HERNIORRHAPHY, HIATAL, LAPAROSCOPIC;  Surgeon: Mady Potts MD;  [facility]    LAPAROSCOPIC HYSTERECTOMY TOTAL  08/12/2013    Procedure: LAPAROSCOPIC HYSTERECTOMY TOTAL;  Laparoscopic Total Hysterectomy,Bilateral Salpingectomy with Sparing of the Ovaries,Uterosacral Suspension,Transvaginal Taping,Perineoplasty;  Surgeon: Sy Castro MD;  Location: [facility]    LAPAROSCOPIC NISSEN FUNDOPLICATION N/A 07/26/2021    Procedure: FUNDOPLICATION, NISSEN, LAPAROSCOPIC;  Surgeon: Mady Potts MD;  Location:  OR    mohs surgery      remove skin cancer    ID BREAST AUGMENTATION Bilateral     SLING TRANSVAGINAL  08/12/2013    Procedure: SLING TRANSVAGINAL;;  Surgeon: Sy Castro MD;  Location: [facility]        Allergies   Allergen Reactions    Bees Swelling     Disfiguring     Allevyn Adhesive [Wound Dressings]     Suture Swelling     local swelling and sutures  didn't dissolve vyrcil   Suture      Current Outpatient Medications   Medication Sig Dispense Refill    amLODIPine (NORVASC) 2.5 MG tablet Take 1 tablet (2.5 mg) by mouth daily 90 tablet 3    aspirin (ASA) 81 MG EC tablet Take 1 tablet (81 mg) by mouth daily (Patient not taking: Reported on 2/6/2024) 90 tablet 3    atorvastatin (LIPITOR) 80 MG tablet Take 1 tablet (80 mg) by mouth daily 90 tablet 3    buPROPion (WELLBUTRIN XL) 300 MG 24 hr tablet Take 1 tablet (300 mg) by mouth every morning 90 tablet 3    EPINEPHrine (ANY BX GENERIC EQUIV) 0.3 MG/0.3ML injection 2-pack       gabapentin (NEURONTIN) 300 MG capsule TAKE 1 CAPSULE BY MOUTH EVERY 6 HOURS AS NEEDED FOR PAIN. MAY TAKE 2 CAPSULES FOR BEDTIME DOSE 75 capsule 3    insulin pen needle (B-D U/F) 31G X 5 MM miscellaneous USE 1 PEN NEEDLE DAILY OR AS DIRECTED. 100 each 1    liraglutide - Weight Management (SAXENDA) 18 MG/3ML pen Inject 3 mg Subcutaneous daily 45 mL 0    methylPREDNISolone (MEDROL DOSEPAK) 4 MG tablet therapy pack Follow Package Directions 21 tablet 0    olopatadine (PATANOL) 0.1 % ophthalmic solution Place 1-2 drops into both eyes 2 times daily as needed for allergies   6    ondansetron (ZOFRAN ODT) 4 MG ODT tab Take 1 tablet (4 mg) by mouth every 6 hours as needed for nausea or vomiting 10 tablet 0    rosuvastatin (CRESTOR) 10 MG tablet Take 1 tablet (10 mg) by mouth daily 90 tablet 3    traZODone (DESYREL) 100 MG tablet Take 1 tablet (100 mg) by mouth at bedtime 90 tablet 3    valACYclovir (VALTREX) 500 MG tablet  (Patient not taking: Reported on 2/6/2024)      vortioxetine (TRINTELLIX) 5 MG tablet Take 1 tablet (5 mg) by mouth daily 90 tablet 3     No current facility-administered medications for this visit.     Social History     Tobacco Use    Smoking status: Never     Passive exposure: Never    Smokeless tobacco: Never   Vaping Use    Vaping status: Never Used   Substance Use Topics    Alcohol use: Yes     Alcohol/week: 1.0 standard drink  of alcohol     Types: 1 Glasses of wine per week    Drug use: No       Exam   Alert and oriented NAD.   Abd ND NT.     SUBJECTIVE  Linsey comes to clinic for a follow up because she had a GI infection causing severe retching and nausea. She started having some heartburn and postprandial pain and wanted to be seen for evaluation.     From a personal perspective, she comes to clinic alone.     IMPRESSION  51 year old female 3.5 years after laparoscopic hiatal hernia repair with Nissen fundoplication.     PLAN  I spent 30 min on the date of the encounter in chart review, patient visit, review of tests, documentation and/or discussion with other providers about the issues documented above. I reviewed the plan as follows:  I discussed with Linsey the results of her esophagram. I reassured her that there is no evidence of hiatal hernia recurrence or failure of the fundoplication. I encouraged her to avoid carbonated beverages since this may exacerbate her postprandial pain that I think it might be related to gastric distension.   Follow up with thoracic surgery PRN.  All questions were answered and the patient and present family were in agreement with the plan.  I appreciate the opportunity to participate in the care of your patient and will keep you updated.  Sincerely,  Mady Archer MD

## 2024-05-20 NOTE — PLAN OF CARE
7B PT: Defer: Orders received and appreciated. Per discussion with OT and chart review, pt has no acute PT needs. OT to address all IP therapy needs. Pt up SBA/IND with possible discharge this afternoon pending medical status. Will complete orders, thank you.    Pt has not read Triptease.     Pt called ACT completed.

## 2024-05-31 ENCOUNTER — TRANSFERRED RECORDS (OUTPATIENT)
Dept: HEALTH INFORMATION MANAGEMENT | Facility: CLINIC | Age: 54
End: 2024-05-31

## 2024-07-02 ENCOUNTER — TRANSFERRED RECORDS (OUTPATIENT)
Dept: HEALTH INFORMATION MANAGEMENT | Facility: CLINIC | Age: 54
End: 2024-07-02

## 2024-07-15 ENCOUNTER — TRANSFERRED RECORDS (OUTPATIENT)
Dept: HEALTH INFORMATION MANAGEMENT | Facility: CLINIC | Age: 54
End: 2024-07-15
Payer: COMMERCIAL

## 2024-09-18 ENCOUNTER — TRANSFERRED RECORDS (OUTPATIENT)
Dept: HEALTH INFORMATION MANAGEMENT | Facility: CLINIC | Age: 54
End: 2024-09-18
Payer: COMMERCIAL

## 2024-09-25 DIAGNOSIS — B00.1 COLD SORE: Primary | ICD-10-CM

## 2024-09-25 RX ORDER — VALACYCLOVIR HYDROCHLORIDE 500 MG/1
1000 TABLET, FILM COATED ORAL 2 TIMES DAILY
Qty: 90 TABLET | Refills: 1 | Status: SHIPPED | OUTPATIENT
Start: 2024-09-25

## 2024-10-15 ENCOUNTER — PATIENT OUTREACH (OUTPATIENT)
Dept: CARE COORDINATION | Facility: CLINIC | Age: 54
End: 2024-10-15
Payer: COMMERCIAL

## 2024-11-03 ENCOUNTER — MYC REFILL (OUTPATIENT)
Dept: INTERNAL MEDICINE | Facility: CLINIC | Age: 54
End: 2024-11-03
Payer: COMMERCIAL

## 2024-11-03 DIAGNOSIS — K21.9 GERD WITHOUT ESOPHAGITIS: ICD-10-CM

## 2024-11-04 ENCOUNTER — MYC REFILL (OUTPATIENT)
Dept: INTERNAL MEDICINE | Facility: CLINIC | Age: 54
End: 2024-11-04
Payer: COMMERCIAL

## 2024-11-04 DIAGNOSIS — E78.5 HYPERLIPIDEMIA LDL GOAL <130: ICD-10-CM

## 2024-11-04 DIAGNOSIS — I10 BENIGN ESSENTIAL HYPERTENSION: ICD-10-CM

## 2024-11-04 DIAGNOSIS — F32.A DEPRESSION, UNSPECIFIED DEPRESSION TYPE: ICD-10-CM

## 2024-11-04 DIAGNOSIS — K21.9 GERD WITHOUT ESOPHAGITIS: ICD-10-CM

## 2024-11-04 DIAGNOSIS — M54.41 ACUTE BILATERAL LOW BACK PAIN WITH RIGHT-SIDED SCIATICA: ICD-10-CM

## 2024-11-04 RX ORDER — ONDANSETRON 4 MG/1
4 TABLET, ORALLY DISINTEGRATING ORAL EVERY 6 HOURS PRN
Qty: 10 TABLET | Refills: 0 | Status: SHIPPED | OUTPATIENT
Start: 2024-11-04 | End: 2024-11-04

## 2024-11-05 RX ORDER — ONDANSETRON 4 MG/1
4 TABLET, ORALLY DISINTEGRATING ORAL EVERY 6 HOURS PRN
Qty: 10 TABLET | Refills: 0 | Status: SHIPPED | OUTPATIENT
Start: 2024-11-05

## 2024-11-05 RX ORDER — AMLODIPINE BESYLATE 2.5 MG/1
2.5 TABLET ORAL DAILY
Qty: 90 TABLET | Refills: 3 | Status: SHIPPED | OUTPATIENT
Start: 2024-11-05

## 2024-11-05 RX ORDER — ROSUVASTATIN CALCIUM 10 MG/1
10 TABLET, COATED ORAL DAILY
Qty: 90 TABLET | Refills: 3 | Status: SHIPPED | OUTPATIENT
Start: 2024-11-05

## 2024-11-05 RX ORDER — GABAPENTIN 300 MG/1
CAPSULE ORAL
Qty: 75 CAPSULE | Refills: 3 | Status: SHIPPED | OUTPATIENT
Start: 2024-11-05

## 2024-11-06 ENCOUNTER — MYC REFILL (OUTPATIENT)
Dept: INTERNAL MEDICINE | Facility: CLINIC | Age: 54
End: 2024-11-06
Payer: COMMERCIAL

## 2024-11-06 DIAGNOSIS — F32.A DEPRESSION, UNSPECIFIED DEPRESSION TYPE: ICD-10-CM

## 2024-11-07 ENCOUNTER — MYC MEDICAL ADVICE (OUTPATIENT)
Dept: INTERNAL MEDICINE | Facility: CLINIC | Age: 54
End: 2024-11-07
Payer: COMMERCIAL

## 2024-11-07 DIAGNOSIS — F32.A DEPRESSION, UNSPECIFIED DEPRESSION TYPE: ICD-10-CM

## 2024-12-05 ENCOUNTER — MYC MEDICAL ADVICE (OUTPATIENT)
Dept: INTERNAL MEDICINE | Facility: CLINIC | Age: 54
End: 2024-12-05
Payer: COMMERCIAL

## 2024-12-05 DIAGNOSIS — F98.8 ATTENTION DEFICIT DISORDER, UNSPECIFIED TYPE: ICD-10-CM

## 2024-12-05 DIAGNOSIS — E66.3 OVERWEIGHT WITH BODY MASS INDEX (BMI) 25.0-29.9: ICD-10-CM

## 2024-12-05 DIAGNOSIS — E55.9 VITAMIN D DEFICIENCY: ICD-10-CM

## 2024-12-05 DIAGNOSIS — F32.A DEPRESSION, UNSPECIFIED DEPRESSION TYPE: ICD-10-CM

## 2024-12-05 DIAGNOSIS — K21.9 GERD WITHOUT ESOPHAGITIS: ICD-10-CM

## 2024-12-05 DIAGNOSIS — I10 BENIGN ESSENTIAL HYPERTENSION: Primary | ICD-10-CM

## 2024-12-05 DIAGNOSIS — E78.5 HYPERLIPIDEMIA LDL GOAL <130: ICD-10-CM

## 2024-12-31 ENCOUNTER — LAB (OUTPATIENT)
Dept: LAB | Facility: CLINIC | Age: 54
End: 2024-12-31
Payer: COMMERCIAL

## 2024-12-31 ENCOUNTER — VIRTUAL VISIT (OUTPATIENT)
Dept: FAMILY MEDICINE | Facility: CLINIC | Age: 54
End: 2024-12-31
Payer: COMMERCIAL

## 2024-12-31 DIAGNOSIS — R19.7 DIARRHEA, UNSPECIFIED TYPE: ICD-10-CM

## 2024-12-31 DIAGNOSIS — R11.0 NAUSEA: ICD-10-CM

## 2024-12-31 DIAGNOSIS — K21.9 GASTROESOPHAGEAL REFLUX DISEASE WITHOUT ESOPHAGITIS: ICD-10-CM

## 2024-12-31 DIAGNOSIS — E78.5 HYPERLIPIDEMIA LDL GOAL <130: ICD-10-CM

## 2024-12-31 DIAGNOSIS — R19.7 DIARRHEA, UNSPECIFIED TYPE: Primary | ICD-10-CM

## 2024-12-31 DIAGNOSIS — Z11.59 NEED FOR HEPATITIS C SCREENING TEST: Primary | ICD-10-CM

## 2024-12-31 DIAGNOSIS — K21.9 GERD WITHOUT ESOPHAGITIS: ICD-10-CM

## 2024-12-31 DIAGNOSIS — E55.9 VITAMIN D DEFICIENCY: ICD-10-CM

## 2024-12-31 DIAGNOSIS — K59.00 CONSTIPATION, UNSPECIFIED CONSTIPATION TYPE: ICD-10-CM

## 2024-12-31 DIAGNOSIS — I10 BENIGN ESSENTIAL HYPERTENSION: ICD-10-CM

## 2024-12-31 PROBLEM — F41.1 GENERALIZED ANXIETY DISORDER: Status: ACTIVE | Noted: 2024-12-30

## 2024-12-31 PROBLEM — F48.9 NEUROSIS: Status: ACTIVE | Noted: 2024-12-31

## 2024-12-31 PROBLEM — F33.2 SEVERE EPISODE OF RECURRENT MAJOR DEPRESSIVE DISORDER, WITHOUT PSYCHOTIC FEATURES (H): Status: ACTIVE | Noted: 2024-12-30

## 2024-12-31 PROBLEM — R11.2 NAUSEA AND VOMITING: Status: RESOLVED | Noted: 2021-09-15 | Resolved: 2024-12-31

## 2024-12-31 PROBLEM — R07.9 CHEST PAIN, UNSPECIFIED TYPE: Status: ACTIVE | Noted: 2024-03-25

## 2024-12-31 PROBLEM — K44.9 DIAPHRAGMATIC HERNIA: Status: RESOLVED | Noted: 2020-07-30 | Resolved: 2024-12-31

## 2024-12-31 PROBLEM — K29.80 DUODENITIS: Status: RESOLVED | Noted: 2020-07-30 | Resolved: 2024-12-31

## 2024-12-31 PROBLEM — F41.0 PANIC ATTACKS: Status: ACTIVE | Noted: 2024-12-30

## 2024-12-31 PROBLEM — Z00.6 ENCOUNTER FOR EXAMINATION FOR NORMAL COMPARISON AND CONTROL IN CLINICAL RESEARCH PROGRAM: Status: RESOLVED | Noted: 2019-08-19 | Resolved: 2024-12-31

## 2024-12-31 LAB
ALBUMIN SERPL BCG-MCNC: 4.1 G/DL (ref 3.5–5.2)
ALBUMIN UR-MCNC: NEGATIVE MG/DL
ALP SERPL-CCNC: 57 U/L (ref 40–150)
ALT SERPL W P-5'-P-CCNC: 9 U/L (ref 0–50)
ANION GAP SERPL CALCULATED.3IONS-SCNC: 10 MMOL/L (ref 7–15)
APPEARANCE UR: CLEAR
AST SERPL W P-5'-P-CCNC: 11 U/L (ref 0–45)
BASOPHILS # BLD AUTO: 0 10E3/UL (ref 0–0.2)
BASOPHILS NFR BLD AUTO: 0 %
BILIRUB SERPL-MCNC: 0.2 MG/DL
BILIRUB UR QL STRIP: NEGATIVE
BUN SERPL-MCNC: 7.9 MG/DL (ref 6–20)
CALCIUM SERPL-MCNC: 8.4 MG/DL (ref 8.8–10.4)
CHLORIDE SERPL-SCNC: 104 MMOL/L (ref 98–107)
CHOLEST SERPL-MCNC: 221 MG/DL
COLOR UR AUTO: YELLOW
CREAT SERPL-MCNC: 0.74 MG/DL (ref 0.51–0.95)
CRP SERPL-MCNC: <3 MG/L
EGFRCR SERPLBLD CKD-EPI 2021: >90 ML/MIN/1.73M2
EOSINOPHIL # BLD AUTO: 0.1 10E3/UL (ref 0–0.7)
EOSINOPHIL NFR BLD AUTO: 1 %
ERYTHROCYTE [DISTWIDTH] IN BLOOD BY AUTOMATED COUNT: 12.8 % (ref 10–15)
EST. AVERAGE GLUCOSE BLD GHB EST-MCNC: 91 MG/DL
FASTING STATUS PATIENT QL REPORTED: NO
FASTING STATUS PATIENT QL REPORTED: NO
FERRITIN SERPL-MCNC: 83 NG/ML (ref 11–328)
GLUCOSE SERPL-MCNC: 108 MG/DL (ref 70–99)
GLUCOSE UR STRIP-MCNC: NEGATIVE MG/DL
HBA1C MFR BLD: 4.8 % (ref 0–5.6)
HCO3 SERPL-SCNC: 22 MMOL/L (ref 22–29)
HCT VFR BLD AUTO: 42.8 % (ref 35–47)
HCV AB SERPL QL IA: NONREACTIVE
HDLC SERPL-MCNC: 58 MG/DL
HGB BLD-MCNC: 14.5 G/DL (ref 11.7–15.7)
HGB UR QL STRIP: NEGATIVE
IMM GRANULOCYTES # BLD: 0 10E3/UL
IMM GRANULOCYTES NFR BLD: 0 %
IRON BINDING CAPACITY (ROCHE): 251 UG/DL (ref 240–430)
IRON SATN MFR SERPL: 22 % (ref 15–46)
IRON SERPL-MCNC: 56 UG/DL (ref 37–145)
KETONES UR STRIP-MCNC: NEGATIVE MG/DL
LDLC SERPL CALC-MCNC: 132 MG/DL
LEUKOCYTE ESTERASE UR QL STRIP: NEGATIVE
LYMPHOCYTES # BLD AUTO: 1.9 10E3/UL (ref 0.8–5.3)
LYMPHOCYTES NFR BLD AUTO: 37 %
MAGNESIUM SERPL-MCNC: 2.3 MG/DL (ref 1.7–2.3)
MCH RBC QN AUTO: 29.8 PG (ref 26.5–33)
MCHC RBC AUTO-ENTMCNC: 33.9 G/DL (ref 31.5–36.5)
MCV RBC AUTO: 88 FL (ref 78–100)
MONOCYTES # BLD AUTO: 0.4 10E3/UL (ref 0–1.3)
MONOCYTES NFR BLD AUTO: 8 %
NEUTROPHILS # BLD AUTO: 2.7 10E3/UL (ref 1.6–8.3)
NEUTROPHILS NFR BLD AUTO: 53 %
NITRATE UR QL: NEGATIVE
NONHDLC SERPL-MCNC: 163 MG/DL
PH UR STRIP: 7.5 [PH] (ref 5–7)
PLATELET # BLD AUTO: 290 10E3/UL (ref 150–450)
POTASSIUM SERPL-SCNC: 4.3 MMOL/L (ref 3.4–5.3)
PROT SERPL-MCNC: 6.6 G/DL (ref 6.4–8.3)
RBC # BLD AUTO: 4.87 10E6/UL (ref 3.8–5.2)
SODIUM SERPL-SCNC: 136 MMOL/L (ref 135–145)
SP GR UR STRIP: 1.02 (ref 1–1.03)
TRIGL SERPL-MCNC: 156 MG/DL
TSH SERPL DL<=0.005 MIU/L-ACNC: 1.67 UIU/ML (ref 0.3–4.2)
UROBILINOGEN UR STRIP-ACNC: 0.2 E.U./DL
VIT B12 SERPL-MCNC: 425 PG/ML (ref 232–1245)
VIT D+METAB SERPL-MCNC: 32 NG/ML (ref 20–50)
WBC # BLD AUTO: 5.1 10E3/UL (ref 4–11)

## 2024-12-31 PROCEDURE — 80061 LIPID PANEL: CPT

## 2024-12-31 PROCEDURE — 84443 ASSAY THYROID STIM HORMONE: CPT

## 2024-12-31 PROCEDURE — 82728 ASSAY OF FERRITIN: CPT

## 2024-12-31 PROCEDURE — 83735 ASSAY OF MAGNESIUM: CPT

## 2024-12-31 PROCEDURE — 83036 HEMOGLOBIN GLYCOSYLATED A1C: CPT

## 2024-12-31 PROCEDURE — 86140 C-REACTIVE PROTEIN: CPT

## 2024-12-31 PROCEDURE — 99214 OFFICE O/P EST MOD 30 MIN: CPT | Mod: 95

## 2024-12-31 PROCEDURE — 83540 ASSAY OF IRON: CPT

## 2024-12-31 PROCEDURE — 82306 VITAMIN D 25 HYDROXY: CPT

## 2024-12-31 PROCEDURE — 80053 COMPREHEN METABOLIC PANEL: CPT

## 2024-12-31 PROCEDURE — 82607 VITAMIN B-12: CPT

## 2024-12-31 PROCEDURE — 81003 URINALYSIS AUTO W/O SCOPE: CPT

## 2024-12-31 PROCEDURE — 85025 COMPLETE CBC W/AUTO DIFF WBC: CPT

## 2024-12-31 PROCEDURE — 86803 HEPATITIS C AB TEST: CPT

## 2024-12-31 PROCEDURE — 36415 COLL VENOUS BLD VENIPUNCTURE: CPT

## 2024-12-31 PROCEDURE — 83550 IRON BINDING TEST: CPT

## 2024-12-31 RX ORDER — BUPROPION HYDROCHLORIDE 150 MG/1
150 TABLET ORAL EVERY MORNING
COMMUNITY
Start: 2024-12-30

## 2024-12-31 RX ORDER — CYANOCOBALAMIN 1000 UG/ML
1000 INJECTION, SOLUTION INTRAMUSCULAR; SUBCUTANEOUS
COMMUNITY

## 2024-12-31 RX ORDER — CLONAZEPAM 0.5 MG/1
0.5 TABLET ORAL
COMMUNITY
Start: 2024-12-30

## 2024-12-31 NOTE — PATIENT INSTRUCTIONS
Long term use of PPI's are associated with micronutrient malabsorption such as B12, calcium, magnesium, and iron, as well as increased risk of fracture, pneumonia and C.diff.

## 2024-12-31 NOTE — PROGRESS NOTES
Linsey is a 54 year old who is being evaluated via a billable video visit.    How would you like to obtain your AVS? MyChart  If the video visit is dropped, the invitation should be resent by: Text to cell phone: [phone]  Will anyone else be joining your video visit? No      Assessment & Plan     Diarrhea, unspecified type  Unclear etiology.  Based on reports, symptoms began prior to travel to [place], however acutely worsened during her time there, will collect stool culture to rule out infectious process.  Will check inflammatory markers as well to assess for an inflammatory bowel issue given history of IBS.  Encouraged to continue with a bland diet, does have Zofran on hand at home. Recommend trial dose to see if it improves nausea and allows for increased appetite.  Encouraged adequate fluid intake as tolerated. Defer additional treatment recommendations until culture has been resulted. Goal to [facility] placed, unclear if diagnostic colonoscopy has ever taken placed.   - TSH with free T4 reflex; Future  - Comprehensive metabolic panel (BMP + Alb, Alk Phos, ALT, AST, Total. Bili, TP); Future  - Enteric Bacteria and Virus Panel by [facility] Stool; Future  - CRP, inflammation; Future  - Adult GI  Referral - Consult Only; Future    Nausea  See plan above.  - Helicobacter pylori Antigen Stool; Future  - Adult GI  Referral - Consult Only; Future    Constipation, unspecified constipation type  Described symptoms of constipation mixed with diarrhea.   - Adult GI  Referral - Consult Only; Future    Gastroesophageal reflux disease without esophagitis  Burning sensation potentially consistent with GERD.  Will begin omeprazole.  Instructed to complete H. pylori testing prior to beginning omeprazole.  - omeprazole (PRILOSEC) 20 MG DR capsule; Take 1 capsule (20 mg) by mouth daily.      BMI  Estimated body mass index is 25.57 kg/m  as calculated from the following:    Height as of 5/15/24: 1.665 m (5'  "5.55\").    Weight as of 5/15/24: 70.9 kg (156 lb 4.8 oz).     Yvan Stiles is a 54 year old, presenting for the following health issues:  No chief complaint on file.        12/31/2024    10:20 AM   Additional Questions   Roomed by Tiesha JAMES     Diarrhea  Onset/Duration: 1 month; worsened 10 days ago while in [place]  Description: Constant gurgling and abdominal discomfort; burning sensation at anal sphincter; feels like there are hard lumps in abdomnen       Consistency of stool: loose and with white spots on it       Blood in stool: No       Number of loose stools past 24 hours: 2  Progression of Symptoms: worsening  Accompanying signs and symptoms:       Fever: No       Nausea/Vomiting: YES- nausea       Abdominal pain: YES- all over abdominal discomfort       Weight loss: No       Episodes of constipation: YES  History   Ill contacts: No  Recent use of antibiotics: YES- travelers diarrhea medication (possibly azithromycin)  Recent travels: YES- to [place]  Recent medication-new or changes(Rx or OTC): YES- dose changes only  Precipitating or alleviating factors: None  Therapies tried and outcome: Imodium AD, PeptoBismol, and soft diet    Diagnosed years ago with IBS, notes she typically experiences fluctuations between constipation and diarrhea.  Upon arrival to [place] symptoms significantly worsened (12/20). Feels that there both constipation and diarrhea is present due to  feeling lumps inside her colon, as well as audible gurgling. Trying to stay hydrated by drinking warm fluids, notes stomach feels like it's on fire. Minimal PO intake, poor appetite. Reports adequate fluid intake. Tolerated applesauce, cranberry juice, bread. Nausea present, but no emesis. Strange BM today, looked like \"white mold.\" Last colonoscopy in 2019, screening only. Notes some belching, which is unusual for her. Denies body aches, fevers and chills.     Long time ago notes working with GI, found to have IBS, has coped well since. "     Upper GI history significant for gastritis without esophagitis status ,GERD, hiatal hernia s/p repair with Nissen fundoplication 7/2021.     Notes reduction in loose stools with use of liraglutide.  Uses MiraLAX regularly promote regular bowel movements.      Objective       Vitals:  No vitals were obtained today due to virtual visit.    Physical Exam   GENERAL: alert and no distress  EYES: Eyes grossly normal to inspection.  No discharge or erythema, or obvious scleral/conjunctival abnormalities.  RESP: No audible wheeze, cough, or visible cyanosis.    SKIN: Visible skin clear. No significant rash, abnormal pigmentation or lesions.  NEURO: Cranial nerves grossly intact.  Mentation and speech appropriate for age.  PSYCH: Appropriate affect, tone, and pace of words      Video-Visit Details    Type of service:  Video Visit   Originating Location (pt. Location): Home    Distant Location (provider location):  On-site  Platform used for Video Visit: Vivian  Signed Electronically by: JAMIE Scott CNP

## 2025-01-06 ENCOUNTER — APPOINTMENT (OUTPATIENT)
Dept: LAB | Facility: CLINIC | Age: 55
End: 2025-01-06
Payer: COMMERCIAL

## 2025-01-06 LAB

## 2025-01-07 ENCOUNTER — TELEPHONE (OUTPATIENT)
Dept: FAMILY MEDICINE | Facility: CLINIC | Age: 55
End: 2025-01-07
Payer: COMMERCIAL

## 2025-01-07 DIAGNOSIS — R19.7 DIARRHEA, UNSPECIFIED TYPE: Primary | ICD-10-CM

## 2025-01-07 DIAGNOSIS — A49.8 ESCHERICHIA COLI (E. COLI) INFECTION: ICD-10-CM

## 2025-01-07 LAB — H PYLORI AG STL QL IA: NEGATIVE

## 2025-01-07 RX ORDER — CIPROFLOXACIN 250 MG/1
250 TABLET, FILM COATED ORAL 2 TIMES DAILY
Qty: 6 TABLET | Refills: 0 | Status: SHIPPED | OUTPATIENT
Start: 2025-01-07 | End: 2025-01-10

## 2025-01-07 NOTE — TELEPHONE ENCOUNTER
Test Results    Contacts       Contact Date/Time Type Contact Phone/Fax    01/07/2025 11:04 AM CST Phone (Incoming) Linsey Archibald (Self) [phone] (M)            Who ordered the test:  Ferny    Type of test: Lab    Date of test:  01/05/25    Where was the test performed:  At home sample then brought to lab    What are your questions/concerns?:  Still having burning pain and cramping. Wondering if it resolves on own or is there anything to take or do to feel better.     Could we send this information to you in [employer] or would you prefer to receive a phone call?:   No preference   Okay to leave a detailed message?: Yes at Cell number on file:    Telephone Information:   Mobile [phone]

## 2025-01-07 NOTE — TELEPHONE ENCOUNTER
RN called patient and patient reports she still has a lot of burning and pain in her abdomin. Patient reports she some hard lumps in her intestine where where it feels like constipation but also having continued diarrhea. Patient has a lot of gurgling in her stomach   Patient reports she has a decreased appetite.     Patient reports she feels about the same as 12/31/24.    Patient is wondering if she needs not go to work for any length of time due to positive lab due to the positive e.coli she saw in her results.   Patient would like a letter if she needs to not go into work but can work from home.    What can she take to treat the cramping and pain?  Patient has tried Tylenol and imodium but this did not help with the burning in her stomach.     Please advise.    Frieda Nick RN on 1/7/2025 at 2:14 PM

## 2025-01-07 NOTE — TELEPHONE ENCOUNTER
Spoke to patient via telephone.  Discussed treating E. Coli with ciprofloxacin twice daily for 3 days. Risks/benefits discussed. No need to work from home as long as she is able to contain/manage bowel movements. Recommend diligent hand hygiene. Order for CT of abdomen and pelvis placed, faxed to [facility] per patient request. Patient has a visit schedule with [facility] in 2 weeks.     JAMIE Scott CNP

## 2025-01-23 ENCOUNTER — OFFICE VISIT (OUTPATIENT)
Dept: INTERNAL MEDICINE | Facility: CLINIC | Age: 55
End: 2025-01-23
Payer: COMMERCIAL

## 2025-01-23 VITALS
OXYGEN SATURATION: 97 % | TEMPERATURE: 97.9 F | WEIGHT: 138.3 LBS | HEIGHT: 65 IN | RESPIRATION RATE: 12 BRPM | DIASTOLIC BLOOD PRESSURE: 86 MMHG | BODY MASS INDEX: 23.04 KG/M2 | SYSTOLIC BLOOD PRESSURE: 110 MMHG | HEART RATE: 72 BPM

## 2025-01-23 DIAGNOSIS — G45.9 TIA (TRANSIENT ISCHEMIC ATTACK): ICD-10-CM

## 2025-01-23 DIAGNOSIS — E55.9 VITAMIN D DEFICIENCY: ICD-10-CM

## 2025-01-23 DIAGNOSIS — Z78.0 POSTMENOPAUSAL STATUS: ICD-10-CM

## 2025-01-23 DIAGNOSIS — I77.74 DISSECTION, VERTEBRAL ARTERY: ICD-10-CM

## 2025-01-23 DIAGNOSIS — E53.8 VITAMIN B12 DEFICIENCY (NON ANEMIC): ICD-10-CM

## 2025-01-23 DIAGNOSIS — Z00.00 PREVENTATIVE HEALTH CARE: Primary | ICD-10-CM

## 2025-01-23 DIAGNOSIS — F32.A DEPRESSION, UNSPECIFIED DEPRESSION TYPE: ICD-10-CM

## 2025-01-23 DIAGNOSIS — K21.9 GERD WITHOUT ESOPHAGITIS: ICD-10-CM

## 2025-01-23 DIAGNOSIS — E78.5 HYPERLIPIDEMIA LDL GOAL <130: ICD-10-CM

## 2025-01-23 DIAGNOSIS — F98.8 ATTENTION DEFICIT DISORDER, UNSPECIFIED TYPE: ICD-10-CM

## 2025-01-23 PROCEDURE — 99396 PREV VISIT EST AGE 40-64: CPT | Performed by: NURSE PRACTITIONER

## 2025-01-23 RX ORDER — ONDANSETRON 4 MG/1
4 TABLET, ORALLY DISINTEGRATING ORAL EVERY 6 HOURS PRN
Qty: 10 TABLET | Refills: 0 | Status: SHIPPED | OUTPATIENT
Start: 2025-01-23

## 2025-01-23 RX ORDER — CYANOCOBALAMIN 1000 UG/ML
1000 INJECTION, SOLUTION INTRAMUSCULAR; SUBCUTANEOUS
Qty: 1 ML | Refills: 11 | Status: SHIPPED | OUTPATIENT
Start: 2025-01-23

## 2025-01-23 SDOH — HEALTH STABILITY: PHYSICAL HEALTH: ON AVERAGE, HOW MANY DAYS PER WEEK DO YOU ENGAGE IN MODERATE TO STRENUOUS EXERCISE (LIKE A BRISK WALK)?: 3 DAYS

## 2025-01-23 ASSESSMENT — PAIN SCALES - GENERAL: PAINLEVEL_OUTOF10: NO PAIN (0)

## 2025-01-23 ASSESSMENT — SOCIAL DETERMINANTS OF HEALTH (SDOH): HOW OFTEN DO YOU GET TOGETHER WITH FRIENDS OR RELATIVES?: ONCE A WEEK

## 2025-01-23 ASSESSMENT — PATIENT HEALTH QUESTIONNAIRE - PHQ9: SUM OF ALL RESPONSES TO PHQ QUESTIONS 1-9: 9

## 2025-01-23 NOTE — PROGRESS NOTES
Preventive Care Visit  [facility] STEFANIE Banks NP,    Jan 23, 2025      Assessment & Plan   Problem List Items Addressed This Visit       Attention deficit disorder    Hyperlipidemia LDL goal <130    Relevant Orders    Lipid Profile (Chol, Trig, HDL, LDL calc)    Vitamin D deficiency    Dissection, vertebral artery     Other Visit Diagnoses       Preventative health care    -  Primary    Postmenopausal status        Relevant Orders    DX Bone Density    Depression, unspecified depression type        TIA (transient ischemic attack)               Patient restarted cholesterol medication given elevated lipids.      Recommend b12 and iron supplement every other day.           Counseling  Appropriate preventive services were addressed with this patient via screening, questionnaire, or discussion as appropriate for fall prevention, nutrition, physical activity, Tobacco-use cessation, social engagement, weight loss and cognition.  Checklist reviewing preventive services available has been given to the patient.  Reviewed patient's diet, addressing concerns and/or questions.   She is at risk for lack of exercise and has been provided with information to increase physical activity for the benefit of her well-being.   She is at risk for psychosocial distress and has been provided with information to reduce risk.           Yvan Stiles is a 54 year old, presenting for the following:  Physical        1/23/2025     2:46 PM   Additional Questions   Roomed by Abdullahi Lr        Wt Readings from Last 5 Encounters:   01/23/25 62.7 kg (138 lb 4.8 oz)   05/15/24 70.9 kg (156 lb 4.8 oz)   02/06/24 68.9 kg (152 lb)   12/20/23 71.6 kg (157 lb 14.4 oz)   02/08/23 72.6 kg (160 lb 1.6 oz)       HPI          Health Care Directive  Patient does not have a Health Care Directive: Advance Directive received and scanned. Click on Code in the patient header to view.      1/23/2025   General Health   How would you rate  your overall physical health? (!) FAIR   Feel stress (tense, anxious, or unable to sleep) To some extent   (!) STRESS CONCERN      1/23/2025   Nutrition   Three or more servings of calcium each day? Yes   Diet: Regular (no restrictions)   How many servings of fruit and vegetables per day? (!) 2-3   How many sweetened beverages each day? 0-1         1/23/2025   Exercise   Days per week of moderate/strenous exercise 3 days         1/23/2025   Social Factors   Frequency of gathering with friends or relatives Once a week   Worry food won't last until get money to buy more No   Food not last or not have enough money for food? No   Do you have housing? (Housing is defined as stable permanent housing and does not include staying ouside in a car, in a tent, in an abandoned building, in an overnight shelter, or couch-surfing.) No   Are you worried about losing your housing? No   Lack of transportation? No   Unable to get utilities (heat,electricity)? No   Want help with housing or utility concern? No   (!) HOUSING CONCERN PRESENT      1/23/2025   Fall Risk   Fallen 2 or more times in the past year? No   Trouble with walking or balance? No          1/23/2025   Dental   Dentist two times every year? Yes            Today's PHQ-2 Score:       1/23/2025     2:42 PM   PHQ-2 ( 1999 [employer])   Q1: Little interest or pleasure in doing things 1   Q2: Feeling down, depressed or hopeless 1   PHQ-2 Score 2    Q1: Little interest or pleasure in doing things Several days   Q2: Feeling down, depressed or hopeless Several days   PHQ-2 Score 2       Patient-reported           1/23/2025   Substance Use   Alcohol more than 3/day or more than 7/wk No   Do you use any other substances recreationally? No     Social History     Tobacco Use    Smoking status: Never     Passive exposure: Never    Smokeless tobacco: Never    Tobacco comments:     na   Vaping Use    Vaping status: Never Used   Substance Use Topics    Alcohol use: Yes     Alcohol/week:  1.0 standard drink of alcohol     Types: 1 Glasses of wine per week     Comment: rarely    Drug use: No           2/8/2023   LAST FHS-7 RESULTS   1st degree relative breast or ovarian cancer Yes    Any relative bilateral breast cancer No    Any male have breast cancer No    Any ONE woman have BOTH breast AND ovarian cancer Yes    Any woman with breast cancer before 50yrs No    2 or more relatives with breast AND/OR ovarian cancer Yes    2 or more relatives with breast AND/OR bowel cancer No        Proxy-reported        Mammogram Screening - Mammogram every 1-2 years updated in Health Maintenance based on mutual decision making        1/23/2025   STI Screening   New sexual partner(s) since last STI/HIV test? No     History of abnormal Pap smear: Status post hysterectomy with removal of cervix and no history of CIN2 or greater or cervical cancer. Health Maintenance and Surgical History updated.        6/26/2013    12:00 AM   PAP / HPV   PAP (Historical) NIL      ASCVD Risk   The 10-year ASCVD risk score (Jakob SETHI, et al., 2019) is: 1.6%    Values used to calculate the score:      Age: 54 years      Sex: Female      Is Non- : No      Diabetic: No      Tobacco smoker: No      Systolic Blood Pressure: 110 mmHg      Is BP treated: No      HDL Cholesterol: 58 mg/dL      Total Cholesterol: 221 mg/dL           Reviewed and updated as needed this visit by Provider   Tobacco  Allergies  Meds  Problems  Med Hx  Surg Hx  Fam Hx            Past Medical History:   Diagnosis Date    ADD (attention deficit disorder)     Anxiety     Cancer (H) 2018    Basil cell carcenoma    Depressive disorder 2000    Gastroesophageal reflux disease without esophagitis     Hypertension     Urethral hypermobility 06/26/2013     Past Surgical History:   Procedure Laterality Date    EGD      7/2020    GYN SURGERY  07/12/2012    [facility]    HYSTERECTOMY, PAP NO LONGER INDICATED      LAPAROSCOPIC HERNIORRHAPHY  HIATAL N/A 2021    Procedure: HERNIORRHAPHY, HIATAL, LAPAROSCOPIC;  Surgeon: Mady Potts MD;  Location: [facility]    LAPAROSCOPIC HYSTERECTOMY TOTAL  2013    Procedure: LAPAROSCOPIC HYSTERECTOMY TOTAL;  Laparoscopic Total Hysterectomy,Bilateral Salpingectomy with Sparing of the Ovaries,Uterosacral Suspension,Transvaginal Taping,Perineoplasty;  Surgeon: Sy Castro MD;  Location: [facility]    LAPAROSCOPIC NISSEN FUNDOPLICATION N/A 2021    Procedure: FUNDOPLICATION, NISSEN, LAPAROSCOPIC;  Surgeon: Mady Potts MD;  Location: [facility] OR    mohs surgery      remove skin cancer    [place] BREAST AUGMENTATION Bilateral     SLING TRANSVAGINAL  2013    Procedure: SLING TRANSVAGINAL;;  Surgeon: Sy Castro MD;  Location: [facility] OR     OB History    Para Term  AB Living   4 2 2 0 2 2   SAB IAB Ectopic Multiple Live Births   1 1 0 0 0      # Outcome Date GA Lbr Frantz/2nd Weight Sex Type Anes PTL Lv   4 IAB            3 SAB            2 Term            1 Term              BP Readings from Last 3 Encounters:   25 110/86   05/15/24 116/84   24 125/87    Wt Readings from Last 3 Encounters:   25 62.7 kg (138 lb 4.8 oz)   05/15/24 70.9 kg (156 lb 4.8 oz)   24 68.9 kg (152 lb)                  Patient Active Problem List   Diagnosis    Attention deficit disorder    Anxiety state    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, insomnia type    Hyperlipidemia LDL goal <130    Essential hypertension    QT prolongation    Persons encountering health services in other specified circumstances    History of basal cell carcinoma    Environmental allergies    Elevated LDL cholesterol level    Vitamin D deficiency    Overweight with body mass index (BMI) of 26 to 26.9 in adult    Dissection, vertebral artery    Lumbar pain    Left ear pain    Chest pain, unspecified type    Generalized anxiety disorder    Neurosis    Panic attacks    Severe episode of  recurrent major depressive disorder, without psychotic features (H)     Past Surgical History:   Procedure Laterality Date    EGD      7/2020    GYN SURGERY  07/12/2012    [facility]    HYSTERECTOMY, PAP NO LONGER INDICATED      LAPAROSCOPIC HERNIORRHAPHY HIATAL N/A 07/26/2021    Procedure: HERNIORRHAPHY, HIATAL, LAPAROSCOPIC;  Surgeon: Mady Potts MD;  Location: [facility]    LAPAROSCOPIC HYSTERECTOMY TOTAL  08/12/2013    Procedure: LAPAROSCOPIC HYSTERECTOMY TOTAL;  Laparoscopic Total Hysterectomy,Bilateral Salpingectomy with Sparing of the Ovaries,Uterosacral Suspension,Transvaginal Taping,Perineoplasty;  Surgeon: Sy Castro MD;  Location: [facility]    LAPAROSCOPIC NISSEN FUNDOPLICATION N/A 07/26/2021    Procedure: FUNDOPLICATION, NISSEN, LAPAROSCOPIC;  Surgeon: Mady Potts MD;  Location: [facility] OR    mohs surgery      remove skin cancer    [place] BREAST AUGMENTATION Bilateral     SLING TRANSVAGINAL  08/12/2013    Procedure: SLING TRANSVAGINAL;;  Surgeon: Sy Castro MD;  Location: [facility]       Social History     Tobacco Use    Smoking status: Never     Passive exposure: Never    Smokeless tobacco: Never    Tobacco comments:     na   Substance Use Topics    Alcohol use: Yes     Alcohol/week: 1.0 standard drink of alcohol     Types: 1 Glasses of wine per week     Comment: rarely     Family History   Problem Relation Age of Onset    Lipids Mother         chol    Hypertension Mother     Hyperlipidemia Mother     Anxiety Disorder Mother     Osteoporosis Mother     Lipids Father         chol    Hypertension Father     C.A.D. Father     Heart Disease Father     Hyperlipidemia Father     Depression Father         Adhd         Current Outpatient Medications   Medication Sig Dispense Refill    cyanocobalamin (CYANOCOBALAMIN) 1000 mcg/mL injection Inject 1,000 mcg into the muscle every 30 days.      EPINEPHrine (ANY BX GENERIC EQUIV) 0.3 MG/0.3ML injection 2-pack       gabapentin (NEURONTIN) 300 MG  capsule TAKE 1 CAPSULE BY MOUTH EVERY 6 HOURS AS NEEDED FOR PAIN. MAY TAKE 2 CAPSULES FOR BEDTIME DOSE 75 capsule 3    insulin pen needle (B-D U/F) 31G X 5 MM miscellaneous USE 1 PEN NEEDLE DAILY OR AS DIRECTED. 100 each 1    ondansetron (ZOFRAN ODT) 4 MG ODT tab Take 1 tablet (4 mg) by mouth every 6 hours as needed for nausea or vomiting. 10 tablet 0    rosuvastatin (CRESTOR) 10 MG tablet Take 1 tablet (10 mg) by mouth daily. 90 tablet 3    Tirzepatide 7.5 MG/0.5ML SOAJ 7.5 mg.      traZODone (DESYREL) 100 MG tablet Take 1 tablet (100 mg) by mouth at bedtime 90 tablet 3    valACYclovir (VALTREX) 500 MG tablet Take 2 tablets (1,000 mg) by mouth 2 times daily. 90 tablet 1    vortioxetine (TRINTELLIX) 5 MG tablet Take 1 tablet (5 mg) by mouth daily. (Patient taking differently: Take 15 mg by mouth daily.) 90 tablet 3     Allergies   Allergen Reactions    Bees Swelling     Disfiguring     Polyethylene Glycol Swelling    Wasp Venom Swelling    Allevyn Adhesive [Wound Dressings]     Ciprofloxacin Unknown    Suture Swelling     local swelling and sutures didn't dissolve vyrcil   Suture      Recent Labs   Lab Test 12/31/24  1347 11/22/23  0755 02/08/23  1614 12/19/22  0858 08/02/22  1007 08/15/21  1304 08/13/21  1547 07/21/21  1612 07/03/20  0859   A1C 4.8  --   --   --  5.1  --  5.0  --   --    * 63  --   --  24   < >  --   --  160*   HDL 58 66  --   --  55   < >  --   --  57   TRIG 156* 120  --   --  67   < >  --   --  235*   ALT 9 22 23   < > 41   < >  --    < > 21   CR 0.74 0.88 0.82   < > 0.78   < >  --    < > 0.76   GFRESTIMATED >90 78 85   < > 90   < >  --    < > >90   GFRESTBLACK  --   --   --   --   --   --   --   --  >90   POTASSIUM 4.3 4.5 4.0   < > 4.7   < >  --    < > 3.9   TSH 1.67 1.59  --   --   --    < >  --   --  2.22    < > = values in this interval not displayed.          Review of Systems  Constitutional, HEENT, cardiovascular, pulmonary, GI, , musculoskeletal, neuro, skin, endocrine and psych  "systems are negative, except as otherwise noted.     Objective    Exam  /86   Pulse 72   Temp 97.9  F (36.6  C) (Oral)   Resp 12   Ht 1.651 m (5' 5\")   Wt 62.7 kg (138 lb 4.8 oz)   LMP 07/26/2013   SpO2 97%   BMI 23.01 kg/m     Estimated body mass index is 23.01 kg/m  as calculated from the following:    Height as of this encounter: 1.651 m (5' 5\").    Weight as of this encounter: 62.7 kg (138 lb 4.8 oz).    Physical Exam  GENERAL: alert and no distress  EYES: Eyes grossly normal to inspection, PERRL and conjunctivae and sclerae normal  HENT: ear canals and TM's normal, nose and mouth without ulcers or lesions  NECK: no adenopathy  RESP: lungs clear to auscultation - no rales, rhonchi or wheezes  CV: regular rate and rhythm, normal S1 S2, no S3 or S4, no murmur, click or rub, no peripheral edema  ABDOMEN: soft, nontender, no hepatosplenomegaly, no masses and bowel sounds normal  MS: no gross musculoskeletal defects noted, no edema  SKIN: no suspicious lesions or rashes  NEURO: Normal strength and tone, mentation intact and speech normal  PSYCH: mentation appears normal, affect normal/bright        Signed Electronically by: Jacinda Banks NP    "

## 2025-01-25 ENCOUNTER — HEALTH MAINTENANCE LETTER (OUTPATIENT)
Age: 55
End: 2025-01-25

## 2025-02-26 ENCOUNTER — TRANSFERRED RECORDS (OUTPATIENT)
Dept: HEALTH INFORMATION MANAGEMENT | Facility: CLINIC | Age: 55
End: 2025-02-26
Payer: COMMERCIAL

## (undated) DEVICE — ENDO SCOPE WARMER SEAL  C3101

## (undated) DEVICE — SU MONOCRYL 4-0 PS-2 27" UND Y426H

## (undated) DEVICE — DRAIN PENROSE 3/8X18" LATEX 0918020

## (undated) DEVICE — LINEN GOWN XLG 5407

## (undated) DEVICE — CONNECTOR BLAKE DRAIN SGL BCC1

## (undated) DEVICE — WIPES FOLEY CARE SURESTEP PROVON DFC100

## (undated) DEVICE — DRSG PRIMAPORE 02X3" 7133

## (undated) DEVICE — GUIDEWIRE AMPLATZ SUPER STIFF 0.035"X180CM M001465250

## (undated) DEVICE — SU TICRON 2 GS-21DA 36" 3146-81

## (undated) DEVICE — ENDO TROCAR FIRST ENTRY KII FIOS Z-THRD 12X100MM CTF73

## (undated) DEVICE — ADH SKIN CLOSURE PREMIERPRO EXOFIN 1.0ML 3470

## (undated) DEVICE — SU PLEDGET SOFT TFE 13MMX7MMX1.5MM D7044

## (undated) DEVICE — DRAIN JACKSON PRATT ROUND SIL 19FR W/TROCAR LF JP-2232

## (undated) DEVICE — SUCTION DRY CHEST DRAIN OASIS 3600-100

## (undated) DEVICE — CATH TRAY FOLEY SURESTEP 16FR W/URNE MTR STLK LATEX A303316A

## (undated) DEVICE — SUCTION MANIFOLD NEPTUNE 2 SYS 4 PORT 0702-020-000

## (undated) DEVICE — DEVICE SUTURE PASSER 14GA WECK EFX EFXSP2

## (undated) DEVICE — ESU LIGASURE MARYLAND LAPAROSCOPIC SLR/DVDR 5MMX37CM LF1937

## (undated) DEVICE — BLADE CLIPPER SGL USE 9680

## (undated) DEVICE — LINEN TOWEL PACK X5 5464

## (undated) DEVICE — KIT CONNECTOR FOR OLYMPUS ENDOSCOPES DEFENDO 100310

## (undated) DEVICE — JELLY LUBRICATING SURGILUBE 2OZ TUBE

## (undated) DEVICE — SU SILK 2-0 SH 30" K833H

## (undated) DEVICE — SU SILK 0 SH 30" K834H

## (undated) DEVICE — ESU PENCIL W/COATED BLADE E2450H

## (undated) DEVICE — ESU ELEC BLADE 2.75" COATED/INSULATED E1455

## (undated) DEVICE — DRAPE IOBAN INCISE 23X17" 6650EZ

## (undated) DEVICE — ENDO SYSTEM WATER BOTTLE & TUBING W/CO2 FILTER 00711549

## (undated) DEVICE — SYR 30ML SLIP TIP W/O NDL 302833

## (undated) DEVICE — DRAPE SHEET REV FOLD 3/4 9349

## (undated) DEVICE — Device

## (undated) DEVICE — PREP CHLORAPREP 26ML TINTED ORANGE  260815

## (undated) DEVICE — TUBING SUCTION 10'X3/16" N510

## (undated) DEVICE — LINEN TOWEL PACK X6 WHITE 5487

## (undated) RX ORDER — BUPIVACAINE HYDROCHLORIDE 2.5 MG/ML
INJECTION, SOLUTION EPIDURAL; INFILTRATION; INTRACAUDAL
Status: DISPENSED
Start: 2021-07-26

## (undated) RX ORDER — HYDROMORPHONE HYDROCHLORIDE 1 MG/ML
INJECTION, SOLUTION INTRAMUSCULAR; INTRAVENOUS; SUBCUTANEOUS
Status: DISPENSED
Start: 2021-07-26

## (undated) RX ORDER — KETOROLAC TROMETHAMINE 30 MG/ML
INJECTION, SOLUTION INTRAMUSCULAR; INTRAVENOUS
Status: DISPENSED
Start: 2021-07-26

## (undated) RX ORDER — FENTANYL CITRATE 50 UG/ML
INJECTION, SOLUTION INTRAMUSCULAR; INTRAVENOUS
Status: DISPENSED
Start: 2021-07-26

## (undated) RX ORDER — HYDROMORPHONE HCL IN WATER/PF 6 MG/30 ML
PATIENT CONTROLLED ANALGESIA SYRINGE INTRAVENOUS
Status: DISPENSED
Start: 2021-07-26

## (undated) RX ORDER — ACETAMINOPHEN 325 MG/1
TABLET ORAL
Status: DISPENSED
Start: 2021-07-26

## (undated) RX ORDER — SODIUM CHLORIDE, SODIUM LACTATE, POTASSIUM CHLORIDE, CALCIUM CHLORIDE 600; 310; 30; 20 MG/100ML; MG/100ML; MG/100ML; MG/100ML
INJECTION, SOLUTION INTRAVENOUS
Status: DISPENSED
Start: 2021-07-26

## (undated) RX ORDER — ONDANSETRON 2 MG/ML
INJECTION INTRAMUSCULAR; INTRAVENOUS
Status: DISPENSED
Start: 2021-07-26

## (undated) RX ORDER — CEFAZOLIN SODIUM 2 G/100ML
INJECTION, SOLUTION INTRAVENOUS
Status: DISPENSED
Start: 2021-07-26